# Patient Record
Sex: FEMALE | Race: BLACK OR AFRICAN AMERICAN | NOT HISPANIC OR LATINO | Employment: FULL TIME | URBAN - METROPOLITAN AREA
[De-identification: names, ages, dates, MRNs, and addresses within clinical notes are randomized per-mention and may not be internally consistent; named-entity substitution may affect disease eponyms.]

---

## 2017-02-13 ENCOUNTER — OFFICE VISIT (OUTPATIENT)
Dept: OPHTHALMOLOGY | Facility: CLINIC | Age: 57
End: 2017-02-13
Payer: COMMERCIAL

## 2017-02-13 DIAGNOSIS — H10.9 BACTERIAL CONJUNCTIVITIS: Primary | ICD-10-CM

## 2017-02-13 PROCEDURE — 99999 PR PBB SHADOW E&M-EST. PATIENT-LVL I: CPT | Mod: PBBFAC,,, | Performed by: OPHTHALMOLOGY

## 2017-02-13 PROCEDURE — 92012 INTRM OPH EXAM EST PATIENT: CPT | Mod: S$GLB,,, | Performed by: OPHTHALMOLOGY

## 2017-02-13 RX ORDER — POLYMYXIN B SULFATE AND TRIMETHOPRIM 1; 10000 MG/ML; [USP'U]/ML
1 SOLUTION OPHTHALMIC 4 TIMES DAILY
Qty: 1 BOTTLE | Refills: 1 | Status: SHIPPED | OUTPATIENT
Start: 2017-02-13 | End: 2017-02-20

## 2017-02-13 NOTE — PROGRESS NOTES
HPI     Patient c/o sticky ou upon awakening in the am with discharge os< od for   3 weeks with itching and burning.      H/O RETINA DETACHMENT OS  LATTICE / COBBLESTONE OU  MAC HOLE OS  HIGH MYOPE -9  PERIPHERAL PIGMENTATION   H/O SARCOID UVEITIS  GUTTATA OU  PCIOL OS  CAT OD         Last edited by IAM Vargas on 2/13/2017 10:39 AM.         Assessment /Plan     For exam results, see Encounter Report.      ICD-10-CM ICD-9-CM    1. Bacterial conjunctivitis H10.9 372.39 OS >OD   polymyxin B sulf-trimethoprim (POLYTRIM) 10,000 unit- 1 mg/mL Drop     041.9        Start Polytrim QID OU, precautions discussed with pt      RETURN TO CLINIC 1 month for Full Exam

## 2017-02-20 ENCOUNTER — OFFICE VISIT (OUTPATIENT)
Dept: RHEUMATOLOGY | Facility: CLINIC | Age: 57
End: 2017-02-20
Payer: COMMERCIAL

## 2017-02-20 ENCOUNTER — LAB VISIT (OUTPATIENT)
Dept: LAB | Facility: HOSPITAL | Age: 57
End: 2017-02-20
Attending: INTERNAL MEDICINE
Payer: COMMERCIAL

## 2017-02-20 VITALS
HEART RATE: 84 BPM | HEIGHT: 65 IN | BODY MASS INDEX: 34.2 KG/M2 | SYSTOLIC BLOOD PRESSURE: 127 MMHG | WEIGHT: 205.25 LBS | DIASTOLIC BLOOD PRESSURE: 78 MMHG

## 2017-02-20 DIAGNOSIS — E66.09 NON MORBID OBESITY DUE TO EXCESS CALORIES: ICD-10-CM

## 2017-02-20 DIAGNOSIS — M54.2 NECK PAIN ON RIGHT SIDE: ICD-10-CM

## 2017-02-20 DIAGNOSIS — E87.6 HYPOKALEMIA: ICD-10-CM

## 2017-02-20 DIAGNOSIS — D86.9 SARCOIDOSIS: Primary | ICD-10-CM

## 2017-02-20 DIAGNOSIS — M17.0 PRIMARY OSTEOARTHRITIS OF BOTH KNEES: Chronic | ICD-10-CM

## 2017-02-20 DIAGNOSIS — Z51.81 MEDICATION MONITORING ENCOUNTER: Chronic | ICD-10-CM

## 2017-02-20 DIAGNOSIS — D86.9 SARCOIDOSIS: ICD-10-CM

## 2017-02-20 LAB
ALBUMIN SERPL BCP-MCNC: 3.7 G/DL
ALP SERPL-CCNC: 85 U/L
ALT SERPL W/O P-5'-P-CCNC: 13 U/L
ANION GAP SERPL CALC-SCNC: 12 MMOL/L
AST SERPL-CCNC: 16 U/L
BASOPHILS # BLD AUTO: 0.02 K/UL
BASOPHILS NFR BLD: 0.3 %
BILIRUB SERPL-MCNC: 0.6 MG/DL
BILIRUB UR QL STRIP: NEGATIVE
BUN SERPL-MCNC: 12 MG/DL
CALCIUM SERPL-MCNC: 9.4 MG/DL
CHLORIDE SERPL-SCNC: 105 MMOL/L
CLARITY UR: CLEAR
CO2 SERPL-SCNC: 26 MMOL/L
COLOR UR: YELLOW
CREAT SERPL-MCNC: 0.9 MG/DL
CRP SERPL-MCNC: 2.6 MG/L
DIFFERENTIAL METHOD: ABNORMAL
EOSINOPHIL # BLD AUTO: 0.1 K/UL
EOSINOPHIL NFR BLD: 1.4 %
ERYTHROCYTE [DISTWIDTH] IN BLOOD BY AUTOMATED COUNT: 12.9 %
ERYTHROCYTE [SEDIMENTATION RATE] IN BLOOD BY WESTERGREN METHOD: 30 MM/HR
EST. GFR  (AFRICAN AMERICAN): >60 ML/MIN/1.73 M^2
EST. GFR  (NON AFRICAN AMERICAN): >60 ML/MIN/1.73 M^2
GLUCOSE SERPL-MCNC: 131 MG/DL
GLUCOSE UR QL STRIP: NEGATIVE
HCT VFR BLD AUTO: 38.3 %
HGB BLD-MCNC: 12.5 G/DL
HGB UR QL STRIP: NEGATIVE
KETONES UR QL STRIP: NEGATIVE
LEUKOCYTE ESTERASE UR QL STRIP: NEGATIVE
LYMPHOCYTES # BLD AUTO: 2.7 K/UL
LYMPHOCYTES NFR BLD: 46.3 %
MCH RBC QN AUTO: 31.8 PG
MCHC RBC AUTO-ENTMCNC: 32.6 %
MCV RBC AUTO: 98 FL
MONOCYTES # BLD AUTO: 0.3 K/UL
MONOCYTES NFR BLD: 4.7 %
NEUTROPHILS # BLD AUTO: 2.7 K/UL
NEUTROPHILS NFR BLD: 47.3 %
NITRITE UR QL STRIP: NEGATIVE
PH UR STRIP: 6 [PH] (ref 5–8)
PLATELET # BLD AUTO: 228 K/UL
PMV BLD AUTO: 10.4 FL
POTASSIUM SERPL-SCNC: 3.2 MMOL/L
PROT SERPL-MCNC: 7.2 G/DL
PROT UR QL STRIP: ABNORMAL
RBC # BLD AUTO: 3.93 M/UL
SODIUM SERPL-SCNC: 143 MMOL/L
SP GR UR STRIP: 1.02 (ref 1–1.03)
URN SPEC COLLECT METH UR: ABNORMAL
WBC # BLD AUTO: 5.79 K/UL

## 2017-02-20 PROCEDURE — 85651 RBC SED RATE NONAUTOMATED: CPT | Mod: PO

## 2017-02-20 PROCEDURE — 85025 COMPLETE CBC W/AUTO DIFF WBC: CPT | Mod: PO

## 2017-02-20 PROCEDURE — 99999 PR PBB SHADOW E&M-EST. PATIENT-LVL III: CPT | Mod: PBBFAC,,, | Performed by: INTERNAL MEDICINE

## 2017-02-20 PROCEDURE — 36415 COLL VENOUS BLD VENIPUNCTURE: CPT | Mod: PO

## 2017-02-20 PROCEDURE — 3078F DIAST BP <80 MM HG: CPT | Mod: S$GLB,,, | Performed by: INTERNAL MEDICINE

## 2017-02-20 PROCEDURE — 86140 C-REACTIVE PROTEIN: CPT

## 2017-02-20 PROCEDURE — 3074F SYST BP LT 130 MM HG: CPT | Mod: S$GLB,,, | Performed by: INTERNAL MEDICINE

## 2017-02-20 PROCEDURE — 99214 OFFICE O/P EST MOD 30 MIN: CPT | Mod: S$GLB,,, | Performed by: INTERNAL MEDICINE

## 2017-02-20 PROCEDURE — 81003 URINALYSIS AUTO W/O SCOPE: CPT | Mod: PO

## 2017-02-20 PROCEDURE — 80053 COMPREHEN METABOLIC PANEL: CPT | Mod: PO

## 2017-02-20 PROCEDURE — 82164 ANGIOTENSIN I ENZYME TEST: CPT

## 2017-02-20 RX ORDER — AZATHIOPRINE 50 MG/1
TABLET ORAL
Qty: 90 TABLET | Refills: 3 | Status: SHIPPED | OUTPATIENT
Start: 2017-02-20 | End: 2017-06-28 | Stop reason: SDUPTHER

## 2017-02-20 NOTE — PATIENT INSTRUCTIONS
Use moist heat and muscle rub to neck with advil for neck pain    Ok to resume gabapentin in future if back pain worsens    Lowered Imuran to 1 tablet per day     Encourage weight loss, dieting, and exercise

## 2017-02-20 NOTE — PROGRESS NOTES
CC: pain in hands, L knee, bottom of feet     Mrs. Montalvo is a 55 y/o AAF with history of osteoarthritis and sarcoidosis. Sarcoidosis was diagnosed in 1985 through a biopsy of a spot on her liver. She has also has vision lost that she has regained since that time. Today, she is on Imuran 75mg daily. She is only endoses SOB when she cleans and when she moves around her house a lot. But states she can walk at least 1/2 mile without SOB. She also has epigastric abdominal pain from time to time. She has R shoulder pain that starts in her neck and radiates down her right arm. She states it's more of a muscle pain. It has been present for months. She takes advil which does help with the pain. She also complaints of soreness/tingling in her hands and the arches of her feet bilaterally. Also endorses L  Knee pain (which has had a replacement in the past). Does not take aleve for pain, just advil about every other week as needed for pain.     Denies chest pain, nausea, vomiting, diarrhea, constipation, fevers, chills, rash.     Vitals:    02/20/17 1439   BP: 127/78   Pulse: 84     PE:   General: alert and orientated  Cardio: rrr, no m, r, g  Resp: CTA bilaterally, no w, r, r   MSK: normal ROM. No swelling noted in any joints, no erythema. Pain on palpation of the MCP and PIP joints bilaterally in hands. No crepitus noted in R knee. Tender to palpation of the R trapezius muscle. No cervical spinal tenderness.    Neuro: CN II-XII intact bilaterally     Results for ASHLYN MONTALVO (MRN [id number]) as of 2/20/2017 15:36   Ref. Range 2/20/2017 14:08   WBC Latest Ref Range: 3.90 - 12.70 K/uL 5.79   RBC Latest Ref Range: 4.00 - 5.40 M/uL 3.93 (L)   Hemoglobin Latest Ref Range: 12.0 - 16.0 g/dL 12.5   Hematocrit Latest Ref Range: 37.0 - 48.5 % 38.3   MCV Latest Ref Range: 82 - 98 fL 98   MCH Latest Ref Range: 27.0 - 31.0 pg 31.8 (H)   MCHC Latest Ref Range: 32.0 - 36.0 % 32.6   RDW Latest Ref Range: 11.5 - 14.5 % 12.9    Platelets Latest Ref Range: 150 - 350 K/uL 228   MPV Latest Ref Range: 9.2 - 12.9 fL 10.4   Gran% Latest Ref Range: 38.0 - 73.0 % 47.3   Gran # Latest Ref Range: 1.8 - 7.7 K/uL 2.7   Lymph% Latest Ref Range: 18.0 - 48.0 % 46.3   Lymph # Latest Ref Range: 1.0 - 4.8 K/uL 2.7   Mono% Latest Ref Range: 4.0 - 15.0 % 4.7   Mono # Latest Ref Range: 0.3 - 1.0 K/uL 0.3   Eosinophil% Latest Ref Range: 0.0 - 8.0 % 1.4   Eos # Latest Ref Range: 0.0 - 0.5 K/uL 0.1   Basophil% Latest Ref Range: 0.0 - 1.9 % 0.3   Baso # Latest Ref Range: 0.00 - 0.20 K/uL 0.02   Sodium Latest Ref Range: 136 - 145 mmol/L 143   Potassium Latest Ref Range: 3.5 - 5.1 mmol/L 3.2 (L)   Chloride Latest Ref Range: 95 - 110 mmol/L 105   CO2 Latest Ref Range: 23 - 29 mmol/L 26   Anion Gap Latest Ref Range: 8 - 16 mmol/L 12   BUN, Bld Latest Ref Range: 6 - 20 mg/dL 12   Creatinine Latest Ref Range: 0.5 - 1.4 mg/dL 0.9   eGFR if non African American Latest Ref Range: >60 mL/min/1.73 m^2 >60   eGFR if  Latest Ref Range: >60 mL/min/1.73 m^2 >60   Glucose Latest Ref Range: 70 - 110 mg/dL 131 (H)   Calcium Latest Ref Range: 8.7 - 10.5 mg/dL 9.4   Alkaline Phosphatase Latest Ref Range: 55 - 135 U/L 85   Total Protein Latest Ref Range: 6.0 - 8.4 g/dL 7.2   Albumin Latest Ref Range: 3.5 - 5.2 g/dL 3.7   Total Bilirubin Latest Ref Range: 0.1 - 1.0 mg/dL 0.6   AST Latest Ref Range: 10 - 40 U/L 16   ALT Latest Ref Range: 10 - 44 U/L 13   Results for ASHLYN MONTALVO (MRN [id number]) as of 2/20/2017 15:36   Ref. Range 2/20/2017 14:05   Specimen UA Unknown Urine, Clean Catch   Color, UA Latest Ref Range: Yellow, Straw, Maru  Yellow   pH, UA Latest Ref Range: 5.0 - 8.0  6.0   Specific Gravity, UA Latest Ref Range: 1.005 - 1.030  1.025   Appearance, UA Latest Ref Range: Clear  Clear   Protein, UA Latest Ref Range: Negative  Trace (A)   Glucose, UA Latest Ref Range: Negative  Negative   Ketones, UA Latest Ref Range: Negative  Negative   Occult Blood UA  Latest Ref Range: Negative  Negative   Nitrite, UA Latest Ref Range: Negative  Negative   Bilirubin (UA) Latest Ref Range: Negative  Negative   Leukocytes, UA Latest Ref Range: Negative  Negative         Impression and Plan:  Mrs. Sheffield is a 55 y/o AAF with history of osteoarthritis and sarcoidosis. Currently well controlled on Imuran 75mg daily. Currently having neck pain with bilateral hand and arch pains.     Sarcoidosis:  - well controlled  - will decrease Imuran from 75mg daily to 50mg daily    Osteoarthritis:  - possible R neck pain is from degenerative changes in neck  - continuing conservative therapy with moist warmth to neck for muscle pain and advil as needed for pain  - feel that her hand pain is likely referred pain from her neck at this time- no evidence of synovitis or loss of motion in any hand joint  - can restart gabapentin in future if needed for back and R hip pain  - Encourage weight loss, dieting, and exercise     Proteinuria  - trace  - will continue to monitor with urinalysis    Hypokalemia  - likely from diuretic   - advised patient to increase K through food choices     Plan:  Use moist heat and muscle rub to neck with advil for neck pain  Ok to resume gabapentin in future if back pain worsens  Lowered Imuran to 50mg daily from 75mg daily - follow lab  Encourage weight loss, dieting, and exercise     Return to clinic in 4 months. Pre-visit labs placed today.

## 2017-02-20 NOTE — MR AVS SNAPSHOT
[facility]  [address] [place] [address]  Phone: [phone]  Fax: [phone]                  Janna Sheffield   2017 2:30 PM   Office Visit    Description:  Female : 1960   Provider:  Odin Barraza MD   Department:  [facility] - Rheumatology           Reason for Visit     Sarcoidosis     Osteoarthritis     Pain           Diagnoses this Visit        Comments    Sarcoidosis    -  Primary     Medication monitoring encounter                To Do List           Future Appointments        Provider Department Dept Phone    3/20/2017 9:15 AM MD Russ Finch - Ophthalmology [phone]    5/15/2017 8:10 AM LABORATORY, [facility] [phone]    2017 1:00 PM RUDY Botello Jr., MD [facility] Internal Medicine [phone]    2017 10:30 AM LABORATORY, [facility] [phone]    2017 10:40 AM SPECIMEN, [facility] [phone]      Goals (5 Years of Data)     None      Follow-Up and Disposition     Return in about 4 months (around 2017) for jasmina, regular for lab and urinalysis .       These Medications        Disp Refills Start End    azathioprine (IMURAN) 50 mg Tab 90 tablet 3 2017     TAKE ONE TABLET BY MOUTH ONCE DAILY WITH  ORANGE  JUICE  AS  DIRECTED    Pharmacy: [facility] [address] [place] [address] R[place]  Ph #: [phone]         [facility] On Call     [facility] Nurse Care Line -  Assistance  Registered nurses in the [facility] Center provide clinical advisement, health education, appointment booking, and other advisory services.  Call for this free service at [phone].             Medications           Message regarding Medications     Verify the changes and/or additions to your medication regime listed below are the same as discussed with your clinician today.  If any of these changes or additions are incorrect,  "please notify your healthcare provider.        CHANGE how you are taking these medications     Start Taking Instead of    azathioprine (IMURAN) 50 mg Tab azathioprine (IMURAN) 50 mg Tab    Dosage:  TAKE ONE TABLET BY MOUTH ONCE DAILY WITH  ORANGE  JUICE  AS  DIRECTED Dosage:  TAKE ONE & ONE-HALF TABLETS BY MOUTH ONCE DAILY WITH  ORANGE  JUICE  AS  DIRECTED    Reason for Change:  Reorder       STOP taking these medications     polymyxin B sulf-trimethoprim (POLYTRIM) 10,000 unit- 1 mg/mL Drop Place 1 drop into both eyes 4 (four) times daily.           Verify that the below list of medications is an accurate representation of the medications you are currently taking.  If none reported, the list may be blank. If incorrect, please contact your healthcare provider. Carry this list with you in case of emergency.           Current Medications     azathioprine (IMURAN) 50 mg Tab TAKE ONE TABLET BY MOUTH ONCE DAILY WITH  ORANGE  JUICE  AS  DIRECTED    hydrochlorothiazide (HYDRODIURIL) 25 MG tablet Take 1 tablet (25 mg total) by mouth once daily.    multivitamin (THERAGRAN) per tablet Take 1 tablet by mouth Daily.    rosuvastatin (CRESTOR) 40 MG Tab TAKE ONE TABLET BY MOUTH ONCE DAILY (  REPLACES  LIPITOR)           Clinical Reference Information           Your Vitals Were     BP Pulse Height Weight BMI    127/78 84 5' 5" (1.651 m) 93.1 kg (205 lb 4 oz) 34.16 kg/m2      Blood Pressure          Most Recent Value    BP  127/78      Allergies as of 2/20/2017     No Known Allergies      Immunizations Administered on Date of Encounter - 2/20/2017     None      Orders Placed During Today's Visit     Future Labs/Procedures Expected by Expires    C-reactive protein  5/2/2017 4/21/2018    CBC auto differential  5/2/2017 4/21/2018    Comprehensive metabolic panel  5/2/2017 4/21/2018    Sedimentation rate, manual  5/2/2017 2/20/2018    Urinalysis  5/2/2017 4/21/2018      Instructions    Use moist heat and muscle rub to neck with advil " for neck pain    Ok to resume gabapentin in future if back pain worsens    Lowered Imuran to 1 tablet per day     Encourage weight loss, dieting, and exercise        Language Assistance Services     ATTENTION: Language assistance services are available, free of charge. Please call [phone].      ATENCIÓN: Si ruy paniagua, tiene a raines disposición servicios gratuitos de asistencia lingüística. Llame al [phone].     CHÚ Ý: N?u b?n nói Ti?ng Vi?t, có các d?ch v? h? tr? ngôn ng? mi?n phí dành cho b?n. G?i s? [phone].         [facility] complies with applicable Federal civil rights laws and does not discriminate on the basis of race, color, national origin, age, disability, or sex.

## 2017-02-22 LAB — ACE SERPL-CCNC: 15 U/L

## 2017-05-01 ENCOUNTER — OFFICE VISIT (OUTPATIENT)
Dept: OPHTHALMOLOGY | Facility: CLINIC | Age: 57
End: 2017-05-01
Payer: COMMERCIAL

## 2017-05-01 DIAGNOSIS — H35.3190 MACULAR DEGENERATION, AGE RELATED, NONEXUDATIVE: Primary | ICD-10-CM

## 2017-05-01 DIAGNOSIS — H35.349 MACULAR CYST, HOLE, OR PSEUDOHOLE OF RETINA: ICD-10-CM

## 2017-05-01 DIAGNOSIS — H52.7 REFRACTIVE ERROR: ICD-10-CM

## 2017-05-01 DIAGNOSIS — H35.342 MACULAR HOLE, LEFT: ICD-10-CM

## 2017-05-01 DIAGNOSIS — Z96.1 PSEUDOPHAKIA OF BOTH EYES: ICD-10-CM

## 2017-05-01 PROCEDURE — 99999 PR PBB SHADOW E&M-EST. PATIENT-LVL I: CPT | Mod: PBBFAC,,, | Performed by: OPHTHALMOLOGY

## 2017-05-01 PROCEDURE — 92014 COMPRE OPH EXAM EST PT 1/>: CPT | Mod: S$GLB,,, | Performed by: OPHTHALMOLOGY

## 2017-05-01 NOTE — PROGRESS NOTES
HPI     Here for full exam.  She wants bifocals so she doesn't have to remove   glasses to read.  Her eyes tear frequently.    H/O RETINA DETACHMENT OS  LATTICE / COBBLESTONE OU  MAC HOLE OS  HIGH MYOPE -9  PERIPHERAL PIGMENTATION   H/O SARCOID UVEITIS  GUTTATA OU  PCIOL OS  CAT OD       Last edited by Shilpa Barraza on 5/1/2017  8:51 AM.         Assessment /Plan     For exam results, see Encounter Report.      ICD-10-CM ICD-9-CM    1. Macular degeneration, age related, nonexudative - Left Eye H35.3190 362.51 Appears stable- Follow   2. Pseudophakia of both eyes Z96.1 V43.1 Well   3. Macular hole, left H35.342 362.54 Left eye, appears stable    4. Macular cyst, hole, or pseudohole of retina H35.349 362.54 Left eye, appears table    5. Refractive error - Both Eyes H52.7 367.9 Disp MR        RETURN TO CLINIC 1 year

## 2017-05-17 ENCOUNTER — LAB VISIT (OUTPATIENT)
Dept: LAB | Facility: HOSPITAL | Age: 57
End: 2017-05-17
Attending: PEDIATRICS
Payer: COMMERCIAL

## 2017-05-17 DIAGNOSIS — E78.00 PURE HYPERCHOLESTEROLEMIA: ICD-10-CM

## 2017-05-17 DIAGNOSIS — I10 ESSENTIAL HYPERTENSION: ICD-10-CM

## 2017-05-17 LAB
ALT SERPL W/O P-5'-P-CCNC: 10 U/L
ANION GAP SERPL CALC-SCNC: 8 MMOL/L
AST SERPL-CCNC: 15 U/L
BUN SERPL-MCNC: 10 MG/DL
CALCIUM SERPL-MCNC: 9.5 MG/DL
CHLORIDE SERPL-SCNC: 104 MMOL/L
CHOLEST/HDLC SERPL: 3.8 {RATIO}
CO2 SERPL-SCNC: 29 MMOL/L
CREAT SERPL-MCNC: 0.8 MG/DL
EST. GFR  (AFRICAN AMERICAN): >60 ML/MIN/1.73 M^2
EST. GFR  (NON AFRICAN AMERICAN): >60 ML/MIN/1.73 M^2
GLUCOSE SERPL-MCNC: 80 MG/DL
HDL/CHOLESTEROL RATIO: 26.5 %
HDLC SERPL-MCNC: 166 MG/DL
HDLC SERPL-MCNC: 44 MG/DL
LDLC SERPL CALC-MCNC: 106.4 MG/DL
NONHDLC SERPL-MCNC: 122 MG/DL
POTASSIUM SERPL-SCNC: 3.7 MMOL/L
SODIUM SERPL-SCNC: 141 MMOL/L
TRIGL SERPL-MCNC: 78 MG/DL

## 2017-05-17 PROCEDURE — 80048 BASIC METABOLIC PNL TOTAL CA: CPT

## 2017-05-17 PROCEDURE — 36415 COLL VENOUS BLD VENIPUNCTURE: CPT | Mod: PO

## 2017-05-17 PROCEDURE — 80061 LIPID PANEL: CPT

## 2017-05-17 PROCEDURE — 84460 ALANINE AMINO (ALT) (SGPT): CPT

## 2017-05-17 PROCEDURE — 84450 TRANSFERASE (AST) (SGOT): CPT

## 2017-05-22 ENCOUNTER — OFFICE VISIT (OUTPATIENT)
Dept: INTERNAL MEDICINE | Facility: CLINIC | Age: 57
End: 2017-05-22
Payer: COMMERCIAL

## 2017-05-22 VITALS
BODY MASS INDEX: 34.25 KG/M2 | OXYGEN SATURATION: 98 % | TEMPERATURE: 96 F | HEART RATE: 81 BPM | HEIGHT: 65 IN | SYSTOLIC BLOOD PRESSURE: 136 MMHG | DIASTOLIC BLOOD PRESSURE: 72 MMHG | WEIGHT: 205.56 LBS

## 2017-05-22 DIAGNOSIS — I10 ESSENTIAL HYPERTENSION: Primary | ICD-10-CM

## 2017-05-22 DIAGNOSIS — E78.00 PURE HYPERCHOLESTEROLEMIA: ICD-10-CM

## 2017-05-22 PROCEDURE — 1160F RVW MEDS BY RX/DR IN RCRD: CPT | Mod: S$GLB,,, | Performed by: PEDIATRICS

## 2017-05-22 PROCEDURE — 99999 PR PBB SHADOW E&M-EST. PATIENT-LVL III: CPT | Mod: PBBFAC,,, | Performed by: PEDIATRICS

## 2017-05-22 PROCEDURE — 99214 OFFICE O/P EST MOD 30 MIN: CPT | Mod: S$GLB,,, | Performed by: PEDIATRICS

## 2017-05-22 PROCEDURE — 3078F DIAST BP <80 MM HG: CPT | Mod: S$GLB,,, | Performed by: PEDIATRICS

## 2017-05-22 PROCEDURE — 3075F SYST BP GE 130 - 139MM HG: CPT | Mod: S$GLB,,, | Performed by: PEDIATRICS

## 2017-05-22 NOTE — PROGRESS NOTES
Subjective:       Patient ID: Janna Sheffield is a 56 y.o. female.    Chief Complaint: Annual Exam (6mo/lab)    HTN: B/P good, no HTNive symptoms.  LIPIDS:not following D&E, weight is down slightly. Tolerating, and compliant with crestor .     SUBSPECIALTY NOTES AND LABS REVIEWED AND DISCUSSED WITH PATIENT      Review of Systems   Constitutional: Negative for fever and unexpected weight change.   HENT: Negative for congestion and rhinorrhea.    Eyes: Negative for discharge and redness.   Respiratory: Negative for cough and wheezing.    Cardiovascular: Negative for chest pain, palpitations and leg swelling.   Gastrointestinal: Negative for constipation, diarrhea and vomiting.   Endocrine: Negative for cold intolerance, heat intolerance, polydipsia, polyphagia and polyuria.   Genitourinary: Positive for pelvic pain. Negative for decreased urine volume, difficulty urinating and menstrual problem.   Musculoskeletal: Positive for arthralgias and myalgias. Negative for joint swelling.   Skin: Negative for rash and wound.   Neurological: Negative for syncope and headaches.   Psychiatric/Behavioral: Negative for behavioral problems and sleep disturbance.       Objective:      Physical Exam   Constitutional: She is oriented to person, place, and time. She appears well-developed and well-nourished. She is cooperative.   HENT:   Head: Normocephalic and atraumatic.   Eyes: Conjunctivae, EOM and lids are normal. Pupils are equal, round, and reactive to light.   Neck: Trachea normal and normal range of motion. Neck supple. No JVD present. Carotid bruit is not present. No Brudzinski's sign and no Kernig's sign noted. No thyroid mass and no thyromegaly present.   Cardiovascular: Normal rate, regular rhythm, normal heart sounds and normal pulses.    No murmur heard.  Pulmonary/Chest: Effort normal and breath sounds normal. No respiratory distress. She has no wheezes. She has no rhonchi. She has no rales.   Abdominal: Soft. Normal  appearance. She exhibits no mass. There is no hepatosplenomegaly. There is no tenderness. There is no rebound and no CVA tenderness.   Musculoskeletal: She exhibits no edema.   Lymphadenopathy:     She has no cervical adenopathy.   Neurological: She is alert and oriented to person, place, and time. She has normal strength and normal reflexes. No cranial nerve deficit or sensory deficit. Coordination and gait normal.   Skin: Skin is warm. No abrasion and no rash noted.   Psychiatric: She has a normal mood and affect. Her speech is normal and behavior is normal. Judgment and thought content normal. Her mood appears not anxious. Cognition and memory are normal. She does not exhibit a depressed mood.       Assessment:       1. Essential hypertension    2. Pure hypercholesterolemia        Plan:       Essential hypertension    Pure hypercholesterolemia  -     ALT (SGPT); Future; Expected date: 05/22/2017  -     AST (SGOT); Future; Expected date: 05/22/2017  -     Lipid panel; Future; Expected date: 05/22/2017    D&E, weight loss. Continue meds. See GYN for her gyn care and pelvic pain. F/U 6 months with labs.

## 2017-06-28 ENCOUNTER — LAB VISIT (OUTPATIENT)
Dept: LAB | Facility: HOSPITAL | Age: 57
End: 2017-06-28
Attending: INTERNAL MEDICINE
Payer: COMMERCIAL

## 2017-06-28 ENCOUNTER — OFFICE VISIT (OUTPATIENT)
Dept: RHEUMATOLOGY | Facility: CLINIC | Age: 57
End: 2017-06-28
Payer: COMMERCIAL

## 2017-06-28 VITALS
HEART RATE: 84 BPM | BODY MASS INDEX: 34.41 KG/M2 | DIASTOLIC BLOOD PRESSURE: 82 MMHG | WEIGHT: 206.81 LBS | SYSTOLIC BLOOD PRESSURE: 134 MMHG

## 2017-06-28 DIAGNOSIS — D86.9 SARCOIDOSIS: Primary | ICD-10-CM

## 2017-06-28 DIAGNOSIS — Z51.81 MEDICATION MONITORING ENCOUNTER: Chronic | ICD-10-CM

## 2017-06-28 DIAGNOSIS — D84.9 IMMUNOCOMPROMISED: ICD-10-CM

## 2017-06-28 DIAGNOSIS — D86.9 SARCOIDOSIS: ICD-10-CM

## 2017-06-28 DIAGNOSIS — M15.9 GENERALIZED OSTEOARTHRITIS OF MULTIPLE SITES: Chronic | ICD-10-CM

## 2017-06-28 DIAGNOSIS — J98.4 CHRONIC RESTRICTIVE LUNG DISEASE: ICD-10-CM

## 2017-06-28 LAB
ALBUMIN SERPL BCP-MCNC: 3.6 G/DL
ALP SERPL-CCNC: 86 U/L
ALT SERPL W/O P-5'-P-CCNC: 12 U/L
ANION GAP SERPL CALC-SCNC: 9 MMOL/L
AST SERPL-CCNC: 15 U/L
BASOPHILS # BLD AUTO: 0.04 K/UL
BASOPHILS NFR BLD: 0.8 %
BILIRUB SERPL-MCNC: 0.6 MG/DL
BUN SERPL-MCNC: 9 MG/DL
CALCIUM SERPL-MCNC: 9 MG/DL
CHLORIDE SERPL-SCNC: 108 MMOL/L
CO2 SERPL-SCNC: 27 MMOL/L
CREAT SERPL-MCNC: 0.8 MG/DL
CRP SERPL-MCNC: 7.1 MG/L
DIFFERENTIAL METHOD: ABNORMAL
EOSINOPHIL # BLD AUTO: 0.1 K/UL
EOSINOPHIL NFR BLD: 1.4 %
ERYTHROCYTE [DISTWIDTH] IN BLOOD BY AUTOMATED COUNT: 13.3 %
ERYTHROCYTE [SEDIMENTATION RATE] IN BLOOD BY WESTERGREN METHOD: 35 MM/HR
EST. GFR  (AFRICAN AMERICAN): >60 ML/MIN/1.73 M^2
EST. GFR  (NON AFRICAN AMERICAN): >60 ML/MIN/1.73 M^2
GLUCOSE SERPL-MCNC: 96 MG/DL
HCT VFR BLD AUTO: 38.2 %
HGB BLD-MCNC: 12.4 G/DL
LYMPHOCYTES # BLD AUTO: 2.4 K/UL
LYMPHOCYTES NFR BLD: 49.4 %
MCH RBC QN AUTO: 31.2 PG
MCHC RBC AUTO-ENTMCNC: 32.5 %
MCV RBC AUTO: 96 FL
MONOCYTES # BLD AUTO: 0.3 K/UL
MONOCYTES NFR BLD: 6.3 %
NEUTROPHILS # BLD AUTO: 2.1 K/UL
NEUTROPHILS NFR BLD: 42.1 %
PLATELET # BLD AUTO: 220 K/UL
PMV BLD AUTO: 10.3 FL
POTASSIUM SERPL-SCNC: 3.6 MMOL/L
PROT SERPL-MCNC: 7.1 G/DL
RBC # BLD AUTO: 3.97 M/UL
SODIUM SERPL-SCNC: 144 MMOL/L
WBC # BLD AUTO: 4.92 K/UL

## 2017-06-28 PROCEDURE — 80053 COMPREHEN METABOLIC PANEL: CPT | Mod: PO

## 2017-06-28 PROCEDURE — 85025 COMPLETE CBC W/AUTO DIFF WBC: CPT | Mod: PO

## 2017-06-28 PROCEDURE — 86140 C-REACTIVE PROTEIN: CPT

## 2017-06-28 PROCEDURE — 99999 PR PBB SHADOW E&M-EST. PATIENT-LVL III: CPT | Mod: PBBFAC,,, | Performed by: PHYSICIAN ASSISTANT

## 2017-06-28 PROCEDURE — 99214 OFFICE O/P EST MOD 30 MIN: CPT | Mod: S$GLB,,, | Performed by: PHYSICIAN ASSISTANT

## 2017-06-28 PROCEDURE — 85651 RBC SED RATE NONAUTOMATED: CPT | Mod: PO

## 2017-06-28 PROCEDURE — 36415 COLL VENOUS BLD VENIPUNCTURE: CPT | Mod: PO

## 2017-06-28 RX ORDER — AZATHIOPRINE 50 MG/1
TABLET ORAL
Qty: 90 TABLET | Refills: 3 | Status: SHIPPED | OUTPATIENT
Start: 2017-06-28 | End: 2018-03-01 | Stop reason: SDUPTHER

## 2017-06-28 RX ORDER — GABAPENTIN 100 MG/1
100 CAPSULE ORAL 3 TIMES DAILY
Qty: 90 CAPSULE | Refills: 11 | Status: SHIPPED | OUTPATIENT
Start: 2017-06-28 | End: 2017-10-11 | Stop reason: SDUPTHER

## 2017-06-28 NOTE — PATIENT INSTRUCTIONS
otc topicals for feet arches, arch supports     Imuran 50mg/day --refilled    Gabapentin 100mg start at night then can increase up to 300mg three times daily if you want     Keep f/u with Dr. Casper

## 2017-06-28 NOTE — PROGRESS NOTES
Subjective:       Patient ID: Janna Sheffield is a 56 y.o. female.    Chief Complaint: Sarcoidosis, osteoarthritis    Mrs. Sheffield is a 57 y/o AAF with history of osteoarthritis and sarcoidosis. Sarcoidosis was diagnosed in 1985 through a biopsy of a spot on her liver. 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     Currently she is on Imuran 50mg daily (lowered last visit from 75mg/d). She is doing well in regards to her Sarcoidosis.  She does have restrictive lung disease and SOB when she is more active. She sees Dr. Casper for this.  Her last CT scan done a year ago was stable.     She has generalized osteoarthritis with pain in her neck that radiates into her right shoulder and arm.  She complains of sharp shooting pains in her hands.  She also c/o mao foot pain at both arches.  She takes aleve prn which helps.     Denies chest pain, nausea, vomiting, diarrhea, constipation, fevers, chills, rash.  No vision issues. No weakness.       Review of Systems   Constitutional: Negative for chills, fatigue and fever.   HENT: Negative for mouth sores, rhinorrhea and sore throat.    Eyes: Negative for pain and redness.   Respiratory: Positive for shortness of breath. Negative for cough.    Cardiovascular: Negative for chest pain.   Gastrointestinal: Negative for abdominal pain, constipation, diarrhea, nausea and vomiting.   Genitourinary: Negative for dysuria and hematuria.   Musculoskeletal: Positive for arthralgias and neck pain. Negative for joint swelling and myalgias.        Left knee replacement   Skin: Negative for rash.   Neurological: Negative for weakness, numbness and headaches.   Psychiatric/Behavioral: The patient is not nervous/anxious.            Objective:   /82   Pulse 84   Wt 93.8 kg (206 lb 12.7 oz)   BMI 34.41 kg/m²      Physical Exam   Constitutional: She is oriented to person, place, and time and well-developed, well-nourished,  and in no distress.   HENT:   Head: Normocephalic and atraumatic.   Eyes: Pupils are equal, round, and reactive to light. Right eye exhibits no discharge.   Neck: Normal range of motion.   Cardiovascular: Normal rate, regular rhythm and normal heart sounds.  Exam reveals no friction rub.    Pulmonary/Chest: Effort normal and breath sounds normal. No respiratory distress.   Abdominal: Soft. She exhibits no distension. There is no tenderness.   Lymphadenopathy:     She has no cervical adenopathy.   Neurological: She is alert and oriented to person, place, and time.   Skin: No rash noted. No erythema.     Psychiatric: Mood normal.   Musculoskeletal: Normal range of motion. She exhibits no edema or deformity.   mao wrists, mcps, pips no synovitis, no tenderness, full rom    Left knee s/p replacement, no effusion, no warmth  Right knee no effusion, no warmth, full rom         Recent Results (from the past 168 hour(s))   CBC auto differential    Collection Time: 06/28/17 10:54 AM   Result Value Ref Range    WBC 4.92 3.90 - 12.70 K/uL    RBC 3.97 (L) 4.00 - 5.40 M/uL    Hemoglobin 12.4 12.0 - 16.0 g/dL    Hematocrit 38.2 37.0 - 48.5 %    MCV 96 82 - 98 fL    MCH 31.2 (H) 27.0 - 31.0 pg    MCHC 32.5 32.0 - 36.0 %    RDW 13.3 11.5 - 14.5 %    Platelets 220 150 - 350 K/uL    MPV 10.3 9.2 - 12.9 fL    Gran # 2.1 1.8 - 7.7 K/uL    Lymph # 2.4 1.0 - 4.8 K/uL    Mono # 0.3 0.3 - 1.0 K/uL    Eos # 0.1 0.0 - 0.5 K/uL    Baso # 0.04 0.00 - 0.20 K/uL    Gran% 42.1 38.0 - 73.0 %    Lymph% 49.4 (H) 18.0 - 48.0 %    Mono% 6.3 4.0 - 15.0 %    Eosinophil% 1.4 0.0 - 8.0 %    Basophil% 0.8 0.0 - 1.9 %    Differential Method Automated    Comprehensive metabolic panel    Collection Time: 06/28/17 10:54 AM   Result Value Ref Range    Sodium 144 136 - 145 mmol/L    Potassium 3.6 3.5 - 5.1 mmol/L    Chloride 108 95 - 110 mmol/L    CO2 27 23 - 29 mmol/L    Glucose 96 70 - 110 mg/dL    BUN, Bld 9 6 - 20 mg/dL    Creatinine 0.8 0.5 - 1.4 mg/dL     Calcium 9.0 8.7 - 10.5 mg/dL    Total Protein 7.1 6.0 - 8.4 g/dL    Albumin 3.6 3.5 - 5.2 g/dL    Total Bilirubin 0.6 0.1 - 1.0 mg/dL    Alkaline Phosphatase 86 55 - 135 U/L    AST 15 10 - 40 U/L    ALT 12 10 - 44 U/L    Anion Gap 9 8 - 16 mmol/L    eGFR if African American >60 >60 mL/min/1.73 m^2    eGFR if non African American >60 >60 mL/min/1.73 m^2   Urinalysis    Collection Time: 06/28/17 11:02 AM   Result Value Ref Range    Specimen UA Urine, Clean Catch     Color, UA Maru Yellow, Straw, Maru    Appearance, UA Clear Clear    pH, UA 6.0 5.0 - 8.0    Specific Gravity, UA 1.020 1.005 - 1.030    Protein, UA Negative Negative    Glucose, UA Negative Negative    Ketones, UA Negative Negative    Bilirubin (UA) Negative Negative    Occult Blood UA Negative Negative    Nitrite, UA Negative Negative    Leukocytes, UA Negative Negative          Assessment:       1. Sarcoidosis    2. Medication monitoring encounter    3. Chronic restrictive lung disease    4. Generalized osteoarthritis of multiple sites    5. Immunocompromised        Impression:    Sarcoidosis: controlled on Imuran 50mg/day  Chronic restrictive lung disease: stable, occasional SOB;  sees dr. Casper, last CT of chest 7/2016 stable, PFTs with moderately reduced diffusion   Medication Monitoring: no issues with toxicity  OA generalized with neck pain likely due to arthritis with radiation into her hands, h/o gabapentin for radicular symptoms. Off now  Immunocompromised: needs zoster, needs revaccination w pneumovax, needs to hold imuran for these    Plan:       Cont imuran 50mg/day   Resume gabapentin 100mg q hs then increase gradually to tid, can go up to 300mg tid  Aleve prn  Topicals pain relievers for foot pain; otc, can try voltaren gel in future if need  rec Arch supports   Discussed holding imuran for zoster vaccine, can get this at any pharmacy; hold imuran week of next visit and we can give pneumovax  Keep f/u appts with pulm    rtc in 4 mos  with reg 4 labs and Urine

## 2017-08-23 ENCOUNTER — TELEPHONE (OUTPATIENT)
Dept: OBSTETRICS AND GYNECOLOGY | Facility: CLINIC | Age: 57
End: 2017-08-23

## 2017-08-23 DIAGNOSIS — Z12.31 VISIT FOR SCREENING MAMMOGRAM: Primary | ICD-10-CM

## 2017-08-23 NOTE — TELEPHONE ENCOUNTER
----- Message from Ernesto Colbert sent at 8/23/2017 10:58 AM CDT -----  Contact: [phone]   Pt would like an order for a mammogram and an appt scheduled on the day of doctor visit.  Please call back at [phone].   Thx-[facility]

## 2017-08-24 ENCOUNTER — HOSPITAL ENCOUNTER (EMERGENCY)
Facility: HOSPITAL | Age: 57
Discharge: HOME | End: 2017-08-24
Payer: COMMERCIAL

## 2017-08-24 ENCOUNTER — TELEPHONE (OUTPATIENT)
Dept: INTERNAL MEDICINE | Facility: CLINIC | Age: 57
End: 2017-08-24

## 2017-08-24 VITALS
RESPIRATION RATE: 18 BRPM | HEART RATE: 78 BPM | BODY MASS INDEX: 33.66 KG/M2 | HEIGHT: 65 IN | DIASTOLIC BLOOD PRESSURE: 79 MMHG | TEMPERATURE: 98 F | WEIGHT: 202 LBS | OXYGEN SATURATION: 100 % | SYSTOLIC BLOOD PRESSURE: 145 MMHG

## 2017-08-24 DIAGNOSIS — M79.18 MYOFACIAL MUSCLE PAIN: Primary | ICD-10-CM

## 2017-08-24 DIAGNOSIS — R07.9 CHEST PAIN: ICD-10-CM

## 2017-08-24 LAB
ALBUMIN SERPL BCP-MCNC: 3.9 G/DL
ALP SERPL-CCNC: 98 U/L
ALT SERPL W/O P-5'-P-CCNC: 13 U/L
ANION GAP SERPL CALC-SCNC: 12 MMOL/L
AST SERPL-CCNC: 18 U/L
BASOPHILS # BLD AUTO: 0.03 K/UL
BASOPHILS NFR BLD: 0.4 %
BILIRUB SERPL-MCNC: 0.3 MG/DL
BUN SERPL-MCNC: 10 MG/DL
CALCIUM SERPL-MCNC: 9.9 MG/DL
CHLORIDE SERPL-SCNC: 106 MMOL/L
CO2 SERPL-SCNC: 25 MMOL/L
CREAT SERPL-MCNC: 1 MG/DL
DIFFERENTIAL METHOD: ABNORMAL
EOSINOPHIL # BLD AUTO: 0.1 K/UL
EOSINOPHIL NFR BLD: 1.4 %
ERYTHROCYTE [DISTWIDTH] IN BLOOD BY AUTOMATED COUNT: 13.8 %
EST. GFR  (AFRICAN AMERICAN): >60 ML/MIN/1.73 M^2
EST. GFR  (NON AFRICAN AMERICAN): >60 ML/MIN/1.73 M^2
GLUCOSE SERPL-MCNC: 96 MG/DL
HCT VFR BLD AUTO: 40.3 %
HGB BLD-MCNC: 13.2 G/DL
LYMPHOCYTES # BLD AUTO: 3.4 K/UL
LYMPHOCYTES NFR BLD: 47.2 %
MCH RBC QN AUTO: 31.5 PG
MCHC RBC AUTO-ENTMCNC: 32.8 G/DL
MCV RBC AUTO: 96 FL
MONOCYTES # BLD AUTO: 0.5 K/UL
MONOCYTES NFR BLD: 7 %
NEUTROPHILS # BLD AUTO: 3.1 K/UL
NEUTROPHILS NFR BLD: 44 %
PLATELET # BLD AUTO: 269 K/UL
PMV BLD AUTO: 10.5 FL
POTASSIUM SERPL-SCNC: 3.8 MMOL/L
PROT SERPL-MCNC: 8.2 G/DL
RBC # BLD AUTO: 4.19 M/UL
SODIUM SERPL-SCNC: 143 MMOL/L
TROPONIN I SERPL DL<=0.01 NG/ML-MCNC: 0.01 NG/ML
WBC # BLD AUTO: 7.1 K/UL

## 2017-08-24 PROCEDURE — 80053 COMPREHEN METABOLIC PANEL: CPT

## 2017-08-24 PROCEDURE — 96374 THER/PROPH/DIAG INJ IV PUSH: CPT

## 2017-08-24 PROCEDURE — 85025 COMPLETE CBC W/AUTO DIFF WBC: CPT

## 2017-08-24 PROCEDURE — 84484 ASSAY OF TROPONIN QUANT: CPT

## 2017-08-24 PROCEDURE — 93005 ELECTROCARDIOGRAM TRACING: CPT

## 2017-08-24 PROCEDURE — 99284 EMERGENCY DEPT VISIT MOD MDM: CPT | Mod: 25

## 2017-08-24 PROCEDURE — 63600175 PHARM REV CODE 636 W HCPCS: Performed by: PHYSICIAN ASSISTANT

## 2017-08-24 PROCEDURE — 25500020 PHARM REV CODE 255: Performed by: PHYSICIAN ASSISTANT

## 2017-08-24 PROCEDURE — 93010 ELECTROCARDIOGRAM REPORT: CPT | Mod: ,,, | Performed by: INTERNAL MEDICINE

## 2017-08-24 PROCEDURE — 25000003 PHARM REV CODE 250: Performed by: PHYSICIAN ASSISTANT

## 2017-08-24 RX ORDER — METHOCARBAMOL 500 MG/1
1000 TABLET, FILM COATED ORAL 3 TIMES DAILY
Qty: 30 TABLET | Refills: 0 | Status: SHIPPED | OUTPATIENT
Start: 2017-08-24 | End: 2017-08-29

## 2017-08-24 RX ORDER — KETOROLAC TROMETHAMINE 30 MG/ML
15 INJECTION, SOLUTION INTRAMUSCULAR; INTRAVENOUS
Status: COMPLETED | OUTPATIENT
Start: 2017-08-24 | End: 2017-08-24

## 2017-08-24 RX ORDER — CYCLOBENZAPRINE HCL 10 MG
10 TABLET ORAL
Status: COMPLETED | OUTPATIENT
Start: 2017-08-24 | End: 2017-08-24

## 2017-08-24 RX ADMIN — KETOROLAC TROMETHAMINE 15 MG: 30 INJECTION, SOLUTION INTRAMUSCULAR at 10:08

## 2017-08-24 RX ADMIN — CYCLOBENZAPRINE HYDROCHLORIDE 10 MG: 10 TABLET, FILM COATED ORAL at 10:08

## 2017-08-24 RX ADMIN — IOHEXOL 100 ML: 350 INJECTION, SOLUTION INTRAVENOUS at 10:08

## 2017-08-24 NOTE — TELEPHONE ENCOUNTER
Returned call to pt regarding 9/10 pain that starts across her shoulders and down her back. She states that it radiates through her neck and down her left arm. Advised that d/t the severity of the pain and and locations, she should go to ER. She states that she will be on her way, and will keep Dr. Botello posted on the happenings.//rlt

## 2017-08-24 NOTE — TELEPHONE ENCOUNTER
----- Message from Alicia Glasgow sent at 8/24/2017  9:33 AM CDT -----  Contact: Patient  Patient called to speak with the nurse; she keeps having pain that is running from her back to her neck, head, left arm, and to the side of her face. This started last week but has gotten worse last night. She can be contacted at [phone].    Thanks,  Alicia

## 2017-08-25 NOTE — ED PROVIDER NOTES
"SCRIBE #1 NOTE: I, Michelle Whiteside, am scribing for, and in the presence of, Kelechi Castillo PA-C. I have scribed the entire note.     SCRIBE #2 NOTE: I, Maame Lemon, am scribing for, and in the presence of,  Kelechi Castillo PA-C. I have scribed the remaining portions of the note not scribed by Scribe #1.     History      Chief Complaint   Patient presents with    Shoulder Pain     radiating down both arms, up neck, and down back       Review of patient's allergies indicates:  No Known Allergies     HPI   HPI    8/24/2017, 9:33 PM   History obtained from the patient      History of Present Illness: Janna Sheffield is a 56 y.o. female patient who presents to the Emergency Department for pain to upper back, bilateral arms, and left chest. Sxs onset gradually 1 week ago and worsened last night. Pt experiencing worst pain in upper back.  Describes chest and upper back pain as "throbbing" and arm pain as "shooting." States arm pain is worse on left side. Symptoms have been constant since onset and moderate in severity.  No exacerbating factors reported. Massaging the area mitigates pain. Pt has not experienced similar pain before. No associated sxs reported. Patient denies any recent injury, n/v, fever, chills, and all other sxs at this time. No prior tx reported. No further complaints or concerns at this time.         Arrival mode: Personal vehicle    PCP: HEIDE Botello Jr, MD       Past Medical History:  Past Medical History:   Diagnosis Date    Arthritis     Chest pain syndrome 10/23/2014    Hyperlipidemia     Hypertension     Macular degeneration, age related, nonexudative - Left Eye 3/24/2014    Retinal detachment, old, partial 4/14/2014    Sarcoidosis        Past Surgical History:  Past Surgical History:   Procedure Laterality Date    CATARACT EXTRACTION      DILATION AND CURETTAGE OF UTERUS      missed ab x 3    HYSTERECTOMY      MACARIO    knee surgery      TUBAL LIGATION           Family " History:  Family History   Problem Relation Age of Onset    Cervical cancer Mother     Heart disease Mother     Heart attack Mother     Hypertension Mother     Hyperlipidemia Mother     Hypertension Father     Hyperlipidemia Father     Stroke Father     Stroke Brother     Breast cancer Maternal Aunt     Colon cancer Neg Hx     Ovarian cancer Neg Hx     Thrombophilia Neg Hx     Deep vein thrombosis Neg Hx     Pulmonary embolism Neg Hx        Social History:  Social History     Social History Main Topics    Smoking status: Never Smoker    Smokeless tobacco: Never Used    Alcohol use No    Drug use: No    Sexual activity: Yes     Partners: Male      Comment:        ROS   Review of Systems   Constitutional: Negative for chills and fever.   HENT: Negative for sore throat.    Respiratory: Negative for shortness of breath.    Cardiovascular: Positive for chest pain.   Gastrointestinal: Negative for nausea and vomiting.   Genitourinary: Negative for dysuria.   Musculoskeletal: Positive for back pain.        (-) injury  (+) bilateral arm pain       Skin: Negative for rash.   Neurological: Negative for weakness.   Hematological: Does not bruise/bleed easily.   All other systems reviewed and are negative.    Physical Exam      Initial Vitals [08/24/17 2051]   BP Pulse Resp Temp SpO2   -- 74 20 98.1 °F (36.7 °C) 100 %      MAP       --          Physical Exam  Nursing Notes and Vital Signs Reviewed.  Constitutional: Patient is in no apparent distress. Well-developed and well-nourished.  Head: Atraumatic. Normocephalic.  Eyes: PERRL. EOM intact. Conjunctivae are not pale. No scleral icterus.  ENT: Mucous membranes are moist. Oropharynx is clear and symmetric.  Neck: Supple. No cervical midline bony tenderness, deformities, or step-offs.    Cardiovascular: Regular rate. Regular rhythm. No murmurs, rubs, or gallops. Good capillary refill. Radial and posterior tibialis pulses are 1+  "bilaterally.  Pulmonary/Chest: No respiratory distress. Clear to auscultation bilaterally. No wheezing, rales, or rhonchi.  Abdominal: Soft and non-distended.  There is no tenderness.  No rebound, guarding, or rigidity.   Musculoskeletal: Moves all extremities. No obvious deformities. No lower extremity swelling. No calf tenderness. Neurovascularly intact to medial, radial, and ulnar motor and sensory. Diffuse tenderness to bilateral arms, upper back, and L chest. FROM upper extremities. Pain when lifting shoulders above 90 degrees.  Back: Upper back trapezius area is TTP. No midline bony tenderness, deformities, or step-offs of the T-spine or L-spine. Skin appears normal without abrasions or bruising. No erythema, induration, or fluctuance.   Skin: Warm and dry.  Neurological:  Alert, awake, and appropriate.  Normal speech.  No acute focal neurological deficits are appreciated.  Psychiatric: Normal affect. Good eye contact. Appropriate in content.    ED Course    Procedures  ED Vital Signs:  Vitals:    08/24/17 2051 08/24/17 2210 08/24/17 2212   BP:  (!) 165/98 133/80   Pulse: 74 80 75   Resp: 20     Temp: 98.1 °F (36.7 °C)     TempSrc: Oral     SpO2: 100%     Weight: 91.6 kg (202 lb)     Height: 5' 5" (1.651 m)         Abnormal Lab Results:  Labs Reviewed   CBC W/ AUTO DIFFERENTIAL - Abnormal; Notable for the following:        Result Value    MCH 31.5 (*)     All other components within normal limits   COMPREHENSIVE METABOLIC PANEL   TROPONIN I        All Lab Results:  None ordered.    Imaging Results:  None ordered.    The EKG was ordered, reviewed, and independently interpreted by the ED provider.  Interpretation time: 2205  Rate: 69 BPM  Rhythm: normal sinus rhythm  Interpretation: No STEMI.           Results for orders placed or performed during the hospital encounter of 08/24/17   CBC auto differential   Result Value Ref Range    WBC 7.10 3.90 - 12.70 K/uL    RBC 4.19 4.00 - 5.40 M/uL    Hemoglobin 13.2 12.0 " - 16.0 g/dL    Hematocrit 40.3 37.0 - 48.5 %    MCV 96 82 - 98 fL    MCH 31.5 (H) 27.0 - 31.0 pg    MCHC 32.8 32.0 - 36.0 g/dL    RDW 13.8 11.5 - 14.5 %    Platelets 269 150 - 350 K/uL    MPV 10.5 9.2 - 12.9 fL    Gran # 3.1 1.8 - 7.7 K/uL    Lymph # 3.4 1.0 - 4.8 K/uL    Mono # 0.5 0.3 - 1.0 K/uL    Eos # 0.1 0.0 - 0.5 K/uL    Baso # 0.03 0.00 - 0.20 K/uL    Gran% 44.0 38.0 - 73.0 %    Lymph% 47.2 18.0 - 48.0 %    Mono% 7.0 4.0 - 15.0 %    Eosinophil% 1.4 0.0 - 8.0 %    Basophil% 0.4 0.0 - 1.9 %    Differential Method Automated    Comprehensive metabolic panel   Result Value Ref Range    Sodium 143 136 - 145 mmol/L    Potassium 3.8 3.5 - 5.1 mmol/L    Chloride 106 95 - 110 mmol/L    CO2 25 23 - 29 mmol/L    Glucose 96 70 - 110 mg/dL    BUN, Bld 10 6 - 20 mg/dL    Creatinine 1.0 0.5 - 1.4 mg/dL    Calcium 9.9 8.7 - 10.5 mg/dL    Total Protein 8.2 6.0 - 8.4 g/dL    Albumin 3.9 3.5 - 5.2 g/dL    Total Bilirubin 0.3 0.1 - 1.0 mg/dL    Alkaline Phosphatase 98 55 - 135 U/L    AST 18 10 - 40 U/L    ALT 13 10 - 44 U/L    Anion Gap 12 8 - 16 mmol/L    eGFR if African American >60 >60 mL/min/1.73 m^2    eGFR if non African American >60 >60 mL/min/1.73 m^2   Troponin I   Result Value Ref Range    Troponin I 0.013 0.000 - 0.026 ng/mL     Imaging Results          CTA Chest Abdomen Pelvis (Final result)  Result time 08/24/17 23:18:55   Procedure changed from CT Chest With Contrast     Final result by Viet Barnett MD (Timothy) (08/24/17 23:18:55)                 Impression:           Negative for pulmonary embolus, aneurysm or dissection.  Lungs are clear.    CT angiography abdomen and pelvis    Clinical Indication: Chest pain, back pain    Technique:  Axial images through the abdomen and pelvis were obtained during the dynamic bolus injection of IV contrast.  MIP oblique coronal reconstructions were obtained and permanently archived.    Findings: The abdominal aorta appears normal.  No evidence of aneurysm or dissection.  No  retroperitoneal hematoma.  Mesenteric vessels are patent.    The renal arteries are patent.  There is a small accessory right renal artery.    The iliac vessels are patent bilaterally.  No hematoma or dissection.      The liver and spleen and pancreas appear normal.  Prior cholecystectomy. Kidneys appear normal.  The bladder is unremarkable no pelvic masses.  No acute bowel abnormalities.  There is a small umbilical hernia containing fat measuring about 2-3 cm.  There is a hernia of the left anterolateral abdominal wall containing fat.    Impression:       No evidence of aortic dissection.  No evidence of aneurysm or retroperitoneal hematoma.    Small umbilical hernia containing fat.  Hernia involving the right anterolateral abdominal wall containing fat.      [facility] exams at [facility] use dose modulation, iterative reconstruction, and/or weight based dosing to reduce radiation dose to as low as reasonably achievable.      Electronically signed by: HIREN BARBOSA MD  Date:     08/24/17  Time:    23:18              Narrative:    CT angiography chest, pulmonary embolus protocol    Clinical Indication: Chest pain    Technique:  Axial images through the chest were obtained during the dynamic bolus injection of IV contrast.  MIP oblique coronal reconstructions were obtained and permanently archived.    Findings: There are no infiltrates, effusions, or parenchymal lung nodules. There are no hilar or mediastinal masses or lymphadenopathy.  Adequate opacification of the primary, secondary , and tertiary divisions of the pulmonary arteries without intraluminal thrombus. The aorta is intact without aneurysm or dissection.    The airways are patent.                             X-Ray Chest PA And Lateral (Final result)  Result time 08/24/17 21:53:27    Final result by Hiren Barbosa MD (Timothy) (08/24/17 21:53:27)                 Impression:         Normal sized heart. Clear lungs.Comparison  07/20/2016.      Electronically signed by: HIREN BARBOSA MD  Date:     08/24/17  Time:    21:53              Narrative:    CXR, 2 views    Clinical History:    Chest pain                                The Emergency Provider reviewed the vital signs and test results, which are outlined above.    ED Discussion     Pt understands normal findings.  Most likely MSK pain due to over work.  Daughter in the room at discharge states her mother works too much and does not get enough rest.      Pt to follow up with PCP tomorrow.    Normal labs, EKG, and CT scan to rule out dissection.    Pt has no discomfort currently after Toradol and muscle relaxer    ED Medication(s):  Medications   ketorolac injection 15 mg (15 mg Intravenous Given 8/24/17 2208)   cyclobenzaprine tablet 10 mg (10 mg Oral Given 8/24/17 2208)   omnipaque 350 iohexol 100 mL (100 mLs Intravenous Given 8/24/17 2251)       New Prescriptions    METHOCARBAMOL (ROBAXIN) 500 MG TAB    Take 2 tablets (1,000 mg total) by mouth 3 (three) times daily.       Follow-up Information     E Alexis Botello Jr, MD In 1 day.    Specialties:  Internal Medicine, Pediatrics  Contact information:  [address]  [place] [address] [phone]                     Medical Decision Making    Medical Decision Making:   Differential Diagnosis:   Diffuse bilateral arm, chest and back pain raise concerns for dissection and her left arm BP higher than right arm also raise concerns for dissection. CP could be also be realted to sarcoidosis. Will do CT scan of chest, abd, and pelvis.  Clinical Tests:   Lab Tests: Ordered and Reviewed  Radiological Study: Ordered and Reviewed  Medical Tests: Ordered and Reviewed           Scribe Attestation:   Scribe #1: I performed the above scribed service and the documentation accurately describes the services I performed. I attest to the accuracy of the note.    Attending:   Physician Attestation Statement for Scribe #1: I, Kelechi Castillo,  VEL, personally performed the services described in this documentation, as scribed by Michelle Whiteside, in my presence, and it is both accurate and complete.       Scribe Attestation:   Scribe #2: I performed the above scribed service and the documentation accurately describes the services I performed. I attest to the accuracy of the note.    Attending Attestation:           Physician Attestation for Scribe:    Physician Attestation Statement for Scribe #2: I, Kelcehi Castillo PA-C, reviewed documentation, as scribed by Maame Lemon in my presence, and it is both accurate and complete. I also acknowledge and confirm the content of the note done by Shenibe #1.          Clinical Impression       ICD-10-CM ICD-9-CM   1. Myofacial muscle pain M79.1 729.1   2. Chest pain R07.9 786.50       Disposition:   Disposition: Discharged  Condition: Stable         Kelechi Castillo PA-C  08/24/17 3763

## 2017-08-25 NOTE — DISCHARGE INSTRUCTIONS
Naproxen 400-500 mg every 12 hours and/or Tylenol 650-1000 mg every 4-6 hours for fever and pain relief.  No more than 4000mg of Tylenol every 24 hours.  Do not take Ibuprofen and Naproxen at the same time

## 2017-08-28 ENCOUNTER — TELEPHONE (OUTPATIENT)
Dept: PULMONOLOGY | Facility: CLINIC | Age: 57
End: 2017-08-28

## 2017-08-28 ENCOUNTER — TELEPHONE (OUTPATIENT)
Dept: RHEUMATOLOGY | Facility: CLINIC | Age: 57
End: 2017-08-28

## 2017-08-28 NOTE — TELEPHONE ENCOUNTER
----- Message from Hina Sanz sent at 8/28/2017  8:38 AM CDT -----  Contact: Pt  Pt went to the ER last Thursday for sarcoidosis. Pt wants to know if she needs to come in for an appt or could she have her meds adjusted. Pls call pt back at [phone].

## 2017-08-28 NOTE — TELEPHONE ENCOUNTER
----- Message from Hina Sanz sent at 2017  8:42 AM CDT -----  Contact: Pt  Pt needs to reschedule her appts on . Pt's orders will be  on . Pls call pt back to reschedule at [phone].

## 2017-08-28 NOTE — TELEPHONE ENCOUNTER
Spoke with Kenrick who states that she went to the ER due to pains Bilat UE, Chest pain and back pains. She is still having the pains but not as severe. Pain rate 7/10. Please refer to ER notes from 08/24/2017. She would like to know if she needs to come in for medication adjustment. Appointment scheduled for 08/31/2017 at 8:00am with Christi Fish PA-C.

## 2017-08-31 ENCOUNTER — OFFICE VISIT (OUTPATIENT)
Dept: RHEUMATOLOGY | Facility: CLINIC | Age: 57
End: 2017-08-31
Payer: COMMERCIAL

## 2017-08-31 ENCOUNTER — HOSPITAL ENCOUNTER (OUTPATIENT)
Dept: RADIOLOGY | Facility: HOSPITAL | Age: 57
Discharge: HOME | End: 2017-08-31
Attending: PHYSICIAN ASSISTANT
Payer: COMMERCIAL

## 2017-08-31 VITALS
BODY MASS INDEX: 34.63 KG/M2 | HEART RATE: 80 BPM | SYSTOLIC BLOOD PRESSURE: 139 MMHG | WEIGHT: 207.88 LBS | HEIGHT: 65 IN | DIASTOLIC BLOOD PRESSURE: 92 MMHG

## 2017-08-31 DIAGNOSIS — E55.9 VITAMIN D DEFICIENCY: ICD-10-CM

## 2017-08-31 DIAGNOSIS — M54.10 RADICULOPATHY AFFECTING UPPER EXTREMITY: ICD-10-CM

## 2017-08-31 DIAGNOSIS — D86.9 SARCOIDOSIS: Primary | ICD-10-CM

## 2017-08-31 DIAGNOSIS — M54.2 NECK PAIN: ICD-10-CM

## 2017-08-31 DIAGNOSIS — Z51.81 MEDICATION MONITORING ENCOUNTER: ICD-10-CM

## 2017-08-31 DIAGNOSIS — J98.4 CHRONIC RESTRICTIVE LUNG DISEASE: ICD-10-CM

## 2017-08-31 PROCEDURE — 99999 PR PBB SHADOW E&M-EST. PATIENT-LVL III: CPT | Mod: PBBFAC,,, | Performed by: PHYSICIAN ASSISTANT

## 2017-08-31 PROCEDURE — 99214 OFFICE O/P EST MOD 30 MIN: CPT | Mod: 25,S$GLB,, | Performed by: PHYSICIAN ASSISTANT

## 2017-08-31 PROCEDURE — 72040 X-RAY EXAM NECK SPINE 2-3 VW: CPT | Mod: TC,PO

## 2017-08-31 PROCEDURE — 3080F DIAST BP >= 90 MM HG: CPT | Mod: S$GLB,,, | Performed by: PHYSICIAN ASSISTANT

## 2017-08-31 PROCEDURE — 3008F BODY MASS INDEX DOCD: CPT | Mod: S$GLB,,, | Performed by: PHYSICIAN ASSISTANT

## 2017-08-31 PROCEDURE — 3075F SYST BP GE 130 - 139MM HG: CPT | Mod: S$GLB,,, | Performed by: PHYSICIAN ASSISTANT

## 2017-08-31 PROCEDURE — 96372 THER/PROPH/DIAG INJ SC/IM: CPT | Mod: S$GLB,,, | Performed by: PHYSICIAN ASSISTANT

## 2017-08-31 PROCEDURE — 72040 X-RAY EXAM NECK SPINE 2-3 VW: CPT | Mod: 26,,, | Performed by: RADIOLOGY

## 2017-08-31 RX ORDER — KETOROLAC TROMETHAMINE 30 MG/ML
30 INJECTION, SOLUTION INTRAMUSCULAR; INTRAVENOUS
Status: COMPLETED | OUTPATIENT
Start: 2017-08-31 | End: 2017-08-31

## 2017-08-31 RX ORDER — ERGOCALCIFEROL 1.25 MG/1
50000 CAPSULE ORAL WEEKLY
Qty: 12 CAPSULE | Refills: 4 | Status: SHIPPED | OUTPATIENT
Start: 2017-08-31 | End: 2019-01-23 | Stop reason: SDUPTHER

## 2017-08-31 RX ORDER — METHYLPREDNISOLONE 4 MG/1
TABLET ORAL
Qty: 1 PACKAGE | Refills: 2 | Status: SHIPPED | OUTPATIENT
Start: 2017-08-31 | End: 2017-09-20

## 2017-08-31 RX ADMIN — KETOROLAC TROMETHAMINE 30 MG: 30 INJECTION, SOLUTION INTRAMUSCULAR; INTRAVENOUS at 08:08

## 2017-08-31 NOTE — PROGRESS NOTES
Administered 1cc Toradol 30mg/cc to Right Ventrogluteal. Pt tolerated well. No acute reaction noted to site. Pt instructed on S/S to report. Pt verbalized understanding.     Lot:-[id number]  Exp:09/01/2017  Manu:Hospira

## 2017-08-31 NOTE — PROGRESS NOTES
Subjective:       Patient ID: Janna Sheffield is a 56 y.o. female.    Chief Complaint: Sarcoidosis, osteoarthritis    Mrs. Sheffield is a 55 y/o AAF with history of osteoarthritis and sarcoidosis. Sarcoidosis was diagnosed in 1985 through a biopsy of a spot on her liver. 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   Currently she is on Imuran 50mg daily (lowered previously from 75mg/d). She has been doing well in regards to her Sarcoidosis.  She does have restrictive lung disease and SOB when she is more active. She sees Dr. Casper for this.  Her last CT scan done a year ago was stable. She has generalized osteoarthritis with pain in her neck that radiates into her bilateral arms     Today she is here c/o 8/10 pain in her neck,left shoulder and arm.  She was recently seen in the hospital for this acute pain, was given robaxin muscle relaxer.  C/o shooting and throbbing pain in her upper back, neck, left arm, and left chest.  CXR was unremarkable. CTA was done, neg for PE, noted a small umbilical hernia but otherwise no acute findings. Cardiac work up normal. Pain has been present for about 1.5 weeks, pain is improved with massaging the neck and shoulder, muscle relaxer helps somewhat.  Today she is obviously uncomfortable.                  Review of Systems   Constitutional: Negative for chills, fatigue and fever.   HENT: Negative for mouth sores, rhinorrhea and sore throat.    Eyes: Negative for pain and redness.   Respiratory: Positive for shortness of breath. Negative for cough.    Cardiovascular: Negative for chest pain.   Gastrointestinal: Negative for abdominal pain, constipation, diarrhea, nausea and vomiting.   Genitourinary: Negative for dysuria and hematuria.   Musculoskeletal: Positive for arthralgias and neck pain. Negative for joint swelling and myalgias.        Left knee replacement   Skin: Negative for rash.   Neurological: Negative for  "weakness, numbness and headaches.   Psychiatric/Behavioral: The patient is not nervous/anxious.            Objective:   BP (!) 139/92   Pulse 80   Ht 5' 5" (1.651 m)   Wt 94.3 kg (207 lb 14.3 oz)   BMI 34.60 kg/m²      Physical Exam   Constitutional: She is oriented to person, place, and time and well-developed, well-nourished, and in no distress.   HENT:   Head: Normocephalic and atraumatic.   Eyes: Pupils are equal, round, and reactive to light. Right eye exhibits no discharge.   Neck: Normal range of motion.   Cardiovascular: Normal rate, regular rhythm and normal heart sounds.  Exam reveals no friction rub.    Pulmonary/Chest: Effort normal and breath sounds normal. No respiratory distress.   Abdominal: Soft. She exhibits no distension. There is no tenderness.   Lymphadenopathy:     She has no cervical adenopathy.   Neurological: She is alert and oriented to person, place, and time.   Skin: No rash noted. No erythema.     Psychiatric: Mood normal.   Musculoskeletal: Normal range of motion. She exhibits no edema or deformity.   Full cervical rom but painful rom with flex/ext and lateral rotation; tenderness to palpation left neck and shoulder area    mao wrists, mcps, pips no synovitis, no tenderness, full rom, grib strength equal bilaterally  mao shoulder strength 5/5    Left knee s/p replacement, no effusion, no warmth  Right knee no effusion, no warmth, full rom         Recent Results (from the past 168 hour(s))   CBC auto differential    Collection Time: 08/24/17 10:10 PM   Result Value Ref Range    WBC 7.10 3.90 - 12.70 K/uL    RBC 4.19 4.00 - 5.40 M/uL    Hemoglobin 13.2 12.0 - 16.0 g/dL    Hematocrit 40.3 37.0 - 48.5 %    MCV 96 82 - 98 fL    MCH 31.5 (H) 27.0 - 31.0 pg    MCHC 32.8 32.0 - 36.0 g/dL    RDW 13.8 11.5 - 14.5 %    Platelets 269 150 - 350 K/uL    MPV 10.5 9.2 - 12.9 fL    Gran # 3.1 1.8 - 7.7 K/uL    Lymph # 3.4 1.0 - 4.8 K/uL    Mono # 0.5 0.3 - 1.0 K/uL    Eos # 0.1 0.0 - 0.5 K/uL    " Baso # 0.03 0.00 - 0.20 K/uL    Gran% 44.0 38.0 - 73.0 %    Lymph% 47.2 18.0 - 48.0 %    Mono% 7.0 4.0 - 15.0 %    Eosinophil% 1.4 0.0 - 8.0 %    Basophil% 0.4 0.0 - 1.9 %    Differential Method Automated    Comprehensive metabolic panel    Collection Time: 08/24/17 10:10 PM   Result Value Ref Range    Sodium 143 136 - 145 mmol/L    Potassium 3.8 3.5 - 5.1 mmol/L    Chloride 106 95 - 110 mmol/L    CO2 25 23 - 29 mmol/L    Glucose 96 70 - 110 mg/dL    BUN, Bld 10 6 - 20 mg/dL    Creatinine 1.0 0.5 - 1.4 mg/dL    Calcium 9.9 8.7 - 10.5 mg/dL    Total Protein 8.2 6.0 - 8.4 g/dL    Albumin 3.9 3.5 - 5.2 g/dL    Total Bilirubin 0.3 0.1 - 1.0 mg/dL    Alkaline Phosphatase 98 55 - 135 U/L    AST 18 10 - 40 U/L    ALT 13 10 - 44 U/L    Anion Gap 12 8 - 16 mmol/L    eGFR if African American >60 >60 mL/min/1.73 m^2    eGFR if non African American >60 >60 mL/min/1.73 m^2   Troponin I    Collection Time: 08/24/17 10:10 PM   Result Value Ref Range    Troponin I 0.013 0.000 - 0.026 ng/mL   CTA chest 8/24/17  Impression:       Negative for pulmonary embolus, aneurysm or dissection.  Lungs are clear.  No evidence of aortic dissection.  No evidence of aneurysm or retroperitoneal hematoma.  Small umbilical hernia containing fat.  Hernia involving the right anterolateral abdominal wall containing fat.         Assessment:       1. Sarcoidosis    2. Medication monitoring encounter    3. Neck pain    4. Chronic restrictive lung disease    5. Radiculopathy affecting upper extremity        Impression:  Neck pain with radiation into the left arm: h/o neck OA w radiation; acutely exacerbated today with left arm radiculopathy, muscle spasm left neck trapezius; not taking gabapentin routinely  Sarcoidosis:  Imuran 50mg/day, do not suspect this pain today is due to sarcoid  Chronic restrictive lung disease: stable, occasional SOB;  sees dr. Casper, last CT of chest 7/2016 stable, PFTs with moderately reduced diffusion   Medication Monitoring:  no issues with toxicity      Plan:       Send for lab today, ace, calcitriol, vit d   C spine xray  Cont imuran 50mg/day   Resume gabapentin 1-200mg am, noon and 3-400mg at night, can increase to 600mg q hs  Toradol 30mg IM now  Medrol dose pack  May need MRI neck if symptoms don't resolve    rtc in 2 weeks for recheck

## 2017-08-31 NOTE — PATIENT INSTRUCTIONS
Labs today to check sarcoid activity   Xray neck today   May need mri  Medrol dose pack --steroid  Shot today for pain-toradol  Gabapentin: 1-200mg morning and afternoon 300-400mg at night --if makes you sleepy during the day take only at night can take up to 6 pills  Heat to neck, shoulder   Continue imuran 50mg/daily

## 2017-09-07 ENCOUNTER — TELEPHONE (OUTPATIENT)
Dept: RHEUMATOLOGY | Facility: CLINIC | Age: 57
End: 2017-09-07

## 2017-09-07 DIAGNOSIS — M54.10 RADICULOPATHY AFFECTING UPPER EXTREMITY: ICD-10-CM

## 2017-09-07 DIAGNOSIS — M54.2 NECK PAIN ON RIGHT SIDE: Primary | ICD-10-CM

## 2017-09-07 NOTE — TELEPHONE ENCOUNTER
----- Message from Tatiana Newell sent at 9/7/2017 12:04 PM CDT -----  She wants to know if you received her labs and test results back.  She is still having the pain.  Call her at [phone].                                           santos

## 2017-09-12 ENCOUNTER — TELEPHONE (OUTPATIENT)
Dept: RHEUMATOLOGY | Facility: CLINIC | Age: 57
End: 2017-09-12

## 2017-09-12 NOTE — TELEPHONE ENCOUNTER
----- Message from Miriam Gonzalez sent at 9/12/2017 12:17 PM CDT -----  Please call pt back at [phone] about  KAMAR GUAJARDO.

## 2017-09-13 ENCOUNTER — TELEPHONE (OUTPATIENT)
Dept: RADIOLOGY | Facility: HOSPITAL | Age: 57
End: 2017-09-13

## 2017-09-13 ENCOUNTER — TELEPHONE (OUTPATIENT)
Dept: INTERNAL MEDICINE | Facility: CLINIC | Age: 57
End: 2017-09-13

## 2017-09-13 NOTE — TELEPHONE ENCOUNTER
Pt informed that she will be called with results. JULIO Mcwilliams sent routing comment informing she can be called. Pt verbalized understanding.

## 2017-09-13 NOTE — TELEPHONE ENCOUNTER
Message sent to Dr. Christi Arevalo but was intended for Christi Fish in Rheumatology. Message forwarded to her staff pool.

## 2017-09-13 NOTE — TELEPHONE ENCOUNTER
----- Message from Naun Jay sent at 9/13/2017 10:45 AM CDT -----  Contact: Pt   Pt called and requested a callback in regards to her appointment pt need to know if she will need to come back to Dr after the MRI which scheduled after she see the Dr, pt wants to know if the dr can just call with results or will she need to come in for a follow up visit  [phone]

## 2017-09-14 ENCOUNTER — HOSPITAL ENCOUNTER (OUTPATIENT)
Dept: RADIOLOGY | Facility: HOSPITAL | Age: 57
Discharge: HOME | End: 2017-09-14
Attending: PHYSICIAN ASSISTANT
Payer: COMMERCIAL

## 2017-09-14 ENCOUNTER — OFFICE VISIT (OUTPATIENT)
Dept: RHEUMATOLOGY | Facility: CLINIC | Age: 57
End: 2017-09-14
Payer: COMMERCIAL

## 2017-09-14 VITALS
WEIGHT: 207.69 LBS | HEART RATE: 75 BPM | DIASTOLIC BLOOD PRESSURE: 83 MMHG | BODY MASS INDEX: 34.56 KG/M2 | SYSTOLIC BLOOD PRESSURE: 139 MMHG

## 2017-09-14 DIAGNOSIS — M54.2 NECK PAIN ON RIGHT SIDE: ICD-10-CM

## 2017-09-14 DIAGNOSIS — E55.9 VITAMIN D DEFICIENCY: ICD-10-CM

## 2017-09-14 DIAGNOSIS — M54.2 NECK PAIN ON RIGHT SIDE: Primary | ICD-10-CM

## 2017-09-14 DIAGNOSIS — M54.10 RADICULOPATHY AFFECTING UPPER EXTREMITY: ICD-10-CM

## 2017-09-14 DIAGNOSIS — D86.9 SARCOIDOSIS: ICD-10-CM

## 2017-09-14 PROCEDURE — 72141 MRI NECK SPINE W/O DYE: CPT | Mod: 26,,, | Performed by: RADIOLOGY

## 2017-09-14 PROCEDURE — 99214 OFFICE O/P EST MOD 30 MIN: CPT | Mod: S$GLB,,, | Performed by: PHYSICIAN ASSISTANT

## 2017-09-14 PROCEDURE — 99999 PR PBB SHADOW E&M-EST. PATIENT-LVL III: CPT | Mod: PBBFAC,,, | Performed by: PHYSICIAN ASSISTANT

## 2017-09-14 PROCEDURE — 3008F BODY MASS INDEX DOCD: CPT | Mod: S$GLB,,, | Performed by: PHYSICIAN ASSISTANT

## 2017-09-14 PROCEDURE — 3079F DIAST BP 80-89 MM HG: CPT | Mod: S$GLB,,, | Performed by: PHYSICIAN ASSISTANT

## 2017-09-14 PROCEDURE — 3075F SYST BP GE 130 - 139MM HG: CPT | Mod: S$GLB,,, | Performed by: PHYSICIAN ASSISTANT

## 2017-09-14 PROCEDURE — 72141 MRI NECK SPINE W/O DYE: CPT | Mod: TC,PO

## 2017-09-14 NOTE — PROGRESS NOTES
Subjective:       Patient ID: Janna Sheffield is a 56 y.o. female.    Chief Complaint: Sarcoidosis, osteoarthritis    Mrs. Sheffield is a 55 y/o AAF with history of osteoarthritis and sarcoidosis. Sarcoidosis was diagnosed in 1985 through a biopsy of a spot on her liver. 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   Currently she is on Imuran 50mg daily (lowered previously from 75mg/d). She has been doing well in regards to her Sarcoidosis.  She does have restrictive lung disease and SOB when she is more active. She sees Dr. Casper for this.  Her last CT scan done a year ago was stable. She has generalized osteoarthritis with pain in her neck that radiates into her bilateral arms     Last seen 2 wks ago for severe neck pain with radiation into her left shoulder and arm.  We gave her a toradol shot as well as a steroid dose pack.  C spine xray showed significant spondylosis.  We also increased her gabapentin to 200mg in the am and 400mg at night.  This has helped somewhat but she still c/o 8/10 pain with left arm pain.  No numbness/tingling.  Of note, She has done PT in the past that helped her neck pain. She is scheduled for MRI today.  Checked ace level, cbc, cmp, esr, crp and calcitriol and vit d level last visit. No evidence on increased sarcoidosis activity. She did have low vit D so started 50K units weekly.                 Review of Systems   Constitutional: Negative for chills, fatigue and fever.   HENT: Negative for mouth sores, rhinorrhea and sore throat.    Eyes: Negative for pain and redness.   Respiratory: Positive for shortness of breath. Negative for cough.    Cardiovascular: Negative for chest pain.   Gastrointestinal: Negative for abdominal pain, constipation, diarrhea, nausea and vomiting.   Genitourinary: Negative for dysuria and hematuria.   Musculoskeletal: Positive for arthralgias and neck pain. Negative for joint swelling and  myalgias.        Left knee replacement   Skin: Negative for rash.   Neurological: Negative for weakness, numbness and headaches.   Psychiatric/Behavioral: The patient is not nervous/anxious.            Objective:   /83   Pulse 75   Wt 94.2 kg (207 lb 10.8 oz)   BMI 34.56 kg/m²      Physical Exam   Constitutional: She is oriented to person, place, and time and well-developed, well-nourished, and in no distress.   HENT:   Head: Normocephalic and atraumatic.   Eyes: Pupils are equal, round, and reactive to light. Right eye exhibits no discharge.   Neck: Normal range of motion.   Cardiovascular: Normal rate, regular rhythm and normal heart sounds.  Exam reveals no friction rub.    Pulmonary/Chest: Effort normal and breath sounds normal. No respiratory distress.   Abdominal: Soft. She exhibits no distension. There is no tenderness.   Lymphadenopathy:     She has no cervical adenopathy.   Neurological: She is alert and oriented to person, place, and time.   Skin: No rash noted. No erythema.     Psychiatric: Mood normal.   Musculoskeletal: Normal range of motion. She exhibits no edema or deformity.   Full cervical rom but painful rom with flex/ext and lateral rotation; tenderness to palpation left neck and shoulder area    mao wrists, mcps, pips no synovitis, no tenderness, full rom, grib strength equal bilaterally  mao shoulder strength 5/5    Left knee s/p replacement, no effusion, no warmth  Right knee no effusion, no warmth, full rom         Recent Results (from the past 672 hour(s))   CBC auto differential    Collection Time: 08/24/17 10:10 PM   Result Value Ref Range    WBC 7.10 3.90 - 12.70 K/uL    RBC 4.19 4.00 - 5.40 M/uL    Hemoglobin 13.2 12.0 - 16.0 g/dL    Hematocrit 40.3 37.0 - 48.5 %    MCV 96 82 - 98 fL    MCH 31.5 (H) 27.0 - 31.0 pg    MCHC 32.8 32.0 - 36.0 g/dL    RDW 13.8 11.5 - 14.5 %    Platelets 269 150 - 350 K/uL    MPV 10.5 9.2 - 12.9 fL    Gran # 3.1 1.8 - 7.7 K/uL    Lymph # 3.4 1.0 - 4.8  K/uL    Mono # 0.5 0.3 - 1.0 K/uL    Eos # 0.1 0.0 - 0.5 K/uL    Baso # 0.03 0.00 - 0.20 K/uL    Gran% 44.0 38.0 - 73.0 %    Lymph% 47.2 18.0 - 48.0 %    Mono% 7.0 4.0 - 15.0 %    Eosinophil% 1.4 0.0 - 8.0 %    Basophil% 0.4 0.0 - 1.9 %    Differential Method Automated    Comprehensive metabolic panel    Collection Time: 08/24/17 10:10 PM   Result Value Ref Range    Sodium 143 136 - 145 mmol/L    Potassium 3.8 3.5 - 5.1 mmol/L    Chloride 106 95 - 110 mmol/L    CO2 25 23 - 29 mmol/L    Glucose 96 70 - 110 mg/dL    BUN, Bld 10 6 - 20 mg/dL    Creatinine 1.0 0.5 - 1.4 mg/dL    Calcium 9.9 8.7 - 10.5 mg/dL    Total Protein 8.2 6.0 - 8.4 g/dL    Albumin 3.9 3.5 - 5.2 g/dL    Total Bilirubin 0.3 0.1 - 1.0 mg/dL    Alkaline Phosphatase 98 55 - 135 U/L    AST 18 10 - 40 U/L    ALT 13 10 - 44 U/L    Anion Gap 12 8 - 16 mmol/L    eGFR if African American >60 >60 mL/min/1.73 m^2    eGFR if non African American >60 >60 mL/min/1.73 m^2   Troponin I    Collection Time: 08/24/17 10:10 PM   Result Value Ref Range    Troponin I 0.013 0.000 - 0.026 ng/mL   Angiotensin converting enzyme    Collection Time: 08/31/17  9:19 AM   Result Value Ref Range    Angio Convert Enzyme 16 8 - 53 U/L   Calcitriol    Collection Time: 08/31/17  9:19 AM   Result Value Ref Range    Vit D, 1,25-Dihydroxy 37 20 - 79 pg/mL   Vitamin D    Collection Time: 08/31/17  9:19 AM   Result Value Ref Range    Vit D, 25-Hydroxy 22 (L) 30 - 96 ng/mL       CTA chest 8/24/17  Impression:       Negative for pulmonary embolus, aneurysm or dissection.  Lungs are clear.  No evidence of aortic dissection.  No evidence of aneurysm or retroperitoneal hematoma.  Small umbilical hernia containing fat.  Hernia involving the right anterolateral abdominal wall containing fat.    Cspine xr:   The vertebral bodies demonstrate normal height.  The alignment is within normal limits. There is moderate displaced narrowing and spondylosis present at the C4-C5 through the C6-C7 levels. The  C1-C2 articulation is within normal limits. No prevertebral soft tissue swelling.       Assessment:       1. Neck pain on right side    2. Radiculopathy affecting upper extremity    3. Sarcoidosis    4. Vitamin D deficiency        Impression:  Neck pain with radiation into the left arm: h/o neck OA w radiation; left arm radiculopathy, muscle spasm left neck trapezius; minimal improvement with toradol, steroid, gabapentin   Sarcoidosis:  Imuran 50mg/day, do not suspect this is sarcoid, ace, calcitriol/vit d normal  Chronic restrictive lung disease: stable, occasional SOB;  sees dr. Casper, last CT of chest 7/2016 stable, PFTs with moderately reduced diffusion   Medication Monitoring: no issues with toxicity  Vit d def: started 50K units weekly      Plan:       Cont imuran 50mg/day   continue gabapentin 1-200mg am, noon and 3-400mg at night, can increase to 600mg q hs  MRI neck scheduled for today,   Likely will need to see Dr. Rankin for HIMANSHU vs neurosurg  Consider sending back to PT as well.     rtc in oct for routine f/u

## 2017-09-15 ENCOUNTER — TELEPHONE (OUTPATIENT)
Dept: RHEUMATOLOGY | Facility: CLINIC | Age: 57
End: 2017-09-15

## 2017-09-15 DIAGNOSIS — M47.22 CERVICAL SPONDYLOSIS WITH RADICULOPATHY: Primary | ICD-10-CM

## 2017-09-15 NOTE — TELEPHONE ENCOUNTER
----- Message from Jaclyn Delaney sent at 9/15/2017  8:43 AM CDT -----  Contact: pt q  Pt states that the block on her account is lifted and need to discuss getting a appt with Dr. Rankin.     [phone]

## 2017-09-15 NOTE — TELEPHONE ENCOUNTER
Called pt and gave order per Christi Pt has financial hold on account when she gets it straight she will call back to schedule appt with Dr. Rankin. Pt verbalized understanding.

## 2017-09-18 ENCOUNTER — OFFICE VISIT (OUTPATIENT)
Dept: PAIN MEDICINE | Facility: CLINIC | Age: 57
End: 2017-09-18
Payer: COMMERCIAL

## 2017-09-18 VITALS
HEART RATE: 76 BPM | BODY MASS INDEX: 34.49 KG/M2 | HEIGHT: 65 IN | WEIGHT: 207 LBS | SYSTOLIC BLOOD PRESSURE: 131 MMHG | DIASTOLIC BLOOD PRESSURE: 86 MMHG

## 2017-09-18 DIAGNOSIS — M47.22 OSTEOARTHRITIS OF SPINE WITH RADICULOPATHY, CERVICAL REGION: ICD-10-CM

## 2017-09-18 PROCEDURE — 3008F BODY MASS INDEX DOCD: CPT | Mod: S$GLB,,, | Performed by: ANESTHESIOLOGY

## 2017-09-18 PROCEDURE — 3079F DIAST BP 80-89 MM HG: CPT | Mod: S$GLB,,, | Performed by: ANESTHESIOLOGY

## 2017-09-18 PROCEDURE — 99204 OFFICE O/P NEW MOD 45 MIN: CPT | Mod: S$GLB,,, | Performed by: ANESTHESIOLOGY

## 2017-09-18 PROCEDURE — 99999 PR PBB SHADOW E&M-EST. PATIENT-LVL III: CPT | Mod: PBBFAC,,, | Performed by: ANESTHESIOLOGY

## 2017-09-18 PROCEDURE — 3075F SYST BP GE 130 - 139MM HG: CPT | Mod: S$GLB,,, | Performed by: ANESTHESIOLOGY

## 2017-09-18 NOTE — PROGRESS NOTES
Chief Pain Complaint:  Shoulder Pain (radiates to arm and chest) and Neck Pain (left)        History of Present Illness:   Janna Sheffield is a 56 y.o. female  who is presenting with a chief complaint of Shoulder Pain (radiates to arm and chest) and Neck Pain (left)  . The patient began experiencing this problem abruptly, and the pain has been gradually worsening over the past 2 week(s). The pain is described as throbbing, shooting, burning and electrical and is located in the left lower cervical spine and radiates into left shouder, triceps and forearm. Pain is intermittent and lasts hours. The pain radiates to left arm. The patient rates her pain a 8 out of ten and interferes with activities of daily living a 6 out of ten. Pain is exacerbated by flexion of the spine, and is improved by rest. Patient reports no prior trauma, no prior spinal surgery     - pertinent negatives: No fever, No chills, No weight loss, No bladder dysfunction, No bowel dysfunction, No extremity weakness, No saddle anesthesia  - pertinent positives: none    - medications, other therapies tried (physical therapy, injections):     >> Tylenol, gabapentin and medrol dose pack    >> Has NOT previously undergone Physical Therapy    >> Has NOT previously undergone spinal injection/s      Imaging / Labs / Studies (reviewed on 9/18/2017):    Results for orders placed during the hospital encounter of 09/14/17   MRI Cervical Spine Without Contrast    Narrative Technique: Standard noncontrast multiplanar multisequence imaging of the cervical spine was performed.    Findings: There is straightening of the cervical lordosis.  Degenerative vertebral endplate spurring and disc desiccation present at multiple levels with moderate disc space narrowing from C4-C5 through C6-C7.  Vertebral marrow signal pattern and architecture are normal.  Cervical cord and craniocervical junction are intact.  Paravertebral soft tissues are symmetric and normal in  appearance.    C2-C3: Unremarkable.    C3-C4: Unremarkable.    C4-C5: Posterior disc osteophyte complex and bilateral uncovertebral hypertrophy resulting in moderate left greater than right foraminal narrowing.  Indentation of ventral thecal sac with associated anterior cord contact and mild central canal stenosis.  Minimum AP canal diameter is 9.0 mm.    C5-C6: Posterior disc osteophyte complex and asymmetric left greater than right uncovertebral hypertrophy producing marked left and moderate right foraminal narrowing.  Mild indentation of the ventral thecal sac without significant canal stenosis.    C6-C7: Posterior disc osteophyte complex and uncovertebral hypertrophy producing moderate to marked left greater than right foraminal narrowing.  Mild indentation of the ventral thecal sac without significant canal stenosis.    C7-T1: Unremarkable.    Impression  Multilevel cervical spondylosis and degenerative disc disease as detailed.      Electronically signed by: KIKE ALCALA MD  Date:     09/14/17  Time:    18:05      Results for orders placed during the hospital encounter of 10/28/15   X-Ray Lumbar Spine AP And Lateral    Narrative Findings: Three views.  Spinal alignment is anatomic.  Vertebral body heights and disk interspaces are maintained.  Hypertrophic facet arthropathy noted bilaterally at L4-5 and L5-S1.  Anterior vertebral endplate spurring in the lower thoracic spine.  Pedicles are intact.  No compression fracture or subluxation.    Impression  As above.      Electronically signed by: KIKE ALCALA MD  Date:     10/28/15  Time:    10:03        Results for orders placed during the hospital encounter of 08/31/17   X-Ray Cervical Spine AP And Lateral    Narrative AP and lateral views of the cervical spine    Findings: The vertebral bodies demonstrate normal height.  The alignment is within normal limits. There is moderate displaced narrowing and spondylosis present at the C4-C5 through the C6-C7  "levels. The C1-C2 articulation is within normal limits. No prevertebral soft tissue swelling.    Impression  As above      Electronically signed by: DONTRELL FERNANDEZ D.O.  Date:     08/31/17  Time:    09:30      t.      Review of Systems:  CONSTITUTIONAL: patient denies any fever, chills, or weight loss  SKIN: patient denies any rash or itching  RESPIRATORY: patient denies having any shortness of breath  GASTROINTESTINAL: patient denies having any diarrhea, constipation, or bowel incontinence  GENITOURINARY: patient denies having any abnormal bladder function    MUSCULOSKELETAL:  - patient complains of the above noted pain/s (see chief pain complaint)    NEUROLOGICAL:   - pain as above  - strength in Upper extremities is intact, BILATERALLY  - sensation in Upper extremities is intact, BILATERALLY  - patient denies any loss of bowel or bladder control      PSYCHIATRIC: patient denies any change in mood    Other:  All other systems reviewed and are negative      Physical Exam:  /86 (BP Location: Right arm, Patient Position: Sitting)   Pulse 76   Ht 5' 5" (1.651 m)   Wt 93.9 kg (207 lb)   BMI 34.45 kg/m²  (reviewed on 9/18/2017)  General: Alert and oriented, in no apparent distress.  Gait: normal gait.  Skin: No rashes, No discoloration, No obvious lesions  HEENT: Normocephalic, atraumatic. Pupils equal and round.  Cardiovascular: Regular rate and rhythm , no significant peripheral edema present  Respiratory: Without audible wheezing, without use of accessory muscles of respiration.    Musculoskeletal:    Cervical Spine    - Pain on flexion of cervical spine Present  - Spurling's Test:  Present    - Pain on extension of cervical spine Absent  - TTP over the cervical facet joints Absent  - Cervical facet loading Absent      Neuro:    Strength:  UE R/L: D: 5/5; B: 5/5; T: 5/5; WF: 5/5; WE: 5/5; IO: 5/5;      Extremity Reflexes: Brisk and symmetric throughout.      Extremity Sensory: Sensation to pinprick and " temperature symmetric. Proprioception intact.      Psych:  Mood and affect is appropriate      Assessment:    Janna Sheffield is a 56 y.o. year old female who is presenting with cervical radiculopathy .    Encounter Diagnosis   Name Primary?    Osteoarthritis of spine with radiculopathy, cervical region        Plan:    1. Interventional: Schedule patient for Left C6-7 TFESI with sedation.     2. Pharmacologic: Continue Gabapentin.     3. Rehabilitative: PT after injection     4. Diagnostic: None at this time    5. Follow up: Return in about 2 weeks (around 10/2/2017) for After Injection.      45 minutes were spent in this encounter with more than 50% of the time used for counseling and review of the plan.  Imaging / studies reviewed, detailed above.  I discussed in detail the risks, benefits, and alternatives to any and all potential treatment options.  All questions and concerns were fully addressed today in clinic. Medical decision making moderate.    Thank you for the opportunity to assist in the care of this patient.    Best wishes,    Signed:    Cali Isabel MD          Disclaimer:  This note may have been prepared using voice recognition software, it may have not been extensively proofed, as such there could be errors within the text such as sound alike errors.

## 2017-09-18 NOTE — LETTER
September 18, 2017      Christi Fish PA-C  [address]  [facility] [address]           [facility]  [address]  [place] [address]  Phone: [phone]  Fax: [phone]          Patient: Janna Sheffield   MR Number: [id number]   YOB: 1960   Date of Visit: 9/18/2017       Dear Christi Fish:    Thank you for referring Janna Sheffield to me for evaluation. Attached you will find relevant portions of my assessment and plan of care.    If you have questions, please do not hesitate to call me. I look forward to following Janna Sheffield along with you.    Sincerely,    Cali Isabel MD    Enclosure  CC:  No Recipients    If you would like to receive this communication electronically, please contact [email or web address] or [phone] to request more information on [employer] Link access.    For providers and/or their staff who would like to refer a patient to [facility], please contact us through our one-stop-shop provider referral line, [facility] , at [phone].    If you feel you have received this communication in error or would no longer like to receive these types of communications, please e-mail [email or web address]

## 2017-09-20 ENCOUNTER — OFFICE VISIT (OUTPATIENT)
Dept: OBSTETRICS AND GYNECOLOGY | Facility: CLINIC | Age: 57
End: 2017-09-20
Payer: COMMERCIAL

## 2017-09-20 ENCOUNTER — HOSPITAL ENCOUNTER (OUTPATIENT)
Dept: RADIOLOGY | Facility: HOSPITAL | Age: 57
Discharge: HOME | End: 2017-09-20
Attending: OBSTETRICS & GYNECOLOGY
Payer: COMMERCIAL

## 2017-09-20 VITALS
DIASTOLIC BLOOD PRESSURE: 84 MMHG | BODY MASS INDEX: 34.68 KG/M2 | WEIGHT: 208.13 LBS | HEIGHT: 65 IN | SYSTOLIC BLOOD PRESSURE: 138 MMHG

## 2017-09-20 VITALS — WEIGHT: 207 LBS | BODY MASS INDEX: 34.49 KG/M2 | HEIGHT: 65 IN

## 2017-09-20 DIAGNOSIS — Z12.31 VISIT FOR SCREENING MAMMOGRAM: ICD-10-CM

## 2017-09-20 DIAGNOSIS — Z01.419 ENCOUNTER FOR GYNECOLOGICAL EXAMINATION: Primary | ICD-10-CM

## 2017-09-20 PROCEDURE — 99999 PR PBB SHADOW E&M-EST. PATIENT-LVL II: CPT | Mod: PBBFAC,,, | Performed by: OBSTETRICS & GYNECOLOGY

## 2017-09-20 PROCEDURE — 99396 PREV VISIT EST AGE 40-64: CPT | Mod: S$GLB,,, | Performed by: OBSTETRICS & GYNECOLOGY

## 2017-09-20 PROCEDURE — 77067 SCR MAMMO BI INCL CAD: CPT | Mod: TC

## 2017-09-20 PROCEDURE — 77067 SCR MAMMO BI INCL CAD: CPT | Mod: 26,,, | Performed by: RADIOLOGY

## 2017-09-20 NOTE — PROGRESS NOTES
"Janna Sheffield is a 56 y.o.  who presents for annual exam and has c/o lower back and abdominal pain "when I first wake up in the morning" x 8 months. Improves as day progresses.   - s/p MACARIO (retains ovaries)   - no GI issues   - no other ppt causes    Pt is sexually active - mut monog.    Very happy helping raise her 4 grandsons       Last mammogram was normal on 6/10/16. Had one today   Last colonoscopy was 11, rpt in 10 yrs was rec.        Past Medical History:   Diagnosis Date    Arthritis     Chest pain syndrome 10/23/2014    Hyperlipidemia     Hypertension     Macular degeneration, age related, nonexudative - Left Eye 3/24/2014    Retinal detachment, old, partial 2014    Sarcoidosis        Past Surgical History:   Procedure Laterality Date    CATARACT EXTRACTION      DILATION AND CURETTAGE OF UTERUS      missed ab x 3    HYSTERECTOMY      MACARIO    knee surgery      TUBAL LIGATION         Current Outpatient Prescriptions   Medication Sig Dispense Refill    azathioprine (IMURAN) 50 mg Tab TAKE ONE TABLET BY MOUTH ONCE DAILY 90 tablet 3    ergocalciferol (ERGOCALCIFEROL) 50,000 unit Cap Take 1 capsule (50,000 Units total) by mouth once a week. 12 capsule 4    gabapentin (NEURONTIN) 100 MG capsule Take 1 capsule (100 mg total) by mouth 3 (three) times daily. 90 capsule 11    hydrochlorothiazide (HYDRODIURIL) 25 MG tablet Take 1 tablet (25 mg total) by mouth once daily. 30 tablet 11    multivitamin (THERAGRAN) per tablet Take 1 tablet by mouth Daily.      rosuvastatin (CRESTOR) 40 MG Tab TAKE ONE TABLET BY MOUTH ONCE DAILY (  REPLACES  LIPITOR) 30 tablet 11     No current facility-administered medications for this visit.        Family History   Problem Relation Age of Onset    Cervical cancer Mother     Heart disease Mother     Heart attack Mother     Hypertension Mother     Hyperlipidemia Mother     Hypertension Father     Hyperlipidemia Father     Stroke Father     " "Stroke Brother     Breast cancer Maternal Aunt     Breast cancer Cousin      mat and pat cousin w/ br ca    Colon cancer Neg Hx     Ovarian cancer Neg Hx     Thrombophilia Neg Hx     Deep vein thrombosis Neg Hx     Pulmonary embolism Neg Hx        Review of patient's allergies indicates:  No Known Allergies    Social History   Substance Use Topics    Smoking status: Never Smoker    Smokeless tobacco: Never Used    Alcohol use No       /84   Ht 5' 5" (1.651 m)   Wt 94.4 kg (208 lb 1.8 oz)   BMI 34.63 kg/m²     ROS:  GENERAL: Doing well and no acute complaints.   BREASTS: No lumps, tenderness, discharge.  GI: No nausea, vomiting, melena, hematochezia.  : No significant incontinence, dysuria, hematuria.  GYN: No unusual discharge, pain.    GEN: Very nice lady in no distress. Alert and oriented.  HEAD and NECK: Normocephalic. No adenopathy. Thyroid normal to inspection and palpation.  SKIN: No hirsutism.  BREASTS: Normal to inspection and exam ( no suspicious masses, tenderness, or axillary adenopathy). No discharge.   ABDOMEN:  Overweight, soft and non tender. No hernia, mass, hepatomegaly, or RUQT/CVAT.   PELVIC:      Vulva: normal female genitalia. No lesions.       Urethra: normal      Vagina:  Moist, no discharge, no significant cystocele or rectocele      Cuff: Intact, well supported and free of lesion. No bladder base tenderness.      Adnexa: No masses, tenderness, or CDS nodularity.      Anus: normal appearing.    IMPRESSION: nl exam, obesity, suspect low back/abd discomfort 2ary to wt     COUNSELING:  -Discussed pt's weight and the health implications (increased risks of thrombosis, DM, HTN, renal disease, lipid issues) -  recommend Weight Watchers, [employer] program or any other organized program along with increased activity/exercise (30-60 min 5 times/wk). Suggest a target of 5-10 % wt reduction over 6-12 months as a goal.      PLAN: annual f/u         "

## 2017-09-28 ENCOUNTER — HOSPITAL ENCOUNTER (OUTPATIENT)
Facility: HOSPITAL | Age: 57
Discharge: HOME | End: 2017-09-28
Attending: ANESTHESIOLOGY | Admitting: ANESTHESIOLOGY
Payer: COMMERCIAL

## 2017-09-28 ENCOUNTER — SURGERY (OUTPATIENT)
Age: 57
End: 2017-09-28

## 2017-09-28 ENCOUNTER — HOSPITAL ENCOUNTER (OUTPATIENT)
Dept: RADIOLOGY | Facility: HOSPITAL | Age: 57
Discharge: HOME | End: 2017-09-28
Attending: ANESTHESIOLOGY
Payer: COMMERCIAL

## 2017-09-28 VITALS
BODY MASS INDEX: 34.49 KG/M2 | SYSTOLIC BLOOD PRESSURE: 149 MMHG | WEIGHT: 207 LBS | DIASTOLIC BLOOD PRESSURE: 84 MMHG | HEIGHT: 65 IN | HEART RATE: 87 BPM | OXYGEN SATURATION: 100 % | RESPIRATION RATE: 16 BRPM | TEMPERATURE: 99 F

## 2017-09-28 DIAGNOSIS — M54.12 CERVICAL RADICULOPATHY: Primary | ICD-10-CM

## 2017-09-28 DIAGNOSIS — M47.22 OSTEOARTHRITIS OF SPINE WITH RADICULOPATHY, CERVICAL REGION: ICD-10-CM

## 2017-09-28 DIAGNOSIS — M54.12 CERVICAL RADICULOPATHY: ICD-10-CM

## 2017-09-28 PROCEDURE — 64479 NJX AA&/STRD TFRM EPI C/T 1: CPT

## 2017-09-28 PROCEDURE — 25000003 PHARM REV CODE 250

## 2017-09-28 PROCEDURE — 63600175 PHARM REV CODE 636 W HCPCS: Performed by: ANESTHESIOLOGY

## 2017-09-28 PROCEDURE — 63600175 PHARM REV CODE 636 W HCPCS

## 2017-09-28 PROCEDURE — 25000003 PHARM REV CODE 250: Performed by: ANESTHESIOLOGY

## 2017-09-28 RX ORDER — SODIUM CHLORIDE, SODIUM LACTATE, POTASSIUM CHLORIDE, CALCIUM CHLORIDE 600; 310; 30; 20 MG/100ML; MG/100ML; MG/100ML; MG/100ML
INJECTION, SOLUTION INTRAVENOUS CONTINUOUS
Status: DISCONTINUED | OUTPATIENT
Start: 2017-09-28 | End: 2017-09-28 | Stop reason: HOSPADM

## 2017-09-28 RX ORDER — DEXAMETHASONE SODIUM PHOSPHATE 10 MG/ML
INJECTION INTRAMUSCULAR; INTRAVENOUS
Status: DISCONTINUED | OUTPATIENT
Start: 2017-09-28 | End: 2017-09-28 | Stop reason: HOSPADM

## 2017-09-28 RX ORDER — SODIUM CHLORIDE, SODIUM LACTATE, POTASSIUM CHLORIDE, CALCIUM CHLORIDE 600; 310; 30; 20 MG/100ML; MG/100ML; MG/100ML; MG/100ML
INJECTION, SOLUTION INTRAVENOUS CONTINUOUS
Status: CANCELLED | OUTPATIENT
Start: 2017-09-28

## 2017-09-28 RX ORDER — LIDOCAINE HYDROCHLORIDE 20 MG/ML
INJECTION, SOLUTION INFILTRATION; PERINEURAL
Status: DISCONTINUED | OUTPATIENT
Start: 2017-09-28 | End: 2017-09-28 | Stop reason: HOSPADM

## 2017-09-28 RX ADMIN — LIDOCAINE HYDROCHLORIDE 10 ML: 20 INJECTION, SOLUTION INFILTRATION; PERINEURAL at 01:09

## 2017-09-28 RX ADMIN — DEXAMETHASONE SODIUM PHOSPHATE 10 MG: 10 INJECTION, SOLUTION INTRAMUSCULAR; INTRAVENOUS at 01:09

## 2017-09-28 NOTE — DISCHARGE SUMMARY
[facility]  Discharge Note       Description of Procedure: Left Cervical C6-7 Transforiminal Injection  under Fluoroscopic Guidance    Procedure Date: 9/28/2017    Admit Date: 9/28/2017  Discharge Date: 9/28/2017     Attending Physician: Maame Leiva   Discharge Provider: Maame Leiva    Preoperative Diagnosis: Right Cervical Radiculopathy   Postoperative Diagnosis: as above, same as preoperative diagnosis    Discharged Condition: Stable    Hospital Course: Patient was admitted for an outpatient procedure. The procedure was tolerated well with no complications.    Final Diagnoses: Same as principal problem.    Disposition: Home, self-care.    Follow up/Patient Instructions:  Follow-up in clinic in 2-3 weeks.    Medications: No medications were prescribed today. The patient was advised to resume normal medication regimen without change.  Specific information was provided regarding restarting any anticoagulant/s.    Discharge Procedure Orders (must include Diet, Follow-up, Activity):  Light activity for the remainder of the day, resume normal activity tomorrow. Resume normal diet. Follow-up in clinic in 2-3 weeks.

## 2017-09-28 NOTE — PROCEDURES
Procedure: Cervical Transforaminal Epidural Steroid Injection under Fluoroscopic Guidance    Level: C6/7     Side: Left    PROCEDURE DATE: 9/28/2017    Pre-operative Diagnosis: Cervical Radiculopathy  Post-operative Diagnosis: Cervical Radiculopathy    Provider: Cali Isabel MD  Assistant(s): None    Anesthesia: Local, No Sedation    >> 0 mg of VERSED    >> 0 mcg of FENTANYL     Indication: Low back pain with radiculopathy consistent with distribution of targeted nerve. Symptoms unresponsive to conservative treatments. Fluoroscopy was used to optimize visualization of needle placement and to maximize safety.     Procedure Description / Technique:  The patient was seen and identified in the preoperative area. Risks, benefits, complications, and alternatives were discussed with the patient. The patient agreed to proceed with the procedure and signed the consent. The site and side of the procedure was identified and marked. An IV was started. The patient was taken to the procedural suite.    The patient was positioned in supine orientation on procedure table. A time out was performed prior to any intervention. The procedure, site, side, and allergies were stated and agreed to by all present. The left neck area was widely prepped with ChloraPrep. The procedural site was draped in usual sterile fashion. Vital signs were closely monitored throughout this procedure. Conscious sedation was not used for this procedure.    The left C6-C7 foramen was identified. The midpoint of the posterior aspect of the facets were approached using 25-gauge, 2.5-inch Quincke point needles. On AP view the needle tips were in the lateral third of the articular pillars. After negative aspiration, 0.25mL of Onmipaque® (lohexol) 240mg/ml was injected at each level. Good spread was identified. Then, 10 mg of Decadron (dexamethasone) and 0.5mL of 1% Lidocaine was injected. The needle was removed intact.    Description of Findings: Not  applicable    Prosthetic devices, grafts, tissues, or devices implanted: None    Specimen Removed: No    Estimated Blood Loss: minimal    COMPLICATIONS: None    DISPOSITION / PLANS: The patient was transferred to the recovery area in a stable condition for observation. The patient was reexamined prior to discharge. There was no evidence of acute neurologic injury following the procedure.  Patient was discharged from the recovery room after meeting discharge criteria. Home discharge instructions were given to the patient by the staff.

## 2017-09-28 NOTE — PLAN OF CARE
Problem: Patient Care Overview  Goal: Plan of Care Review  Outcome: Outcome(s) achieved Date Met: 09/28/17  Patient d/c home in stable condition via wheelchair with ride. Verbalized understanding of d/c instructions. Patient voiced no complaints at this time. Patient stood at side of bed, walked steps with no new motor deficits. Neurologically intact.

## 2017-10-04 ENCOUNTER — TELEPHONE (OUTPATIENT)
Dept: RHEUMATOLOGY | Facility: CLINIC | Age: 57
End: 2017-10-04

## 2017-10-04 NOTE — TELEPHONE ENCOUNTER
----- Message from Sigrid Banksnickolas sent at 10/4/2017  3:34 PM CDT -----  Contact: self [phone]  1. What is the name of the medication you are requesting? Vitamin D  2. What is the dose? unk  3. How do you take the medication? Orally, topically, etc? orally  4. How often do you take this medication? weekly  5. Do you need a 30 day or 90 day supply? 30 day  6. How many refills are you requesting? 1  7. What is your preferred pharmacy and location of the pharmacy?     [id number] - [place] - [address]  [address] [address]  Phone: [phone] Fax: [phone]    8. Who can we contact with further questions? Pt [phone]

## 2017-10-06 DIAGNOSIS — E55.9 VITAMIN D DEFICIENCY: Primary | ICD-10-CM

## 2017-10-06 RX ORDER — ASPIRIN 325 MG
50000 TABLET, DELAYED RELEASE (ENTERIC COATED) ORAL WEEKLY
Qty: 12 CAPSULE | Refills: 4 | Status: SHIPPED | OUTPATIENT
Start: 2017-10-06 | End: 2017-10-31 | Stop reason: SDUPTHER

## 2017-10-11 ENCOUNTER — OFFICE VISIT (OUTPATIENT)
Dept: PAIN MEDICINE | Facility: CLINIC | Age: 57
End: 2017-10-11
Payer: COMMERCIAL

## 2017-10-11 VITALS
BODY MASS INDEX: 34.49 KG/M2 | HEART RATE: 95 BPM | WEIGHT: 207 LBS | RESPIRATION RATE: 16 BRPM | DIASTOLIC BLOOD PRESSURE: 74 MMHG | SYSTOLIC BLOOD PRESSURE: 111 MMHG | HEIGHT: 65 IN

## 2017-10-11 DIAGNOSIS — M54.12 CERVICAL RADICULOPATHY: Primary | ICD-10-CM

## 2017-10-11 DIAGNOSIS — M47.22 OSTEOARTHRITIS OF SPINE WITH RADICULOPATHY, CERVICAL REGION: ICD-10-CM

## 2017-10-11 PROCEDURE — 99999 PR PBB SHADOW E&M-EST. PATIENT-LVL III: CPT | Mod: PBBFAC,,, | Performed by: ANESTHESIOLOGY

## 2017-10-11 PROCEDURE — 99214 OFFICE O/P EST MOD 30 MIN: CPT | Mod: S$GLB,,, | Performed by: ANESTHESIOLOGY

## 2017-10-11 RX ORDER — GABAPENTIN 300 MG/1
CAPSULE ORAL
Qty: 90 CAPSULE | Refills: 0 | Status: SHIPPED | OUTPATIENT
Start: 2017-10-11 | End: 2018-05-01 | Stop reason: SDUPTHER

## 2017-10-11 NOTE — PROGRESS NOTES
Chief Pain Complaint:  Arm Pain (left)    Interval History: The patient was last seen 9/28/2017. At that time she underwent C6-7 TFESI. The patient reports that  she is/was better following the procedure. She had 60 % relief of hr pain. The changes lasted 2 weeks. The changes have continued through this visit.       History of Present Illness:   Janna Sheffield is a 56 y.o. female  who is presenting with a chief complaint of Arm Pain (left)  . The patient began experiencing this problem abruptly, and the pain has been gradually worsening over the past 2 week(s). The pain is described as throbbing, shooting, burning and electrical and is located in the left lower cervical spine and radiates into left shouder, triceps and forearm. Pain is intermittent and lasts hours. The pain radiates to left arm. The patient rates her pain a 8 out of ten and interferes with activities of daily living a 6 out of ten. Pain is exacerbated by flexion of the spine, and is improved by rest. Patient reports no prior trauma, no prior spinal surgery     Imaging / Labs / Studies (reviewed on 10/11/2017):    Results for orders placed during the hospital encounter of 09/14/17   MRI Cervical Spine Without Contrast    Narrative Technique: Standard noncontrast multiplanar multisequence imaging of the cervical spine was performed.    Findings: There is straightening of the cervical lordosis.  Degenerative vertebral endplate spurring and disc desiccation present at multiple levels with moderate disc space narrowing from C4-C5 through C6-C7.  Vertebral marrow signal pattern and architecture are normal.  Cervical cord and craniocervical junction are intact.  Paravertebral soft tissues are symmetric and normal in appearance.    C2-C3: Unremarkable.    C3-C4: Unremarkable.    C4-C5: Posterior disc osteophyte complex and bilateral uncovertebral hypertrophy resulting in moderate left greater than right foraminal narrowing.  Indentation of ventral  thecal sac with associated anterior cord contact and mild central canal stenosis.  Minimum AP canal diameter is 9.0 mm.    C5-C6: Posterior disc osteophyte complex and asymmetric left greater than right uncovertebral hypertrophy producing marked left and moderate right foraminal narrowing.  Mild indentation of the ventral thecal sac without significant canal stenosis.    C6-C7: Posterior disc osteophyte complex and uncovertebral hypertrophy producing moderate to marked left greater than right foraminal narrowing.  Mild indentation of the ventral thecal sac without significant canal stenosis.    C7-T1: Unremarkable.    Impression  Multilevel cervical spondylosis and degenerative disc disease as detailed.      Electronically signed by: KIKE ALCALA MD  Date:     09/14/17  Time:    18:05        Results for orders placed during the hospital encounter of 10/28/15   X-Ray Lumbar Spine AP And Lateral    Narrative Findings: Three views.  Spinal alignment is anatomic.  Vertebral body heights and disk interspaces are maintained.  Hypertrophic facet arthropathy noted bilaterally at L4-5 and L5-S1.  Anterior vertebral endplate spurring in the lower thoracic spine.  Pedicles are intact.  No compression fracture or subluxation.    Impression  As above.      Electronically signed by: KIKE ALCALA MD  Date:     10/28/15  Time:    10:03        Results for orders placed during the hospital encounter of 08/31/17   X-Ray Cervical Spine AP And Lateral    Narrative AP and lateral views of the cervical spine    Findings: The vertebral bodies demonstrate normal height.  The alignment is within normal limits. There is moderate displaced narrowing and spondylosis present at the C4-C5 through the C6-C7 levels. The C1-C2 articulation is within normal limits. No prevertebral soft tissue swelling.    Impression  As above      Electronically signed by: DONTRELL FERNANDEZ D.O.  Date:     08/31/17  Time:    09:30        Review of  "Systems:  CONSTITUTIONAL: patient denies any fever, chills, or weight loss  SKIN: patient denies any rash or itching  RESPIRATORY: patient denies having any shortness of breath  GASTROINTESTINAL: patient denies having any diarrhea, constipation, or bowel incontinence  GENITOURINARY: patient denies having any abnormal bladder function    MUSCULOSKELETAL:  - patient complains of the above noted pain/s (see chief pain complaint)    NEUROLOGICAL:   - pain as above  - strength in Upper extremities is intact, BILATERALLY  - sensation in Upper extremities is intact, BILATERALLY  - patient denies any loss of bowel or bladder control      PSYCHIATRIC: patient denies any change in mood    Other:  All other systems reviewed and are negative      Physical Exam:  /74 (BP Location: Right arm, Patient Position: Sitting)   Pulse 95   Resp 16   Ht 5' 5" (1.651 m)   Wt 93.9 kg (207 lb)   BMI 34.45 kg/m²  (reviewed on 10/11/2017)  General: Alert and oriented, in no apparent distress.  Gait: normal gait.  Skin: No rashes, No discoloration, No obvious lesions  HEENT: Normocephalic, atraumatic. Pupils equal and round.  Cardiovascular: Regular rate and rhythm , no significant peripheral edema present  Respiratory: Without audible wheezing, without use of accessory muscles of respiration.    Musculoskeletal:    Cervical Spine    - Pain on flexion of cervical spine Present  - Spurling's Test:  Present    - Pain on extension of cervical spine Absent  - TTP over the cervical facet joints Absent  - Cervical facet loading Absent    Neuro:    Strength:  UE R/L: D: 5/5; B: 5/5; T: 5/5; WF: 5/5; WE: 5/5; IO: 5/5;    Extremity Reflexes: Brisk and symmetric throughout.      Extremity Sensory: Sensation to pinprick and temperature symmetric. Proprioception intact.      Psych:  Mood and affect is appropriate      Assessment:    Janna Sheffield is a 56 y.o. year old female who is presenting with     Encounter Diagnoses   Name Primary?    " Cervical radiculopathy Yes    Osteoarthritis of spine with radiculopathy, cervical region        Plan:    1. Interventional: None at this time.     2. Pharmacologic: Gabapentin 300 mg PO Qhs with up titration.    3. Rehabilitative: Referral to PT given.     4. Diagnostic: None at this time.     5. Follow up: 4 weeks    20 minutes were spent in this encounter with more than 50% of the time used for counseling and review of the plan.  Imaging / studies reviewed, detailed above.  I discussed in detail the risks, benefits, and alternatives to any and all potential treatment options.  All questions and concerns were fully addressed today in clinic. Medical decision making moderate.    Thank you for the opportunity to assist in the care of this patient.    Best wishes,    Signed:    Cali Isabel MD

## 2017-10-31 ENCOUNTER — LAB VISIT (OUTPATIENT)
Dept: LAB | Facility: HOSPITAL | Age: 57
End: 2017-10-31
Attending: INTERNAL MEDICINE
Payer: COMMERCIAL

## 2017-10-31 ENCOUNTER — OFFICE VISIT (OUTPATIENT)
Dept: RHEUMATOLOGY | Facility: CLINIC | Age: 57
End: 2017-10-31
Payer: COMMERCIAL

## 2017-10-31 VITALS
BODY MASS INDEX: 34.45 KG/M2 | DIASTOLIC BLOOD PRESSURE: 95 MMHG | WEIGHT: 207 LBS | SYSTOLIC BLOOD PRESSURE: 144 MMHG | HEART RATE: 69 BPM

## 2017-10-31 DIAGNOSIS — E55.9 VITAMIN D DEFICIENCY: ICD-10-CM

## 2017-10-31 DIAGNOSIS — M54.12 CERVICAL RADICULOPATHY: ICD-10-CM

## 2017-10-31 DIAGNOSIS — D86.9 SARCOIDOSIS: ICD-10-CM

## 2017-10-31 DIAGNOSIS — M47.22 OSTEOARTHRITIS OF SPINE WITH RADICULOPATHY, CERVICAL REGION: Primary | ICD-10-CM

## 2017-10-31 DIAGNOSIS — Z51.81 MEDICATION MONITORING ENCOUNTER: Chronic | ICD-10-CM

## 2017-10-31 DIAGNOSIS — Z23 NEED FOR INFLUENZA VACCINATION: ICD-10-CM

## 2017-10-31 DIAGNOSIS — Z51.81 MEDICATION MONITORING ENCOUNTER: ICD-10-CM

## 2017-10-31 DIAGNOSIS — D84.9 IMMUNOCOMPROMISED: ICD-10-CM

## 2017-10-31 DIAGNOSIS — J98.4 CHRONIC RESTRICTIVE LUNG DISEASE: ICD-10-CM

## 2017-10-31 LAB
ALBUMIN SERPL BCP-MCNC: 3.5 G/DL
ALP SERPL-CCNC: 74 U/L
ALT SERPL W/O P-5'-P-CCNC: 11 U/L
ANION GAP SERPL CALC-SCNC: 7 MMOL/L
AST SERPL-CCNC: 14 U/L
BASOPHILS # BLD AUTO: 0.02 K/UL
BASOPHILS NFR BLD: 0.5 %
BILIRUB SERPL-MCNC: 0.4 MG/DL
BUN SERPL-MCNC: 9 MG/DL
CALCIUM SERPL-MCNC: 9.6 MG/DL
CHLORIDE SERPL-SCNC: 109 MMOL/L
CO2 SERPL-SCNC: 28 MMOL/L
CREAT SERPL-MCNC: 0.8 MG/DL
CRP SERPL-MCNC: 5 MG/L
DIFFERENTIAL METHOD: ABNORMAL
EOSINOPHIL # BLD AUTO: 0.1 K/UL
EOSINOPHIL NFR BLD: 1.2 %
ERYTHROCYTE [DISTWIDTH] IN BLOOD BY AUTOMATED COUNT: 13.7 %
ERYTHROCYTE [SEDIMENTATION RATE] IN BLOOD BY WESTERGREN METHOD: 42 MM/HR
EST. GFR  (AFRICAN AMERICAN): >60 ML/MIN/1.73 M^2
EST. GFR  (NON AFRICAN AMERICAN): >60 ML/MIN/1.73 M^2
GLUCOSE SERPL-MCNC: 92 MG/DL
HCT VFR BLD AUTO: 37 %
HGB BLD-MCNC: 12 G/DL
LYMPHOCYTES # BLD AUTO: 2.1 K/UL
LYMPHOCYTES NFR BLD: 52.1 %
MCH RBC QN AUTO: 31.5 PG
MCHC RBC AUTO-ENTMCNC: 32.4 G/DL
MCV RBC AUTO: 97 FL
MONOCYTES # BLD AUTO: 0.2 K/UL
MONOCYTES NFR BLD: 4.6 %
NEUTROPHILS # BLD AUTO: 1.7 K/UL
NEUTROPHILS NFR BLD: 41.6 %
PLATELET # BLD AUTO: 287 K/UL
PMV BLD AUTO: 10.4 FL
POTASSIUM SERPL-SCNC: 4 MMOL/L
PROT SERPL-MCNC: 7.1 G/DL
RBC # BLD AUTO: 3.81 M/UL
SODIUM SERPL-SCNC: 144 MMOL/L
WBC # BLD AUTO: 4.11 K/UL

## 2017-10-31 PROCEDURE — 99999 PR PBB SHADOW E&M-EST. PATIENT-LVL III: CPT | Mod: PBBFAC,,, | Performed by: PHYSICIAN ASSISTANT

## 2017-10-31 PROCEDURE — 85025 COMPLETE CBC W/AUTO DIFF WBC: CPT | Mod: PO

## 2017-10-31 PROCEDURE — 36415 COLL VENOUS BLD VENIPUNCTURE: CPT | Mod: PO

## 2017-10-31 PROCEDURE — 85651 RBC SED RATE NONAUTOMATED: CPT | Mod: PO

## 2017-10-31 PROCEDURE — 86140 C-REACTIVE PROTEIN: CPT

## 2017-10-31 PROCEDURE — 99214 OFFICE O/P EST MOD 30 MIN: CPT | Mod: S$GLB,,, | Performed by: PHYSICIAN ASSISTANT

## 2017-10-31 PROCEDURE — 80053 COMPREHEN METABOLIC PANEL: CPT | Mod: PO

## 2017-10-31 RX ORDER — ASPIRIN 325 MG
50000 TABLET, DELAYED RELEASE (ENTERIC COATED) ORAL WEEKLY
Qty: 12 CAPSULE | Refills: 4 | Status: SHIPPED | OUTPATIENT
Start: 2017-10-31 | End: 2019-05-07 | Stop reason: SDUPTHER

## 2017-10-31 NOTE — PATIENT INSTRUCTIONS
High dose flu today downstairs    Start PT    Vitamin D weekly 50,000units     Continue Imuran 50mg/day (can start this weekend again)

## 2017-10-31 NOTE — PROGRESS NOTES
Subjective:       Patient ID: Janna Sheffield is a 56 y.o. female.    Chief Complaint: Sarcoidosis, osteoarthritis    Mrs. Sheffield is a 55 y/o AAF with history of osteoarthritis and sarcoidosis. Sarcoidosis was diagnosed in 1985 through a biopsy of a spot on her liver. 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   Currently she is on Imuran 50mg daily (lowered previously from 75mg/d). She has been doing well in regards to her Sarcoidosis.  She does have restrictive lung disease and SOB when she is more active. She sees Dr. Casper for this.  Her last CT scan done a year ago was stable. She has low vitamin D and started on 50Kunits weekly.     She has generalized osteoarthritis with pain in her neck that radiates into her bilateral arms. She has recently been seen worsening neck pain radiating into her left arm. MRI showed disc disease and degenerative arthritis. She was sent to pain [facility] for ESIs which have helped tremendously. She also is taking gabapentin. Is planning to start PT at the [facility] soon.  She is doing well and has no major complaints today.                 Review of Systems   Constitutional: Negative for chills, fatigue and fever.   HENT: Negative for mouth sores, rhinorrhea and sore throat.    Eyes: Negative for pain and redness.   Respiratory: Positive for shortness of breath. Negative for cough.    Cardiovascular: Negative for chest pain.   Gastrointestinal: Negative for abdominal pain, constipation, diarrhea, nausea and vomiting.   Genitourinary: Negative for dysuria and hematuria.   Musculoskeletal: Positive for arthralgias and neck pain. Negative for joint swelling and myalgias.        Left knee replacement   Skin: Negative for rash.   Neurological: Negative for weakness, numbness and headaches.   Psychiatric/Behavioral: The patient is not nervous/anxious.            Objective:   BP (!) 144/95   Pulse 69   Wt 93.9 kg (207 lb  0.2 oz)   BMI 34.45 kg/m²      Physical Exam   Constitutional: She is oriented to person, place, and time and well-developed, well-nourished, and in no distress.   HENT:   Head: Normocephalic and atraumatic.   Eyes: Pupils are equal, round, and reactive to light. Right eye exhibits no discharge.   Neck: Normal range of motion.   Cardiovascular: Normal rate, regular rhythm and normal heart sounds.  Exam reveals no friction rub.    Pulmonary/Chest: Effort normal and breath sounds normal. No respiratory distress.   Abdominal: Soft. She exhibits no distension. There is no tenderness.   Lymphadenopathy:     She has no cervical adenopathy.   Neurological: She is alert and oriented to person, place, and time.   Skin: No rash noted. No erythema.     Psychiatric: Mood normal.   Musculoskeletal: Normal range of motion. She exhibits no edema or deformity.   mao wrists, mcps, pips no synovitis, no tenderness, full rom, grib strength equal bilaterally  mao shoulder strength 5/5    Left knee s/p replacement, no effusion, no warmth  Right knee no effusion, no warmth, full rom         No results found for this or any previous visit (from the past 672 hour(s)).    CTA chest 8/24/17  Impression:       Negative for pulmonary embolus, aneurysm or dissection.  Lungs are clear.  No evidence of aortic dissection.  No evidence of aneurysm or retroperitoneal hematoma.  Small umbilical hernia containing fat.  Hernia involving the right anterolateral abdominal wall containing fat.    Cspine xr:   The vertebral bodies demonstrate normal height.  The alignment is within normal limits. There is moderate displaced narrowing and spondylosis present at the C4-C5 through the C6-C7 levels. The C1-C2 articulation is within normal limits. No prevertebral soft tissue swelling.    MRI C spine:   Findings: There is straightening of the cervical lordosis.  Degenerative vertebral endplate spurring and disc desiccation present at multiple levels with  moderate disc space narrowing from C4-C5 through C6-C7.  Vertebral marrow signal pattern and architecture are normal.  Cervical cord and craniocervical junction are intact.  Paravertebral soft tissues are symmetric and normal in appearance.  C2-C3: Unremarkable.  C3-C4: Unremarkable.  C4-C5: Posterior disc osteophyte complex and bilateral uncovertebral hypertrophy resulting in moderate left greater than right foraminal narrowing.  Indentation of ventral thecal sac with associated anterior cord contact and mild central canal stenosis.  Minimum AP canal diameter is 9.0 mm.  C5-C6: Posterior disc osteophyte complex and asymmetric left greater than right uncovertebral hypertrophy producing marked left and moderate right foraminal narrowing.  Mild indentation of the ventral thecal sac without significant canal stenosis.  C6-C7: Posterior disc osteophyte complex and uncovertebral hypertrophy producing moderate to marked left greater than right foraminal narrowing.  Mild indentation of the ventral thecal sac without significant canal stenosis.  C7-T1: Unremarkable.     Assessment:       1. Osteoarthritis of spine with radiculopathy, cervical region    2. Cervical radiculopathy    3. Sarcoidosis    4. Chronic restrictive lung disease    5. Medication monitoring encounter    6. Immunocompromised    7. Vitamin D deficiency        Impression:  Cervical spondylosis with radiation into the left arm: seeing Dr. Isabel now, s/p HIMANSHU, much improved, PT planned    Sarcoidosis:  Imuran 50mg/day, no evidence of increased activity, ace, calcitriol/vit d normal    Chronic restrictive lung disease: stable, occasional SOB;  sees dr. Casper, last CT of chest 7/2016 stable, PFTs with moderately reduced diffusion     Medication Monitoring: no issues with toxicity    Immunocompromised: utd except this year flu and zoster    Vit d def: started 50K units weekly      Plan:       High dose flu vaccine today   Cont imuran 50mg/day   continue  gabapentin per Dr. Isabel  Start PT  Cont weekly vitamin D  Cont f/u with Dr. Isabel for cervical spondylosis    rtc in 4 mos with early reg labs, ace

## 2017-11-15 ENCOUNTER — LAB VISIT (OUTPATIENT)
Dept: LAB | Facility: HOSPITAL | Age: 57
End: 2017-11-15
Attending: PEDIATRICS
Payer: COMMERCIAL

## 2017-11-15 DIAGNOSIS — E78.00 PURE HYPERCHOLESTEROLEMIA: ICD-10-CM

## 2017-11-15 LAB
ALT SERPL W/O P-5'-P-CCNC: 12 U/L
AST SERPL-CCNC: 15 U/L
CHOLEST SERPL-MCNC: 184 MG/DL
CHOLEST/HDLC SERPL: 4.1 {RATIO}
HDLC SERPL-MCNC: 45 MG/DL
HDLC SERPL: 24.5 %
LDLC SERPL CALC-MCNC: 121.4 MG/DL
NONHDLC SERPL-MCNC: 139 MG/DL
TRIGL SERPL-MCNC: 88 MG/DL

## 2017-11-15 PROCEDURE — 84460 ALANINE AMINO (ALT) (SGPT): CPT

## 2017-11-15 PROCEDURE — 80061 LIPID PANEL: CPT

## 2017-11-15 PROCEDURE — 84450 TRANSFERASE (AST) (SGOT): CPT

## 2017-11-15 PROCEDURE — 36415 COLL VENOUS BLD VENIPUNCTURE: CPT

## 2017-12-04 NOTE — TELEPHONE ENCOUNTER
EKG/BMP/CBC Patient requesting a refill on her Vitamin D 50K that she was instructed to take due to her low Vitamin D level but the script is not listed in her Med list. Christi Fish PA-C notified.

## 2017-12-05 ENCOUNTER — OFFICE VISIT (OUTPATIENT)
Dept: INTERNAL MEDICINE | Facility: CLINIC | Age: 57
End: 2017-12-05
Payer: COMMERCIAL

## 2017-12-05 VITALS
SYSTOLIC BLOOD PRESSURE: 130 MMHG | WEIGHT: 205.69 LBS | HEART RATE: 85 BPM | OXYGEN SATURATION: 98 % | HEIGHT: 65 IN | TEMPERATURE: 98 F | BODY MASS INDEX: 34.27 KG/M2 | DIASTOLIC BLOOD PRESSURE: 70 MMHG

## 2017-12-05 DIAGNOSIS — E78.00 PURE HYPERCHOLESTEROLEMIA: ICD-10-CM

## 2017-12-05 DIAGNOSIS — E66.09 CLASS 1 OBESITY DUE TO EXCESS CALORIES WITH SERIOUS COMORBIDITY AND BODY MASS INDEX (BMI) OF 33.0 TO 33.9 IN ADULT: ICD-10-CM

## 2017-12-05 DIAGNOSIS — I10 ESSENTIAL HYPERTENSION: Primary | ICD-10-CM

## 2017-12-05 PROCEDURE — 99214 OFFICE O/P EST MOD 30 MIN: CPT | Mod: S$GLB,,, | Performed by: PEDIATRICS

## 2017-12-05 PROCEDURE — 99999 PR PBB SHADOW E&M-EST. PATIENT-LVL III: CPT | Mod: PBBFAC,,, | Performed by: PEDIATRICS

## 2017-12-05 NOTE — PROGRESS NOTES
Subjective:       Patient ID: Janna Sheffield is a 56 y.o. female.     Chief Complaint:6mo/lab     HTN: B/P good, no HTNive symptoms.  LIPIDS:not following D&E, weight is down slightly. Tolerating, and compliant with crestor .     SUBSPECIALTY NOTES AND LABS REVIEWED AND DISCUSSED WITH PATIENT     Review of Systems   Constitutional: Negative for fever and unexpected weight change.   HENT: Negative for congestion and rhinorrhea.    Eyes: Negative for discharge and redness.   Respiratory: Negative for cough and wheezing.    Cardiovascular: Negative for chest pain, palpitations and leg swelling.   Gastrointestinal: Negative for constipation, diarrhea and vomiting.   Endocrine: Negative for cold intolerance, heat intolerance, polydipsia, polyphagia and polyuria.   Genitourinary: Positive for pelvic pain. Negative for decreased urine volume, difficulty urinating and menstrual problem.   Musculoskeletal: Positive for arthralgias and myalgias. Negative for joint swelling.   Skin: Negative for rash and wound.   Neurological: Negative for syncope and headaches.   Psychiatric/Behavioral: Negative for behavioral problems and sleep disturbance.       Objective:      Physical Exam   Constitutional: She is oriented to person, place, and time. She appears well-developed and well-nourished. She is cooperative.   Neck: Trachea normal and normal range of motion. Neck supple. No JVD present. Carotid bruit is not present. No Brudzinski's sign and no Kernig's sign noted. No thyroid mass and no thyromegaly present.   Cardiovascular: Normal rate, regular rhythm, normal heart sounds and normal pulses.    No murmur heard.  Pulmonary/Chest: Effort normal and breath sounds normal. No respiratory distress. She has no wheezes. She has no rhonchi. She has no rales.   Abdominal: Soft. Normal appearance. She exhibits no mass. There is no hepatosplenomegaly. There is no tenderness. There is no rebound and no CVA tenderness.   Musculoskeletal:  She exhibits no edema.   Lymphadenopathy:     She has no cervical adenopathy.   Neurological: She is alert and oriented to person, place, and time. She has normal strength and normal reflexes. No cranial nerve deficit or sensory deficit. Coordination and gait normal.   Skin: Skin is warm. No abrasion and no rash noted.   Psychiatric: She has a normal mood and affect. Her speech is normal and behavior is normal. Judgment and thought content normal. Her mood appears not anxious. Cognition and memory are normal. She does not exhibit a depressed mood.       Assessment:       1. Essential hypertension    2. Pure hypercholesterolemia        Plan:    D&E, weight loss. Continue meds. F/U 6 months with labs.

## 2017-12-19 RX ORDER — HYDROCHLOROTHIAZIDE 25 MG/1
TABLET ORAL
Qty: 30 TABLET | Refills: 11 | Status: SHIPPED | OUTPATIENT
Start: 2017-12-19 | End: 2019-03-14 | Stop reason: SDUPTHER

## 2017-12-19 RX ORDER — ROSUVASTATIN CALCIUM 40 MG/1
TABLET, COATED ORAL
Qty: 30 TABLET | Refills: 11 | Status: SHIPPED | OUTPATIENT
Start: 2017-12-19 | End: 2019-03-14 | Stop reason: SDUPTHER

## 2017-12-21 ENCOUNTER — TELEPHONE (OUTPATIENT)
Dept: RHEUMATOLOGY | Facility: CLINIC | Age: 57
End: 2017-12-21

## 2017-12-21 NOTE — TELEPHONE ENCOUNTER
Spoke with patient and cancelled her 02/26/2018 appointment with Dr. Barraza due to his custodial. Patient will be contacted once his schedule is opened to reschedule appointment with Dr. Barraza. Patient has an appointment with Christi Fish PA-C 03/01/2018.

## 2018-02-27 NOTE — PROGRESS NOTES
Subjective:       Patient ID: Janna Sheffield is a 57 y.o. female.    Chief Complaint: Sarcoidosis, osteoarthritis    Mrs. Sheffield is a 58y/o AAF with history of osteoarthritis and sarcoidosis. Sarcoidosis was diagnosed in 1985 through a biopsy of a spot on her liver. 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   Currently she is on Imuran 50mg daily (lowered previously from 75mg/d). She has been doing well in regards to her Sarcoidosis.  She does have restrictive lung disease and SOB when she is more active. She sees Dr. Casper for this.  Her last CT scan done a year ago was stable. She has low vitamin D and started on 50Kunits weekly. No issues with joint swelling, rash, worsening sob, cp, eye issues. She does c/o dry eyes.     She has generalized osteoarthritis with pain in her neck that radiates into her bilateral arms. She had issues with worsening neck pain radiating into her left arm. MRI showed disc disease and degenerative arthritis. She was sent to pain [facility] for ESIs which have helped tremendously. She also is taking gabapentin but when she takes it early evening she gets very drowsy. She has pain in her right shoulder today with lifting her arm and other specific movements, pain down to her elbow but not past that.  No numbness/tingling. Her pain is 7/10.                Review of Systems   Constitutional: Negative for chills, fatigue and fever.   HENT: Negative for mouth sores, rhinorrhea and sore throat.    Eyes: Negative for pain and redness.        Dry eyes   Respiratory: Positive for shortness of breath. Negative for cough.    Cardiovascular: Negative for chest pain.   Gastrointestinal: Negative for abdominal pain, constipation, diarrhea, nausea and vomiting.   Genitourinary: Negative for dysuria and hematuria.   Musculoskeletal: Positive for arthralgias and neck pain. Negative for joint swelling and myalgias.        Left knee  "replacement   Skin: Negative for rash.   Neurological: Negative for weakness, numbness and headaches.   Psychiatric/Behavioral: The patient is not nervous/anxious.            Objective:   BP (!) 143/88   Pulse 72   Ht 5' 7" (1.702 m)   Wt 92.6 kg (204 lb 2.3 oz)   BMI 31.97 kg/m²      Physical Exam   Constitutional: She is oriented to person, place, and time and well-developed, well-nourished, and in no distress.   HENT:   Head: Normocephalic and atraumatic.   Eyes: Pupils are equal, round, and reactive to light. Right eye exhibits no discharge.   Neck: Normal range of motion.   Cardiovascular: Normal rate, regular rhythm and normal heart sounds.  Exam reveals no friction rub.    Pulmonary/Chest: Effort normal and breath sounds normal. No respiratory distress.   Abdominal: Soft. She exhibits no distension. There is no tenderness.   Lymphadenopathy:     She has no cervical adenopathy.   Neurological: She is alert and oriented to person, place, and time.   Skin: No rash noted. No erythema.     Psychiatric: Mood normal.   Musculoskeletal: Normal range of motion. She exhibits no edema or deformity.   mao wrists, mcps, pips no synovitis, no tenderness, full rom, grib strength equal bilaterally  Right shoulder pain with full abduction, +impingment sign, mild tenderness laterally    Left knee s/p replacement, no effusion, no warmth  Right knee no effusion, no warmth, full rom         No results found for this or any previous visit (from the past 672 hour(s)).    CTA chest 8/24/17  Impression:       Negative for pulmonary embolus, aneurysm or dissection.  Lungs are clear.  No evidence of aortic dissection.  No evidence of aneurysm or retroperitoneal hematoma.  Small umbilical hernia containing fat.  Hernia involving the right anterolateral abdominal wall containing fat.    Cspine xr:   The vertebral bodies demonstrate normal height.  The alignment is within normal limits. There is moderate displaced narrowing and " spondylosis present at the C4-C5 through the C6-C7 levels. The C1-C2 articulation is within normal limits. No prevertebral soft tissue swelling.    MRI C spine:   Findings: There is straightening of the cervical lordosis.  Degenerative vertebral endplate spurring and disc desiccation present at multiple levels with moderate disc space narrowing from C4-C5 through C6-C7.  Vertebral marrow signal pattern and architecture are normal.  Cervical cord and craniocervical junction are intact.  Paravertebral soft tissues are symmetric and normal in appearance.  C2-C3: Unremarkable.  C3-C4: Unremarkable.  C4-C5: Posterior disc osteophyte complex and bilateral uncovertebral hypertrophy resulting in moderate left greater than right foraminal narrowing.  Indentation of ventral thecal sac with associated anterior cord contact and mild central canal stenosis.  Minimum AP canal diameter is 9.0 mm.  C5-C6: Posterior disc osteophyte complex and asymmetric left greater than right uncovertebral hypertrophy producing marked left and moderate right foraminal narrowing.  Mild indentation of the ventral thecal sac without significant canal stenosis.  C6-C7: Posterior disc osteophyte complex and uncovertebral hypertrophy producing moderate to marked left greater than right foraminal narrowing.  Mild indentation of the ventral thecal sac without significant canal stenosis.  C7-T1: Unremarkable.     Assessment:       1. Sarcoidosis    2. Chronic restrictive lung disease    3. Medication monitoring encounter    4. Immunocompromised    5. Cervical radiculopathy    6. Vitamin D deficiency        Impression:  Sarcoidosis:  Imuran 50mg/day, no evidence of increased activity, labs pending today but have been normal    Chronic restrictive lung disease: stable, occasional SOB;  sees dr. Casper, last CT of chest 7/2016 stable, PFTs with moderately reduced diffusion     Medication Monitoring: no issues with toxicity    Immunocompromised: utd except  zoster    Cervical spondylosis with radiation into the left arm: seeing Dr. Isabel now, s/p HIMANSHU, gabapentin makes her drowsy    Right shoulder pain: likely subacromial bursitis with impingement    Vit d def: started 50K units weekly      Plan:       Cont imuran 50mg/day   Cont weekly vitamin D  Take gabapentin later in evening, can decrease dose to 100mg if needed  Inject right shoulder today as below   Cont f/u with Dr. Isabel for cervical spondylosis and HIMANSHU's as needed  F/u with dr. silva    rtc in 4 mos with reg labs, ace    Procedure note: After verbal consent was obtained.  The right shoulder was prepared with sterile prep.  The skin was anesthetized with 1% ethyl chloride.  The shoulder joint was injected with 2.5 cc of 1% lidocaine to anesthetize the joint.  The shoulder joint was then injected with 3mg celestone/soluspan, 80mg  Depo-Medrol and 1.0 mL of 1% lidocaine.  Hemostasis was obtained.  The patient tolerated procedure well with no complications.  Patient discharged with icing instructions

## 2018-03-01 ENCOUNTER — TELEPHONE (OUTPATIENT)
Dept: RHEUMATOLOGY | Facility: CLINIC | Age: 58
End: 2018-03-01

## 2018-03-01 ENCOUNTER — OFFICE VISIT (OUTPATIENT)
Dept: RHEUMATOLOGY | Facility: CLINIC | Age: 58
End: 2018-03-01
Payer: COMMERCIAL

## 2018-03-01 ENCOUNTER — LAB VISIT (OUTPATIENT)
Dept: LAB | Facility: HOSPITAL | Age: 58
End: 2018-03-01
Attending: PHYSICIAN ASSISTANT
Payer: COMMERCIAL

## 2018-03-01 VITALS
BODY MASS INDEX: 32.04 KG/M2 | SYSTOLIC BLOOD PRESSURE: 143 MMHG | HEART RATE: 72 BPM | HEIGHT: 67 IN | WEIGHT: 204.13 LBS | DIASTOLIC BLOOD PRESSURE: 88 MMHG

## 2018-03-01 DIAGNOSIS — E55.9 VITAMIN D DEFICIENCY: ICD-10-CM

## 2018-03-01 DIAGNOSIS — Z51.81 MEDICATION MONITORING ENCOUNTER: Chronic | ICD-10-CM

## 2018-03-01 DIAGNOSIS — D84.9 IMMUNOCOMPROMISED: ICD-10-CM

## 2018-03-01 DIAGNOSIS — M54.12 CERVICAL RADICULOPATHY: ICD-10-CM

## 2018-03-01 DIAGNOSIS — M25.511 ACUTE PAIN OF RIGHT SHOULDER: ICD-10-CM

## 2018-03-01 DIAGNOSIS — J98.4 CHRONIC RESTRICTIVE LUNG DISEASE: ICD-10-CM

## 2018-03-01 DIAGNOSIS — Z51.81 MEDICATION MONITORING ENCOUNTER: ICD-10-CM

## 2018-03-01 DIAGNOSIS — D86.9 SARCOIDOSIS: Primary | ICD-10-CM

## 2018-03-01 DIAGNOSIS — D86.9 SARCOIDOSIS: ICD-10-CM

## 2018-03-01 LAB
ALBUMIN SERPL BCP-MCNC: 3.9 G/DL
ALP SERPL-CCNC: 93 U/L
ALT SERPL W/O P-5'-P-CCNC: 13 U/L
ANION GAP SERPL CALC-SCNC: 10 MMOL/L
AST SERPL-CCNC: 15 U/L
BASOPHILS # BLD AUTO: 0.02 K/UL
BASOPHILS NFR BLD: 0.4 %
BILIRUB SERPL-MCNC: 0.4 MG/DL
BUN SERPL-MCNC: 15 MG/DL
CALCIUM SERPL-MCNC: 10.1 MG/DL
CHLORIDE SERPL-SCNC: 103 MMOL/L
CO2 SERPL-SCNC: 28 MMOL/L
CREAT SERPL-MCNC: 0.9 MG/DL
CRP SERPL-MCNC: 7.2 MG/L
DIFFERENTIAL METHOD: ABNORMAL
EOSINOPHIL # BLD AUTO: 0.1 K/UL
EOSINOPHIL NFR BLD: 1.3 %
ERYTHROCYTE [DISTWIDTH] IN BLOOD BY AUTOMATED COUNT: 13.2 %
ERYTHROCYTE [SEDIMENTATION RATE] IN BLOOD BY WESTERGREN METHOD: 25 MM/HR
EST. GFR  (AFRICAN AMERICAN): >60 ML/MIN/1.73 M^2
EST. GFR  (NON AFRICAN AMERICAN): >60 ML/MIN/1.73 M^2
GLUCOSE SERPL-MCNC: 98 MG/DL
HCT VFR BLD AUTO: 39.9 %
HGB BLD-MCNC: 13 G/DL
LYMPHOCYTES # BLD AUTO: 2.7 K/UL
LYMPHOCYTES NFR BLD: 51.6 %
MCH RBC QN AUTO: 31.3 PG
MCHC RBC AUTO-ENTMCNC: 32.6 G/DL
MCV RBC AUTO: 96 FL
MONOCYTES # BLD AUTO: 0.4 K/UL
MONOCYTES NFR BLD: 7.6 %
NEUTROPHILS # BLD AUTO: 2.1 K/UL
NEUTROPHILS NFR BLD: 39.1 %
PLATELET # BLD AUTO: 248 K/UL
PMV BLD AUTO: 10.9 FL
POTASSIUM SERPL-SCNC: 3.9 MMOL/L
PROT SERPL-MCNC: 7.5 G/DL
RBC # BLD AUTO: 4.16 M/UL
SODIUM SERPL-SCNC: 141 MMOL/L
WBC # BLD AUTO: 5.27 K/UL

## 2018-03-01 PROCEDURE — 3077F SYST BP >= 140 MM HG: CPT | Mod: S$GLB,,, | Performed by: PHYSICIAN ASSISTANT

## 2018-03-01 PROCEDURE — 20610 DRAIN/INJ JOINT/BURSA W/O US: CPT | Mod: RT,S$GLB,, | Performed by: PHYSICIAN ASSISTANT

## 2018-03-01 PROCEDURE — 36415 COLL VENOUS BLD VENIPUNCTURE: CPT

## 2018-03-01 PROCEDURE — 86140 C-REACTIVE PROTEIN: CPT

## 2018-03-01 PROCEDURE — 80053 COMPREHEN METABOLIC PANEL: CPT | Mod: PO

## 2018-03-01 PROCEDURE — 82164 ANGIOTENSIN I ENZYME TEST: CPT

## 2018-03-01 PROCEDURE — 99214 OFFICE O/P EST MOD 30 MIN: CPT | Mod: 25,S$GLB,, | Performed by: PHYSICIAN ASSISTANT

## 2018-03-01 PROCEDURE — 3079F DIAST BP 80-89 MM HG: CPT | Mod: S$GLB,,, | Performed by: PHYSICIAN ASSISTANT

## 2018-03-01 PROCEDURE — 85651 RBC SED RATE NONAUTOMATED: CPT

## 2018-03-01 PROCEDURE — 99999 PR PBB SHADOW E&M-EST. PATIENT-LVL III: CPT | Mod: PBBFAC,,, | Performed by: PHYSICIAN ASSISTANT

## 2018-03-01 PROCEDURE — 85025 COMPLETE CBC W/AUTO DIFF WBC: CPT | Mod: PO

## 2018-03-01 RX ORDER — AZATHIOPRINE 50 MG/1
TABLET ORAL
Qty: 90 TABLET | Refills: 3 | Status: SHIPPED | OUTPATIENT
Start: 2018-03-01 | End: 2019-05-10 | Stop reason: SDUPTHER

## 2018-03-01 RX ORDER — BETAMETHASONE SODIUM PHOSPHATE AND BETAMETHASONE ACETATE 3; 3 MG/ML; MG/ML
3 INJECTION, SUSPENSION INTRA-ARTICULAR; INTRALESIONAL; INTRAMUSCULAR; SOFT TISSUE
Status: COMPLETED | OUTPATIENT
Start: 2018-03-01 | End: 2018-03-01

## 2018-03-01 RX ORDER — METHYLPREDNISOLONE ACETATE 80 MG/ML
80 INJECTION, SUSPENSION INTRA-ARTICULAR; INTRALESIONAL; INTRAMUSCULAR; SOFT TISSUE
Status: COMPLETED | OUTPATIENT
Start: 2018-03-01 | End: 2018-03-01

## 2018-03-01 RX ADMIN — METHYLPREDNISOLONE ACETATE 80 MG: 80 INJECTION, SUSPENSION INTRA-ARTICULAR; INTRALESIONAL; INTRAMUSCULAR; SOFT TISSUE at 11:03

## 2018-03-01 RX ADMIN — BETAMETHASONE SODIUM PHOSPHATE AND BETAMETHASONE ACETATE 3 MG: 3; 3 INJECTION, SUSPENSION INTRA-ARTICULAR; INTRALESIONAL; INTRAMUSCULAR; SOFT TISSUE at 11:03

## 2018-03-01 NOTE — PATIENT INSTRUCTIONS
systane eye drops     Ice shoulder tonight, 20mins on 20 mins off 2-3 times    Imuran 50mg/day     See pulmonary doctor     Continue weekly vit D    Ok to take gabapentin later in the evening, do this Friday to see how you feel     advil as directed through Saturday

## 2018-03-01 NOTE — LETTER
March 1, 2018    Janna Sheffield  [address] [address] [address]             [facility]  [address] [address]  Phone: [phone]  Fax: [phone] Dear  Janna Sheffield:    We are sorry that you missed your appointment with Christi Fish on 3/1/2018. Your health and follow-up medical care are important to us. Please call our office as soon as possible so that we may reschedule your appointment. If you have already rescheduled your appointment, please disregard this letter.    Sincerely,        Danielle Nowak LPN

## 2018-03-02 LAB — ACE SERPL-CCNC: 19 U/L

## 2018-04-09 ENCOUNTER — TELEPHONE (OUTPATIENT)
Dept: INTERNAL MEDICINE | Facility: CLINIC | Age: 58
End: 2018-04-09

## 2018-04-09 NOTE — TELEPHONE ENCOUNTER
----- Message from Chioma Delaney sent at 4/9/2018 12:18 PM CDT -----  Contact: Patient   Patient would like a call back at [phone], Regards to her not feeling well.    Thanks  td

## 2018-04-09 NOTE — TELEPHONE ENCOUNTER
Returned call to patient. Patient states she has Sarcoidosis of the liver. She has been doing a lot of work at her house and she's been feeling tired and drained. Wants to know can she be seen so that she can get an excuse for the rest of the week for work. Please advise.

## 2018-04-10 NOTE — TELEPHONE ENCOUNTER
Returned call to patient to advise excuse cannot be written for the week of 4/16-4/20. No answer. Voicemail full. Could not leave message.

## 2018-04-10 NOTE — TELEPHONE ENCOUNTER
Patient states she wasn't able to take off this week due to a program at her job. Wants to know can the excuse be written for next week. 4/16-4/20 Please advise.

## 2018-04-13 ENCOUNTER — OFFICE VISIT (OUTPATIENT)
Dept: INTERNAL MEDICINE | Facility: CLINIC | Age: 58
End: 2018-04-13
Payer: COMMERCIAL

## 2018-04-13 ENCOUNTER — LAB VISIT (OUTPATIENT)
Dept: LAB | Facility: HOSPITAL | Age: 58
End: 2018-04-13
Attending: PEDIATRICS
Payer: COMMERCIAL

## 2018-04-13 VITALS
HEART RATE: 84 BPM | BODY MASS INDEX: 31.7 KG/M2 | WEIGHT: 201.94 LBS | DIASTOLIC BLOOD PRESSURE: 78 MMHG | SYSTOLIC BLOOD PRESSURE: 122 MMHG | OXYGEN SATURATION: 98 % | HEIGHT: 67 IN | TEMPERATURE: 99 F

## 2018-04-13 DIAGNOSIS — R53.83 FATIGUE, UNSPECIFIED TYPE: ICD-10-CM

## 2018-04-13 DIAGNOSIS — D86.9 SARCOIDOSIS: ICD-10-CM

## 2018-04-13 DIAGNOSIS — M79.7 FIBROMYALGIA: Primary | ICD-10-CM

## 2018-04-13 LAB — TSH SERPL DL<=0.005 MIU/L-ACNC: 0.57 UIU/ML

## 2018-04-13 PROCEDURE — 99999 PR PBB SHADOW E&M-EST. PATIENT-LVL III: CPT | Mod: PBBFAC,,, | Performed by: PEDIATRICS

## 2018-04-13 PROCEDURE — 84443 ASSAY THYROID STIM HORMONE: CPT

## 2018-04-13 PROCEDURE — 99214 OFFICE O/P EST MOD 30 MIN: CPT | Mod: S$GLB,,, | Performed by: PEDIATRICS

## 2018-04-13 PROCEDURE — 3074F SYST BP LT 130 MM HG: CPT | Mod: CPTII,S$GLB,, | Performed by: PEDIATRICS

## 2018-04-13 PROCEDURE — 36415 COLL VENOUS BLD VENIPUNCTURE: CPT | Mod: PO

## 2018-04-13 PROCEDURE — 3078F DIAST BP <80 MM HG: CPT | Mod: CPTII,S$GLB,, | Performed by: PEDIATRICS

## 2018-04-13 NOTE — PROGRESS NOTES
Subjective:       Patient ID: Janna Sheffield is a 57 y.o. female.    Chief Complaint: No chief complaint on file.    She is here due to feeling poorly. Rheumatology note was reviewed. Symptoms today are basically a continuation of that note. Fatigue, body achey, sleeping poorly, does not feel well. Cannot work up to full daily gabapentin due to sedation during next morning. Only takes 200 mg neurontin on weekend which seems to help but makes her too sleepy. Has also stressors at home. Depression may be playing role. No HI/SI.      Review of Systems   Constitutional: Positive for activity change and fatigue. Negative for fever and unexpected weight change.   HENT: Negative for congestion and rhinorrhea.    Eyes: Negative for discharge and redness.   Respiratory: Positive for shortness of breath (only with activity higher than her normal.). Negative for cough and wheezing.    Cardiovascular: Negative for chest pain, palpitations and leg swelling.   Gastrointestinal: Negative for abdominal pain, constipation, diarrhea and vomiting.   Endocrine: Negative for cold intolerance, heat intolerance, polydipsia, polyphagia and polyuria.   Genitourinary: Negative for decreased urine volume, difficulty urinating and menstrual problem.   Musculoskeletal: Negative for arthralgias and joint swelling.   Skin: Negative for rash and wound.   Neurological: Positive for dizziness, weakness and headaches. Negative for syncope.   Psychiatric/Behavioral: Positive for decreased concentration, dysphoric mood and sleep disturbance. Negative for behavioral problems.       Objective:      Physical Exam   Constitutional: She is oriented to person, place, and time. She appears well-developed and well-nourished. She is cooperative.   HENT:   Head: Normocephalic and atraumatic.   Eyes: Conjunctivae, EOM and lids are normal. Pupils are equal, round, and reactive to light.   Neck: Trachea normal and normal range of motion. Neck supple. No JVD  present. Carotid bruit is not present. No Brudzinski's sign and no Kernig's sign noted. No thyroid mass and no thyromegaly present.   Cardiovascular: Normal rate, regular rhythm, normal heart sounds and normal pulses.    No murmur heard.  Pulmonary/Chest: Effort normal and breath sounds normal. She has no wheezes. She has no rhonchi. She has no rales.   Abdominal: Soft. Normal appearance. She exhibits no mass. There is no hepatosplenomegaly. There is no tenderness. There is no rebound, no guarding and no CVA tenderness.   Musculoskeletal: She exhibits no edema.   Lymphadenopathy:     She has no cervical adenopathy.   Neurological: She is alert and oriented to person, place, and time. She has normal strength and normal reflexes. No cranial nerve deficit or sensory deficit. Coordination and gait normal.   Skin: Skin is warm. No abrasion and no rash noted.   Psychiatric: Her speech is normal and behavior is normal. Judgment and thought content normal. Her mood appears not anxious. Cognition and memory are normal. She does not exhibit a depressed mood.   Slightly depressed affect.       Assessment:       1. Fibromyalgia    2. Sarcoidosis    3. Fatigue, unspecified type        Plan:       Fibromyalgia    Sarcoidosis    Fatigue, unspecified type  -     TSH; Future; Expected date: 04/13/2018    She will try to work up to gabapentin 300 mg at night. Work excuse to be off one week written to allow her to adjust to sedation. If she does not, change to cymbalta which may also help with any depression symptoms. It is hard to separate what may be true depression, or feeling poorly/poor sleep, vs comorbidity. F/U in 1-2 weeks.

## 2018-04-19 ENCOUNTER — PATIENT OUTREACH (OUTPATIENT)
Dept: ADMINISTRATIVE | Facility: HOSPITAL | Age: 58
End: 2018-04-19

## 2018-05-01 ENCOUNTER — OFFICE VISIT (OUTPATIENT)
Dept: INTERNAL MEDICINE | Facility: CLINIC | Age: 58
End: 2018-05-01
Payer: COMMERCIAL

## 2018-05-01 VITALS
DIASTOLIC BLOOD PRESSURE: 86 MMHG | HEIGHT: 67 IN | HEART RATE: 82 BPM | SYSTOLIC BLOOD PRESSURE: 122 MMHG | OXYGEN SATURATION: 99 % | TEMPERATURE: 97 F | WEIGHT: 204.81 LBS | BODY MASS INDEX: 32.15 KG/M2

## 2018-05-01 DIAGNOSIS — M79.7 FIBROMYALGIA: Primary | ICD-10-CM

## 2018-05-01 PROCEDURE — 99999 PR PBB SHADOW E&M-EST. PATIENT-LVL III: CPT | Mod: PBBFAC,,, | Performed by: PEDIATRICS

## 2018-05-01 PROCEDURE — 3074F SYST BP LT 130 MM HG: CPT | Mod: CPTII,S$GLB,, | Performed by: PEDIATRICS

## 2018-05-01 PROCEDURE — 3079F DIAST BP 80-89 MM HG: CPT | Mod: CPTII,S$GLB,, | Performed by: PEDIATRICS

## 2018-05-01 PROCEDURE — 99214 OFFICE O/P EST MOD 30 MIN: CPT | Mod: S$GLB,,, | Performed by: PEDIATRICS

## 2018-05-01 RX ORDER — GABAPENTIN 300 MG/1
600 CAPSULE ORAL NIGHTLY
Qty: 60 CAPSULE | Refills: 11 | Status: SHIPPED | OUTPATIENT
Start: 2018-05-01 | End: 2021-11-16

## 2018-05-01 NOTE — PROGRESS NOTES
Subjective:       Patient ID: Janna Sheffield is a 57 y.o. female.    Chief Complaint: Follow-up (2wk)    She has tolerated the increase to gabapentin to 600 mg at night. She has had wonderful response. Not hardly achey at all, sleeping well, fatigue resolved, mood improved.      Review of Systems   Constitutional: Negative for fever and unexpected weight change.   HENT: Negative for congestion and rhinorrhea.    Eyes: Negative for discharge and redness.   Respiratory: Negative for cough and wheezing.    Cardiovascular: Negative for chest pain, palpitations and leg swelling.   Gastrointestinal: Negative for constipation, diarrhea and vomiting.   Endocrine: Negative for cold intolerance, heat intolerance, polydipsia, polyphagia and polyuria.   Genitourinary: Negative for decreased urine volume, difficulty urinating and menstrual problem.   Musculoskeletal: Negative for arthralgias and joint swelling.   Skin: Negative for rash and wound.   Neurological: Negative for syncope and headaches.   Psychiatric/Behavioral: Negative for behavioral problems and sleep disturbance.       Objective:      Physical Exam   Constitutional: She is oriented to person, place, and time. She appears well-developed and well-nourished. She is cooperative.   HENT:   Head: Normocephalic and atraumatic.   Eyes: Conjunctivae, EOM and lids are normal. Pupils are equal, round, and reactive to light.   Neck: Trachea normal and normal range of motion. Neck supple. No JVD present. Carotid bruit is not present. No Brudzinski's sign and no Kernig's sign noted. No thyroid mass and no thyromegaly present.   Cardiovascular: Normal rate, regular rhythm, normal heart sounds and normal pulses.    No murmur heard.  Pulmonary/Chest: Effort normal and breath sounds normal. She has no wheezes. She has no rhonchi. She has no rales.   Abdominal: Soft. Normal appearance. There is no hepatosplenomegaly. There is no tenderness. There is no rebound and no CVA  tenderness.   Musculoskeletal: She exhibits no edema.   Lymphadenopathy:     She has no cervical adenopathy.   Neurological: She is alert and oriented to person, place, and time. She has normal strength and normal reflexes. No cranial nerve deficit or sensory deficit. Coordination and gait normal.   Skin: Skin is warm. No abrasion and no rash noted.   Psychiatric: She has a normal mood and affect. Her speech is normal and behavior is normal. Judgment and thought content normal. Her mood appears not anxious. Cognition and memory are normal. She does not exhibit a depressed mood.       Assessment:       1. Fibromyalgia        Plan:       Fibromyalgia  -     gabapentin (NEURONTIN) 300 MG capsule; Take 2 capsules (600 mg total) by mouth every evening.  Dispense: 60 capsule; Refill: 11    She is doing quite well. We can increase gabapentin to 3 a day and add cymbalta if needed.  F/u in 6 months.

## 2019-01-02 ENCOUNTER — TELEPHONE (OUTPATIENT)
Dept: INTERNAL MEDICINE | Facility: CLINIC | Age: 59
End: 2019-01-02

## 2019-01-02 ENCOUNTER — OFFICE VISIT (OUTPATIENT)
Dept: INTERNAL MEDICINE | Facility: CLINIC | Age: 59
End: 2019-01-02
Payer: COMMERCIAL

## 2019-01-02 ENCOUNTER — HOSPITAL ENCOUNTER (OUTPATIENT)
Dept: RADIOLOGY | Facility: HOSPITAL | Age: 59
Discharge: HOME | End: 2019-01-02
Attending: NURSE PRACTITIONER
Payer: COMMERCIAL

## 2019-01-02 VITALS
BODY MASS INDEX: 32.46 KG/M2 | SYSTOLIC BLOOD PRESSURE: 128 MMHG | HEIGHT: 67 IN | TEMPERATURE: 97 F | HEART RATE: 88 BPM | OXYGEN SATURATION: 97 % | DIASTOLIC BLOOD PRESSURE: 94 MMHG | WEIGHT: 206.81 LBS

## 2019-01-02 DIAGNOSIS — R10.13 EPIGASTRIC PAIN: ICD-10-CM

## 2019-01-02 DIAGNOSIS — Z00.00 WELL ADULT EXAM: Primary | ICD-10-CM

## 2019-01-02 DIAGNOSIS — M15.9 GENERALIZED OSTEOARTHRITIS OF MULTIPLE SITES: Chronic | ICD-10-CM

## 2019-01-02 DIAGNOSIS — D84.9 IMMUNOCOMPROMISED: ICD-10-CM

## 2019-01-02 DIAGNOSIS — M72.2 PLANTAR FASCIITIS: ICD-10-CM

## 2019-01-02 DIAGNOSIS — M79.7 FIBROMYALGIA: Primary | ICD-10-CM

## 2019-01-02 DIAGNOSIS — R39.9 UTI SYMPTOMS: ICD-10-CM

## 2019-01-02 DIAGNOSIS — Z00.00 PREVENTATIVE HEALTH CARE: ICD-10-CM

## 2019-01-02 PROCEDURE — 99999 PR PBB SHADOW E&M-EST. PATIENT-LVL IV: ICD-10-PCS | Mod: PBBFAC,,, | Performed by: NURSE PRACTITIONER

## 2019-01-02 PROCEDURE — 99999 PR PBB SHADOW E&M-EST. PATIENT-LVL IV: CPT | Mod: PBBFAC,,, | Performed by: NURSE PRACTITIONER

## 2019-01-02 PROCEDURE — 74019 RADEX ABDOMEN 2 VIEWS: CPT | Mod: 26,,, | Performed by: RADIOLOGY

## 2019-01-02 PROCEDURE — 3074F PR MOST RECENT SYSTOLIC BLOOD PRESSURE < 130 MM HG: ICD-10-PCS | Mod: CPTII,S$GLB,, | Performed by: NURSE PRACTITIONER

## 2019-01-02 PROCEDURE — 3074F SYST BP LT 130 MM HG: CPT | Mod: CPTII,S$GLB,, | Performed by: NURSE PRACTITIONER

## 2019-01-02 PROCEDURE — 3080F DIAST BP >= 90 MM HG: CPT | Mod: CPTII,S$GLB,, | Performed by: NURSE PRACTITIONER

## 2019-01-02 PROCEDURE — 99214 PR OFFICE/OUTPT VISIT, EST, LEVL IV, 30-39 MIN: ICD-10-PCS | Mod: S$GLB,,, | Performed by: NURSE PRACTITIONER

## 2019-01-02 PROCEDURE — 3008F BODY MASS INDEX DOCD: CPT | Mod: CPTII,S$GLB,, | Performed by: NURSE PRACTITIONER

## 2019-01-02 PROCEDURE — 3008F PR BODY MASS INDEX (BMI) DOCUMENTED: ICD-10-PCS | Mod: CPTII,S$GLB,, | Performed by: NURSE PRACTITIONER

## 2019-01-02 PROCEDURE — 99214 OFFICE O/P EST MOD 30 MIN: CPT | Mod: S$GLB,,, | Performed by: NURSE PRACTITIONER

## 2019-01-02 PROCEDURE — 74019 XR ABDOMEN FLAT AND ERECT: ICD-10-PCS | Mod: 26,,, | Performed by: RADIOLOGY

## 2019-01-02 PROCEDURE — 3080F PR MOST RECENT DIASTOLIC BLOOD PRESSURE >= 90 MM HG: ICD-10-PCS | Mod: CPTII,S$GLB,, | Performed by: NURSE PRACTITIONER

## 2019-01-02 PROCEDURE — 74019 RADEX ABDOMEN 2 VIEWS: CPT | Mod: TC,FY,PO

## 2019-01-02 RX ORDER — PANTOPRAZOLE SODIUM 40 MG/1
40 TABLET, DELAYED RELEASE ORAL DAILY
Qty: 14 TABLET | Refills: 0 | Status: SHIPPED | OUTPATIENT
Start: 2019-01-02 | End: 2019-01-28

## 2019-01-02 NOTE — PROGRESS NOTES
Subjective:       Patient ID: Janna Sheffield is a 58 y.o. female.    Chief Complaint: Abdominal Pain; Leg Pain (Patient complains of both leg pain. ); Hip Pain; and Knee Pain (Patient had left knee replacement. )    HPI    Fibromyalgia- Hips/bilateral leg pain- pt admits that she stopped taking gabapentin. States that it was helping, but she had fear of addiction so she stopped taking it.     R plantar foot pain x 1-2 weeks- pain when first getting out of bed and if walking/standing too long    L knee pain- previous Knee surgery (reports aching) no trauma    Took advil 400 mg last night and it did help for above 3 complaints.    Reports GI discomfort x 1 week. Had n/v x 1- 1 1/2 days. Now has remaining burning in throat epigastric region. Reports bloating and gas. Reports diminished appetite. Is tolerating fluids. Mild nausea remains    Dysuria x 3-4 days no flank pain, hematuria      Review of Systems   Constitutional: Negative for activity change, appetite change, chills, diaphoresis, fatigue, fever and unexpected weight change.   HENT: Negative for congestion, ear pain, postnasal drip, rhinorrhea, sinus pressure, sinus pain, sneezing, sore throat, tinnitus, trouble swallowing and voice change.    Eyes: Negative for photophobia, pain and visual disturbance.   Respiratory: Negative for cough, chest tightness, shortness of breath and wheezing.    Cardiovascular: Negative for chest pain, palpitations and leg swelling.   Gastrointestinal: Positive for nausea. Negative for abdominal distention, abdominal pain, constipation, diarrhea and vomiting.        See HPI    Genitourinary: Positive for dysuria. Negative for decreased urine volume, difficulty urinating, flank pain, frequency, hematuria and urgency.   Musculoskeletal: Positive for arthralgias and myalgias. Negative for back pain, joint swelling, neck pain and neck stiffness.        Bilateral hip pain, BLE pain, L knee pain   Allergic/Immunologic: Negative for  immunocompromised state.   Neurological: Negative for dizziness, tremors, seizures, syncope, facial asymmetry, speech difficulty, weakness, light-headedness, numbness and headaches.   Hematological: Negative for adenopathy. Does not bruise/bleed easily.   Psychiatric/Behavioral: Negative for confusion and sleep disturbance.       Objective:      Physical Exam   Constitutional: She is oriented to person, place, and time.   Cardiovascular: Normal rate, regular rhythm and normal heart sounds.   Pulmonary/Chest: Effort normal and breath sounds normal.   Abdominal: Bowel sounds are normal. She exhibits distension (mild). There is tenderness in the epigastric area.   Musculoskeletal:        Left knee: She exhibits decreased range of motion.   Neurological: She is alert and oriented to person, place, and time.   Skin: Skin is warm and dry. Capillary refill takes less than 2 seconds.   Psychiatric: She has a normal mood and affect.       Assessment:     Vitals:    01/02/19 1115   BP: (!) 128/94   Pulse: 88   Temp: 96.7 °F (35.9 °C)         1. Fibromyalgia    2. Plantar fasciitis    3. Generalized osteoarthritis of multiple sites    4. Epigastric pain    5. Immunocompromised    6. UTI symptoms    7. Preventative health care        Plan:   Fibromyalgia    Plantar fasciitis    Generalized osteoarthritis of multiple sites    Epigastric pain  -     X-Ray Abdomen Flat And Erect; Future; Expected date: 01/02/2019  -     pantoprazole (PROTONIX) 40 MG tablet; Take 1 tablet (40 mg total) by mouth once daily. for 14 days  Dispense: 14 tablet; Refill: 0    Immunocompromised    UTI symptoms  -     Urinalysis; Future; Expected date: 01/02/2019    Preventative health care  -     Ambulatory referral to Obstetrics / Gynecology        As above  Xray now- r/o obstruction  protonix BID x2 week  Avoid greasy, spicy foods  UA now  Shoe inserts, foot stretches discussed  advil 400 mg twice daily x 7-10 days  Restart gabapentin nightly as  previously ordered- increase to BID or TID if warranted (discussed with patient)  Reschedule missed rheum and PCP appt  Refer to GYN

## 2019-01-02 NOTE — PATIENT INSTRUCTIONS
advil 400 mg twice daily  Restart gabapentin nightly as previously ordered  Plantar Fasciitis  Plantar fasciitis is a painful swelling of the plantar fascia. The plantar fascia is a thick, fibrous layer of tissue. It covers the bones on the bottom of your foot. And it supports the foot bones in an arched position.  This can happen gradually or suddenly. It usually affects one foot at a time. Heel pain can be sharp, like a knife sticking into the bottom of your foot. You may feel pain after exercising, long-distance jogging, stair climbing, long periods of standing, or after standing up.  Risk factors include: non-active lifestyle, arthritis, diabetes, obesity or recent weight gain, flat foot, high arch. Wearing high heels, loose shoes, or shoes with poor arch support for long periods of time adds to the risk. This problem is commonly found in runners and dancers. It also found in people who stand on hard surfaces for long periods of time.  Foot pain from this condition is usually worse in the morning. But it improves with walking. By the end of the day there may be a dull aching. Treatment requires short-term rest and controlling swelling. It may take up to 9 months before all symptoms go away. Rarely, a steroid injection into the foot, or surgery, may be needed.  Home care  · If you are overweight, lose weight to help healing.  · Choose supportive shoes with good arch support and shock absorbency. Replace athletic shoes when they become worn out. Dont walk or run barefoot.  · Premade or custom-fitted shoe inserts may be helpful. Inserts made of silicone seem to be the most effective. Custom-made inserts can be provided by a podiatrist or foot specialist, physical therapist, or orthopedist.  · Premade or custom-made night splints keep the heel stretched out while you sleep. They may prevent morning pain.  · Avoid activities that stress the feet: jogging, prolonged standing or walking, contact sports, etc.  · First  thing in the morning and before sports, stretch the bottom of your feet. Gently flex your ankle so the toes move toward your knee.  · Icing may help control heel pain. Apply an ice pack to the heel for 10-20 minutes as a preventive. Or ice your heel after a severe flare-up of symptoms. You may repeat this every 1-2 hours as needed.  · You may use over-the-counter pain medicine to control pain, unless another medicine was prescribed. Anti-inflammatory pain medicines, such as ibuprofen or naproxen, may work better than acetaminophen. If you have chronic liver or kidney disease or ever had a stomach ulcer or GI bleeding, talk with your healthcare provider before using these medicines.  Follow-up care  Follow up with your healthcare provider, physical therapist, or podiatrist or foot specialist as advised.  Call for an appointment if pain worsens or there is no relief after a few weeks of home treatment. Shoe inserts, a night splint, or a special boot may be required.  If X-rays were taken, you will be told of any new findings that may affect your care.  When to seek medical advice  Call your healthcare provider right away if any of these occur:  · Foot swelling  · Redness with increasing pain  Date Last Reviewed: 11/21/2015  © 6732-3996 [employer]. [address] [place] [address], [place] [address]. All rights reserved. This information is not intended as a substitute for professional medical care. Always follow your healthcare professional's instructions.        Understanding Plantar Fasciitis    Plantar fasciitis is a condition that causes foot and heel pain. The plantar fascia is a tough band of tissue that runs across the bottom of the foot from the heel to the toes. This tissue pulls on the heel bone. It supports the arch of the foot as it pushes off the ground. If the tissue becomes irritated or red and swollen (inflamed), it is called plantar fasciitis.  How to say it  PLAN-tuhr [id number]   What causes  plantar fasciitis?  Plantar fasciitis most often occurs from overusing the plantar fascia. The tissue may become damaged from activities that put repeated stress on the heel and foot. Or it may wear down over time with age and ankle stiffness. You are more likely to have plantar fasciitis if you:  · Do activities that require a lot of running, jumping, or dancing  · Have a job that requires being on your feet for long periods  · Are overweight or obese  · Have certain foot problems, such as a tight Achilles tendon, flat feet, or high arches  · Often wear poorly fitting shoes  Symptoms of plantar fasciitis  The condition most often causes pain in the heel and the bottom of the foot. The pain may occur when you take your first steps in the morning. It may get better as you walk throughout the day. But as you continue to put weight on the foot, the pain often returns. Pain may also occur after standing or sitting for long periods.  Treating plantar fasciitis  Treatments for plantar fasciitis include:  · Resting the foot. This involves limiting movements that make your foot hurt. You may also need to avoid certain sports and types of work for a time.  · Using cold packs. Put an ice pack on the heel and foot to help reduce pain and swelling.  · Taking pain medicines. Prescription and over-the-counter pain medicines can help relieve pain and swelling.  · Using heel cups or foot inserts (orthotics). These are placed in the shoes to help support the heel or arch and cushion the heel. You may also be told to buy proper-fitting shoes with good arch support and cushioned soles.  · Taping the foot. This supports the arch and limits the movement of the plantar fascia to help relieve symptoms.  · Wearing a night splint. This stretches the plantar fascia and leg muscles while you sleep. This may help relieve pain.  · Doing exercises and physical therapy. These stretch and strengthen the plantar fascia and the muscles in the leg  that support the heel and foot.  · Getting shots of medicine into the foot. These may help relieve symptoms for a time.  · Having surgery. This may be needed if other treatments fail to relieve symptoms. During surgery, the surgeon may partially cut the plantar fascia to release tension.  Possible complications of plantar fasciitis  Without proper care and treatment, healing may take longer than normal. Also, symptoms may continue or get worse. Over time, the plantar fascia may be damaged. This can make it hard to walk or even stand without pain.  When to call your healthcare provider  Call your healthcare provider right away if you have any of these:  · Fever of 100.4°F (38°C) or higher, or as directed  · Symptoms that dont get better with treatment, or get worse  · New symptoms, such as numbness, tingling, or weakness in the foot   Date Last Reviewed: 3/10/2016  © 0562-9318 [employer]. [address] [place] [address]. All rights reserved. This information is not intended as a substitute for professional medical care. Always follow your healthcare professional's instructions.        Treating Plantar Fasciitis  First, your healthcare provider tries to determine the cause of your problem in order to suggest ways to relieve pain. If your pain is due to poor foot mechanics, custom-made shoe inserts (orthoses) may help.    Reduce symptoms  · To relieve mild symptoms, try aspirin, ibuprofen, or other medicines as directed. Rubbing ice on the affected area may also help.  · To reduce severe pain and swelling, your healthcare provider may prescribe pills or injections or a walking cast in some instances. Physical therapy, such as ultrasound or a daily stretching program, may also be recommended. Surgery is rarely required.  · To reduce symptoms caused by poor foot mechanics, your foot may be taped. This supports the arch and temporarily controls movement. Night splints may also help by stretching  the fascia.  Control movement  If taping helps, your healthcare provider may prescribe orthoses. Built from plaster casts of your feet, these inserts control the way your foot moves. As a result, your symptoms should go away.  Reduce overuse  Every time your foot strikes the ground, the plantar fascia is stretched. You can reduce the strain on the plantar fascia and the possibility of overuse by following these suggestions:  · Lose any excess weight.  · Avoid running on hard or uneven ground.  · Use orthoses at all times in your shoes and house slippers.  If surgery is needed  Your healthcare provider may consider surgery if other types of treatment don't control your pain. During surgery, the plantar fascia is partially cut to release tension. As you heal, fibrous tissue fills the space between the heel bone and the plantar fascia.   Date Last Reviewed: 10/14/2015  © 6544-8266 [employer]. [address] [place] [address], [place] [address]. All rights reserved. This information is not intended as a substitute for professional medical care. Always follow your healthcare professional's instructions.

## 2019-01-02 NOTE — TELEPHONE ENCOUNTER
----- Message from Magdalena Jolley MA sent at 1/2/2019 11:55 AM CST -----  The above patient would like you to put in labs and mammogram order before 6 month follow up appointment with you on Monday 1/7/18.    Thank you

## 2019-01-02 NOTE — TELEPHONE ENCOUNTER
----- Message from RUDY Botello Jr., MD sent at 1/2/2019 12:27 PM CST -----  Lab and mammogram placed.  ----- Message -----  From: Magdalena Jolley MA  Sent: 1/2/2019  11:55 AM  To: RUDY Botello Jr., MD    The above patient would like you to put in labs and mammogram order before 6 month follow up appointment with you on Monday 1/7/18.    Thank you

## 2019-01-03 ENCOUNTER — OFFICE VISIT (OUTPATIENT)
Dept: OBSTETRICS AND GYNECOLOGY | Facility: CLINIC | Age: 59
End: 2019-01-03
Payer: COMMERCIAL

## 2019-01-03 ENCOUNTER — HOSPITAL ENCOUNTER (OUTPATIENT)
Dept: RADIOLOGY | Facility: HOSPITAL | Age: 59
Discharge: HOME | End: 2019-01-03
Attending: PEDIATRICS
Payer: COMMERCIAL

## 2019-01-03 VITALS
HEIGHT: 67 IN | BODY MASS INDEX: 32.4 KG/M2 | SYSTOLIC BLOOD PRESSURE: 128 MMHG | DIASTOLIC BLOOD PRESSURE: 92 MMHG | WEIGHT: 206.44 LBS

## 2019-01-03 DIAGNOSIS — Z00.00 WELL ADULT EXAM: ICD-10-CM

## 2019-01-03 DIAGNOSIS — Z01.419 ENCOUNTER FOR GYNECOLOGICAL EXAMINATION WITHOUT ABNORMAL FINDING: Primary | ICD-10-CM

## 2019-01-03 PROCEDURE — 99396 PR PREVENTIVE VISIT,EST,40-64: ICD-10-PCS | Mod: S$GLB,,, | Performed by: OBSTETRICS & GYNECOLOGY

## 2019-01-03 PROCEDURE — 77063 MAMMO DIGITAL SCREENING BILAT WITH TOMOSYNTHESIS_CAD: ICD-10-PCS | Mod: 26,,, | Performed by: RADIOLOGY

## 2019-01-03 PROCEDURE — 77067 MAMMO DIGITAL SCREENING BILAT WITH TOMOSYNTHESIS_CAD: ICD-10-PCS | Mod: 26,,, | Performed by: RADIOLOGY

## 2019-01-03 PROCEDURE — 3074F PR MOST RECENT SYSTOLIC BLOOD PRESSURE < 130 MM HG: ICD-10-PCS | Mod: CPTII,S$GLB,, | Performed by: OBSTETRICS & GYNECOLOGY

## 2019-01-03 PROCEDURE — 3074F SYST BP LT 130 MM HG: CPT | Mod: CPTII,S$GLB,, | Performed by: OBSTETRICS & GYNECOLOGY

## 2019-01-03 PROCEDURE — 77063 BREAST TOMOSYNTHESIS BI: CPT | Mod: 26,,, | Performed by: RADIOLOGY

## 2019-01-03 PROCEDURE — 3080F DIAST BP >= 90 MM HG: CPT | Mod: CPTII,S$GLB,, | Performed by: OBSTETRICS & GYNECOLOGY

## 2019-01-03 PROCEDURE — 99999 PR PBB SHADOW E&M-EST. PATIENT-LVL III: ICD-10-PCS | Mod: PBBFAC,,, | Performed by: OBSTETRICS & GYNECOLOGY

## 2019-01-03 PROCEDURE — 77067 SCR MAMMO BI INCL CAD: CPT | Mod: 26,,, | Performed by: RADIOLOGY

## 2019-01-03 PROCEDURE — 3080F PR MOST RECENT DIASTOLIC BLOOD PRESSURE >= 90 MM HG: ICD-10-PCS | Mod: CPTII,S$GLB,, | Performed by: OBSTETRICS & GYNECOLOGY

## 2019-01-03 PROCEDURE — 99396 PREV VISIT EST AGE 40-64: CPT | Mod: S$GLB,,, | Performed by: OBSTETRICS & GYNECOLOGY

## 2019-01-03 PROCEDURE — 99999 PR PBB SHADOW E&M-EST. PATIENT-LVL III: CPT | Mod: PBBFAC,,, | Performed by: OBSTETRICS & GYNECOLOGY

## 2019-01-03 PROCEDURE — 77067 SCR MAMMO BI INCL CAD: CPT | Mod: TC,PO

## 2019-01-03 NOTE — LETTER
January 3, 2019      Tuyet Mead, LEIF  [address]  [place] [address]           [facility] - OB/ GYN  [address]  [place] [address]  Phone: [phone]  Fax: [phone]          Patient: Janna Sheffield   MR Number: [id number]   YOB: 1960   Date of Visit: 1/3/2019       Dear Tuyet Mead:    Thank you for referring Janna Sheffield to me for evaluation. Attached you will find relevant portions of my assessment and plan of care.    If you have questions, please do not hesitate to call me. I look forward to following Janna Sheffield along with you.    Sincerely,    Marlee Lopez MD    Enclosure  CC:  No Recipients    If you would like to receive this communication electronically, please contact [email or web address] or [phone] to request more information on [employer] access.    For providers and/or their staff who would like to refer a patient to [facility], please contact us through our one-stop-shop provider referral line, [facility], at [phone].    If you feel you have received this communication in error or would no longer like to receive these types of communications, please e-mail [email or web address]

## 2019-01-03 NOTE — PROGRESS NOTES
CC: Well woman exam    Janna Sheffield is a 58 y.o. female  presents for a well woman exam.  No issues, problems, or complaints.      Past Medical History:   Diagnosis Date    Arthritis     Chest pain syndrome 10/23/2014    Hyperlipidemia     Hypertension     Macular degeneration, age related, nonexudative - Left Eye 3/24/2014    Retinal detachment, old, partial 2014    Sarcoidosis      Past Surgical History:   Procedure Laterality Date    CATARACT EXTRACTION      DILATION AND CURETTAGE OF UTERUS      missed ab x 3    HYSTERECTOMY      MACARIO - complete    knee surgery      OOPHORECTOMY      TUBAL LIGATION       Family History   Problem Relation Age of Onset    Cervical cancer Mother     Heart disease Mother     Heart attack Mother     Hypertension Mother     Hyperlipidemia Mother     Hypertension Father     Hyperlipidemia Father     Stroke Father     Stroke Brother     Breast cancer Maternal Aunt     Breast cancer Cousin         mat and pat cousin w/ br ca    Colon cancer Neg Hx     Ovarian cancer Neg Hx     Thrombophilia Neg Hx     Deep vein thrombosis Neg Hx     Pulmonary embolism Neg Hx      Social History     Tobacco Use    Smoking status: Never Smoker    Smokeless tobacco: Never Used   Substance Use Topics    Alcohol use: No    Drug use: No     OB History      Para Term  AB Living    5 2 1 1 3 2    SAB TAB Ectopic Multiple Live Births    3       2          Current Outpatient Medications:     azaTHIOprine (IMURAN) 50 mg Tab, TAKE ONE TABLET BY MOUTH ONCE DAILY, Disp: 90 tablet, Rfl: 3    cholecalciferol, vitamin D3, 50,000 unit capsule, Take 1 capsule (50,000 Units total) by mouth once a week., Disp: 12 capsule, Rfl: 4    gabapentin (NEURONTIN) 300 MG capsule, Take 2 capsules (600 mg total) by mouth every evening., Disp: 60 capsule, Rfl: 11    hydroCHLOROthiazide (HYDRODIURIL) 25 MG tablet, TAKE ONE TABLET BY MOUTH ONCE DAILY, Disp: 30 tablet,  "Rfl: 11    multivitamin (THERAGRAN) per tablet, Take 1 tablet by mouth Daily., Disp: , Rfl:     pantoprazole (PROTONIX) 40 MG tablet, Take 1 tablet (40 mg total) by mouth once daily. for 14 days, Disp: 14 tablet, Rfl: 0    rosuvastatin (CRESTOR) 40 MG Tab, TAKE ONE TABLET BY MOUTH ONCE DAILY (  REPLACES  LIPITOR), Disp: 30 tablet, Rfl: 11    GYNECOLOGY HISTORY:  No abnormal pap/std    DATA REVIEWED:  Last pap: normal history  Last mmg: normal Date: 201  Colonoscopy: normal 2011, repeat 2021    BP (!) 128/92 (BP Location: Left arm)   Ht 5' 7" (1.702 m)   Wt 93.6 kg (206 lb 7.4 oz)   BMI 32.34 kg/m²     ROS:  GENERAL: Denies weight gain or weight loss. Feeling well overall.   SKIN: Denies rash or lesions.   HEAD: Denies head injury or headache.   NODES: Denies enlarged lymph nodes.   CHEST: Denies chest pain or shortness of breath.   CARDIOVASCULAR: Denies palpitations or left sided chest pain.   ABDOMEN: No abdominal pain, constipation, diarrhea, nausea, vomiting or rectal bleeding.   URINARY: No frequency, dysuria, hematuria, or burning on urination.  REPRODUCTIVE: See HPI.   BREASTS: The patient denies pain, lumps, or nipple discharge.   HEMATOLOGIC: No easy bruisability or excessive bleeding.   MUSCULOSKELETAL: Denies joint pain or swelling.   NEUROLOGIC: Denies syncope or weakness.   PSYCHIATRIC: Denies depression, anxiety or mood swings.    PE:   APPEARANCE: Well nourished, well developed, in no acute distress.  AFFECT: WNL, alert and oriented x 3.  SKIN: No acne or hirsutism.  NECK: Neck symmetric without masses or thyromegaly.  NODES: No inguinal, cervical, axillary or femoral lymph node enlargement.  CHEST: Good respiratory effort.   ABDOMEN: Soft. No tenderness or masses. No hepatosplenomegaly. No hernias.  BREASTS: Symmetrical, no skin changes or visible lesions. No palpable masses, nipple discharge bilaterally.  PELVIC: Normal external female genitalia without lesions. Normal hair distribution. " Adequate perineal body, normal urethral meatus. Vagina atrophic without lesions or discharge. No significant cystocele or rectocele. Bimanual exam shows uterus and cervix to be surgically absent. Adnexa without masses or tenderness.  EXTREMITIES: No edema.    Encounter for gynecological examination without abnormal finding    Patient was counseled today on A.C.S. Pap guidelines (none needed) and recommendations for yearly pelvic exams, yearly mammograms starting age 40, and clinical breast exams; to see her PCP for other health maintenance.

## 2019-01-04 NOTE — PROGRESS NOTES
"Subjective:       Patient ID: Janna Sheffield is a 58 y.o. female.    Chief Complaint: Sarcoidosis, osteoarthritis    Mrs. Sheffield is a 59y/o AAF with history of osteoarthritis and sarcoidosis. Sarcoidosis was diagnosed in 1985 through a biopsy of a spot on her liver. 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     Currently she is on Imuran 50mg daily (lowered previously from 75mg/d). She has been doing well in regards to her Sarcoidosis.  She does have restrictive lung disease and SOB when she is more active but no worsening. She sees Dr. Casper for this but needs to f/u.  Her last CT scan done a year ago was stable. She has low vitamin D and started on 50Kunits weekly. No issues with joint swelling, rash, worsening sob, cp, eye issues. She does c/o dry, watery, itchy eyes.     She has generalized osteoarthritis with radiating neck pain. MRI showed disc disease and degenerative arthritis. She was sent to pain mgt for ESIs which helped. She also is taking gabapentin at night 300mg, makes her drowsy.            Review of Systems   Constitutional: Negative for chills, fatigue and fever.   HENT: Negative for mouth sores, rhinorrhea and sore throat.    Eyes: Negative for pain and redness.        Dry eyes   Respiratory: Positive for shortness of breath. Negative for cough.    Cardiovascular: Negative for chest pain.   Gastrointestinal: Negative for abdominal pain, constipation, diarrhea, nausea and vomiting.   Genitourinary: Negative for dysuria and hematuria.   Musculoskeletal: Positive for arthralgias and neck pain. Negative for joint swelling and myalgias.        Left knee replacement   Skin: Negative for rash.   Neurological: Negative for weakness, numbness and headaches.   Psychiatric/Behavioral: The patient is not nervous/anxious.            Objective:   /88   Pulse 71   Ht 5' 7" (1.702 m)   Wt 93.2 kg (205 lb 7.5 oz)   BMI 32.18 kg/m²    "   Physical Exam   Constitutional: She is oriented to person, place, and time and well-developed, well-nourished, and in no distress.   HENT:   Head: Normocephalic and atraumatic.   Eyes: Pupils are equal, round, and reactive to light. Right eye exhibits no discharge.   Neck: Normal range of motion.   Cardiovascular: Normal rate, regular rhythm and normal heart sounds.  Exam reveals no friction rub.    Pulmonary/Chest: Effort normal and breath sounds normal. No respiratory distress.   Abdominal: Soft. She exhibits no distension. There is no tenderness.   Lymphadenopathy:     She has no cervical adenopathy.   Neurological: She is alert and oriented to person, place, and time.   Skin: No rash noted. No erythema.     Psychiatric: Mood normal.   Musculoskeletal: Normal range of motion. She exhibits no edema or deformity.   mao wrists, mcps, pips no synovitis, no tenderness, full rom, grib strength equal bilaterally  Left knee s/p replacement, no effusion, no warmth  Right knee no effusion, no warmth, full rom         Recent Results (from the past 672 hour(s))   Urinalysis    Collection Time: 01/02/19 12:00 PM   Result Value Ref Range    Specimen UA Urine, Clean Catch     Color, UA Yellow Yellow, Straw, Maru    Appearance, UA Clear Clear    pH, UA 6.0 5.0 - 8.0    Specific Gravity, UA 1.025 1.005 - 1.030    Protein, UA Negative Negative    Glucose, UA Negative Negative    Ketones, UA Negative Negative    Bilirubin (UA) Negative Negative    Occult Blood UA Negative Negative    Nitrite, UA Negative Negative    Leukocytes, UA Negative Negative   Lipid panel    Collection Time: 01/03/19 10:22 AM   Result Value Ref Range    Cholesterol 172 120 - 199 mg/dL    Triglycerides 74 30 - 150 mg/dL    HDL 42 40 - 75 mg/dL    LDL Cholesterol 115.2 63.0 - 159.0 mg/dL    HDL/Chol Ratio 24.4 20.0 - 50.0 %    Total Cholesterol/HDL Ratio 4.1 2.0 - 5.0    Non-HDL Cholesterol 130 mg/dL   Basic metabolic panel    Collection Time: 01/03/19  10:22 AM   Result Value Ref Range    Sodium 141 136 - 145 mmol/L    Potassium 3.7 3.5 - 5.1 mmol/L    Chloride 102 95 - 110 mmol/L    CO2 30 (H) 23 - 29 mmol/L    Glucose 104 70 - 110 mg/dL    BUN, Bld 15 6 - 20 mg/dL    Creatinine 0.8 0.5 - 1.4 mg/dL    Calcium 9.7 8.7 - 10.5 mg/dL    Anion Gap 9 8 - 16 mmol/L    eGFR if African American >60.0 >60 mL/min/1.73 m^2    eGFR if non African American >60.0 >60 mL/min/1.73 m^2       CTA chest 8/24/17  Impression:       Negative for pulmonary embolus, aneurysm or dissection.  Lungs are clear.  No evidence of aortic dissection.  No evidence of aneurysm or retroperitoneal hematoma.  Small umbilical hernia containing fat.  Hernia involving the right anterolateral abdominal wall containing fat.    Cspine xr:   The vertebral bodies demonstrate normal height.  The alignment is within normal limits. There is moderate displaced narrowing and spondylosis present at the C4-C5 through the C6-C7 levels. The C1-C2 articulation is within normal limits. No prevertebral soft tissue swelling.    MRI C spine:   Findings: There is straightening of the cervical lordosis.  Degenerative vertebral endplate spurring and disc desiccation present at multiple levels with moderate disc space narrowing from C4-C5 through C6-C7.  Vertebral marrow signal pattern and architecture are normal.  Cervical cord and craniocervical junction are intact.  Paravertebral soft tissues are symmetric and normal in appearance.  C2-C3: Unremarkable.  C3-C4: Unremarkable.  C4-C5: Posterior disc osteophyte complex and bilateral uncovertebral hypertrophy resulting in moderate left greater than right foraminal narrowing.  Indentation of ventral thecal sac with associated anterior cord contact and mild central canal stenosis.  Minimum AP canal diameter is 9.0 mm.  C5-C6: Posterior disc osteophyte complex and asymmetric left greater than right uncovertebral hypertrophy producing marked left and moderate right foraminal  narrowing.  Mild indentation of the ventral thecal sac without significant canal stenosis.  C6-C7: Posterior disc osteophyte complex and uncovertebral hypertrophy producing moderate to marked left greater than right foraminal narrowing.  Mild indentation of the ventral thecal sac without significant canal stenosis.  C7-T1: Unremarkable.     Assessment:       1. Sarcoidosis    2. Vitamin D deficiency    3. Chronic restrictive lung disease    4. Immunocompromised    5. Medication monitoring encounter    6. Cervical radiculopathy        Impression:  Sarcoidosis:  Imuran 50mg/day, no evidence of increased activity, stable    Chronic restrictive lung disease: stable, occasional SOB;  sees dr. Casper, last CT of chest 7/2016 stable, PFTs with moderately reduced diffusion--need to f/u with pulm     High risk Medication Monitoring: no issues with toxicity    Immunocompromised: utd except zoster and flu     Cervical spondylosis with radiation into the left arm: seeing Dr. Isabel now, s/p HIMANSHU, gabapentin makes her drowsy    Vit d def: started 50K units weekly      Plan:       Flu shot today, hold imuran next two weeks  Cont imuran 50mg/day, can we come off this in the future?  Cont weekly vitamin D, will check level  Take gabapentin at night 300-600  Cont f/u with Dr. Isabel for cervical spondylosis and HIMANSHU's as needed  F/u with dr. casper  Send to lab today reg 4, vit D    rtc in 4 mos with reg labs, ace

## 2019-01-07 ENCOUNTER — LAB VISIT (OUTPATIENT)
Dept: LAB | Facility: HOSPITAL | Age: 59
End: 2019-01-07
Attending: PHYSICIAN ASSISTANT
Payer: COMMERCIAL

## 2019-01-07 ENCOUNTER — OFFICE VISIT (OUTPATIENT)
Dept: RHEUMATOLOGY | Facility: CLINIC | Age: 59
End: 2019-01-07
Payer: COMMERCIAL

## 2019-01-07 ENCOUNTER — OFFICE VISIT (OUTPATIENT)
Dept: INTERNAL MEDICINE | Facility: CLINIC | Age: 59
End: 2019-01-07
Payer: COMMERCIAL

## 2019-01-07 VITALS
WEIGHT: 205.5 LBS | HEART RATE: 64 BPM | DIASTOLIC BLOOD PRESSURE: 88 MMHG | HEIGHT: 67 IN | WEIGHT: 205.25 LBS | OXYGEN SATURATION: 96 % | SYSTOLIC BLOOD PRESSURE: 134 MMHG | DIASTOLIC BLOOD PRESSURE: 82 MMHG | HEIGHT: 67 IN | SYSTOLIC BLOOD PRESSURE: 133 MMHG | BODY MASS INDEX: 32.21 KG/M2 | TEMPERATURE: 98 F | BODY MASS INDEX: 32.25 KG/M2 | HEART RATE: 71 BPM

## 2019-01-07 DIAGNOSIS — J98.4 CHRONIC RESTRICTIVE LUNG DISEASE: ICD-10-CM

## 2019-01-07 DIAGNOSIS — E66.09 CLASS 1 OBESITY DUE TO EXCESS CALORIES WITH SERIOUS COMORBIDITY AND BODY MASS INDEX (BMI) OF 33.0 TO 33.9 IN ADULT: ICD-10-CM

## 2019-01-07 DIAGNOSIS — D86.9 SARCOIDOSIS: ICD-10-CM

## 2019-01-07 DIAGNOSIS — M79.7 FIBROMYALGIA: ICD-10-CM

## 2019-01-07 DIAGNOSIS — D84.9 IMMUNOCOMPROMISED: ICD-10-CM

## 2019-01-07 DIAGNOSIS — I10 ESSENTIAL HYPERTENSION: Primary | ICD-10-CM

## 2019-01-07 DIAGNOSIS — Z51.81 MEDICATION MONITORING ENCOUNTER: ICD-10-CM

## 2019-01-07 DIAGNOSIS — E55.9 VITAMIN D DEFICIENCY: ICD-10-CM

## 2019-01-07 DIAGNOSIS — D86.9 SARCOIDOSIS: Primary | ICD-10-CM

## 2019-01-07 DIAGNOSIS — M54.12 CERVICAL RADICULOPATHY: ICD-10-CM

## 2019-01-07 DIAGNOSIS — R11.0 NAUSEA: ICD-10-CM

## 2019-01-07 DIAGNOSIS — E78.00 PURE HYPERCHOLESTEROLEMIA: ICD-10-CM

## 2019-01-07 LAB
25(OH)D3+25(OH)D2 SERPL-MCNC: 43 NG/ML
BASOPHILS # BLD AUTO: 0.02 K/UL
BASOPHILS NFR BLD: 0.4 %
CRP SERPL-MCNC: 3.4 MG/L
DIFFERENTIAL METHOD: ABNORMAL
EOSINOPHIL # BLD AUTO: 0.1 K/UL
EOSINOPHIL NFR BLD: 1.9 %
ERYTHROCYTE [DISTWIDTH] IN BLOOD BY AUTOMATED COUNT: 12.9 %
ERYTHROCYTE [SEDIMENTATION RATE] IN BLOOD BY WESTERGREN METHOD: 30 MM/HR
HCT VFR BLD AUTO: 41.7 %
HGB BLD-MCNC: 13.5 G/DL
LYMPHOCYTES # BLD AUTO: 1.9 K/UL
LYMPHOCYTES NFR BLD: 40 %
MCH RBC QN AUTO: 31.5 PG
MCHC RBC AUTO-ENTMCNC: 32.4 G/DL
MCV RBC AUTO: 97 FL
MONOCYTES # BLD AUTO: 0.4 K/UL
MONOCYTES NFR BLD: 9.5 %
NEUTROPHILS # BLD AUTO: 2.2 K/UL
NEUTROPHILS NFR BLD: 48.2 %
PLATELET # BLD AUTO: 253 K/UL
PMV BLD AUTO: 10.2 FL
RBC # BLD AUTO: 4.28 M/UL
WBC # BLD AUTO: 4.62 K/UL

## 2019-01-07 PROCEDURE — 3079F PR MOST RECENT DIASTOLIC BLOOD PRESSURE 80-89 MM HG: ICD-10-PCS | Mod: CPTII,S$GLB,, | Performed by: PHYSICIAN ASSISTANT

## 2019-01-07 PROCEDURE — 90471 PNEUMOCOCCAL POLYSACCHARIDE VACCINE 23-VALENT =>2YO SQ IM: ICD-10-PCS | Mod: S$GLB,,, | Performed by: PEDIATRICS

## 2019-01-07 PROCEDURE — 3079F PR MOST RECENT DIASTOLIC BLOOD PRESSURE 80-89 MM HG: ICD-10-PCS | Mod: CPTII,S$GLB,, | Performed by: PEDIATRICS

## 2019-01-07 PROCEDURE — 3079F DIAST BP 80-89 MM HG: CPT | Mod: CPTII,S$GLB,, | Performed by: PEDIATRICS

## 2019-01-07 PROCEDURE — 99214 OFFICE O/P EST MOD 30 MIN: CPT | Mod: 25,S$GLB,, | Performed by: PHYSICIAN ASSISTANT

## 2019-01-07 PROCEDURE — 99999 PR PBB SHADOW E&M-EST. PATIENT-LVL IV: ICD-10-PCS | Mod: PBBFAC,,, | Performed by: PEDIATRICS

## 2019-01-07 PROCEDURE — 86140 C-REACTIVE PROTEIN: CPT

## 2019-01-07 PROCEDURE — 99999 PR PBB SHADOW E&M-EST. PATIENT-LVL IV: CPT | Mod: PBBFAC,,, | Performed by: PEDIATRICS

## 2019-01-07 PROCEDURE — 90471 IMMUNIZATION ADMIN: CPT | Mod: S$GLB,,, | Performed by: PHYSICIAN ASSISTANT

## 2019-01-07 PROCEDURE — 85651 RBC SED RATE NONAUTOMATED: CPT | Mod: PO

## 2019-01-07 PROCEDURE — 3075F SYST BP GE 130 - 139MM HG: CPT | Mod: CPTII,S$GLB,, | Performed by: PEDIATRICS

## 2019-01-07 PROCEDURE — 3008F PR BODY MASS INDEX (BMI) DOCUMENTED: ICD-10-PCS | Mod: CPTII,S$GLB,, | Performed by: PHYSICIAN ASSISTANT

## 2019-01-07 PROCEDURE — 3075F PR MOST RECENT SYSTOLIC BLOOD PRESS GE 130-139MM HG: ICD-10-PCS | Mod: CPTII,S$GLB,, | Performed by: PEDIATRICS

## 2019-01-07 PROCEDURE — 3079F DIAST BP 80-89 MM HG: CPT | Mod: CPTII,S$GLB,, | Performed by: PHYSICIAN ASSISTANT

## 2019-01-07 PROCEDURE — 3008F BODY MASS INDEX DOCD: CPT | Mod: CPTII,S$GLB,, | Performed by: PEDIATRICS

## 2019-01-07 PROCEDURE — 3008F PR BODY MASS INDEX (BMI) DOCUMENTED: ICD-10-PCS | Mod: CPTII,S$GLB,, | Performed by: PEDIATRICS

## 2019-01-07 PROCEDURE — 90732 PPSV23 VACC 2 YRS+ SUBQ/IM: CPT | Mod: S$GLB,,, | Performed by: PEDIATRICS

## 2019-01-07 PROCEDURE — 85025 COMPLETE CBC W/AUTO DIFF WBC: CPT | Mod: PO

## 2019-01-07 PROCEDURE — 99999 PR PBB SHADOW E&M-EST. PATIENT-LVL IV: ICD-10-PCS | Mod: PBBFAC,,, | Performed by: PHYSICIAN ASSISTANT

## 2019-01-07 PROCEDURE — 3075F PR MOST RECENT SYSTOLIC BLOOD PRESS GE 130-139MM HG: ICD-10-PCS | Mod: CPTII,S$GLB,, | Performed by: PHYSICIAN ASSISTANT

## 2019-01-07 PROCEDURE — 99999 PR PBB SHADOW E&M-EST. PATIENT-LVL IV: CPT | Mod: PBBFAC,,, | Performed by: PHYSICIAN ASSISTANT

## 2019-01-07 PROCEDURE — 90662 IIV NO PRSV INCREASED AG IM: CPT | Mod: S$GLB,,, | Performed by: PHYSICIAN ASSISTANT

## 2019-01-07 PROCEDURE — 90662 FLU VACCINE - HIGH DOSE (65+) PRESERVATIVE FREE IM: ICD-10-PCS | Mod: S$GLB,,, | Performed by: PHYSICIAN ASSISTANT

## 2019-01-07 PROCEDURE — 90732 PNEUMOCOCCAL POLYSACCHARIDE VACCINE 23-VALENT =>2YO SQ IM: ICD-10-PCS | Mod: S$GLB,,, | Performed by: PEDIATRICS

## 2019-01-07 PROCEDURE — 99214 PR OFFICE/OUTPT VISIT, EST, LEVL IV, 30-39 MIN: ICD-10-PCS | Mod: 25,S$GLB,, | Performed by: PHYSICIAN ASSISTANT

## 2019-01-07 PROCEDURE — 82164 ANGIOTENSIN I ENZYME TEST: CPT

## 2019-01-07 PROCEDURE — 90471 IMMUNIZATION ADMIN: CPT | Mod: S$GLB,,, | Performed by: PEDIATRICS

## 2019-01-07 PROCEDURE — 82306 VITAMIN D 25 HYDROXY: CPT

## 2019-01-07 PROCEDURE — 99214 PR OFFICE/OUTPT VISIT, EST, LEVL IV, 30-39 MIN: ICD-10-PCS | Mod: 25,S$GLB,, | Performed by: PEDIATRICS

## 2019-01-07 PROCEDURE — 3008F BODY MASS INDEX DOCD: CPT | Mod: CPTII,S$GLB,, | Performed by: PHYSICIAN ASSISTANT

## 2019-01-07 PROCEDURE — 3075F SYST BP GE 130 - 139MM HG: CPT | Mod: CPTII,S$GLB,, | Performed by: PHYSICIAN ASSISTANT

## 2019-01-07 PROCEDURE — 99214 OFFICE O/P EST MOD 30 MIN: CPT | Mod: 25,S$GLB,, | Performed by: PEDIATRICS

## 2019-01-07 PROCEDURE — 36415 COLL VENOUS BLD VENIPUNCTURE: CPT | Mod: PO

## 2019-01-07 PROCEDURE — 90471 FLU VACCINE - HIGH DOSE (65+) PRESERVATIVE FREE IM: ICD-10-PCS | Mod: S$GLB,,, | Performed by: PHYSICIAN ASSISTANT

## 2019-01-07 NOTE — PROGRESS NOTES
Subjective:       Patient ID: Janna Sheffield is a 58 y.o. female.    Chief Complaint: Follow-up    HTN: No home B/P monitoring. No HTNive symptoms  Hypercholesterol: compliant with statin. No D&E.  Fibromyalgia:Gabapentin working  GERD: pantoprazole is helping but still nauseated. S/P choley.    LIPIDS:following D&E, tolerating and compliant with med(s).      Review of Systems   Constitutional: Negative for fever and unexpected weight change.   HENT: Negative for congestion and rhinorrhea.    Eyes: Negative for discharge and redness.   Respiratory: Negative for cough and wheezing.    Gastrointestinal: Positive for abdominal pain and nausea. Negative for abdominal distention, anal bleeding, blood in stool, constipation, diarrhea and vomiting.   Endocrine: Negative for polydipsia, polyphagia and polyuria.   Genitourinary: Negative for decreased urine volume, difficulty urinating and menstrual problem.   Musculoskeletal: Positive for arthralgias and myalgias. Negative for joint swelling.   Skin: Negative for rash and wound.   Neurological: Negative for syncope and headaches.   Psychiatric/Behavioral: Negative for behavioral problems and sleep disturbance.       Objective:      Physical Exam   Constitutional: She is oriented to person, place, and time. She appears well-developed and well-nourished. No distress.   Neck: No JVD present. No thyromegaly present.   Cardiovascular: Normal rate, regular rhythm and normal heart sounds.   No murmur heard.  Pulmonary/Chest: Effort normal and breath sounds normal. No respiratory distress. She has no wheezes. She has no rales.   Abdominal: Soft. She exhibits no distension and no mass. There is no tenderness. There is no guarding.   Musculoskeletal: She exhibits no edema.   Lymphadenopathy:     She has no cervical adenopathy.   Neurological: She is alert and oriented to person, place, and time. No cranial nerve deficit. Coordination normal.   Skin: Capillary refill takes less  than 2 seconds. No rash noted.   Psychiatric: She has a normal mood and affect. Her behavior is normal. Judgment and thought content normal.       Assessment:       1. Essential hypertension    2. Pure hypercholesterolemia    3. Sarcoidosis    4. Fibromyalgia    5. Class 1 obesity due to excess calories with serious comorbidity and body mass index (BMI) of 33.0 to 33.9 in adult    6. Nausea        Plan:       Essential hypertension  -     Hypertension [employer] [facility] ([facility]) Enrollment Order  -     Hypertension Digital Medicine ([facility]): Assign Onboarding Questionnaires    Pure hypercholesterolemia  -     Lipid panel; Future; Expected date: 01/07/2019  -     ALT (SGPT); Future; Expected date: 01/07/2019  -     AST (SGOT); Future; Expected date: 01/07/2019    Sarcoidosis    Fibromyalgia    Class 1 obesity due to excess calories with serious comorbidity and body mass index (BMI) of 33.0 to 33.9 in adult    Nausea  -     H. pylori antigen, stool; Future; Expected date: 01/07/2019  -     Hepatic function panel; Future; Expected date: 01/07/2019    Other orders  -     Pneumococcal Polysaccharide Vaccine (23 Valent) (SQ/IM)    Rebooster PCV 23 as > 10 years. Flu vaccine today. She has recent CT lungs which had normal abd findings. If pain/nausea persists next week and H pylori negative, see GI. Maintain meds, diet and exercise, weight loss. F/U 6 months with labs.

## 2019-01-07 NOTE — PATIENT INSTRUCTIONS
Imuran continue for now    Labs today     See pulmonary MD    Flu shot today, hold imuran next 2 wks

## 2019-01-07 NOTE — PROGRESS NOTES
Administered 0.5cc Influenza  Vaccination to  Left  Deltoid. Pt. Tolerated well. No acute reaction noted to site. Pt. Instructed on S/S to report. Pt. Verbalized understanding. ptwaited 15 minutes.  Lot: [id number]  Exp: 06/19

## 2019-01-08 ENCOUNTER — TELEPHONE (OUTPATIENT)
Dept: RHEUMATOLOGY | Facility: CLINIC | Age: 59
End: 2019-01-08

## 2019-01-08 LAB — ACE SERPL-CCNC: 18 U/L

## 2019-01-08 NOTE — TELEPHONE ENCOUNTER
----- Message from Lefty Bragg sent at 1/8/2019  9:41 AM CST -----  Contact: pt  She's calling in regards to a missed call,  [phone] (home)
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
## 2019-01-15 ENCOUNTER — OFFICE VISIT (OUTPATIENT)
Dept: PULMONOLOGY | Facility: CLINIC | Age: 59
End: 2019-01-15
Payer: COMMERCIAL

## 2019-01-15 ENCOUNTER — HOSPITAL ENCOUNTER (OUTPATIENT)
Dept: RADIOLOGY | Facility: HOSPITAL | Age: 59
Discharge: HOME | End: 2019-01-15
Attending: INTERNAL MEDICINE
Payer: COMMERCIAL

## 2019-01-15 VITALS
SYSTOLIC BLOOD PRESSURE: 130 MMHG | WEIGHT: 211 LBS | HEIGHT: 67 IN | HEART RATE: 76 BPM | BODY MASS INDEX: 33.12 KG/M2 | OXYGEN SATURATION: 98 % | DIASTOLIC BLOOD PRESSURE: 82 MMHG | RESPIRATION RATE: 16 BRPM

## 2019-01-15 DIAGNOSIS — D86.9 SARCOIDOSIS: Primary | ICD-10-CM

## 2019-01-15 DIAGNOSIS — D86.9 SARCOIDOSIS: ICD-10-CM

## 2019-01-15 DIAGNOSIS — K63.5 POLYP OF COLON, UNSPECIFIED PART OF COLON, UNSPECIFIED TYPE: ICD-10-CM

## 2019-01-15 DIAGNOSIS — J98.4 CHRONIC RESTRICTIVE LUNG DISEASE: ICD-10-CM

## 2019-01-15 PROCEDURE — 99999 PR PBB SHADOW E&M-EST. PATIENT-LVL III: CPT | Mod: PBBFAC,,, | Performed by: INTERNAL MEDICINE

## 2019-01-15 PROCEDURE — 3008F PR BODY MASS INDEX (BMI) DOCUMENTED: ICD-10-PCS | Mod: CPTII,S$GLB,, | Performed by: INTERNAL MEDICINE

## 2019-01-15 PROCEDURE — 3079F DIAST BP 80-89 MM HG: CPT | Mod: CPTII,S$GLB,, | Performed by: INTERNAL MEDICINE

## 2019-01-15 PROCEDURE — 3075F SYST BP GE 130 - 139MM HG: CPT | Mod: CPTII,S$GLB,, | Performed by: INTERNAL MEDICINE

## 2019-01-15 PROCEDURE — 99999 PR PBB SHADOW E&M-EST. PATIENT-LVL III: ICD-10-PCS | Mod: PBBFAC,,, | Performed by: INTERNAL MEDICINE

## 2019-01-15 PROCEDURE — 99214 OFFICE O/P EST MOD 30 MIN: CPT | Mod: S$GLB,,, | Performed by: INTERNAL MEDICINE

## 2019-01-15 PROCEDURE — 3079F PR MOST RECENT DIASTOLIC BLOOD PRESSURE 80-89 MM HG: ICD-10-PCS | Mod: CPTII,S$GLB,, | Performed by: INTERNAL MEDICINE

## 2019-01-15 PROCEDURE — 3008F BODY MASS INDEX DOCD: CPT | Mod: CPTII,S$GLB,, | Performed by: INTERNAL MEDICINE

## 2019-01-15 PROCEDURE — 71046 X-RAY EXAM CHEST 2 VIEWS: CPT | Mod: TC

## 2019-01-15 PROCEDURE — 99214 PR OFFICE/OUTPT VISIT, EST, LEVL IV, 30-39 MIN: ICD-10-PCS | Mod: S$GLB,,, | Performed by: INTERNAL MEDICINE

## 2019-01-15 PROCEDURE — 71046 X-RAY EXAM CHEST 2 VIEWS: CPT | Mod: 26,,, | Performed by: RADIOLOGY

## 2019-01-15 PROCEDURE — 3075F PR MOST RECENT SYSTOLIC BLOOD PRESS GE 130-139MM HG: ICD-10-PCS | Mod: CPTII,S$GLB,, | Performed by: INTERNAL MEDICINE

## 2019-01-15 PROCEDURE — 71046 XR CHEST PA AND LATERAL: ICD-10-PCS | Mod: 26,,, | Performed by: RADIOLOGY

## 2019-01-17 ENCOUNTER — LAB VISIT (OUTPATIENT)
Dept: LAB | Facility: HOSPITAL | Age: 59
End: 2019-01-17
Attending: PEDIATRICS
Payer: COMMERCIAL

## 2019-01-17 DIAGNOSIS — R11.0 NAUSEA: ICD-10-CM

## 2019-01-17 PROCEDURE — 87338 HPYLORI STOOL AG IA: CPT

## 2019-01-21 ENCOUNTER — TELEPHONE (OUTPATIENT)
Dept: URGENT CARE | Facility: CLINIC | Age: 59
End: 2019-01-21

## 2019-01-21 ENCOUNTER — OFFICE VISIT (OUTPATIENT)
Dept: OPHTHALMOLOGY | Facility: CLINIC | Age: 59
End: 2019-01-21
Payer: COMMERCIAL

## 2019-01-21 DIAGNOSIS — H35.342 MACULAR HOLE, LEFT: Primary | ICD-10-CM

## 2019-01-21 DIAGNOSIS — H35.3122 INTERMEDIATE STAGE NONEXUDATIVE AGE-RELATED MACULAR DEGENERATION OF LEFT EYE: ICD-10-CM

## 2019-01-21 DIAGNOSIS — Z98.42 CATARACT EXTRACTION STATUS, LEFT: ICD-10-CM

## 2019-01-21 DIAGNOSIS — H52.7 REFRACTIVE ERROR: ICD-10-CM

## 2019-01-21 DIAGNOSIS — H25.11 NS (NUCLEAR SCLEROSIS), RIGHT: ICD-10-CM

## 2019-01-21 DIAGNOSIS — D86.9 SARCOIDOSIS: ICD-10-CM

## 2019-01-21 PROCEDURE — 92014 COMPRE OPH EXAM EST PT 1/>: CPT | Mod: S$GLB,,, | Performed by: OPHTHALMOLOGY

## 2019-01-21 PROCEDURE — 92014 PR EYE EXAM, EST PATIENT,COMPREHESV: ICD-10-PCS | Mod: S$GLB,,, | Performed by: OPHTHALMOLOGY

## 2019-01-21 PROCEDURE — 99999 PR PBB SHADOW E&M-EST. PATIENT-LVL I: CPT | Mod: PBBFAC,,, | Performed by: OPHTHALMOLOGY

## 2019-01-21 PROCEDURE — 92015 PR REFRACTION: ICD-10-PCS | Mod: S$GLB,,, | Performed by: OPHTHALMOLOGY

## 2019-01-21 PROCEDURE — 92015 DETERMINE REFRACTIVE STATE: CPT | Mod: S$GLB,,, | Performed by: OPHTHALMOLOGY

## 2019-01-21 PROCEDURE — 99999 PR PBB SHADOW E&M-EST. PATIENT-LVL I: ICD-10-PCS | Mod: PBBFAC,,, | Performed by: OPHTHALMOLOGY

## 2019-01-21 NOTE — PROGRESS NOTES
HPI     Patient c/o OD  Is tearing, and sticky for a month , patient states she   has to raise her rx up to read.      LAST SEEN 05/01/17 [facility]  H/O RETINA DETACHMENT OS  LATTICE / COBBLESTONE OU  MAC HOLE OS  HIGH MYOPE -9  PERIPHERAL PIGMENTATION   H/O SARCOID UVEITIS  GUTTATA OU  PCIOL OS    Last edited by IAM Vargas on 1/21/2019  9:32 AM. (History)            Assessment /Plan     For exam results, see Encounter Report.      ICD-10-CM ICD-9-CM    1. Macular hole, left H35.342 362.54 stable   2. Intermediate stage nonexudative age-related macular degeneration of left eye H35.3122 362.51 As above/with some additional findings of AMD   3. Sarcoidosis D86.9 135 Stable without iritis since being on Imuran for many years   4. NS (nuclear sclerosis), right H25.11 366.16 follow   5. Cataract extraction status, left Z98.42 V45.61    6. Refractive error - Both Eyes H52.7 367.9 Disp Mr       Return to clinic 1 year

## 2019-01-21 NOTE — TELEPHONE ENCOUNTER
Left message to return call\      ----- Message from Supriya Villa sent at 1/21/2019 10:38 AM CST -----  Contact: pt  States the her stomach is not feeling any better. Please call pt at [phone]. Thank you

## 2019-01-22 ENCOUNTER — TELEPHONE (OUTPATIENT)
Dept: INTERNAL MEDICINE | Facility: CLINIC | Age: 59
End: 2019-01-22

## 2019-01-22 DIAGNOSIS — R11.0 NAUSEA: Primary | ICD-10-CM

## 2019-01-22 LAB — H PYLORI AG STL QL IA: NOT DETECTED

## 2019-01-22 NOTE — TELEPHONE ENCOUNTER
----- Message from Joan Osborne sent at 1/22/2019  8:02 AM CST -----  Contact: Janna Wasserman is returning call missed yesterday regarding her stomach and not feeling any better. Please call [phone]. Thanks!

## 2019-01-22 NOTE — TELEPHONE ENCOUNTER
Returned call to pt. Pt states that her stomach is cramping and she is having pain. She states Dr. Botello advised her that if she is still continuees to have stomach pains to see GI. I advised her that I will give the message to Dr. Botello to place the referral and return call to schedule appt. She verbalized understanding.

## 2019-01-22 NOTE — TELEPHONE ENCOUNTER
S/w pt and sched for appt w/ NP Home @ [facility] on 01/28/19. Pt confirmed appt date/time and provider/location.

## 2019-01-22 NOTE — TELEPHONE ENCOUNTER
----- Message from Luz Mann sent at 1/21/2019  4:20 PM CST -----  Contact: pt  Pt stated she was returning a call and can be reached at [phone] Thanks

## 2019-01-23 ENCOUNTER — TELEPHONE (OUTPATIENT)
Dept: ENDOSCOPY | Facility: HOSPITAL | Age: 59
End: 2019-01-23

## 2019-01-23 RX ORDER — ERGOCALCIFEROL 1.25 MG/1
CAPSULE ORAL
Qty: 12 CAPSULE | Refills: 4 | Status: SHIPPED | OUTPATIENT
Start: 2019-01-23 | End: 2020-06-24 | Stop reason: SDUPTHER

## 2019-01-23 NOTE — TELEPHONE ENCOUNTER
Endoscopy Scheduling Questionnaire:    Call Type:Outgoing call    1. Have you been admitted overnight to the hospital in the past 3 months? no  2. Do you get CP and SOB while walking up a flight of stairs? no  3. Have you had a stent placed in the past 12 months? no  4. Have you had a stroke or heart attack in the past 6 months? no  5. Have you had any chest pain in the past 3 months? no      If so, have you been evaluated by your PCP or Cardiologist? no  6. Do you take weight loss medications? no  7. Have you been diagnosed with Diverticulitis within the past 3 months? no  8. Are you having any GI symptoms that you feel need to be evaluated prior to your procedure? no  9. Are you on dialysis? no  10. Are you diabetic? no  11. Do you have any other health issues that you feel might limit your ability to safely have the procedure and/or sedation? no  12. Is the patient over 79 yo? no        If so, has the patient been seen by their PCP or GI in the last 3 months? N/A       -I have reviewed the last colonoscopy for recommendations regarding surveillance and bowel prep  Yes  -I have reviewed the patient's medications and allergies. She is not on high risk medications and will require cardiac clearance. A clearance request NA  -I have verified the pharmacy information. The prep being used is Suprep. The patient's prep instructions were sent via mail..    Date Endoscopy Scheduled: Date: 3/14/19  Or  Date Gastro office visit Scheduled: NA

## 2019-01-24 RX ORDER — SODIUM, POTASSIUM,MAG SULFATES 17.5-3.13G
1 SOLUTION, RECONSTITUTED, ORAL ORAL DAILY
Qty: 1 KIT | Refills: 0 | Status: SHIPPED | OUTPATIENT
Start: 2019-01-24 | End: 2019-01-26

## 2019-01-28 ENCOUNTER — OFFICE VISIT (OUTPATIENT)
Dept: GASTROENTEROLOGY | Facility: CLINIC | Age: 59
End: 2019-01-28
Payer: COMMERCIAL

## 2019-01-28 VITALS
BODY MASS INDEX: 32.39 KG/M2 | HEIGHT: 67 IN | DIASTOLIC BLOOD PRESSURE: 78 MMHG | SYSTOLIC BLOOD PRESSURE: 138 MMHG | WEIGHT: 206.38 LBS | HEART RATE: 76 BPM

## 2019-01-28 DIAGNOSIS — R10.13 EPIGASTRIC PAIN: Primary | ICD-10-CM

## 2019-01-28 DIAGNOSIS — R11.0 NAUSEA: ICD-10-CM

## 2019-01-28 DIAGNOSIS — K59.00 CONSTIPATION, UNSPECIFIED CONSTIPATION TYPE: ICD-10-CM

## 2019-01-28 PROCEDURE — 3078F DIAST BP <80 MM HG: CPT | Mod: CPTII,S$GLB,, | Performed by: NURSE PRACTITIONER

## 2019-01-28 PROCEDURE — 3078F PR MOST RECENT DIASTOLIC BLOOD PRESSURE < 80 MM HG: ICD-10-PCS | Mod: CPTII,S$GLB,, | Performed by: NURSE PRACTITIONER

## 2019-01-28 PROCEDURE — 99204 PR OFFICE/OUTPT VISIT, NEW, LEVL IV, 45-59 MIN: ICD-10-PCS | Mod: S$GLB,,, | Performed by: NURSE PRACTITIONER

## 2019-01-28 PROCEDURE — 3075F SYST BP GE 130 - 139MM HG: CPT | Mod: CPTII,S$GLB,, | Performed by: NURSE PRACTITIONER

## 2019-01-28 PROCEDURE — 3075F PR MOST RECENT SYSTOLIC BLOOD PRESS GE 130-139MM HG: ICD-10-PCS | Mod: CPTII,S$GLB,, | Performed by: NURSE PRACTITIONER

## 2019-01-28 PROCEDURE — 99999 PR PBB SHADOW E&M-EST. PATIENT-LVL III: CPT | Mod: PBBFAC,,, | Performed by: NURSE PRACTITIONER

## 2019-01-28 PROCEDURE — 3008F PR BODY MASS INDEX (BMI) DOCUMENTED: ICD-10-PCS | Mod: CPTII,S$GLB,, | Performed by: NURSE PRACTITIONER

## 2019-01-28 PROCEDURE — 3008F BODY MASS INDEX DOCD: CPT | Mod: CPTII,S$GLB,, | Performed by: NURSE PRACTITIONER

## 2019-01-28 PROCEDURE — 99204 OFFICE O/P NEW MOD 45 MIN: CPT | Mod: S$GLB,,, | Performed by: NURSE PRACTITIONER

## 2019-01-28 PROCEDURE — 99999 PR PBB SHADOW E&M-EST. PATIENT-LVL III: ICD-10-PCS | Mod: PBBFAC,,, | Performed by: NURSE PRACTITIONER

## 2019-01-28 RX ORDER — PANTOPRAZOLE SODIUM 40 MG/1
40 TABLET, DELAYED RELEASE ORAL DAILY
Qty: 30 TABLET | Refills: 11 | Status: SHIPPED | OUTPATIENT
Start: 2019-01-28 | End: 2020-04-06

## 2019-01-28 NOTE — PATIENT INSTRUCTIONS
Start with an initial clean out with 1 bottle of Magnesium Citrate. This can be purchased over the counter. After completed, start Miralax once to twice a day. This can also be purchased over the counter. Also, you should drink plenty of water a day and incorporate dietary fiber in your diet, such as fruits, vegetables, and fortified cereals.     Notify me if about 2-4 weeks if lower abdominal discomfort persists. Avoid all anti-inflammatories (Advil, Aleve, etc..) May use Tylenol for pain.

## 2019-01-28 NOTE — PROGRESS NOTES
Clinic Consult:  [facility] Consultation Note    Reason for Consult:  The primary encounter diagnosis was Epigastric pain. Diagnoses of Nausea and Constipation, unspecified constipation type were also pertinent to this visit.    PCP: HEIDE Botello Jr   [address] / [place] [id number]    HPI:  This is a 58 y.o. female here for evaluation of the above. She was referred by Dr. Botello. She presents to clinic today with epigastric pain. Onset of symptoms started years ago but significantly worsened after [place]. Pain has been constant. No relationship to eating. Associated symptoms include nausea. She does use daily ibuprofen for chronic pain. She does admit that her pain seems to have eased up yesterday and today.  She was seen by PCP and had xray done 1/2/18 which revealed constipation. She was placed on pantoprazole for 2 weeks. She also had a H. Pylori stool test that was negative. She was on PPI at time of collection. She reports having bowel movements about every other day. She will get some lower abdominal discomfort that improves after defecation. She admits to sometimes having to massage her lower abdomen in order to defecate. No hematochezia or melena. No vomiting. She is scheduled for screening colonoscopy. Upon review of past colonoscopy and pathology report, she is not due for screening. She had a colonoscopy in 2011 that showed hyperplastic polyp. Repeat recommended in 10 years. Pathology report reviewed and showed normal colon mucosa with lymphoid tissue.     Review of Systems   Constitutional: Negative for fever, malaise/fatigue and weight loss.   HENT: Negative for sore throat.    Respiratory: Negative for cough and wheezing.    Cardiovascular: Negative for chest pain and palpitations.   Gastrointestinal: Positive for abdominal pain, constipation and nausea. Negative for blood in stool, diarrhea, heartburn, melena and vomiting.   Genitourinary: Negative for dysuria and  frequency.   Musculoskeletal: Negative for back pain, joint pain, myalgias and neck pain.   Skin: Negative for itching and rash.   Neurological: Negative for dizziness, speech change, seizures, loss of consciousness and headaches.   Psychiatric/Behavioral: Negative for depression and substance abuse. The patient is not nervous/anxious.        Medical History:  has a past medical history of Arthritis, Chest pain syndrome (10/23/2014), Hyperlipidemia, Hypertension, Macular degeneration, age related, nonexudative - Left Eye (3/24/2014), Retinal detachment, old, partial (4/14/2014), and Sarcoidosis.    Surgical History:  has a past surgical history that includes Tubal ligation; knee surgery; Cataract extraction; Dilation and curettage of uterus; Oophorectomy; and Hysterectomy (2001).    Family History: family history includes Breast cancer in her cousin, maternal aunt, and maternal cousin; Cervical cancer in her mother; Heart attack in her mother; Heart disease in her mother; Hyperlipidemia in her father and mother; Hypertension in her father and mother; Stroke in her brother and father..     Social History:  reports that  has never smoked. she has never used smokeless tobacco. She reports that she does not drink alcohol or use drugs.    Allergies: Reviewed    Home Medications:   Current Outpatient Medications on File Prior to Visit   Medication Sig Dispense Refill    azaTHIOprine (IMURAN) 50 mg Tab TAKE ONE TABLET BY MOUTH ONCE DAILY 90 tablet 3    cholecalciferol, vitamin D3, 50,000 unit capsule Take 1 capsule (50,000 Units total) by mouth once a week. 12 capsule 4    gabapentin (NEURONTIN) 300 MG capsule Take 2 capsules (600 mg total) by mouth every evening. 60 capsule 11    hydroCHLOROthiazide (HYDRODIURIL) 25 MG tablet TAKE ONE TABLET BY MOUTH ONCE DAILY 30 tablet 11    multivitamin (THERAGRAN) per tablet Take 1 tablet by mouth Daily.      rosuvastatin (CRESTOR) 40 MG Tab TAKE ONE TABLET BY MOUTH ONCE DAILY (  " REPLACES  LIPITOR) 30 tablet 11    VITAMIN D2 50,000 unit capsule TAKE ONE CAPSULE BY MOUTH ONCE A WEEK 12 capsule 4    [DISCONTINUED] pantoprazole (PROTONIX) 40 MG tablet Take 1 tablet (40 mg total) by mouth once daily. for 14 days 14 tablet 0     No current facility-administered medications on file prior to visit.        Physical Exam:  /78   Pulse 76   Ht 5' 7" (1.702 m)   Wt 93.6 kg (206 lb 5.6 oz)   BMI 32.32 kg/m²   Body mass index is 32.32 kg/m².  Physical Exam   Constitutional: She is oriented to person, place, and time and well-developed, well-nourished, and in no distress. No distress.   HENT:   Head: Normocephalic.   Eyes: Conjunctivae are normal. Pupils are equal, round, and reactive to light.   Cardiovascular: Normal rate, regular rhythm and normal heart sounds.   Pulmonary/Chest: Effort normal and breath sounds normal. No respiratory distress.   Abdominal: Soft. Bowel sounds are normal. She exhibits no distension. There is no tenderness.   Neurological: She is alert and oriented to person, place, and time. No cranial nerve deficit.   Skin: Skin is warm and dry. No rash noted.   Psychiatric: Mood and affect normal.       Labs: Pertinent labs reviewed.    CRC Screening: up to date    Assessment:  1. Epigastric pain    2. Nausea    3. Constipation, unspecified constipation type         Recommendations:  Epigastric pain  Nausea  - recommend EGD for further evaluation  - restart daily PPI  - recommend gastric biopsies for H. Pylori testing as she was on PPI at time of stool test which can cause inaccurate results.   -     Case request GI: ESOPHAGOGASTRODUODENOSCOPY (EGD)    Constipation, unspecified constipation type  - could be contributing to the above  - lower abdominal discomfort likely secondary to constipation  - will treat with Mag Citrate clean out followed by Miralax  - if lower abdominal complaints continue, then would recommend colonoscopy but for diagnostic reasons.   - she is " actually up to date with screening colonoscopy. The polyp found on colonoscopy in 2010 was benign tissue which does not increase risk for development of colon cancer. She will be due in 2021.     Other orders  -     pantoprazole (PROTONIX) 40 MG tablet; Take 1 tablet (40 mg total) by mouth once daily.  Dispense: 30 tablet; Refill: 11    Follow up to be determined after procedure.    Thank you so much for allowing me to participate in the care of LENO Boone

## 2019-01-28 NOTE — H&P (VIEW-ONLY)
Clinic Consult:  [facility] Consultation Note    Reason for Consult:  The primary encounter diagnosis was Epigastric pain. Diagnoses of Nausea and Constipation, unspecified constipation type were also pertinent to this visit.    PCP: HEIDE Botello Jr   [address] / [place] [phone]    HPI:  This is a 58 y.o. female here for evaluation of the above. She was referred by Dr. Botello. She presents to clinic today with epigastric pain. Onset of symptoms started years ago but significantly worsened after [place]. Pain has been constant. No relationship to eating. Associated symptoms include nausea. She does use daily ibuprofen for chronic pain. She does admit that her pain seems to have eased up yesterday and today.  She was seen by PCP and had xray done 1/2/18 which revealed constipation. She was placed on pantoprazole for 2 weeks. She also had a H. Pylori stool test that was negative. She was on PPI at time of collection. She reports having bowel movements about every other day. She will get some lower abdominal discomfort that improves after defecation. She admits to sometimes having to massage her lower abdomen in order to defecate. No hematochezia or melena. No vomiting. She is scheduled for screening colonoscopy. Upon review of past colonoscopy and pathology report, she is not due for screening. She had a colonoscopy in 2011 that showed hyperplastic polyp. Repeat recommended in 10 years. Pathology report reviewed and showed normal colon mucosa with lymphoid tissue.     Review of Systems   Constitutional: Negative for fever, malaise/fatigue and weight loss.   HENT: Negative for sore throat.    Respiratory: Negative for cough and wheezing.    Cardiovascular: Negative for chest pain and palpitations.   Gastrointestinal: Positive for abdominal pain, constipation and nausea. Negative for blood in stool, diarrhea, heartburn, melena and vomiting.   Genitourinary: Negative for dysuria and  frequency.   Musculoskeletal: Negative for back pain, joint pain, myalgias and neck pain.   Skin: Negative for itching and rash.   Neurological: Negative for dizziness, speech change, seizures, loss of consciousness and headaches.   Psychiatric/Behavioral: Negative for depression and substance abuse. The patient is not nervous/anxious.        Medical History:  has a past medical history of Arthritis, Chest pain syndrome (10/23/2014), Hyperlipidemia, Hypertension, Macular degeneration, age related, nonexudative - Left Eye (3/24/2014), Retinal detachment, old, partial (4/14/2014), and Sarcoidosis.    Surgical History:  has a past surgical history that includes Tubal ligation; knee surgery; Cataract extraction; Dilation and curettage of uterus; Oophorectomy; and Hysterectomy (2001).    Family History: family history includes Breast cancer in her cousin, maternal aunt, and maternal cousin; Cervical cancer in her mother; Heart attack in her mother; Heart disease in her mother; Hyperlipidemia in her father and mother; Hypertension in her father and mother; Stroke in her brother and father..     Social History:  reports that  has never smoked. she has never used smokeless tobacco. She reports that she does not drink alcohol or use drugs.    Allergies: Reviewed    Home Medications:   Current Outpatient Medications on File Prior to Visit   Medication Sig Dispense Refill    azaTHIOprine (IMURAN) 50 mg Tab TAKE ONE TABLET BY MOUTH ONCE DAILY 90 tablet 3    cholecalciferol, vitamin D3, 50,000 unit capsule Take 1 capsule (50,000 Units total) by mouth once a week. 12 capsule 4    gabapentin (NEURONTIN) 300 MG capsule Take 2 capsules (600 mg total) by mouth every evening. 60 capsule 11    hydroCHLOROthiazide (HYDRODIURIL) 25 MG tablet TAKE ONE TABLET BY MOUTH ONCE DAILY 30 tablet 11    multivitamin (THERAGRAN) per tablet Take 1 tablet by mouth Daily.      rosuvastatin (CRESTOR) 40 MG Tab TAKE ONE TABLET BY MOUTH ONCE DAILY (  " REPLACES  LIPITOR) 30 tablet 11    VITAMIN D2 50,000 unit capsule TAKE ONE CAPSULE BY MOUTH ONCE A WEEK 12 capsule 4    [DISCONTINUED] pantoprazole (PROTONIX) 40 MG tablet Take 1 tablet (40 mg total) by mouth once daily. for 14 days 14 tablet 0     No current facility-administered medications on file prior to visit.        Physical Exam:  /78   Pulse 76   Ht 5' 7" (1.702 m)   Wt 93.6 kg (206 lb 5.6 oz)   BMI 32.32 kg/m²   Body mass index is 32.32 kg/m².  Physical Exam   Constitutional: She is oriented to person, place, and time and well-developed, well-nourished, and in no distress. No distress.   HENT:   Head: Normocephalic.   Eyes: Conjunctivae are normal. Pupils are equal, round, and reactive to light.   Cardiovascular: Normal rate, regular rhythm and normal heart sounds.   Pulmonary/Chest: Effort normal and breath sounds normal. No respiratory distress.   Abdominal: Soft. Bowel sounds are normal. She exhibits no distension. There is no tenderness.   Neurological: She is alert and oriented to person, place, and time. No cranial nerve deficit.   Skin: Skin is warm and dry. No rash noted.   Psychiatric: Mood and affect normal.       Labs: Pertinent labs reviewed.    CRC Screening: up to date    Assessment:  1. Epigastric pain    2. Nausea    3. Constipation, unspecified constipation type         Recommendations:  Epigastric pain  Nausea  - recommend EGD for further evaluation  - restart daily PPI  - recommend gastric biopsies for H. Pylori testing as she was on PPI at time of stool test which can cause inaccurate results.   -     Case request GI: ESOPHAGOGASTRODUODENOSCOPY (EGD)    Constipation, unspecified constipation type  - could be contributing to the above  - lower abdominal discomfort likely secondary to constipation  - will treat with Mag Citrate clean out followed by Miralax  - if lower abdominal complaints continue, then would recommend colonoscopy but for diagnostic reasons.   - she is " actually up to date with screening colonoscopy. The polyp found on colonoscopy in 2010 was benign tissue which does not increase risk for development of colon cancer. She will be due in 2021.     Other orders  -     pantoprazole (PROTONIX) 40 MG tablet; Take 1 tablet (40 mg total) by mouth once daily.  Dispense: 30 tablet; Refill: 11    Follow up to be determined after procedure.    Thank you so much for allowing me to participate in the care of LENO Boone

## 2019-02-06 ENCOUNTER — ANESTHESIA (OUTPATIENT)
Dept: ENDOSCOPY | Facility: HOSPITAL | Age: 59
End: 2019-02-06
Payer: COMMERCIAL

## 2019-02-06 ENCOUNTER — ANESTHESIA EVENT (OUTPATIENT)
Dept: ENDOSCOPY | Facility: HOSPITAL | Age: 59
End: 2019-02-06
Payer: COMMERCIAL

## 2019-02-06 ENCOUNTER — HOSPITAL ENCOUNTER (OUTPATIENT)
Facility: HOSPITAL | Age: 59
Discharge: HOME | End: 2019-02-06
Attending: INTERNAL MEDICINE | Admitting: INTERNAL MEDICINE
Payer: COMMERCIAL

## 2019-02-06 DIAGNOSIS — K44.9 SLIDING HIATAL HERNIA: ICD-10-CM

## 2019-02-06 DIAGNOSIS — K31.7 GASTRIC POLYPS: Primary | ICD-10-CM

## 2019-02-06 PROCEDURE — 43239 EGD BIOPSY SINGLE/MULTIPLE: CPT | Performed by: INTERNAL MEDICINE

## 2019-02-06 PROCEDURE — 63600175 PHARM REV CODE 636 W HCPCS: Performed by: NURSE ANESTHETIST, CERTIFIED REGISTERED

## 2019-02-06 PROCEDURE — 88305 TISSUE SPECIMEN TO PATHOLOGY - SURGERY: ICD-10-PCS | Mod: 26,,, | Performed by: PATHOLOGY

## 2019-02-06 PROCEDURE — 88305 TISSUE EXAM BY PATHOLOGIST: CPT | Mod: 26,,, | Performed by: PATHOLOGY

## 2019-02-06 PROCEDURE — 37000009 HC ANESTHESIA EA ADD 15 MINS: Performed by: INTERNAL MEDICINE

## 2019-02-06 PROCEDURE — 25000003 PHARM REV CODE 250: Performed by: INTERNAL MEDICINE

## 2019-02-06 PROCEDURE — 37000008 HC ANESTHESIA 1ST 15 MINUTES: Performed by: INTERNAL MEDICINE

## 2019-02-06 PROCEDURE — 43239 PR EGD, FLEX, W/BIOPSY, SGL/MULTI: ICD-10-PCS | Mod: ,,, | Performed by: INTERNAL MEDICINE

## 2019-02-06 PROCEDURE — 25000003 PHARM REV CODE 250: Performed by: NURSE ANESTHETIST, CERTIFIED REGISTERED

## 2019-02-06 PROCEDURE — 43239 EGD BIOPSY SINGLE/MULTIPLE: CPT | Mod: ,,, | Performed by: INTERNAL MEDICINE

## 2019-02-06 PROCEDURE — 27201012 HC FORCEPS, HOT/COLD, DISP: Performed by: INTERNAL MEDICINE

## 2019-02-06 PROCEDURE — 88305 TISSUE EXAM BY PATHOLOGIST: CPT | Performed by: PATHOLOGY

## 2019-02-06 RX ORDER — PROPOFOL 10 MG/ML
VIAL (ML) INTRAVENOUS
Status: DISCONTINUED | OUTPATIENT
Start: 2019-02-06 | End: 2019-02-06

## 2019-02-06 RX ORDER — SODIUM CHLORIDE, SODIUM LACTATE, POTASSIUM CHLORIDE, CALCIUM CHLORIDE 600; 310; 30; 20 MG/100ML; MG/100ML; MG/100ML; MG/100ML
INJECTION, SOLUTION INTRAVENOUS CONTINUOUS PRN
Status: DISCONTINUED | OUTPATIENT
Start: 2019-02-06 | End: 2019-02-06

## 2019-02-06 RX ORDER — LIDOCAINE HYDROCHLORIDE 10 MG/ML
INJECTION INFILTRATION; PERINEURAL
Status: DISCONTINUED | OUTPATIENT
Start: 2019-02-06 | End: 2019-02-06

## 2019-02-06 RX ORDER — SODIUM CHLORIDE, SODIUM LACTATE, POTASSIUM CHLORIDE, CALCIUM CHLORIDE 600; 310; 30; 20 MG/100ML; MG/100ML; MG/100ML; MG/100ML
INJECTION, SOLUTION INTRAVENOUS CONTINUOUS
Status: DISCONTINUED | OUTPATIENT
Start: 2019-02-06 | End: 2019-02-06 | Stop reason: HOSPADM

## 2019-02-06 RX ADMIN — SODIUM CHLORIDE, SODIUM LACTATE, POTASSIUM CHLORIDE, AND CALCIUM CHLORIDE: .6; .31; .03; .02 INJECTION, SOLUTION INTRAVENOUS at 01:02

## 2019-02-06 RX ADMIN — PROPOFOL 30 MG: 10 INJECTION, EMULSION INTRAVENOUS at 02:02

## 2019-02-06 RX ADMIN — LIDOCAINE HYDROCHLORIDE 50 ML: 10 INJECTION, SOLUTION INFILTRATION; PERINEURAL at 02:02

## 2019-02-06 RX ADMIN — PROPOFOL 20 MG: 10 INJECTION, EMULSION INTRAVENOUS at 02:02

## 2019-02-06 RX ADMIN — PROPOFOL 100 MG: 10 INJECTION, EMULSION INTRAVENOUS at 02:02

## 2019-02-06 RX ADMIN — SODIUM CHLORIDE, SODIUM LACTATE, POTASSIUM CHLORIDE, AND CALCIUM CHLORIDE: .6; .31; .03; .02 INJECTION, SOLUTION INTRAVENOUS at 02:02

## 2019-02-06 NOTE — ANESTHESIA RELEASE NOTE
"Anesthesia Release from PACU Note    Patient: Janna Sheffield    Procedure(s) Performed: Procedure(s) (LRB):  ESOPHAGOGASTRODUODENOSCOPY (EGD) (N/A)    Anesthesia type: MAC    Post pain: Adequate analgesia    Post assessment: no apparent anesthetic complications, tolerated procedure well and no evidence of recall    Last Vitals:   Visit Vitals  BP (!) 142/99 (BP Location: Left arm, Patient Position: Lying)   Pulse 71   Temp 36.6 °C (97.9 °F) (Skin)   Resp 20   Ht 5' 7" (1.702 m)   Wt 93.4 kg (206 lb)   SpO2 98%   Breastfeeding? No   BMI 32.26 kg/m²       Post vital signs: stable    Level of consciousness: awake    Nausea/Vomiting: no nausea/no vomiting    Complications: none    Airway Patency: patent    Respiratory: unassisted, spontaneous ventilation, room air    Cardiovascular: stable and blood pressure at baseline    Hydration: euvolemic  "

## 2019-02-06 NOTE — [employer] PATIENT INSTRUCTIONS
Discharge Summary/Instructions after an Endoscopic Procedure  Patient Name: Janna Sheffield  Patient MRN: [id number]  Patient YOB: 1960 Wednesday, February 06, 2019 Jairo Vargas III, MD  RESTRICTIONS:  During your procedure today, you received medications for sedation.  These   medications may affect your judgment, balance and coordination.  Therefore,   for 24 hours, you have the following restrictions:   - DO NOT drive a car, operate machinery, make legal/financial decisions,   sign important papers or drink alcohol.    ACTIVITY:  Today: no heavy lifting, straining or running due to procedural   sedation/anesthesia.  The following day: return to full activity including work.  DIET:  Eat and drink normally unless instructed otherwise.     TREATMENT FOR COMMON SIDE EFFECTS:  - Mild abdominal pain, nausea, belching, bloating or excessive gas:  rest,   eat lightly and use a heating pad.  - Sore Throat: treat with throat lozenges and/or gargle with warm salt   water.  - Because air was used during the procedure, expelling large amounts of air   from your rectum or belching is normal.  - If a bowel prep was taken, you may not have a bowel movement for 1-3 days.    This is normal.  SYMPTOMS TO WATCH FOR AND REPORT TO YOUR PHYSICIAN:  1. Abdominal pain or bloating, other than gas cramps.  2. Chest pain.  3. Back pain.  4. Signs of infection such as: chills or fever occurring within 24 hours   after the procedure.  5. Rectal bleeding, which would show as bright red, maroon, or black stools.   (A tablespoon of blood from the rectum is not serious, especially if   hemorrhoids are present.)  6. Vomiting.  7. Weakness or dizziness.  GO DIRECTLY TO THE NEAREST EMERGENCY ROOM IF YOU HAVE ANY OF THE FOLLOWING:      Difficulty breathing              Chills and/or fever over 101 F   Persistent vomiting and/or vomiting blood   Severe abdominal pain   Severe chest pain   Black, tarry stools   Bleeding- more than one  tablespoon   Any other symptom or condition that you feel may need urgent attention  Your doctor recommends these additional instructions:  If any biopsies were taken, your doctors clinic will contact you in 1 to 2   weeks with any results.  - Discharge patient to home (via wheelchair).   - Resume previous diet.   - Continue present medications.   - Await pathology results.   - Return to GI clinic as previously scheduled.  For questions, problems or results please call your physician Jairo Vargas III, MD at Work:  [phone]  If you have any questions about the above instructions, call the GI   department at [phone] or call the endoscopy unit at [phone]   from 7am until 3 pm.  [facility], EMERGENCY ROOM PHONE NUMBER:   [phone]  IF A COMPLICATION OR EMERGENCY SITUATION ARISES AND YOU ARE UNABLE TO REACH   YOUR PHYSICIAN - GO DIRECTLY TO THE EMERGENCY ROOM.  I have read or have had read to me these discharge instructions for my   procedure and have received a written copy.  I understand these   instructions and will follow-up with my physician if I have any questions.     __________________________________       _____________________________________  Nurse Signature                                          Patient/Designated   Responsible Party Signature  Jairo Vargas III, MD  2/6/2019 2:37:33 PM  This report has been verified and signed electronically.  [employer]

## 2019-02-06 NOTE — DISCHARGE INSTRUCTIONS
What Is a Hiatal Hernia?    Hiatal hernia is when the area where the stomach and esophagus meet bulges up through the diaphragm into the chest cavity. In some cases, part of the stomach may bulge above the diaphragm. Stomach acid may move up into the esophagus and cause symptoms. The symptoms are often blamed on gastroesophageal reflux disease (GERD). You may only know about the hernia when it shows up on an X-ray taken for other reasons.   What you may feel  The hiatus is a normal hole in the diaphragm. The esophagus passes through this hole and leads to the stomach. In some cases, part of the stomach may bulge above the diaphragm. This bulge is called a hernia. Stomach acid may move up into the esophagus and cause symptoms.  When you eat, the muscle at the hiatus relaxes to allow food to pass into the stomach. It tightens again to keep food and digestive acids in the stomach.  Many people with hiatal hernias have mild symptoms. You may notice the following GERD symptoms:  · Heartburn or other chest discomfort  · A feeling of chest fullness after a meal  · Frequent burping  · Acid taste in the mouth  · Trouble swallowing  Treating symptoms  If you have been diagnosed with hiatal hernia, these suggestions may help improve symptoms:  · Lose excess weight. Extra weight puts pressure on the stomach and esophagus.  · Dont lie down after eating. Sit up for at least an hour after eating. Lying down after eating can increase symptoms.  · Avoid certain foods and drinks. These include fatty foods, chocolate, coffee, mint, and other foods that cause symptoms for you.  · Dont smoke or drink alcohol. These can worsen symptoms.  · Look at your medicines. Discuss your medicines with your healthcare provider. Many medicines can cause symptoms.  · Consider an antacid medicine. Ask your healthcare provider about over-the-counter and prescription medicines that may help.  · Ask about surgery, if needed. Surgery is a treatment  choice for some people. Your healthcare provider can determine if surgery is an option for you.    Date Last Reviewed: 10/1/2016  © 6377-6798 [employer]. [address] [place] [address], [place] [address]. All rights reserved. This information is not intended as a substitute for professional medical care. Always follow your healthcare professional's instructions.        Upper GI Endoscopy     During endoscopy, a long, flexible tube is used to view the inside of your upper GI tract.      Upper GI endoscopy allows your healthcare provider to look directly into the beginning of your gastrointestinal (GI) tract. The esophagus, stomach, and duodenum (the first part of the small intestine) make up the upper GI tract.   Before the exam  Follow these and any other instructions you are given before your endoscopy. If you dont follow the healthcare providers instructions carefully, the test may need to be canceled or done over:  · Don't eat or drink anything after midnight the night before your exam. If your exam is in the afternoon, drink only clear liquids in the morning. Don't eat or drink anything for 8 hours before the exam. In some cases, you may be able to take medicines with sips of water until 2 hours before the procedure. Speak with your healthcare provider about this.   · Bring your X-rays and any other test results you have.  · Because you will be sedated, arrange for an adult to drive you home after the exam.  · Tell your healthcare provider before the exam if you are taking any medicines or have any medical problems.  The procedure  Here is what to expect:  · You will lie on the endoscopy table. Usually patients lie on the left side.  · You will be monitored and given oxygen.  · Your throat may be numbed with a spray or gargle. You are given medicine through an intravenous (IV) line that will help you relax and remain comfortable. You may be awake or asleep during the procedure.  · The healthcare  provider will put the endoscope in your mouth and down your esophagus. It is thinner than most pieces of food that you swallow. It will not affect your breathing. The medicine helps keep you from gagging.  · Air is put into your GI tract to expand it. It can make you burp.  · During the procedure, the healthcare provider can take biopsies (tissue samples), remove abnormalities, such as polyps, or treat abnormalities through a variety of devices placed through the endoscope. You will not feel this.   · The endoscope carries images of your upper GI tract to a video screen. If you are awake, you may be able to look at the images.  · After the procedure is done, you will rest for a time. An adult must drive you home.  When to call your healthcare provider  Contact your healthcare provider if you have:  · Black or tarry stools, or blood in your stool  · Fever  · Pain in your belly that does not go away  · Nausea and vomiting, or vomiting blood   Date Last Reviewed: 7/1/2016  © 9724-0614 [employer]. [address] [place] [address], [place] [address]. All rights reserved. This information is not intended as a substitute for professional medical care. Always follow your healthcare professional's instructions.

## 2019-02-06 NOTE — TRANSFER OF CARE
"Anesthesia Transfer of Care Note    Patient: Janna Sheffield    Procedure(s) Performed: Procedure(s) (LRB):  ESOPHAGOGASTRODUODENOSCOPY (EGD) (N/A)    Patient location: PACU    Anesthesia Type: MAC    Transport from OR: Transported from OR on room air with adequate spontaneous ventilation    Post pain: adequate analgesia    Post assessment: no apparent anesthetic complications and tolerated procedure well    Post vital signs: stable    Level of consciousness: awake    Nausea/Vomiting: no nausea/vomiting    Complications: none    Transfer of care protocol was followed      Last vitals:   Visit Vitals  BP (!) 142/99 (BP Location: Left arm, Patient Position: Lying)   Pulse 71   Temp 36.6 °C (97.9 °F) (Skin)   Resp 20   Ht 5' 7" (1.702 m)   Wt 93.4 kg (206 lb)   SpO2 98%   Breastfeeding? No   BMI 32.26 kg/m²     "

## 2019-02-06 NOTE — DISCHARGE SUMMARY
[facility] -   Brief Operative Note     SUMMARY     Surgery Date: 2/6/2019     Surgeon(s) and Role:     * Jairo Vargas III, MD - Primary    Assisting Surgeon: None    Pre-op Diagnosis:  Epigastric pain [R10.13]  Nausea [R11.0]    Post-op Diagnosis:  Post-Op Diagnosis Codes:     * Epigastric pain [R10.13]     * Nausea [R11.0]     - Gastric polyps     - Hiatal hernia  Procedure(s) (LRB):  ESOPHAGOGASTRODUODENOSCOPY (EGD) (N/A)    Anesthesia: Monitor Anesthesia Care    Description of the findings of the procedure: Procedures completed. See Procedure note for full details.    Findings/Key Components: Procedures completed. See Procedure note for full details.    Prosthetics/Devices: None    Estimated Blood Loss: * No values recorded between 2/6/2019 12:00 AM and 2/6/2019  2:39 PM *         Specimens:   Specimen (12h ago, onward)    Start     Ordered    02/06/19 1422  Specimen to Pathology - Surgery  Once     Comments:  1. Antrum biopsies r/o h pylori     Start Status   02/06/19 1422 Collected (02/06/19 1426)       02/06/19 1425          Discharge Note    SUMMARY     Admit Date: 2/6/2019    Discharge Date and Time: 2/6/2019    Hospital Course (synopsis of major diagnoses, care, treatment, and services provided during the course of the hospital stay):  Procedures completed. See Procedure note for full details. Discharge patient when discharge criteria met.    Final Diagnosis: Post-Op Diagnosis Codes:     * Epigastric pain [R10.13]     * Nausea [R11.0]      - Hiatal Hernia      - Gastric Polyp  Disposition: Discharge patient when discharge criteria met.    Follow Up/Patient Instructions:       Medications:  Reconciled Home Medications:   Current Discharge Medication List      CONTINUE these medications which have NOT CHANGED    Details   azaTHIOprine (IMURAN) 50 mg Tab TAKE ONE TABLET BY MOUTH ONCE DAILY  Qty: 90 tablet, Refills: 3    Associated Diagnoses: Sarcoidosis      cholecalciferol, vitamin D3,  50,000 unit capsule Take 1 capsule (50,000 Units total) by mouth once a week.  Qty: 12 capsule, Refills: 4    Associated Diagnoses: Vitamin D deficiency      gabapentin (NEURONTIN) 300 MG capsule Take 2 capsules (600 mg total) by mouth every evening.  Qty: 60 capsule, Refills: 11    Associated Diagnoses: Fibromyalgia      hydroCHLOROthiazide (HYDRODIURIL) 25 MG tablet TAKE ONE TABLET BY MOUTH ONCE DAILY  Qty: 30 tablet, Refills: 11    Comments: Please consider 90 day supplies to promote better adherence      multivitamin (THERAGRAN) per tablet Take 1 tablet by mouth Daily.      pantoprazole (PROTONIX) 40 MG tablet Take 1 tablet (40 mg total) by mouth once daily.  Qty: 30 tablet, Refills: 11      rosuvastatin (CRESTOR) 40 MG Tab TAKE ONE TABLET BY MOUTH ONCE DAILY (  REPLACES  LIPITOR)  Qty: 30 tablet, Refills: 11    Comments: Please consider 90 day supplies to promote better adherence      VITAMIN D2 50,000 unit capsule TAKE ONE CAPSULE BY MOUTH ONCE A WEEK  Qty: 12 capsule, Refills: 4    Comments: Please consider 90 day supplies to promote better adherence            Discharge Procedure Orders   Diet general     Activity as tolerated

## 2019-02-06 NOTE — ANESTHESIA POSTPROCEDURE EVALUATION
"Anesthesia Post Evaluation    Patient: Janna Sheffield    Procedure(s) Performed: Procedure(s) (LRB):  ESOPHAGOGASTRODUODENOSCOPY (EGD) (N/A)    Final Anesthesia Type: MAC  Patient location during evaluation: PACU  Patient participation: Yes- Able to Participate  Level of consciousness: awake  Post-procedure vital signs: reviewed and stable  Pain management: adequate  Airway patency: patent  PONV status at discharge: No PONV  Anesthetic complications: no      Cardiovascular status: blood pressure returned to baseline and hemodynamically stable  Respiratory status: room air, spontaneous ventilation and unassisted  Hydration status: euvolemic  Follow-up not needed.        Visit Vitals  BP (!) 142/99 (BP Location: Left arm, Patient Position: Lying)   Pulse 71   Temp 36.6 °C (97.9 °F) (Skin)   Resp 20   Ht 5' 7" (1.702 m)   Wt 93.4 kg (206 lb)   SpO2 98%   Breastfeeding? No   BMI 32.26 kg/m²       Pain/Ben Score: No Data Recorded      "

## 2019-02-06 NOTE — ANESTHESIA PREPROCEDURE EVALUATION
02/06/2019  Janna Sheffield is a 58 y.o., female.    Anesthesia Evaluation    I have reviewed the Patient Summary Reports.    I have reviewed the Nursing Notes.   I have reviewed the Medications.     Review of Systems  Anesthesia Hx:  No problems with previous Anesthesia    Social:  Non-Smoker, No Alcohol Use    Hematology/Oncology:  Hematology Normal   Oncology Normal     EENT/Dental:EENT/Dental Normal   Cardiovascular:   Hypertension, well controlled ALLEN    Pulmonary:   Shortness of breath    Renal/:  Renal/ Normal     Hepatic/GI:   Liver Disease,    Musculoskeletal:   Arthritis     Neurological:   Neuromuscular Disease,   Peripheral Neuropathy    Endocrine:  Endocrine Normal    Dermatological:  Skin Normal    Psych:  Psychiatric Normal           Physical Exam  General:  Well nourished    Airway/Jaw/Neck:  Airway Findings: Mouth Opening: Normal Tongue: Normal  Mallampati: I  TM Distance: Normal, at least 6 cm  Jaw/Neck Findings:  Neck ROM: Normal ROM      Dental:  Dental Findings: In tact   Chest/Lungs:  Chest/Lungs Findings: Clear to auscultation, Normal Respiratory Rate     Heart/Vascular:  Heart Findings: Rate: Normal  Rhythm: Regular Rhythm  Sounds: Normal        Mental Status:  Mental Status Findings:  Cooperative, Alert and Oriented         Anesthesia Plan  Type of Anesthesia, risks & benefits discussed:  Anesthesia Type:  MAC  Patient's Preference:   Intra-op Monitoring Plan: standard ASA monitors  Intra-op Monitoring Plan Comments:   Post Op Pain Control Plan:   Post Op Pain Control Plan Comments:   Induction:   IV  Beta Blocker:  Patient is not currently on a Beta-Blocker (No further documentation required).       Informed Consent: Patient understands risks and agrees with Anesthesia plan.  Questions answered. Anesthesia consent signed with patient.  ASA Score: 2     Day of Surgery Review of  History & Physical: I have interviewed and examined the patient. I have reviewed the patient's H&P dated:  There are no significant changes.          Ready For Surgery From Anesthesia Perspective.

## 2019-02-06 NOTE — PLAN OF CARE
Dr Vargas came to bedside and discussed findings. NO N/V,  no abdominal pain, no GI bleeding, and vitals stable.  Pt discharged from unit.

## 2019-02-07 VITALS
RESPIRATION RATE: 17 BRPM | WEIGHT: 206 LBS | DIASTOLIC BLOOD PRESSURE: 79 MMHG | HEART RATE: 66 BPM | OXYGEN SATURATION: 99 % | TEMPERATURE: 98 F | BODY MASS INDEX: 32.33 KG/M2 | SYSTOLIC BLOOD PRESSURE: 123 MMHG | HEIGHT: 67 IN

## 2019-03-12 ENCOUNTER — OFFICE VISIT (OUTPATIENT)
Dept: PULMONOLOGY | Facility: CLINIC | Age: 59
End: 2019-03-12
Payer: COMMERCIAL

## 2019-03-12 ENCOUNTER — LAB VISIT (OUTPATIENT)
Dept: LAB | Facility: HOSPITAL | Age: 59
End: 2019-03-12
Attending: INTERNAL MEDICINE
Payer: COMMERCIAL

## 2019-03-12 ENCOUNTER — CLINICAL SUPPORT (OUTPATIENT)
Dept: PULMONOLOGY | Facility: CLINIC | Age: 59
End: 2019-03-12
Payer: COMMERCIAL

## 2019-03-12 VITALS
WEIGHT: 200 LBS | OXYGEN SATURATION: 98 % | WEIGHT: 200.31 LBS | HEART RATE: 67 BPM | BODY MASS INDEX: 33.37 KG/M2 | HEIGHT: 65 IN | HEIGHT: 65 IN | RESPIRATION RATE: 16 BRPM | SYSTOLIC BLOOD PRESSURE: 133 MMHG | DIASTOLIC BLOOD PRESSURE: 84 MMHG | BODY MASS INDEX: 33.32 KG/M2

## 2019-03-12 DIAGNOSIS — D86.9 SARCOIDOSIS: Primary | ICD-10-CM

## 2019-03-12 DIAGNOSIS — J98.4 CHRONIC RESTRICTIVE LUNG DISEASE: ICD-10-CM

## 2019-03-12 DIAGNOSIS — D86.9 SARCOIDOSIS: ICD-10-CM

## 2019-03-12 DIAGNOSIS — R94.2 ABNORMAL DIFFUSION CAPACITY DETERMINED BY PULMONARY FUNCTION TEST: ICD-10-CM

## 2019-03-12 LAB
BRPFT: ABNORMAL
DLCO ADJ PRE: 11.29 ML/(MIN*MMHG) (ref 17.9–29.37)
DLCO SINGLE BREATH LLN: 17.9
DLCO SINGLE BREATH PRE REF: 47.8 %
DLCO SINGLE BREATH REF: 23.63
DLCOC SBVA LLN: 3.19
DLCOC SBVA PRE REF: 78.5 %
DLCOC SBVA REF: 4.63
DLCOC SINGLE BREATH LLN: 17.9
DLCOC SINGLE BREATH PRE REF: 47.8 %
DLCOC SINGLE BREATH REF: 23.63
DLCOVA LLN: 3.19
DLCOVA PRE REF: 78.5 %
DLCOVA PRE: 3.64 ML/(MIN*MMHG*L) (ref 3.19–6.07)
DLCOVA REF: 4.63
DLVAADJ PRE: 3.64 ML/(MIN*MMHG*L) (ref 3.19–6.07)
ERV LLN: 0.84
ERV PRE REF: 57.2 %
ERV REF: 0.84
ERVN2 LLN: 0.84
ERVN2 REF: 0.84
FEF 25 75 LLN: 0.94
FEF 25 75 PRE REF: 71.8 %
FEF 25 75 REF: 2.12
FEV1 FVC LLN: 68
FEV1 FVC PRE REF: 100 %
FEV1 FVC REF: 80
FEV1 LLN: 1.66
FEV1 PRE REF: 69.4 %
FEV1 REF: 2.27
FEV6 LLN: 2.01
FEV6 PRE REF: 70.7 %
FEV6 PRE: 1.93 L (ref 2.01–3.45)
FEV6 REF: 2.73
FRCN2 LLN: 1.93
FRCN2 REF: 2.75
FRCPLETH LLN: 1.93
FRCPLETH PREREF: 76.2 %
FRCPLETH REF: 2.75
FVC LLN: 2.12
FVC PRE REF: 69 %
FVC REF: 2.85
IVC PRE: 1.9 L (ref 2.12–3.58)
IVC SINGLE BREATH LLN: 2.12
IVC SINGLE BREATH PRE REF: 66.5 %
IVC SINGLE BREATH REF: 2.85
MVV LLN: 84
MVV PRE REF: 65.9 %
MVV REF: 99
PEF LLN: 3.83
PEF PRE REF: 79.4 %
PEF REF: 5.89
PRE DLCO: 11.29 ML/(MIN*MMHG) (ref 17.9–29.37)
PRE ERV: 0.48 L (ref 0.84–0.84)
PRE FEF 25 75: 1.52 L/S (ref 0.94–3.3)
PRE FET 100: 7.7 SEC
PRE FEV1 FVC: 79.89 % (ref 68.38–91.33)
PRE FEV1: 1.57 L (ref 1.66–2.87)
PRE FRC PL: 2.1 L
PRE FVC: 1.97 L (ref 2.12–3.58)
PRE MVV: 65 L/MIN (ref 83.89–113.5)
PRE PEF: 4.68 L/S (ref 3.83–7.96)
PRE RV: 1.56 L (ref 1.34–2.49)
PRE TLC: 3.59 L (ref 4.11–6.09)
RAW LLN: 3.06
RAW PRE REF: 108.3 %
RAW PRE: 3.31 CMH2O*S/L (ref 3.06–3.06)
RAW REF: 3.06
RV LLN: 1.34
RV PRE REF: 81.5 %
RV REF: 1.91
RVN2 LLN: 1.34
RVN2 REF: 1.91
RVN2TLCN2 LLN: 29.09
RVN2TLCN2 REF: 38.68
RVTLC LLN: 29
RVTLC PRE REF: 112.5 %
RVTLC PRE: 43.51 % (ref 29.09–48.27)
RVTLC REF: 39
TLC LLN: 4.11
TLC PRE REF: 70.4 %
TLC REF: 5.1
TLCN2 LLN: 4.11
TLCN2 REF: 5.1
VA PRE: 3.12 L (ref 4.95–4.95)
VA SINGLE BREATH LLN: 4.95
VA SINGLE BREATH PRE REF: 63 %
VA SINGLE BREATH REF: 4.95
VC LLN: 2.12
VC PRE REF: 71.1 %
VC PRE: 2.03 L (ref 2.12–3.58)
VC REF: 2.85
VCMAXN2 LLN: 2.12
VCMAXN2 REF: 2.85
VTGRAWPRE: 2.31 L

## 2019-03-12 PROCEDURE — 94618 PULMONARY STRESS TESTING: ICD-10-PCS | Mod: S$GLB,,, | Performed by: INTERNAL MEDICINE

## 2019-03-12 PROCEDURE — 3079F DIAST BP 80-89 MM HG: CPT | Mod: CPTII,S$GLB,, | Performed by: INTERNAL MEDICINE

## 2019-03-12 PROCEDURE — 99214 PR OFFICE/OUTPT VISIT, EST, LEVL IV, 30-39 MIN: ICD-10-PCS | Mod: 25,S$GLB,, | Performed by: INTERNAL MEDICINE

## 2019-03-12 PROCEDURE — 94618 PULMONARY STRESS TESTING: CPT | Mod: S$GLB,,, | Performed by: INTERNAL MEDICINE

## 2019-03-12 PROCEDURE — 3008F BODY MASS INDEX DOCD: CPT | Mod: CPTII,S$GLB,, | Performed by: INTERNAL MEDICINE

## 2019-03-12 PROCEDURE — 94010 BREATHING CAPACITY TEST: CPT | Mod: 59,S$GLB,, | Performed by: INTERNAL MEDICINE

## 2019-03-12 PROCEDURE — 99999 PR PBB SHADOW E&M-EST. PATIENT-LVL IV: CPT | Mod: PBBFAC,,, | Performed by: INTERNAL MEDICINE

## 2019-03-12 PROCEDURE — 36415 COLL VENOUS BLD VENIPUNCTURE: CPT

## 2019-03-12 PROCEDURE — 94729 PR C02/MEMBANE DIFFUSE CAPACITY: ICD-10-PCS | Mod: S$GLB,,, | Performed by: INTERNAL MEDICINE

## 2019-03-12 PROCEDURE — 94726 PULM FUNCT TST PLETHYSMOGRAP: ICD-10-PCS | Mod: S$GLB,,, | Performed by: INTERNAL MEDICINE

## 2019-03-12 PROCEDURE — 3075F PR MOST RECENT SYSTOLIC BLOOD PRESS GE 130-139MM HG: ICD-10-PCS | Mod: CPTII,S$GLB,, | Performed by: INTERNAL MEDICINE

## 2019-03-12 PROCEDURE — 3075F SYST BP GE 130 - 139MM HG: CPT | Mod: CPTII,S$GLB,, | Performed by: INTERNAL MEDICINE

## 2019-03-12 PROCEDURE — 94729 DIFFUSING CAPACITY: CPT | Mod: S$GLB,,, | Performed by: INTERNAL MEDICINE

## 2019-03-12 PROCEDURE — 82164 ANGIOTENSIN I ENZYME TEST: CPT

## 2019-03-12 PROCEDURE — 99214 OFFICE O/P EST MOD 30 MIN: CPT | Mod: 25,S$GLB,, | Performed by: INTERNAL MEDICINE

## 2019-03-12 PROCEDURE — 3079F PR MOST RECENT DIASTOLIC BLOOD PRESSURE 80-89 MM HG: ICD-10-PCS | Mod: CPTII,S$GLB,, | Performed by: INTERNAL MEDICINE

## 2019-03-12 PROCEDURE — 99999 PR PBB SHADOW E&M-EST. PATIENT-LVL IV: ICD-10-PCS | Mod: PBBFAC,,, | Performed by: INTERNAL MEDICINE

## 2019-03-12 PROCEDURE — 3008F PR BODY MASS INDEX (BMI) DOCUMENTED: ICD-10-PCS | Mod: CPTII,S$GLB,, | Performed by: INTERNAL MEDICINE

## 2019-03-12 PROCEDURE — 94726 PLETHYSMOGRAPHY LUNG VOLUMES: CPT | Mod: S$GLB,,, | Performed by: INTERNAL MEDICINE

## 2019-03-12 PROCEDURE — 94010 BREATHING CAPACITY TEST: ICD-10-PCS | Mod: 59,S$GLB,, | Performed by: INTERNAL MEDICINE

## 2019-03-12 NOTE — PROCEDURES
"[facility] - [facility] Function [facility]  Six Minute Walk     SUMMARY     Ordering Provider: Demetrio Casper MD   Interpreting Provider: Demetrio Casper MD  Performing nurse/tech/RT: VT  Diagnosis: Sarcoidosis(Chronic restrictive lung disease)  Height: 5' 5" (165.1 cm)  Weight: 90.9 kg (200 lb 4.6 oz)  BMI (Calculated): 33.4   Patient Race:          Phase Oxygen Assessment Supplemental O2 Heart   Rate Blood Pressure Magdalena Dyspnea Scale Rating   Resting 100 % Room Air 66 bpm 133/84 0   Exercise        Minute        1 99 % Room Air 98 bpm     2 99 % Room Air 100 bpm     3 98 % Room Air 105 bpm     4 98 % Room Air 103 bpm     5 98 % Room Air 102 bpm     6  98 % Room Air 102 bpm 134/83 1   Recovery        Minute        1 97 % Room Air 74 bpm     2 97 % Room Air 65 bpm     3 97 % Room Air 68 bpm     4 97 % Room Air 68 bpm 134/79 0     Six Minute Walk Summary  6MWT Status: completed without stopping  Patient Reported: No complaints     Interpretation:  Did the patient stop or pause?: No         Total Time Walked (Calculated): 360 seconds  Final Partial Lap Distance (feet): 150 feet  Total Distance Meters (Calculated): 350.52 meters   LLN was 333.07m  Predicted Distance Meters (Calculated): 472.07 meters  Percentage of Predicted (Calculated): 74.25  Peak VO2 (Calculated): 14.5  Mets: 4.14  Has The Patient Had a Previous Six Minute Walk Test?: Yes       Previous 6MWT Results  Has The Patient Had a Previous Six Minute Walk Test?: Yes  Date of Previous Test: 07/22/16  Total Time Walked: 360 seconds  Total Distance (meters): 472.44  Predicted Distance (meters): 481.06 meters  Percentage of Predicted: 98.21  Percent Change (Calculated): 0.26    REPORT       CLINICAL INTERPRETATION:  Six minute walk distance is 350.52m (74.25 % predicted) with very, very light dyspnea.  During exercise, there was no significant desaturation while breathing supplemental oxygen.  The patient did not report non-pulmonary symptoms during " exercise.  The patient did complete the study, walking 360 seconds of the 360 second test.  Since the previous study in 07/22/2016, exercise capacity is unchanged.  Based upon age and body mass index, exercise capacity is normal.    Demetrio Casper MD

## 2019-03-12 NOTE — PROGRESS NOTES
"Subjective:       Patient ID: Janna Sheffield is a 58 y.o. female.    Chief Complaint: Sarcoidosis; Dyspnea on exetion; and Chronic restrictive lung disease    Ms Sheffield  is a 58 years old.  LV was 01/15/2019,   No interval health issues.  She has sarcoidosis currently treated with IMURAN since 1985.  No cough no wheezing no shortness of breath  Her spirometry was consistent with a restrictive defect and reduced DLCO  Echocardiogram was normal  6 minute walk test was normal  Overnight saturation study was normal.  ACE was normal  Immunizations are up-to-date        Review of Systems   Constitutional: Negative.    HENT: Negative.    Eyes: Negative.    Respiratory: Negative.    Cardiovascular: Negative.    Genitourinary: Negative.    Endocrine: endocrine negative   Musculoskeletal: Negative.    Skin: Negative.    Gastrointestinal: Negative.    Neurological: Negative.    Psychiatric/Behavioral: Negative.        Objective:       Vitals:    03/12/19 0934   BP: 133/84   Pulse: 67   Resp: 16   SpO2: 98%   Weight: 90.7 kg (200 lb)   Height: 5' 5" (1.651 m)     Physical Exam   Constitutional: She is oriented to person, place, and time. She appears well-developed and well-nourished. She is obese.   HENT:   Head: Normocephalic.   Nose: Nose normal.   Neck: Normal range of motion. Neck supple. No JVD present. No tracheal deviation present. No thyromegaly present.   Cardiovascular: Normal rate, regular rhythm and normal heart sounds.   No murmur heard.  Pulmonary/Chest: Normal expansion, symmetric chest wall expansion, effort normal and breath sounds normal.   Abdominal: Soft. Bowel sounds are normal.   Musculoskeletal: Normal range of motion. She exhibits no edema.   Lymphadenopathy:     She has no cervical adenopathy.   Neurological: She is alert and oriented to person, place, and time. Gait normal.   Skin: Skin is warm and dry. No cyanosis. Nails show no clubbing.   Psychiatric: She has a normal mood and affect. Her " "behavior is normal.   Nursing note and vitals reviewed.    Personal Diagnostic Review  Chest x-ray: Clear lung fields    ACE  Component      Latest Ref Rng & Units 1/7/2019   Angio Convert Enzyme      8 - 53 U/L 18       6MW Test  Height: 5' 5" (165.1 cm)  Weight: 90.7 kg (200 lb)  BMI (Calculated): 33.4  Predicted Distance: 331.44  Interpretation  Predicted Distance Meters (Calculated): 472.37 meters  SpO2 bjorn was 98%  Distance was 350.52 m  148.5% predicted    PFT:  FEv1, FVC improved  FEV1: 1.57l( 69.4%), FVC 1.97( 69.0%)  FEV1/FVC 80  TLC: 3.59l( 70.4%)  DLCo: 11.29 ( 47.8%)    No flowsheet data found.      Assessment:       1. Sarcoidosis    2. Chronic restrictive lung disease    3. Abnormal diffusion capacity determined by pulmonary function test        Orders Placed This Encounter   Procedures    X-Ray Chest PA And Lateral     Standing Status:   Future     Standing Expiration Date:   3/11/2020    ANGIOTENSIN CONVERTING ENZYME     Standing Status:   Future     Standing Expiration Date:   5/10/2020    Ambulatory Referral to Cardiology     Referral Priority:   Routine     Referral Type:   Consultation     Referral Reason:   Specialty Services Required     Requested Specialty:   Cardiology     Number of Visits Requested:   1    Spirometry without Bronchodilator     Standing Status:   Future     Standing Expiration Date:   3/11/2020     Plan:       Stable sarcoid    Follow-up in about 1 year (around 3/12/2020), or Consider coming off IMURAN, [place], CXR, ref Cardiology.    Thank you for the courtesy of participating in the care of this patient    Demetrio Casper MD    "

## 2019-03-14 DIAGNOSIS — I10 ESSENTIAL HYPERTENSION: Primary | ICD-10-CM

## 2019-03-14 LAB — ACE SERPL-CCNC: 21 U/L

## 2019-03-14 RX ORDER — HYDROCHLOROTHIAZIDE 25 MG/1
TABLET ORAL
Qty: 30 TABLET | Refills: 11 | Status: CANCELLED | OUTPATIENT
Start: 2019-03-14

## 2019-03-14 NOTE — TELEPHONE ENCOUNTER
----- Message from Sigrid Reza sent at 3/14/2019 12:52 PM CDT -----  Contact: self [phone]  Type:  RX Refill Request    Who Called: Janna Sheffield  Refill or New Rx:refill  RX Name and Strength:hctz 25mg   How is the patient currently taking it? (ex. 1XDay):1x day  Is this a 30 day or 90 day RX:30 day  Preferred Pharmacy with phone number:    [id number] - [place] - [address]   [address]   [place] [address]  Phone: [phone] Fax: [phone]    Local or Mail Order:local  Ordering Provider:Rosmery  Would the patient rather a call back or a response via [facility]? Call back   Best Call Back Number:[phone]  Additional Information:  Rosuvastatin 40mg 1x day

## 2019-03-15 RX ORDER — HYDROCHLOROTHIAZIDE 25 MG/1
25 TABLET ORAL DAILY
Qty: 90 TABLET | Refills: 3 | Status: SHIPPED | OUTPATIENT
Start: 2019-03-15 | End: 2020-08-10

## 2019-03-15 RX ORDER — ROSUVASTATIN CALCIUM 40 MG/1
TABLET, COATED ORAL
Qty: 30 TABLET | Refills: 11 | Status: SHIPPED | OUTPATIENT
Start: 2019-03-15 | End: 2020-10-12

## 2019-04-22 ENCOUNTER — CLINICAL SUPPORT (OUTPATIENT)
Dept: CARDIOLOGY | Facility: CLINIC | Age: 59
End: 2019-04-22
Payer: COMMERCIAL

## 2019-04-22 ENCOUNTER — OFFICE VISIT (OUTPATIENT)
Dept: CARDIOLOGY | Facility: CLINIC | Age: 59
End: 2019-04-22
Payer: COMMERCIAL

## 2019-04-22 VITALS
WEIGHT: 202.19 LBS | SYSTOLIC BLOOD PRESSURE: 124 MMHG | HEART RATE: 72 BPM | HEIGHT: 65 IN | BODY MASS INDEX: 33.69 KG/M2 | DIASTOLIC BLOOD PRESSURE: 76 MMHG

## 2019-04-22 DIAGNOSIS — D86.9 SARCOIDOSIS: ICD-10-CM

## 2019-04-22 DIAGNOSIS — D86.9 SARCOIDOSIS: Primary | ICD-10-CM

## 2019-04-22 DIAGNOSIS — I10 ESSENTIAL HYPERTENSION: ICD-10-CM

## 2019-04-22 DIAGNOSIS — E78.00 PURE HYPERCHOLESTEROLEMIA: ICD-10-CM

## 2019-04-22 PROCEDURE — 99244 PR OFFICE CONSULTATION,LEVEL IV: ICD-10-PCS | Mod: S$GLB,,, | Performed by: INTERNAL MEDICINE

## 2019-04-22 PROCEDURE — 93000 EKG 12-LEAD: ICD-10-PCS | Mod: S$GLB,,, | Performed by: INTERNAL MEDICINE

## 2019-04-22 PROCEDURE — 99999 PR PBB SHADOW E&M-EST. PATIENT-LVL III: CPT | Mod: PBBFAC,,, | Performed by: INTERNAL MEDICINE

## 2019-04-22 PROCEDURE — 99244 OFF/OP CNSLTJ NEW/EST MOD 40: CPT | Mod: S$GLB,,, | Performed by: INTERNAL MEDICINE

## 2019-04-22 PROCEDURE — 93000 ELECTROCARDIOGRAM COMPLETE: CPT | Mod: S$GLB,,, | Performed by: INTERNAL MEDICINE

## 2019-04-22 PROCEDURE — 99999 PR PBB SHADOW E&M-EST. PATIENT-LVL III: ICD-10-PCS | Mod: PBBFAC,,, | Performed by: INTERNAL MEDICINE

## 2019-04-22 NOTE — PROGRESS NOTES
Subjective:   Patient ID:  Janna Sheffield is a 58 y.o. female who presents for evaluation of Consult (medication)      59 yo female, came in to r/o cardiac sarcoidosis. 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  PMHl liver sarcoidosis since 1985, h/o Rx of steroid and now chronic on Imuran, HTN, HLD.   No Dm, Heart attack. Stroke and cancer.  No smoking/drinking.  No exercise  Echo in  normal EF  Exercise ECHO in 2014 normal  Serial ekg NSR.        Past Medical History:   Diagnosis Date    Arthritis     Chest pain syndrome 10/23/2014    Hyperlipidemia     Hypertension     Macular degeneration, age related, nonexudative - Left Eye 3/24/2014    Retinal detachment, old, partial 4/14/2014    Sarcoidosis        Past Surgical History:   Procedure Laterality Date    CATARACT EXTRACTION      DILATION AND CURETTAGE OF UTERUS      missed ab x 3    ESOPHAGOGASTRODUODENOSCOPY (EGD) N/A 2/6/2019    Performed [facility]    HYSTERECTOMY  2001    MACARIO - complete    JOINT REPLACEMENT Left     Knee    knee surgery      OOPHORECTOMY      TUBAL LIGATION         Social History     Tobacco Use    Smoking status: Never Smoker    Smokeless tobacco: Never Used   Substance Use Topics    Alcohol use: No    Drug use: No       Family History   Problem Relation Age of Onset    Cervical cancer Mother     Heart disease Mother     Heart attack Mother     Hypertension Mother     Hyperlipidemia Mother     Hypertension Father     Hyperlipidemia Father     Stroke Father     Stroke Brother     Breast cancer Maternal Aunt     Breast cancer Cousin         mat and pat cousin w/ br ca    Breast cancer Maternal Cousin     Colon cancer Neg Hx     Ovarian cancer Neg Hx     Thrombophilia Neg Hx     Deep vein thrombosis Neg Hx     Pulmonary embolism Neg Hx        Review of Systems   Constitution: Negative for decreased appetite, diaphoresis, fever, malaise/fatigue and night sweats.   HENT: Negative  for nosebleeds.    Eyes: Negative for blurred vision and double vision.   Cardiovascular: Negative for chest pain, claudication, dyspnea on exertion, irregular heartbeat, leg swelling, near-syncope, orthopnea, palpitations, paroxysmal nocturnal dyspnea and syncope.   Respiratory: Negative for cough, shortness of breath, sleep disturbances due to breathing, snoring, sputum production and wheezing.    Endocrine: Negative for cold intolerance and polyuria.   Hematologic/Lymphatic: Does not bruise/bleed easily.   Skin: Negative for rash.   Musculoskeletal: Negative for back pain, falls, joint pain, joint swelling and neck pain.   Gastrointestinal: Negative for abdominal pain, heartburn, nausea and vomiting.   Genitourinary: Negative for dysuria, frequency and hematuria.   Neurological: Negative for difficulty with concentration, dizziness, focal weakness, headaches, light-headedness, numbness, seizures and weakness.   Psychiatric/Behavioral: Negative for depression, memory loss and substance abuse. The patient does not have insomnia.    Allergic/Immunologic: Negative for HIV exposure and hives.       Objective:   Physical Exam   Constitutional: She is oriented to person, place, and time. She appears well-nourished.   HENT:   Head: Normocephalic.   Eyes: Pupils are equal, round, and reactive to light.   Neck: Normal carotid pulses and no JVD present. Carotid bruit is not present. No thyromegaly present.   Cardiovascular: Normal rate, regular rhythm, normal heart sounds and normal pulses.  No extrasystoles are present. PMI is not displaced. Exam reveals no gallop and no S3.   No murmur heard.  Pulmonary/Chest: Breath sounds normal. No stridor. No respiratory distress.   Abdominal: Soft. Bowel sounds are normal. There is no tenderness. There is no rebound.   Musculoskeletal: Normal range of motion.   Neurological: She is alert and oriented to person, place, and time.   Skin: Skin is intact. No rash noted.   Psychiatric:  Her behavior is normal.       Lab Results   Component Value Date    CHOL 172 01/03/2019    CHOL 184 11/15/2017    CHOL 166 05/17/2017     Lab Results   Component Value Date    HDL 42 01/03/2019    HDL 45 11/15/2017    HDL 44 05/17/2017     Lab Results   Component Value Date    LDLCALC 115.2 01/03/2019    LDLCALC 121.4 11/15/2017    LDLCALC 106.4 05/17/2017     Lab Results   Component Value Date    TRIG 74 01/03/2019    TRIG 88 11/15/2017    TRIG 78 05/17/2017     Lab Results   Component Value Date    CHOLHDL 24.4 01/03/2019    CHOLHDL 24.5 11/15/2017    CHOLHDL 26.5 05/17/2017       Chemistry        Component Value Date/Time     01/03/2019 1022    K 3.7 01/03/2019 1022     01/03/2019 1022    CO2 30 (H) 01/03/2019 1022    BUN 15 01/03/2019 1022    CREATININE 0.8 01/03/2019 1022     01/03/2019 1022        Component Value Date/Time    CALCIUM 9.7 01/03/2019 1022    ALKPHOS 90 01/07/2019 0935    AST 21 01/07/2019 0935    ALT 16 01/07/2019 0935    BILITOT 0.6 01/07/2019 0935    ESTGFRAFRICA >60.0 01/03/2019 1022    EGFRNONAA >60.0 01/03/2019 1022          No results found for: LABA1C, HGBA1C  Lab Results   Component Value Date    TSH 0.574 04/13/2018     Lab Results   Component Value Date    INR 1.1 10/22/2014     Lab Results   Component Value Date    WBC 4.62 01/07/2019    HGB 13.5 01/07/2019    HCT 41.7 01/07/2019    MCV 97 01/07/2019     01/07/2019     BNP  @LABRCNTIP(BNP,BNPTRIAGEBLO)@  CrCl cannot be calculated (Patient's most recent lab result is older than the maximum 7 days allowed.).  No results found in the last 24 hours.  No results found in the last 24 hours.  No results found in the last 24 hours.    Assessment:      1. Sarcoidosis    2. Essential hypertension    3. Pure hypercholesterolemia        Plan:   Echo with ELLEN  Continue current meds.  Recommend heart-healthy diet, weight control and regular exercise.  Orlin. Risk modification.   F/u with PCP    I have reviewed all  pertinent labs and cardiac studies. Plans and recommendations have been formulated under my direct supervision. All questions answered and patient voiced understanding. Patient to continue current medications.

## 2019-04-22 NOTE — LETTER
April 22, 2019      Demetrio Casper MD  [address]  [place] [address]           [facility]  [address] [place] [id number]  Phone: [phone]  Fax: [phone]          Patient: Janna Sheffield   MR Number: [id number]   YOB: 1960   Date of Visit: 4/22/2019       Dear Dr. Demetrio Casper:    Thank you for referring Janna Sheffield to me for evaluation. Attached you will find relevant portions of my assessment and plan of care.    If you have questions, please do not hesitate to call me. I look forward to following Janna Sheffield along with you.    Sincerely,    Wellington Saucedo MD    Enclosure  CC:  No Recipients    If you would like to receive this communication electronically, please contact [email or web address] or [phone] to request more information on [employer] Link access.    For providers and/or their staff who would like to refer a patient to [facility], please contact us through our one-stop-shop provider referral line, [facility] , at [phone].    If you feel you have received this communication in error or would no longer like to receive these types of communications, please e-mail [email or web address]

## 2019-04-24 ENCOUNTER — TELEPHONE (OUTPATIENT)
Dept: CARDIOLOGY | Facility: CLINIC | Age: 59
End: 2019-04-24

## 2019-04-24 NOTE — TELEPHONE ENCOUNTER
Pt would like to know if the echo is necessary before her appt with Rheumatology.  Pt needs to know if she can postpone test for financial reasons.  Will ask Dr Saucedo know and ask to advise.

## 2019-04-24 NOTE — TELEPHONE ENCOUNTER
----- Message from Ernesto Colbert sent at 4/24/2019  2:23 PM CDT -----  Contact: [phone]  Pt would like to consult with the nurse about U/S appt.  Please call back at [phone]. x-

## 2019-04-25 ENCOUNTER — TELEPHONE (OUTPATIENT)
Dept: CARDIOLOGY | Facility: CLINIC | Age: 59
End: 2019-04-25

## 2019-04-25 NOTE — TELEPHONE ENCOUNTER
Spoke with pt and let her know it's ok to postpone echo until she is ready.      ----- Message from Luz Mann sent at 4/24/2019  4:40 PM CDT -----  Contact: pt  Type:  Patient Returning Call    Who Called: pt   Who Left Message for Patient: Dr Saucedo office ([facility])  Does the patient know what this is regarding?: not sure   Would the patient rather a call back or a response via [facility]? Call back   Best Call Back Number: [phone] or [phone]  Additional Information:

## 2019-05-07 ENCOUNTER — OFFICE VISIT (OUTPATIENT)
Dept: ORTHOPEDICS | Facility: CLINIC | Age: 59
End: 2019-05-07
Payer: COMMERCIAL

## 2019-05-07 ENCOUNTER — HOSPITAL ENCOUNTER (OUTPATIENT)
Dept: RADIOLOGY | Facility: HOSPITAL | Age: 59
Discharge: HOME | End: 2019-05-07
Attending: FAMILY MEDICINE
Payer: COMMERCIAL

## 2019-05-07 VITALS
HEIGHT: 65 IN | HEART RATE: 80 BPM | SYSTOLIC BLOOD PRESSURE: 132 MMHG | BODY MASS INDEX: 33.69 KG/M2 | WEIGHT: 202.19 LBS | DIASTOLIC BLOOD PRESSURE: 91 MMHG

## 2019-05-07 DIAGNOSIS — E78.00 PURE HYPERCHOLESTEROLEMIA: ICD-10-CM

## 2019-05-07 DIAGNOSIS — R52 PAIN: ICD-10-CM

## 2019-05-07 DIAGNOSIS — M17.11 PRIMARY OSTEOARTHRITIS OF RIGHT KNEE: ICD-10-CM

## 2019-05-07 DIAGNOSIS — M17.0 PRIMARY OSTEOARTHRITIS OF BOTH KNEES: Primary | Chronic | ICD-10-CM

## 2019-05-07 DIAGNOSIS — Z96.652 H/O TOTAL KNEE REPLACEMENT, LEFT: ICD-10-CM

## 2019-05-07 DIAGNOSIS — I10 ESSENTIAL HYPERTENSION: ICD-10-CM

## 2019-05-07 DIAGNOSIS — R52 PAIN: Primary | ICD-10-CM

## 2019-05-07 PROCEDURE — 3008F BODY MASS INDEX DOCD: CPT | Mod: CPTII,S$GLB,, | Performed by: FAMILY MEDICINE

## 2019-05-07 PROCEDURE — 99999 PR PBB SHADOW E&M-EST. PATIENT-LVL III: CPT | Mod: PBBFAC,,, | Performed by: FAMILY MEDICINE

## 2019-05-07 PROCEDURE — 73564 XR KNEE ORTHO BILAT WITH FLEXION: ICD-10-PCS | Mod: 26,,, | Performed by: RADIOLOGY

## 2019-05-07 PROCEDURE — 3008F PR BODY MASS INDEX (BMI) DOCUMENTED: ICD-10-PCS | Mod: CPTII,S$GLB,, | Performed by: FAMILY MEDICINE

## 2019-05-07 PROCEDURE — 3075F PR MOST RECENT SYSTOLIC BLOOD PRESS GE 130-139MM HG: ICD-10-PCS | Mod: CPTII,S$GLB,, | Performed by: FAMILY MEDICINE

## 2019-05-07 PROCEDURE — 3075F SYST BP GE 130 - 139MM HG: CPT | Mod: CPTII,S$GLB,, | Performed by: FAMILY MEDICINE

## 2019-05-07 PROCEDURE — 99999 PR PBB SHADOW E&M-EST. PATIENT-LVL III: ICD-10-PCS | Mod: PBBFAC,,, | Performed by: FAMILY MEDICINE

## 2019-05-07 PROCEDURE — 73564 X-RAY EXAM KNEE 4 OR MORE: CPT | Mod: 26,,, | Performed by: RADIOLOGY

## 2019-05-07 PROCEDURE — 99214 OFFICE O/P EST MOD 30 MIN: CPT | Mod: S$GLB,,, | Performed by: FAMILY MEDICINE

## 2019-05-07 PROCEDURE — 73564 X-RAY EXAM KNEE 4 OR MORE: CPT | Mod: TC,50

## 2019-05-07 PROCEDURE — 99214 PR OFFICE/OUTPT VISIT, EST, LEVL IV, 30-39 MIN: ICD-10-PCS | Mod: S$GLB,,, | Performed by: FAMILY MEDICINE

## 2019-05-07 PROCEDURE — 3080F DIAST BP >= 90 MM HG: CPT | Mod: CPTII,S$GLB,, | Performed by: FAMILY MEDICINE

## 2019-05-07 PROCEDURE — 3080F PR MOST RECENT DIASTOLIC BLOOD PRESSURE >= 90 MM HG: ICD-10-PCS | Mod: CPTII,S$GLB,, | Performed by: FAMILY MEDICINE

## 2019-05-07 NOTE — LETTER
May 7, 2019      [place]  [address]  [place] [address]           '[facility]  [address] [place] [address]  Phone: [phone]  Fax: [phone]          Patient: Janna Sheffield   MR Number: [id number]   YOB: 1960   Date of Visit: 5/7/2019       Dear Dr. RUDY Botello Jr.:    Thank you for referring Janna Shfefield to me for evaluation. Attached you will find relevant portions of my assessment and plan of care.    If you have questions, please do not hesitate to call me. I look forward to following Janna Sheffield along with you.    Sincerely,    Kelechi Coley MD    Enclosure  CC:  No Recipients    If you would like to receive this communication electronically, please contact [email or web address] or [phone] to request more information on [employer] Link access.    For providers and/or their staff who would like to refer a patient to [facility], please contact us through our one-stop-shop provider referral line, [facility], at [phone].    If you feel you have received this communication in error or would no longer like to receive these types of communications, please e-mail [email or web address]

## 2019-05-07 NOTE — PROGRESS NOTES
Subjective:     Patient ID: Janna Sheffield is a 58 y.o. female.    Chief Complaint: Pain of the Left Knee and Pain of the Right Knee    Patient is a 58-year-old female presents clinic today complaining of bilateral knee pain. Patient states that she a history of left total knee replacement by Dr. Valerio in 2004.  Patient states that her knees have been hurting worse over the past 1 month.  States she has not had any injuries or falls.  States that the left knee hurts throughout the day, and especially when it is bent or at night.  Patient describes pain is a burning sensation with a throb.  Patient states that the right knee has similar pain, but is more stiff and painful in the anterior and posterior portions of her knee.  Patient states that she has not ever tried a topical compounding cream.  States that she has been taking Tylenol which helped some, but pain is still present.  States that she has given at home, but has not tried it for these knee pain just yet.  Denies any redness, swelling, fever, or chills.      Past Medical History:   Diagnosis Date    Arthritis     Chest pain syndrome 10/23/2014    Hyperlipidemia     Hypertension     Macular degeneration, age related, nonexudative - Left Eye 3/24/2014    Retinal detachment, old, partial 4/14/2014    Sarcoidosis      Past Surgical History:   Procedure Laterality Date    CATARACT EXTRACTION      DILATION AND CURETTAGE OF UTERUS      missed ab x 3    ESOPHAGOGASTRODUODENOSCOPY (EGD) N/A 2/6/2019    Performed by Jairo Vargas III, MD at [facility] END[facility]    HYSTERECTOMY  2001    MAC[facility]IO - complete    JOINT REPLACEMENT Left     Knee    knee surgery      OOPHORECTOMY      TUBAL LIGATION       Family History   Problem Relation Age of Onset    Cervical cancer Mother     Heart disease Mother     Heart attack Mother     Hypertension Mother     Hyperlipidemia Mother     Hypertension Father     Hyperlipidemia Father     Stroke Father     Stroke Brother      Breast cancer Maternal Aunt     Breast cancer Cousin         mat and pat cousin w/ br ca    Breast cancer Maternal Cousin     Colon cancer Neg Hx     Ovarian cancer Neg Hx     Thrombophilia Neg Hx     Deep vein thrombosis Neg Hx     Pulmonary embolism Neg Hx      Social History     Socioeconomic History    Marital status:      Spouse name: Not on file    Number of children: Not on file    Years of education: Not on file    Highest education level: Not on file   Occupational History     Employer: [employer]   Social Needs    Financial resource strain: Not on file    Food insecurity:     Worry: Not on file     Inability: Not on file    Transportation needs:     Medical: Not on file     Non-medical: Not on file   Tobacco Use    Smoking status: Never Smoker    Smokeless tobacco: Never Used   Substance and Sexual Activity    Alcohol use: No    Drug use: No    Sexual activity: Yes     Partners: Male     Comment:    Lifestyle    Physical activity:     Days per week: Not on file     Minutes per session: Not on file    Stress: Not on file   Relationships    Social connections:     Talks on phone: Not on file     Gets together: Not on file     Attends Church service: Not on file     Active member of club or organization: Not on file     Attends meetings of clubs or organizations: Not on file     Relationship status: Not on file   Other Topics Concern    Not on file   Social History Narrative    Not on file     Medication List with Changes/Refills   Current Medications    AZATHIOPRINE (IMURAN) 50 MG TAB    TAKE ONE TABLET BY MOUTH ONCE DAILY    GABAPENTIN (NEURONTIN) 300 MG CAPSULE    Take 2 capsules (600 mg total) by mouth every evening.    HYDROCHLOROTHIAZIDE (HYDRODIURIL) 25 MG TABLET    Take 1 tablet (25 mg total) by mouth once daily.    MULTIVITAMIN (THERAGRAN) PER TABLET    Take 1 tablet by mouth Daily.    PANTOPRAZOLE (PROTONIX) 40 MG TABLET    Take 1 tablet (40  "mg total) by mouth once daily.    ROSUVASTATIN (CRESTOR) 40 MG TAB    TAKE ONE TABLET BY MOUTH ONCE DAILY (  REPLACES  LIPITOR)    VITAMIN D2 50,000 UNIT CAPSULE    TAKE ONE CAPSULE BY MOUTH ONCE A WEEK   Discontinued Medications    CHOLECALCIFEROL, VITAMIN D3, 50,000 UNIT CAPSULE    Take 1 capsule (50,000 Units total) by mouth once a week.     Review of patient's allergies indicates:  No Known Allergies  Review of Systems   Constitutional: Negative for chills, fever and malaise/fatigue.   HENT: Negative for hearing loss.    Eyes: Negative for redness.   Cardiovascular: Negative for leg swelling.   Gastrointestinal: Negative for nausea and vomiting.   Musculoskeletal: Positive for joint pain. Negative for back pain, falls and myalgias.   Skin: Negative for rash.   Neurological: Negative for tingling, sensory change, focal weakness and weakness.        Objective:   Body mass index is 33.64 kg/m².  Vitals:    05/07/19 1615   BP: (!) 132/91   Pulse: 80   Weight: 91.7 kg (202 lb 2.6 oz)   Height: 5' 5" (1.651 m)   PainSc:   7   PainLoc: Knee           General    Nursing note and vitals reviewed.  Constitutional: She is oriented to person, place, and time. She appears well-developed and well-nourished. No distress.   Eyes: Conjunctivae are normal. No scleral icterus.   Pulmonary/Chest: Effort normal.   Neurological: She is alert and oriented to person, place, and time.   Psychiatric: She has a normal mood and affect. Her behavior is normal. Judgment and thought content normal.     General Musculoskeletal Exam   Gait: antalgic       Right Knee Exam     Inspection   Erythema: absent  Effusion: absent  Deformity: absent    Tenderness   The patient is tender to palpation of the medial joint line and lateral joint line.    Crepitus   The patient has crepitus of the patella and medial joint line.    Range of Motion   The patient has normal right knee ROM.    Other   Sensation: normal    Left Knee Exam     Inspection "   Erythema: absent  Scars: present (Postsurgical scars present well healed)  Effusion: absent  Deformity: absent    Tenderness   The patient is experiencing no tenderness.     Crepitus   The patient has crepitus of the patella.    Range of Motion   The patient has normal left knee ROM.    Other   Sensation: normal    Muscle Strength   Right Lower Extremity   Quadriceps:  5/5   Left Lower Extremity   Quadriceps:  5/5       EXAMINATION:  XR KNEE ORTHO BILAT WITH FLEXION    CLINICAL HISTORY:  Pain, unspecified    TECHNIQUE:  AP standing of both knees, PA flexion standing views of both knees, and Merchant views of both knees were performed.  Lateral views of both knees were also performed.    COMPARISON  04/27/2016    FINDINGS:  There is equivocal mild joint space narrowing involving the medial compartment of the right knee.  No significant degenerative change at the right patellofemoral compartment.  A left total knee arthroplasty is in place.  No hardware failure or loosening.  No periprosthetic fracture.  No joint effusions are suggested.  No acute fracture or dislocation.      Impression       1.  As above      Electronically signed by: Reese Montes DO  Date: 05/07/2019           Janna was seen today for pain and pain.    Diagnoses and all orders for this visit:    Primary osteoarthritis of both knees    Primary osteoarthritis of right knee    H/O total knee replacement, left    Essential hypertension    Pure hypercholesterolemia    -discussed the clinical course and nature of osteoarthritis with patient.  Advised patient that her knee replacement hardware appears to be functioning well and does not appear to have shifted or moved on x-rays.  At this time patient would like to conservative approach with Tylenol, gabapentin, and home physical therapy.  If patient does not get significant relief, would recommend formal physical therapy and topical compounding cream.  Discussed with patient that the definitive  treatment for knee osteoarthritis is total knee replacement.  -bilateral knee Xray images were independently viewed and read by me showing left total knee replacement hardware the does not appear to have shifted or moved.  Right knee has moderate medial joint space narrowing.  No acute fractures or dislocations.  -Formal read by radiologist is as described above  -Discussed findings with patient    -Chronic hypertension.  Managed by patient's PCP.  -Chronic hyperlipidemia.  Managed by patient's PCP.    -Treatment options and alternatives were discussed with the patient. Patient expressed understanding. Patient was given the opportunity to ask questions and be an active participant in their medical care. Patient had no further questions or concerns at this time.   -Patient is an overall moderate risk for health complications from their medical conditions.

## 2019-05-09 NOTE — PROGRESS NOTES
"Subjective:       Patient ID: Janna Sheffield is a 58 y.o. female.    Chief Complaint: Sarcoidosis, osteoarthritis    Mrs. Sheffield is a 59y/o AAF with history of osteoarthritis and sarcoidosis. Sarcoidosis was diagnosed in 1985 through a biopsy of a spot on her liver. 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     Currently she is on Imuran 50mg daily.. She has been doing well in regards to her Sarcoidosis.  She does have restrictive lung disease and SOB when she is more active but no worsening. She sees Dr. Casper, most recent visit everything was stable and he rec'd coming off imuran.      She has low vitamin D and started on 50Kunits weekly improved to now normal level. No issues with joint swelling, rash, worsening sob, cp, eye issues. She has mao knee pain with a TKA on the left, saw ortho who rx'd topical     She has generalized osteoarthritis with radiating neck pain. MRI showed disc disease and degenerative arthritis. She was sent to pain mgt for ESIs which helped. She also is taking gabapentin at night 3-600mg, makes her drowsy.          Review of Systems   Constitutional: Negative for chills, fatigue and fever.   HENT: Negative for mouth sores, rhinorrhea and sore throat.    Eyes: Negative for pain and redness.        Dry eyes   Respiratory: Positive for shortness of breath. Negative for cough.    Cardiovascular: Negative for chest pain.   Gastrointestinal: Negative for abdominal pain, constipation, diarrhea, nausea and vomiting.   Genitourinary: Negative for dysuria and hematuria.   Musculoskeletal: Positive for arthralgias and neck pain. Negative for joint swelling and myalgias.        Left knee replacement   Skin: Negative for rash.   Neurological: Negative for weakness, numbness and headaches.   Psychiatric/Behavioral: The patient is not nervous/anxious.            Objective:   BP (!) 140/82   Pulse 84   Ht 5' 5" (1.651 m)   Wt 93.6 " kg (206 lb 5.6 oz)   BMI 34.34 kg/m²      Physical Exam   Constitutional: She is oriented to person, place, and time and well-developed, well-nourished, and in no distress.   HENT:   Head: Normocephalic and atraumatic.   Eyes: Pupils are equal, round, and reactive to light. Right eye exhibits no discharge.   Neck: Normal range of motion.   Cardiovascular: Normal rate, regular rhythm and normal heart sounds.  Exam reveals no friction rub.    Pulmonary/Chest: Effort normal and breath sounds normal. No respiratory distress.   Abdominal: Soft. She exhibits no distension. There is no tenderness.   Lymphadenopathy:     She has no cervical adenopathy.   Neurological: She is alert and oriented to person, place, and time.   Skin: No rash noted. No erythema.     Psychiatric: Mood normal.   Musculoskeletal: Normal range of motion. She exhibits no edema or deformity.   mao wrists, mcps, pips no synovitis, no tenderness, full rom, grib strength equal bilaterally  Left knee s/p replacement, no effusion, no warmth  Right knee no effusion, no warmth, full rom         Recent Results (from the past 672 hour(s))   Comprehensive metabolic panel    Collection Time: 05/10/19  1:56 PM   Result Value Ref Range    Sodium 141 136 - 145 mmol/L    Potassium 3.5 3.5 - 5.1 mmol/L    Chloride 103 95 - 110 mmol/L    CO2 29 23 - 29 mmol/L    Glucose 127 (H) 70 - 110 mg/dL    BUN, Bld 13 6 - 20 mg/dL    Creatinine 0.9 0.5 - 1.4 mg/dL    Calcium 9.7 8.7 - 10.5 mg/dL    Total Protein 7.4 6.0 - 8.4 g/dL    Albumin 3.7 3.5 - 5.2 g/dL    Total Bilirubin 0.5 0.1 - 1.0 mg/dL    Alkaline Phosphatase 86 55 - 135 U/L    AST 19 10 - 40 U/L    ALT 16 10 - 44 U/L    Anion Gap 9 8 - 16 mmol/L    eGFR if African American >60 >60 mL/min/1.73 m^2    eGFR if non African American >60 >60 mL/min/1.73 m^2   CBC auto differential    Collection Time: 05/10/19  1:56 PM   Result Value Ref Range    WBC 5.80 3.90 - 12.70 K/uL    RBC 3.91 (L) 4.00 - 5.40 M/uL    Hemoglobin  12.4 12.0 - 16.0 g/dL    Hematocrit 38.1 37.0 - 48.5 %    Mean Corpuscular Volume 97 82 - 98 fL    Mean Corpuscular Hemoglobin 31.7 (H) 27.0 - 31.0 pg    Mean Corpuscular Hemoglobin Conc 32.5 32.0 - 36.0 g/dL    RDW 12.8 11.5 - 14.5 %    Platelets 245 150 - 350 K/uL    MPV 10.0 9.2 - 12.9 fL    Immature Granulocytes 0.2 0.0 - 0.5 %    Gran # (ANC) 3.2 1.8 - 7.7 K/uL    Immature Grans (Abs) 0.01 0.00 - 0.04 K/uL    Lymph # 2.1 1.0 - 4.8 K/uL    Mono # 0.4 0.3 - 1.0 K/uL    Eos # 0.1 0.0 - 0.5 K/uL    Baso # 0.02 0.00 - 0.20 K/uL    nRBC 0 0 /100 WBC    Gran% 54.6 38.0 - 73.0 %    Lymph% 36.6 18.0 - 48.0 %    Mono% 7.1 4.0 - 15.0 %    Eosinophil% 1.4 0.0 - 8.0 %    Basophil% 0.3 0.0 - 1.9 %    Differential Method Automated    C-reactive protein    Collection Time: 05/10/19  1:56 PM   Result Value Ref Range    CRP 7.3 0.0 - 8.2 mg/L       CTA chest 8/24/17  Impression:       Negative for pulmonary embolus, aneurysm or dissection.  Lungs are clear.  No evidence of aortic dissection.  No evidence of aneurysm or retroperitoneal hematoma.  Small umbilical hernia containing fat.  Hernia involving the right anterolateral abdominal wall containing fat.    Cspine xr:   The vertebral bodies demonstrate normal height.  The alignment is within normal limits. There is moderate displaced narrowing and spondylosis present at the C4-C5 through the C6-C7 levels. The C1-C2 articulation is within normal limits. No prevertebral soft tissue swelling.    MRI C spine:   Findings: There is straightening of the cervical lordosis.  Degenerative vertebral endplate spurring and disc desiccation present at multiple levels with moderate disc space narrowing from C4-C5 through C6-C7.  Vertebral marrow signal pattern and architecture are normal.  Cervical cord and craniocervical junction are intact.  Paravertebral soft tissues are symmetric and normal in appearance.  C2-C3: Unremarkable.  C3-C4: Unremarkable.  C4-C5: Posterior disc osteophyte  "complex and bilateral uncovertebral hypertrophy resulting in moderate left greater than right foraminal narrowing.  Indentation of ventral thecal sac with associated anterior cord contact and mild central canal stenosis.  Minimum AP canal diameter is 9.0 mm.  C5-C6: Posterior disc osteophyte complex and asymmetric left greater than right uncovertebral hypertrophy producing marked left and moderate right foraminal narrowing.  Mild indentation of the ventral thecal sac without significant canal stenosis.  C6-C7: Posterior disc osteophyte complex and uncovertebral hypertrophy producing moderate to marked left greater than right foraminal narrowing.  Mild indentation of the ventral thecal sac without significant canal stenosis.  C7-T1: Unremarkable.     6MW Test  Height: 5' 5" (165.1 cm)  Weight: 90.7 kg (200 lb)  BMI (Calculated): 33.4  Predicted Distance: 331.44  Interpretation  Predicted Distance Meters (Calculated): 472.37 meters  SpO2 bjorn was 98%  Distance was 350.52 m  148.5% predicted     PFT:  FEv1, FVC improved  FEV1: 1.57l( 69.4%), FVC 1.97( 69.0%)  FEV1/FVC 80  TLC: 3.59l( 70.4%)  DLCo: 11.29 ( 47.8%)    Assessment:       1. Sarcoidosis    2. Chronic restrictive lung disease    3. Vitamin D deficiency    4. Medication monitoring encounter    5. Immunocompromised    6. Cervical radiculopathy        Impression:  Sarcoidosis:  Imuran 50mg/day, no evidence of activity     Chronic restrictive lung disease: stable recently seen by pulm rec coming off imuran, occasional SOB;  sees dr. Casper, last CT of chest 7/2016 stable, PFTs with moderately reduced diffusion--mild restriction, stable    High risk Medication Monitoring: no issues with toxicity    Immunocompromised: utd except zoster and flu     Cervical spondylosis with radiation into the left arm: saw Dr. Isabel now, s/p HIMANSHU w good result, gabapentin makes her drowsy    Vit d def: started 50K units weekly, level in normal range now 43    R knee pain: left " knee replaced, saw ortho, compound topical rx'd      Plan:       Lower imuran to 25mg/day (50mg every other day), if no worsening symptoms and remains stable we can dc   Cont weekly vitamin D, last level wnl, check later this year  Take gabapentin at night 300-600  Cont f/u with Dr. Isabel for cervical spondylosis and HIMANSHU's as needed  F/u with dr. silva yearly   If R knee worsens can inject     rtc in 3 mos with reg labs, ace

## 2019-05-10 ENCOUNTER — OFFICE VISIT (OUTPATIENT)
Dept: RHEUMATOLOGY | Facility: CLINIC | Age: 59
End: 2019-05-10
Payer: COMMERCIAL

## 2019-05-10 ENCOUNTER — LAB VISIT (OUTPATIENT)
Dept: LAB | Facility: HOSPITAL | Age: 59
End: 2019-05-10
Attending: PHYSICIAN ASSISTANT
Payer: COMMERCIAL

## 2019-05-10 VITALS
BODY MASS INDEX: 34.38 KG/M2 | WEIGHT: 206.38 LBS | HEART RATE: 84 BPM | HEIGHT: 65 IN | SYSTOLIC BLOOD PRESSURE: 140 MMHG | DIASTOLIC BLOOD PRESSURE: 82 MMHG

## 2019-05-10 DIAGNOSIS — Z51.81 MEDICATION MONITORING ENCOUNTER: ICD-10-CM

## 2019-05-10 DIAGNOSIS — D84.9 IMMUNOCOMPROMISED: ICD-10-CM

## 2019-05-10 DIAGNOSIS — E55.9 VITAMIN D DEFICIENCY: ICD-10-CM

## 2019-05-10 DIAGNOSIS — D86.9 SARCOIDOSIS: ICD-10-CM

## 2019-05-10 DIAGNOSIS — M17.11 PRIMARY OSTEOARTHRITIS OF RIGHT KNEE: ICD-10-CM

## 2019-05-10 DIAGNOSIS — D86.9 SARCOIDOSIS: Primary | ICD-10-CM

## 2019-05-10 DIAGNOSIS — J98.4 CHRONIC RESTRICTIVE LUNG DISEASE: ICD-10-CM

## 2019-05-10 DIAGNOSIS — M54.12 CERVICAL RADICULOPATHY: ICD-10-CM

## 2019-05-10 LAB
ALBUMIN SERPL BCP-MCNC: 3.7 G/DL (ref 3.5–5.2)
ALP SERPL-CCNC: 86 U/L (ref 55–135)
ALT SERPL W/O P-5'-P-CCNC: 16 U/L (ref 10–44)
ANION GAP SERPL CALC-SCNC: 9 MMOL/L (ref 8–16)
AST SERPL-CCNC: 19 U/L (ref 10–40)
BASOPHILS # BLD AUTO: 0.02 K/UL (ref 0–0.2)
BASOPHILS NFR BLD: 0.3 % (ref 0–1.9)
BILIRUB SERPL-MCNC: 0.5 MG/DL (ref 0.1–1)
BUN SERPL-MCNC: 13 MG/DL (ref 6–20)
CALCIUM SERPL-MCNC: 9.7 MG/DL (ref 8.7–10.5)
CHLORIDE SERPL-SCNC: 103 MMOL/L (ref 95–110)
CO2 SERPL-SCNC: 29 MMOL/L (ref 23–29)
CREAT SERPL-MCNC: 0.9 MG/DL (ref 0.5–1.4)
CRP SERPL-MCNC: 7.3 MG/L (ref 0–8.2)
DIFFERENTIAL METHOD: ABNORMAL
EOSINOPHIL # BLD AUTO: 0.1 K/UL (ref 0–0.5)
EOSINOPHIL NFR BLD: 1.4 % (ref 0–8)
ERYTHROCYTE [DISTWIDTH] IN BLOOD BY AUTOMATED COUNT: 12.8 % (ref 11.5–14.5)
ERYTHROCYTE [SEDIMENTATION RATE] IN BLOOD BY WESTERGREN METHOD: 34 MM/HR (ref 0–36)
EST. GFR  (AFRICAN AMERICAN): >60 ML/MIN/1.73 M^2
EST. GFR  (NON AFRICAN AMERICAN): >60 ML/MIN/1.73 M^2
GLUCOSE SERPL-MCNC: 127 MG/DL (ref 70–110)
HCT VFR BLD AUTO: 38.1 % (ref 37–48.5)
HGB BLD-MCNC: 12.4 G/DL (ref 12–16)
IMM GRANULOCYTES # BLD AUTO: 0.01 K/UL (ref 0–0.04)
IMM GRANULOCYTES NFR BLD AUTO: 0.2 % (ref 0–0.5)
LYMPHOCYTES # BLD AUTO: 2.1 K/UL (ref 1–4.8)
LYMPHOCYTES NFR BLD: 36.6 % (ref 18–48)
MCH RBC QN AUTO: 31.7 PG (ref 27–31)
MCHC RBC AUTO-ENTMCNC: 32.5 G/DL (ref 32–36)
MCV RBC AUTO: 97 FL (ref 82–98)
MONOCYTES # BLD AUTO: 0.4 K/UL (ref 0.3–1)
MONOCYTES NFR BLD: 7.1 % (ref 4–15)
NEUTROPHILS # BLD AUTO: 3.2 K/UL (ref 1.8–7.7)
NEUTROPHILS NFR BLD: 54.6 % (ref 38–73)
NRBC BLD-RTO: 0 /100 WBC
PLATELET # BLD AUTO: 245 K/UL (ref 150–350)
PMV BLD AUTO: 10 FL (ref 9.2–12.9)
POTASSIUM SERPL-SCNC: 3.5 MMOL/L (ref 3.5–5.1)
PROT SERPL-MCNC: 7.4 G/DL (ref 6–8.4)
RBC # BLD AUTO: 3.91 M/UL (ref 4–5.4)
SODIUM SERPL-SCNC: 141 MMOL/L (ref 136–145)
WBC # BLD AUTO: 5.8 K/UL (ref 3.9–12.7)

## 2019-05-10 PROCEDURE — 3008F BODY MASS INDEX DOCD: CPT | Mod: CPTII,S$GLB,, | Performed by: PHYSICIAN ASSISTANT

## 2019-05-10 PROCEDURE — 3079F PR MOST RECENT DIASTOLIC BLOOD PRESSURE 80-89 MM HG: ICD-10-PCS | Mod: CPTII,S$GLB,, | Performed by: PHYSICIAN ASSISTANT

## 2019-05-10 PROCEDURE — 3077F PR MOST RECENT SYSTOLIC BLOOD PRESSURE >= 140 MM HG: ICD-10-PCS | Mod: CPTII,S$GLB,, | Performed by: PHYSICIAN ASSISTANT

## 2019-05-10 PROCEDURE — 36415 COLL VENOUS BLD VENIPUNCTURE: CPT

## 2019-05-10 PROCEDURE — 99214 OFFICE O/P EST MOD 30 MIN: CPT | Mod: S$GLB,,, | Performed by: PHYSICIAN ASSISTANT

## 2019-05-10 PROCEDURE — 99999 PR PBB SHADOW E&M-EST. PATIENT-LVL III: CPT | Mod: PBBFAC,,, | Performed by: PHYSICIAN ASSISTANT

## 2019-05-10 PROCEDURE — 3008F PR BODY MASS INDEX (BMI) DOCUMENTED: ICD-10-PCS | Mod: CPTII,S$GLB,, | Performed by: PHYSICIAN ASSISTANT

## 2019-05-10 PROCEDURE — 85652 RBC SED RATE AUTOMATED: CPT

## 2019-05-10 PROCEDURE — 3079F DIAST BP 80-89 MM HG: CPT | Mod: CPTII,S$GLB,, | Performed by: PHYSICIAN ASSISTANT

## 2019-05-10 PROCEDURE — 86140 C-REACTIVE PROTEIN: CPT

## 2019-05-10 PROCEDURE — 99214 PR OFFICE/OUTPT VISIT, EST, LEVL IV, 30-39 MIN: ICD-10-PCS | Mod: S$GLB,,, | Performed by: PHYSICIAN ASSISTANT

## 2019-05-10 PROCEDURE — 80053 COMPREHEN METABOLIC PANEL: CPT

## 2019-05-10 PROCEDURE — 82164 ANGIOTENSIN I ENZYME TEST: CPT

## 2019-05-10 PROCEDURE — 3077F SYST BP >= 140 MM HG: CPT | Mod: CPTII,S$GLB,, | Performed by: PHYSICIAN ASSISTANT

## 2019-05-10 PROCEDURE — 99999 PR PBB SHADOW E&M-EST. PATIENT-LVL III: ICD-10-PCS | Mod: PBBFAC,,, | Performed by: PHYSICIAN ASSISTANT

## 2019-05-10 PROCEDURE — 85025 COMPLETE CBC W/AUTO DIFF WBC: CPT

## 2019-05-10 RX ORDER — AZATHIOPRINE 50 MG/1
TABLET ORAL
Qty: 45 TABLET | Refills: 0 | Status: SHIPPED | OUTPATIENT
Start: 2019-05-10 | End: 2019-08-16

## 2019-05-10 NOTE — PATIENT INSTRUCTIONS
One imuran every other day now, if does well we will stop     Inject R knee if needed     Cont weekly vit D

## 2019-05-13 LAB — ACE SERPL-CCNC: 16 U/L (ref 16–85)

## 2019-06-20 ENCOUNTER — OFFICE VISIT (OUTPATIENT)
Dept: INTERNAL MEDICINE | Facility: CLINIC | Age: 59
End: 2019-06-20
Payer: COMMERCIAL

## 2019-06-20 ENCOUNTER — LAB VISIT (OUTPATIENT)
Dept: LAB | Facility: HOSPITAL | Age: 59
End: 2019-06-20
Payer: COMMERCIAL

## 2019-06-20 VITALS
DIASTOLIC BLOOD PRESSURE: 78 MMHG | SYSTOLIC BLOOD PRESSURE: 128 MMHG | HEART RATE: 77 BPM | HEIGHT: 65 IN | TEMPERATURE: 97 F | WEIGHT: 203.69 LBS | OXYGEN SATURATION: 100 % | BODY MASS INDEX: 33.94 KG/M2

## 2019-06-20 DIAGNOSIS — M62.830 BACK SPASM: ICD-10-CM

## 2019-06-20 DIAGNOSIS — M54.50 ACUTE RIGHT-SIDED LOW BACK PAIN WITHOUT SCIATICA: Primary | ICD-10-CM

## 2019-06-20 DIAGNOSIS — M54.50 ACUTE RIGHT-SIDED LOW BACK PAIN WITHOUT SCIATICA: ICD-10-CM

## 2019-06-20 LAB
BILIRUB UR QL STRIP: NEGATIVE
CLARITY UR: CLEAR
COLOR UR: YELLOW
GLUCOSE UR QL STRIP: NEGATIVE
HGB UR QL STRIP: NEGATIVE
KETONES UR QL STRIP: NEGATIVE
LEUKOCYTE ESTERASE UR QL STRIP: NEGATIVE
NITRITE UR QL STRIP: NEGATIVE
PH UR STRIP: 6 [PH] (ref 5–8)
PROT UR QL STRIP: NEGATIVE
SP GR UR STRIP: 1.02 (ref 1–1.03)
URN SPEC COLLECT METH UR: NORMAL

## 2019-06-20 PROCEDURE — 3008F BODY MASS INDEX DOCD: CPT | Mod: CPTII,S$GLB,, | Performed by: NURSE PRACTITIONER

## 2019-06-20 PROCEDURE — 3078F DIAST BP <80 MM HG: CPT | Mod: CPTII,S$GLB,, | Performed by: NURSE PRACTITIONER

## 2019-06-20 PROCEDURE — 3008F PR BODY MASS INDEX (BMI) DOCUMENTED: ICD-10-PCS | Mod: CPTII,S$GLB,, | Performed by: NURSE PRACTITIONER

## 2019-06-20 PROCEDURE — 96372 THER/PROPH/DIAG INJ SC/IM: CPT | Mod: S$GLB,,, | Performed by: NURSE PRACTITIONER

## 2019-06-20 PROCEDURE — 99214 PR OFFICE/OUTPT VISIT, EST, LEVL IV, 30-39 MIN: ICD-10-PCS | Mod: 25,S$GLB,, | Performed by: NURSE PRACTITIONER

## 2019-06-20 PROCEDURE — 3074F PR MOST RECENT SYSTOLIC BLOOD PRESSURE < 130 MM HG: ICD-10-PCS | Mod: CPTII,S$GLB,, | Performed by: NURSE PRACTITIONER

## 2019-06-20 PROCEDURE — 3074F SYST BP LT 130 MM HG: CPT | Mod: CPTII,S$GLB,, | Performed by: NURSE PRACTITIONER

## 2019-06-20 PROCEDURE — 99999 PR PBB SHADOW E&M-EST. PATIENT-LVL IV: CPT | Mod: PBBFAC,,, | Performed by: NURSE PRACTITIONER

## 2019-06-20 PROCEDURE — 99999 PR PBB SHADOW E&M-EST. PATIENT-LVL IV: ICD-10-PCS | Mod: PBBFAC,,, | Performed by: NURSE PRACTITIONER

## 2019-06-20 PROCEDURE — 96372 PR INJECTION,THERAP/PROPH/DIAG2ST, IM OR SUBCUT: ICD-10-PCS | Mod: S$GLB,,, | Performed by: NURSE PRACTITIONER

## 2019-06-20 PROCEDURE — 99214 OFFICE O/P EST MOD 30 MIN: CPT | Mod: 25,S$GLB,, | Performed by: NURSE PRACTITIONER

## 2019-06-20 PROCEDURE — 3078F PR MOST RECENT DIASTOLIC BLOOD PRESSURE < 80 MM HG: ICD-10-PCS | Mod: CPTII,S$GLB,, | Performed by: NURSE PRACTITIONER

## 2019-06-20 PROCEDURE — 81003 URINALYSIS AUTO W/O SCOPE: CPT

## 2019-06-20 RX ORDER — METHYLPREDNISOLONE ACETATE 80 MG/ML
60 INJECTION, SUSPENSION INTRA-ARTICULAR; INTRALESIONAL; INTRAMUSCULAR; SOFT TISSUE ONCE
Status: COMPLETED | OUTPATIENT
Start: 2019-06-20 | End: 2019-06-20

## 2019-06-20 RX ORDER — TRAMADOL HYDROCHLORIDE 50 MG/1
50 TABLET ORAL
Qty: 10 TABLET | Refills: 0 | Status: SHIPPED | OUTPATIENT
Start: 2019-06-20 | End: 2019-08-16

## 2019-06-20 RX ORDER — CYCLOBENZAPRINE HCL 10 MG
10 TABLET ORAL EVERY 12 HOURS PRN
Qty: 20 TABLET | Refills: 0 | Status: SHIPPED | OUTPATIENT
Start: 2019-06-20 | End: 2019-06-30

## 2019-06-20 RX ORDER — METHYLPREDNISOLONE 4 MG/1
TABLET ORAL
Qty: 1 PACKAGE | Refills: 0 | Status: SHIPPED | OUTPATIENT
Start: 2019-06-20 | End: 2019-07-08

## 2019-06-20 RX ADMIN — METHYLPREDNISOLONE ACETATE 60 MG: 80 INJECTION, SUSPENSION INTRA-ARTICULAR; INTRALESIONAL; INTRAMUSCULAR; SOFT TISSUE at 10:06

## 2019-06-20 NOTE — PROGRESS NOTES
"Subjective:       Patient ID: Janna Sheffield is a 58 y.o. female.    Chief Complaint: Back Pain    HPI     Pt reports that yesterday she was getting out of bed when she felt a "strain" in her right lower back and she has been in intense pain 10/10 ever since. She has taken tylenol and gabapentin with no relief. Heat provided mild relief. There are no urinary complaints, fever, pain/weakness/numbness/tingling in legs.  No hx kidney stones      Review of Systems   Constitutional: Negative for activity change, appetite change, chills, diaphoresis, fatigue, fever and unexpected weight change.   HENT: Negative for congestion, ear pain, postnasal drip, rhinorrhea, sinus pressure, sinus pain, sneezing, sore throat, tinnitus, trouble swallowing and voice change.    Eyes: Negative for photophobia, pain and visual disturbance.   Respiratory: Negative for cough, chest tightness, shortness of breath and wheezing.    Cardiovascular: Negative for chest pain, palpitations and leg swelling.   Gastrointestinal: Negative for abdominal distention, abdominal pain, constipation, diarrhea, nausea and vomiting.   Genitourinary: Negative for decreased urine volume, difficulty urinating, dysuria, flank pain, frequency, hematuria and urgency.   Musculoskeletal: Positive for back pain and myalgias. Negative for arthralgias, joint swelling, neck pain and neck stiffness.   Allergic/Immunologic: Negative for immunocompromised state.   Neurological: Negative for dizziness, tremors, seizures, syncope, facial asymmetry, speech difficulty, weakness, light-headedness, numbness and headaches.   Hematological: Negative for adenopathy. Does not bruise/bleed easily.   Psychiatric/Behavioral: Negative for confusion and sleep disturbance.       Objective:      Physical Exam   Cardiovascular:   Pulses:       Dorsalis pedis pulses are 2+ on the right side, and 2+ on the left side.   Musculoskeletal:        Lumbar back: She exhibits decreased range of " motion, tenderness, pain and spasm. She exhibits no bony tenderness, no swelling, no edema, no deformity, no laceration and normal pulse.        Back:        Assessment:       1. Acute right-sided low back pain without sciatica    2. Back spasm        Plan:   -heat/ice/muscle rub  -rest  -steroid IM now, steroid pack tomorrow, flexeril prn caution for sed, tramadol breakthrough  -Ua r/o infection/hematuria  -pt has routine f/u in 1 week, but return sooner if warranted

## 2019-07-01 ENCOUNTER — LAB VISIT (OUTPATIENT)
Dept: LAB | Facility: HOSPITAL | Age: 59
End: 2019-07-01
Attending: PEDIATRICS
Payer: COMMERCIAL

## 2019-07-01 DIAGNOSIS — E78.00 PURE HYPERCHOLESTEROLEMIA: ICD-10-CM

## 2019-07-01 LAB
ALT SERPL W/O P-5'-P-CCNC: 18 U/L (ref 10–44)
AST SERPL-CCNC: 16 U/L (ref 10–40)
CHOLEST SERPL-MCNC: 202 MG/DL (ref 120–199)
CHOLEST/HDLC SERPL: 4.2 {RATIO} (ref 2–5)
HDLC SERPL-MCNC: 48 MG/DL (ref 40–75)
HDLC SERPL: 23.8 % (ref 20–50)
LDLC SERPL CALC-MCNC: 139 MG/DL (ref 63–159)
NONHDLC SERPL-MCNC: 154 MG/DL
TRIGL SERPL-MCNC: 75 MG/DL (ref 30–150)

## 2019-07-01 PROCEDURE — 80061 LIPID PANEL: CPT

## 2019-07-01 PROCEDURE — 36415 COLL VENOUS BLD VENIPUNCTURE: CPT

## 2019-07-01 PROCEDURE — 84450 TRANSFERASE (AST) (SGOT): CPT

## 2019-07-01 PROCEDURE — 84460 ALANINE AMINO (ALT) (SGPT): CPT

## 2019-07-08 ENCOUNTER — OFFICE VISIT (OUTPATIENT)
Dept: INTERNAL MEDICINE | Facility: CLINIC | Age: 59
End: 2019-07-08
Payer: COMMERCIAL

## 2019-07-08 ENCOUNTER — APPOINTMENT (OUTPATIENT)
Dept: RADIOLOGY | Facility: HOSPITAL | Age: 59
End: 2019-07-08
Attending: NURSE PRACTITIONER
Payer: COMMERCIAL

## 2019-07-08 VITALS
OXYGEN SATURATION: 95 % | TEMPERATURE: 97 F | BODY MASS INDEX: 33.83 KG/M2 | WEIGHT: 203.06 LBS | HEIGHT: 65 IN | DIASTOLIC BLOOD PRESSURE: 74 MMHG | SYSTOLIC BLOOD PRESSURE: 132 MMHG | HEART RATE: 79 BPM

## 2019-07-08 DIAGNOSIS — J98.4 CHRONIC RESTRICTIVE LUNG DISEASE: ICD-10-CM

## 2019-07-08 DIAGNOSIS — M89.9 BONE DISORDER: ICD-10-CM

## 2019-07-08 DIAGNOSIS — M79.7 FIBROMYALGIA: ICD-10-CM

## 2019-07-08 DIAGNOSIS — E55.9 VITAMIN D DEFICIENCY: ICD-10-CM

## 2019-07-08 DIAGNOSIS — I10 ESSENTIAL HYPERTENSION: ICD-10-CM

## 2019-07-08 DIAGNOSIS — D86.9 SARCOIDOSIS: ICD-10-CM

## 2019-07-08 DIAGNOSIS — E78.00 PURE HYPERCHOLESTEROLEMIA: ICD-10-CM

## 2019-07-08 DIAGNOSIS — M15.9 GENERALIZED OSTEOARTHRITIS OF MULTIPLE SITES: Chronic | ICD-10-CM

## 2019-07-08 DIAGNOSIS — E66.09 CLASS 1 OBESITY DUE TO EXCESS CALORIES WITH SERIOUS COMORBIDITY AND BODY MASS INDEX (BMI) OF 33.0 TO 33.9 IN ADULT: ICD-10-CM

## 2019-07-08 DIAGNOSIS — M47.22 OSTEOARTHRITIS OF SPINE WITH RADICULOPATHY, CERVICAL REGION: Primary | ICD-10-CM

## 2019-07-08 DIAGNOSIS — D84.9 IMMUNOCOMPROMISED: ICD-10-CM

## 2019-07-08 DIAGNOSIS — K59.09 OTHER CONSTIPATION: ICD-10-CM

## 2019-07-08 PROBLEM — M54.12 CERVICAL RADICULOPATHY: Status: RESOLVED | Noted: 2017-09-28 | Resolved: 2019-07-08

## 2019-07-08 PROBLEM — M54.10 RADICULOPATHY AFFECTING UPPER EXTREMITY: Status: RESOLVED | Noted: 2017-08-31 | Resolved: 2019-07-08

## 2019-07-08 PROBLEM — M54.2 NECK PAIN ON RIGHT SIDE: Status: RESOLVED | Noted: 2017-08-31 | Resolved: 2019-07-08

## 2019-07-08 PROCEDURE — 99999 PR PBB SHADOW E&M-EST. PATIENT-LVL IV: CPT | Mod: PBBFAC,,, | Performed by: NURSE PRACTITIONER

## 2019-07-08 PROCEDURE — 3078F DIAST BP <80 MM HG: CPT | Mod: CPTII,S$GLB,, | Performed by: NURSE PRACTITIONER

## 2019-07-08 PROCEDURE — 99396 PREV VISIT EST AGE 40-64: CPT | Mod: S$GLB,,, | Performed by: NURSE PRACTITIONER

## 2019-07-08 PROCEDURE — 3078F PR MOST RECENT DIASTOLIC BLOOD PRESSURE < 80 MM HG: ICD-10-PCS | Mod: CPTII,S$GLB,, | Performed by: NURSE PRACTITIONER

## 2019-07-08 PROCEDURE — 3075F SYST BP GE 130 - 139MM HG: CPT | Mod: CPTII,S$GLB,, | Performed by: NURSE PRACTITIONER

## 2019-07-08 PROCEDURE — 77080 DEXA BONE DENSITY SPINE HIP: ICD-10-PCS | Mod: 26,,, | Performed by: RADIOLOGY

## 2019-07-08 PROCEDURE — 3075F PR MOST RECENT SYSTOLIC BLOOD PRESS GE 130-139MM HG: ICD-10-PCS | Mod: CPTII,S$GLB,, | Performed by: NURSE PRACTITIONER

## 2019-07-08 PROCEDURE — 99396 PR PREVENTIVE VISIT,EST,40-64: ICD-10-PCS | Mod: S$GLB,,, | Performed by: NURSE PRACTITIONER

## 2019-07-08 PROCEDURE — 99999 PR PBB SHADOW E&M-EST. PATIENT-LVL IV: ICD-10-PCS | Mod: PBBFAC,,, | Performed by: NURSE PRACTITIONER

## 2019-07-08 PROCEDURE — 77080 DXA BONE DENSITY AXIAL: CPT | Mod: 26,,, | Performed by: RADIOLOGY

## 2019-07-08 PROCEDURE — 77080 DXA BONE DENSITY AXIAL: CPT | Mod: TC

## 2019-07-08 RX ORDER — DOCUSATE SODIUM 100 MG/1
100 CAPSULE, LIQUID FILLED ORAL 2 TIMES DAILY
Qty: 20 CAPSULE | Refills: 0 | Status: SHIPPED | OUTPATIENT
Start: 2019-07-08 | End: 2019-07-18

## 2019-07-08 NOTE — PROGRESS NOTES
Subjective:       Patient ID: Janna Sheffield is a 58 y.o. female.    Chief Complaint: Follow-up (6mth f/u)    HPI     HTN: No home B/P monitoring. No HTNive symptoms  Hypercholesterol: compliant with statin. No D&E.  Fibromyalgia:Gabapentin working  GERD: pantoprazole is helping but still nauseated.   LIPIDS:somewhat following D&E, tolerating and compliant with med(s).  Back pain - much improved  Recent passing of her father due to CVA (burial last week)- coping well. Has familial support     Review of Systems   Constitutional: Negative for activity change, appetite change, chills, diaphoresis, fatigue, fever and unexpected weight change.   HENT: Negative for congestion, ear pain, postnasal drip, rhinorrhea, sinus pressure, sinus pain, sneezing, sore throat, tinnitus, trouble swallowing and voice change.    Eyes: Negative for photophobia, pain and visual disturbance.   Respiratory: Negative for cough, chest tightness, shortness of breath and wheezing.    Cardiovascular: Negative for chest pain, palpitations and leg swelling.   Gastrointestinal: Negative for abdominal distention, abdominal pain, constipation, diarrhea, nausea and vomiting.   Genitourinary: Negative for decreased urine volume, difficulty urinating, dysuria, flank pain, frequency, hematuria and urgency.   Musculoskeletal: Negative for arthralgias, back pain, joint swelling, neck pain and neck stiffness.   Allergic/Immunologic: Negative for immunocompromised state.   Neurological: Negative for dizziness, tremors, seizures, syncope, facial asymmetry, speech difficulty, weakness, light-headedness, numbness and headaches.   Hematological: Negative for adenopathy. Does not bruise/bleed easily.   Psychiatric/Behavioral: Positive for dysphoric mood. Negative for agitation, behavioral problems, confusion, decreased concentration, hallucinations, self-injury, sleep disturbance and suicidal ideas. The patient is not nervous/anxious and is not hyperactive.         Objective:      Physical Exam   Constitutional: She is oriented to person, place, and time.   HENT:   Head: Normocephalic and atraumatic.   Right Ear: Tympanic membrane normal.   Left Ear: Tympanic membrane normal.   Eyes: Conjunctivae and EOM are normal.   Neck: Normal range of motion. Neck supple.   Cardiovascular: Normal rate, regular rhythm, normal heart sounds and intact distal pulses.   Pulmonary/Chest: Effort normal and breath sounds normal.   Abdominal: Soft. Bowel sounds are normal.   Musculoskeletal: Normal range of motion.   Neurological: She is alert and oriented to person, place, and time.   Skin: Skin is warm and dry.       Assessment:       1. Osteoarthritis of spine with radiculopathy, cervical region    2. Chronic restrictive lung disease    3. Essential hypertension    4. Pure hypercholesterolemia    5. Sarcoidosis    6. Immunocompromised    7. Class 1 obesity due to excess calories with serious comorbidity and body mass index (BMI) of 33.0 to 33.9 in adult    8. Vitamin D deficiency    9. Fibromyalgia    10. Generalized osteoarthritis of multiple sites    11. Other constipation    12. Bone disorder        Plan:   Verbal support given due to death of her father  Water intake (try sugar free flavored  packets)  Take miralax daily, stop if diarrhea develops, continue every other day and so on  Colace twice daily x 2 weeks   Weight loss/diet/exercise   Keep subspecialty care  DEXA  F/u 6 months w/ PCP

## 2019-07-08 NOTE — PATIENT INSTRUCTIONS
Water intake (try packets)  Take miralax daily, stop if diarrhea develops, continue every other day and so on  Colace twice daily x 2 weeks   Weight loss/diet/exercise

## 2019-07-08 NOTE — Clinical Note
The patient wanted me to inform you that her father passed away a week or so ago, he was a patient of yours. Not sure if you were notified yet.

## 2019-08-15 ENCOUNTER — OFFICE VISIT (OUTPATIENT)
Dept: ORTHOPEDICS | Facility: CLINIC | Age: 59
End: 2019-08-15
Payer: COMMERCIAL

## 2019-08-15 VITALS
SYSTOLIC BLOOD PRESSURE: 139 MMHG | HEART RATE: 72 BPM | HEIGHT: 65 IN | DIASTOLIC BLOOD PRESSURE: 88 MMHG | BODY MASS INDEX: 33.83 KG/M2 | WEIGHT: 203.06 LBS

## 2019-08-15 DIAGNOSIS — E78.00 PURE HYPERCHOLESTEROLEMIA: ICD-10-CM

## 2019-08-15 DIAGNOSIS — M17.11 PRIMARY OSTEOARTHRITIS OF RIGHT KNEE: ICD-10-CM

## 2019-08-15 DIAGNOSIS — M17.0 PRIMARY OSTEOARTHRITIS OF BOTH KNEES: Primary | ICD-10-CM

## 2019-08-15 DIAGNOSIS — Z96.652 H/O TOTAL KNEE REPLACEMENT, LEFT: ICD-10-CM

## 2019-08-15 DIAGNOSIS — I10 ESSENTIAL HYPERTENSION: ICD-10-CM

## 2019-08-15 PROCEDURE — 3008F PR BODY MASS INDEX (BMI) DOCUMENTED: ICD-10-PCS | Mod: CPTII,S$GLB,, | Performed by: FAMILY MEDICINE

## 2019-08-15 PROCEDURE — 3075F SYST BP GE 130 - 139MM HG: CPT | Mod: CPTII,S$GLB,, | Performed by: FAMILY MEDICINE

## 2019-08-15 PROCEDURE — 99999 PR PBB SHADOW E&M-EST. PATIENT-LVL III: ICD-10-PCS | Mod: PBBFAC,,, | Performed by: FAMILY MEDICINE

## 2019-08-15 PROCEDURE — 3079F DIAST BP 80-89 MM HG: CPT | Mod: CPTII,S$GLB,, | Performed by: FAMILY MEDICINE

## 2019-08-15 PROCEDURE — 99999 PR PBB SHADOW E&M-EST. PATIENT-LVL III: CPT | Mod: PBBFAC,,, | Performed by: FAMILY MEDICINE

## 2019-08-15 PROCEDURE — 3079F PR MOST RECENT DIASTOLIC BLOOD PRESSURE 80-89 MM HG: ICD-10-PCS | Mod: CPTII,S$GLB,, | Performed by: FAMILY MEDICINE

## 2019-08-15 PROCEDURE — 3075F PR MOST RECENT SYSTOLIC BLOOD PRESS GE 130-139MM HG: ICD-10-PCS | Mod: CPTII,S$GLB,, | Performed by: FAMILY MEDICINE

## 2019-08-15 PROCEDURE — 99214 PR OFFICE/OUTPT VISIT, EST, LEVL IV, 30-39 MIN: ICD-10-PCS | Mod: S$GLB,,, | Performed by: FAMILY MEDICINE

## 2019-08-15 PROCEDURE — 99214 OFFICE O/P EST MOD 30 MIN: CPT | Mod: S$GLB,,, | Performed by: FAMILY MEDICINE

## 2019-08-15 PROCEDURE — 3008F BODY MASS INDEX DOCD: CPT | Mod: CPTII,S$GLB,, | Performed by: FAMILY MEDICINE

## 2019-08-15 NOTE — PROGRESS NOTES
Subjective:       Patient ID: Janna Sheffield is a 58 y.o. female.    Chief Complaint: Sarcoidosis, osteoarthritis    Mrs. Sheffield is a 57y/o AAF with history of osteoarthritis and sarcoidosis. Sarcoidosis was diagnosed in 1985 through a biopsy of a spot on her liver. 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     Currently she is on Imuran 50mg every other day.. She has been doing well in regards to her Sarcoidosis.  She does have restrictive lung disease and SOB when she is more active but no worsening. She sees Dr. Casper, most recent visit everything was stable and he rec'd coming off imuran (visit in march)      She has low vitamin D and started on 50Kunits weekly improved to now normal level. No issues with joint swelling, rash, worsening sob, cp, eye issues. She has mao knee pain with a TKA on the left, saw ortho who rx'd topical     She has generalized osteoarthritis with radiating neck pain. MRI showed disc disease and degenerative arthritis. She was sent to pain mgt for ESIs which helped. She also is taking gabapentin at night 3-600mg occasionally.  Recent flare up of pain in her back improved with tramadol, would like a few more.            Review of Systems   Constitutional: Negative for chills, fatigue and fever.   HENT: Negative for mouth sores, rhinorrhea and sore throat.    Eyes: Negative for pain and redness.        Dry eyes   Respiratory: Positive for shortness of breath. Negative for cough.    Cardiovascular: Negative for chest pain.   Gastrointestinal: Negative for abdominal pain, constipation, diarrhea, nausea and vomiting.   Genitourinary: Negative for dysuria and hematuria.   Musculoskeletal: Positive for arthralgias, back pain and neck pain. Negative for joint swelling and myalgias.        Left knee replacement   Skin: Negative for rash.   Neurological: Negative for weakness, numbness and headaches.   Psychiatric/Behavioral:  "The patient is not nervous/anxious.            Objective:   Ht 5' 5" (1.651 m)   Wt 94.1 kg (207 lb 7.3 oz)   BMI 34.52 kg/m²      Physical Exam   Constitutional: She is oriented to person, place, and time and well-developed, well-nourished, and in no distress.   HENT:   Head: Normocephalic and atraumatic.   Eyes: Pupils are equal, round, and reactive to light. Right eye exhibits no discharge.   Neck: Normal range of motion.   Cardiovascular: Normal rate, regular rhythm and normal heart sounds.  Exam reveals no friction rub.    Pulmonary/Chest: Effort normal and breath sounds normal. No respiratory distress.   Abdominal: Soft. She exhibits no distension. There is no tenderness.   Lymphadenopathy:     She has no cervical adenopathy.   Neurological: She is alert and oriented to person, place, and time.   Skin: No rash noted. No erythema.     Psychiatric: Mood normal.   Musculoskeletal: Normal range of motion. She exhibits no edema or deformity.   mao wrists, mcps, pips no synovitis, no tenderness, full rom, grib strength equal bilaterally  Left knee s/p replacement, no effusion, no warmth  Right knee no effusion, no warmth, full rom         Recent Results (from the past 672 hour(s))   Comprehensive metabolic panel    Collection Time: 08/16/19  1:51 PM   Result Value Ref Range    CO2 28 23 - 29 mmol/L    Glucose 85 70 - 110 mg/dL    BUN, Bld 11 6 - 20 mg/dL    Creatinine 0.8 0.5 - 1.4 mg/dL    Calcium 9.7 8.7 - 10.5 mg/dL    Total Protein 6.9 6.0 - 8.4 g/dL    Albumin 3.7 3.5 - 5.2 g/dL    Total Bilirubin 0.5 0.1 - 1.0 mg/dL    Alkaline Phosphatase 89 55 - 135 U/L    AST 17 10 - 40 U/L    ALT 12 10 - 44 U/L    eGFR if African American >60 >60 mL/min/1.73 m^2    eGFR if non African American >60 >60 mL/min/1.73 m^2   CBC auto differential    Collection Time: 08/16/19  1:51 PM   Result Value Ref Range    WBC 4.32 3.90 - 12.70 K/uL    RBC 3.80 (L) 4.00 - 5.40 M/uL    Hemoglobin 11.9 (L) 12.0 - 16.0 g/dL    Hematocrit " 37.1 37.0 - 48.5 %    Mean Corpuscular Volume 98 82 - 98 fL    Mean Corpuscular Hemoglobin 31.3 (H) 27.0 - 31.0 pg    Mean Corpuscular Hemoglobin Conc 32.1 32.0 - 36.0 g/dL    RDW 13.1 11.5 - 14.5 %    Platelets 232 150 - 350 K/uL    MPV 10.1 9.2 - 12.9 fL    Immature Granulocytes 0.2 0.0 - 0.5 %    Gran # (ANC) 1.8 1.8 - 7.7 K/uL    Immature Grans (Abs) 0.01 0.00 - 0.04 K/uL    Lymph # 2.0 1.0 - 4.8 K/uL    Mono # 0.4 0.3 - 1.0 K/uL    Eos # 0.1 0.0 - 0.5 K/uL    Baso # 0.03 0.00 - 0.20 K/uL    nRBC 0 0 /100 WBC    Gran% 42.1 38.0 - 73.0 %    Lymph% 46.8 18.0 - 48.0 %    Mono% 8.3 4.0 - 15.0 %    Eosinophil% 2.1 0.0 - 8.0 %    Basophil% 0.7 0.0 - 1.9 %    Differential Method Automated    C-reactive protein    Collection Time: 08/16/19  1:51 PM   Result Value Ref Range    CRP 6.7 0.0 - 8.2 mg/L       CTA chest 8/24/17  Impression:       Negative for pulmonary embolus, aneurysm or dissection.  Lungs are clear.  No evidence of aortic dissection.  No evidence of aneurysm or retroperitoneal hematoma.  Small umbilical hernia containing fat.  Hernia involving the right anterolateral abdominal wall containing fat.    Cspine xr:   The vertebral bodies demonstrate normal height.  The alignment is within normal limits. There is moderate displaced narrowing and spondylosis present at the C4-C5 through the C6-C7 levels. The C1-C2 articulation is within normal limits. No prevertebral soft tissue swelling.    MRI C spine:   Findings: There is straightening of the cervical lordosis.  Degenerative vertebral endplate spurring and disc desiccation present at multiple levels with moderate disc space narrowing from C4-C5 through C6-C7.  Vertebral marrow signal pattern and architecture are normal.  Cervical cord and craniocervical junction are intact.  Paravertebral soft tissues are symmetric and normal in appearance.  C2-C3: Unremarkable.  C3-C4: Unremarkable.  C4-C5: Posterior disc osteophyte complex and bilateral uncovertebral  "hypertrophy resulting in moderate left greater than right foraminal narrowing.  Indentation of ventral thecal sac with associated anterior cord contact and mild central canal stenosis.  Minimum AP canal diameter is 9.0 mm.  C5-C6: Posterior disc osteophyte complex and asymmetric left greater than right uncovertebral hypertrophy producing marked left and moderate right foraminal narrowing.  Mild indentation of the ventral thecal sac without significant canal stenosis.  C6-C7: Posterior disc osteophyte complex and uncovertebral hypertrophy producing moderate to marked left greater than right foraminal narrowing.  Mild indentation of the ventral thecal sac without significant canal stenosis.  C7-T1: Unremarkable.     6MW Test  Height: 5' 5" (165.1 cm)  Weight: 90.7 kg (200 lb)  BMI (Calculated): 33.4  Predicted Distance: 331.44  Interpretation  Predicted Distance Meters (Calculated): 472.37 meters  SpO2 bjorn was 98%  Distance was 350.52 m  148.5% predicted     PFT:  FEv1, FVC improved  FEV1: 1.57l( 69.4%), FVC 1.97( 69.0%)  FEV1/FVC 80  TLC: 3.59l( 70.4%)  DLCo: 11.29 ( 47.8%)    Assessment:       1. Sarcoidosis    2. Chronic restrictive lung disease    3. Vitamin D deficiency    4. Medication monitoring encounter    5. Immunocompromised    6. Cervical radiculopathy        Impression:  Sarcoidosis:  Imuran 50mg every other day, no evidence of activity     Chronic restrictive lung disease: stable recently seen by pulm rec coming off imuran, occasional SOB;  sees dr. Casper, last CT of chest 7/2016 stable, PFTs with moderately reduced diffusion--mild restriction, stable    High risk Medication Monitoring: no issues with toxicity    Immunocompromised: utd except zoster and flu     Cervical spondylosis with radiation into the left arm: saw Dr. Isabel now, s/p HIMANSHU w good result, gabapentin makes her drowsy    Vit d def: started 50K units weekly, level in normal range now 43    Recent muscle spasm/back pain: given tramadol " by pcp but needs a few more due to continued back pain       Plan:       Ayo imuran and see how she does, if any worsening lung issues or other symptoms suggesting sarcoid we can resume  Cont weekly vitamin D, last level wnl,  Will give 7 days of tramadol for her back spasms, she knows she wont get this long term     Cont f/u with Dr. Isabel for cervical spondylosis and HIMANSHU's as needed  F/u with dr. silva yearly       rtc in 4mos with reg labs, ace and vit D early

## 2019-08-15 NOTE — PROGRESS NOTES
Subjective:     Patient ID: Janna Sheffield is a 58 y.o. female.    Chief Complaint: Pain of the Left Knee and Pain of the Right Knee    Patient is a 58-year-old female presents clinic today for follow-up of bilateral knee pain. Patient states that the topical compounding cream has helped significantly.  States that she will use it once or twice a day.  Denies any new injuries, falls, fever, chills, or redness.  Patient states that overall she is happy with her improvement.  States she still has some pain and discomfort, but not nearly as severe as it was.  Patient states that she would like to continue the cream and Tylenol as needed.    Previous Note:  Patient states that she a history of left total knee replacement by Dr. Valerio in 2004.  Patient states that her knees have been hurting worse over the past 1 month.  States she has not had any injuries or falls.  States that the left knee hurts throughout the day, and especially when it is bent or at night.  Patient describes pain is a burning sensation with a throb.  Patient states that the right knee has similar pain, but is more stiff and painful in the anterior and posterior portions of her knee.  Patient states that she has not ever tried a topical compounding cream.  States that she has been taking Tylenol which helped some, but pain is still present.  States that she has given at home, but has not tried it for these knee pain just yet.  Denies any redness, swelling, fever, or chills.    Pain   Pertinent negatives include no chills, fever, myalgias, nausea, rash, vomiting or weakness.       Past Medical History:   Diagnosis Date    Arthritis     Chest pain syndrome 10/23/2014    Hyperlipidemia     Hypertension     Macular degeneration, age related, nonexudative - Left Eye 3/24/2014    Retinal detachment, old, partial 4/14/2014    Sarcoidosis      Past Surgical History:   Procedure Laterality Date    CATARACT EXTRACTION      DILATION AND CURETTAGE OF  UTERUS      missed ab x 3    ESOPHAGOGASTRODUODENOSCOPY (EGD) N/A 2/6/2019    Performed by Jairo Vargas III, MD at [facility]    HYSTERECTOMY  2001    MACARIO - complete    JOINT REPLACEMENT Left     Knee    knee surgery      OOPHORECTOMY      TUBAL LIGATION       Family History   Problem Relation Age of Onset    Cervical cancer Mother     Heart disease Mother     Heart attack Mother     Hypertension Mother     Hyperlipidemia Mother     Hypertension Father     Hyperlipidemia Father     Stroke Father     Stroke Brother     Breast cancer Maternal Aunt     Breast cancer Cousin         mat and pat cousin w/ br ca    Breast cancer Maternal Cousin     Colon cancer Neg Hx     Ovarian cancer Neg Hx     Thrombophilia Neg Hx     Deep vein thrombosis Neg Hx     Pulmonary embolism Neg Hx      Social History     Socioeconomic History    Marital status:      Spouse name: Not on file    Number of children: Not on file    Years of education: Not on file    Highest education level: Not on file   Occupational History     Employer: [employer]    Social Needs    Financial resource strain: Not on file    Food insecurity:     Worry: Not on file     Inability: Not on file    Transportation needs:     Medical: Not on file     Non-medical: Not on file   Tobacco Use    Smoking status: Never Smoker    Smokeless tobacco: Never Used   Substance and Sexual Activity    Alcohol use: No    Drug use: No    Sexual activity: Yes     Partners: Male     Comment:    Lifestyle    Physical activity:     Days per week: Not on file     Minutes per session: Not on file    Stress: Not on file   Relationships    Social connections:     Talks on phone: Not on file     Gets together: Not on file     Attends [employer] service: Not on file     Active member of club or organization: Not on file     Attends meetings of clubs or organizations: Not on file     Relationship status: Not on file   Other Topics  "Concern    Not on file   Social History Narrative    Not on file     Medication List with Changes/Refills   Current Medications    AZATHIOPRINE (IMURAN) 50 MG TAB    TAKE ONE TABLET BY MOUTH EVERY OTHER DAY    GABAPENTIN (NEURONTIN) 300 MG CAPSULE    Take 2 capsules (600 mg total) by mouth every evening.    HYDROCHLOROTHIAZIDE (HYDRODIURIL) 25 MG TABLET    Take 1 tablet (25 mg total) by mouth once daily.    MULTIVITAMIN (THERAGRAN) PER TABLET    Take 1 tablet by mouth Daily.    PANTOPRAZOLE (PROTONIX) 40 MG TABLET    Take 1 tablet (40 mg total) by mouth once daily.    ROSUVASTATIN (CRESTOR) 40 MG TAB    TAKE ONE TABLET BY MOUTH ONCE DAILY (  REPLACES  LIPITOR)    TRAMADOL (ULTRAM) 50 MG TABLET    Take 1 tablet (50 mg total) by mouth every 24 hours as needed for Pain (breakthrough pain).    VITAMIN D2 50,000 UNIT CAPSULE    TAKE ONE CAPSULE BY MOUTH ONCE A WEEK     Review of patient's allergies indicates:  No Known Allergies  Review of Systems   Constitutional: Negative for chills, fever and malaise/fatigue.   HENT: Negative for hearing loss.    Eyes: Negative for redness.   Cardiovascular: Negative for leg swelling.   Gastrointestinal: Negative for nausea and vomiting.   Musculoskeletal: Positive for joint pain. Negative for back pain, falls and myalgias.   Skin: Negative for rash.   Neurological: Negative for tingling, sensory change, focal weakness and weakness.        Objective:   Body mass index is 33.79 kg/m².  Vitals:    08/15/19 1613   BP: 139/88   Pulse: 72   Weight: 92.1 kg (203 lb 0.7 oz)   Height: 5' 5" (1.651 m)   PainSc:   7   PainLoc: Knee           General    Nursing note and vitals reviewed.  Constitutional: She is oriented to person, place, and time. She appears well-developed and well-nourished. No distress.   HENT:   Head: Normocephalic and atraumatic.   Eyes: Conjunctivae are normal. No scleral icterus.   Pulmonary/Chest: Effort normal.   Neurological: She is alert and oriented to person, " place, and time.   Psychiatric: She has a normal mood and affect. Her behavior is normal. Judgment and thought content normal.     General Musculoskeletal Exam   Gait: antalgic       Right Knee Exam     Inspection   Erythema: absent  Effusion: absent  Deformity: absent    Tenderness   The patient is tender to palpation of the medial joint line and lateral joint line.    Crepitus   The patient has crepitus of the patella and medial joint line.    Range of Motion   The patient has normal right knee ROM.    Other   Sensation: normal    Left Knee Exam     Inspection   Erythema: absent  Scars: present (Postsurgical scars present well healed)  Effusion: absent  Deformity: absent    Tenderness   The patient is experiencing no tenderness.     Crepitus   The patient has crepitus of the patella.    Range of Motion   The patient has normal left knee ROM.    Other   Sensation: normal    Muscle Strength   Right Lower Extremity   Quadriceps:  5/5   Left Lower Extremity   Quadriceps:  5/5       EXAMINATION:  XR KNEE ORTHO BILAT WITH FLEXION    CLINICAL HISTORY:  Pain, unspecified    TECHNIQUE:  AP standing of both knees, PA flexion standing views of both knees, and Merchant views of both knees were performed.  Lateral views of both knees were also performed.    COMPARISON  04/27/2016    FINDINGS:  There is equivocal mild joint space narrowing involving the medial compartment of the right knee.  No significant degenerative change at the right patellofemoral compartment.  A left total knee arthroplasty is in place.  No hardware failure or loosening.  No periprosthetic fracture.  No joint effusions are suggested.  No acute fracture or dislocation.      Impression       1.  As above      Electronically signed by: Reese Montes DO  Date: 05/07/2019           Janna was seen today for pain and pain.    Diagnoses and all orders for this visit:    Primary osteoarthritis of both knees    H/O total knee replacement, left    Primary  osteoarthritis of right knee    Essential hypertension    Pure hypercholesterolemia    -discussed the clinical course and nature of osteoarthritis with patient.  Patient is doing well with conservative management.  Follow-up as needed.  At this time patient would like to conservative approach with Tylenol, gabapentin, and home physical therapy.   -bilateral knee Xray images were independently viewed and read by me showing left total knee replacement hardware the does not appear to have shifted or moved.  Right knee has moderate medial joint space narrowing.  No acute fractures or dislocations.  -Formal read by radiologist is as described above  -Discussed findings with patient    -Chronic hypertension.  Managed by patient's PCP.  -Chronic hyperlipidemia.  Managed by patient's PCP.    -Treatment options and alternatives were discussed with the patient. Patient expressed understanding. Patient was given the opportunity to ask questions and be an active participant in their medical care. Patient had no further questions or concerns at this time.   -Patient is an overall moderate risk for health complications from their medical conditions.

## 2019-08-16 ENCOUNTER — LAB VISIT (OUTPATIENT)
Dept: LAB | Facility: HOSPITAL | Age: 59
End: 2019-08-16
Attending: PHYSICIAN ASSISTANT
Payer: COMMERCIAL

## 2019-08-16 ENCOUNTER — OFFICE VISIT (OUTPATIENT)
Dept: RHEUMATOLOGY | Facility: CLINIC | Age: 59
End: 2019-08-16
Payer: COMMERCIAL

## 2019-08-16 VITALS — WEIGHT: 207.44 LBS | BODY MASS INDEX: 34.56 KG/M2 | HEIGHT: 65 IN

## 2019-08-16 DIAGNOSIS — D86.9 SARCOIDOSIS: Primary | ICD-10-CM

## 2019-08-16 DIAGNOSIS — M54.12 CERVICAL RADICULOPATHY: ICD-10-CM

## 2019-08-16 DIAGNOSIS — Z51.81 MEDICATION MONITORING ENCOUNTER: ICD-10-CM

## 2019-08-16 DIAGNOSIS — D84.9 IMMUNOCOMPROMISED: ICD-10-CM

## 2019-08-16 DIAGNOSIS — D86.9 SARCOIDOSIS: ICD-10-CM

## 2019-08-16 DIAGNOSIS — E55.9 VITAMIN D DEFICIENCY: ICD-10-CM

## 2019-08-16 DIAGNOSIS — J98.4 CHRONIC RESTRICTIVE LUNG DISEASE: ICD-10-CM

## 2019-08-16 LAB
ALBUMIN SERPL BCP-MCNC: 3.7 G/DL (ref 3.5–5.2)
ALP SERPL-CCNC: 89 U/L (ref 55–135)
ALT SERPL W/O P-5'-P-CCNC: 12 U/L (ref 10–44)
ANION GAP SERPL CALC-SCNC: 9 MMOL/L (ref 8–16)
AST SERPL-CCNC: 17 U/L (ref 10–40)
BASOPHILS # BLD AUTO: 0.03 K/UL (ref 0–0.2)
BASOPHILS NFR BLD: 0.7 % (ref 0–1.9)
BILIRUB SERPL-MCNC: 0.5 MG/DL (ref 0.1–1)
BUN SERPL-MCNC: 11 MG/DL (ref 6–20)
CALCIUM SERPL-MCNC: 9.7 MG/DL (ref 8.7–10.5)
CHLORIDE SERPL-SCNC: 105 MMOL/L (ref 95–110)
CO2 SERPL-SCNC: 28 MMOL/L (ref 23–29)
CREAT SERPL-MCNC: 0.8 MG/DL (ref 0.5–1.4)
CRP SERPL-MCNC: 6.7 MG/L (ref 0–8.2)
DIFFERENTIAL METHOD: ABNORMAL
EOSINOPHIL # BLD AUTO: 0.1 K/UL (ref 0–0.5)
EOSINOPHIL NFR BLD: 2.1 % (ref 0–8)
ERYTHROCYTE [DISTWIDTH] IN BLOOD BY AUTOMATED COUNT: 13.1 % (ref 11.5–14.5)
ERYTHROCYTE [SEDIMENTATION RATE] IN BLOOD BY WESTERGREN METHOD: 41 MM/HR (ref 0–36)
EST. GFR  (AFRICAN AMERICAN): >60 ML/MIN/1.73 M^2
EST. GFR  (NON AFRICAN AMERICAN): >60 ML/MIN/1.73 M^2
GLUCOSE SERPL-MCNC: 85 MG/DL (ref 70–110)
HCT VFR BLD AUTO: 37.1 % (ref 37–48.5)
HGB BLD-MCNC: 11.9 G/DL (ref 12–16)
IMM GRANULOCYTES # BLD AUTO: 0.01 K/UL (ref 0–0.04)
IMM GRANULOCYTES NFR BLD AUTO: 0.2 % (ref 0–0.5)
LYMPHOCYTES # BLD AUTO: 2 K/UL (ref 1–4.8)
LYMPHOCYTES NFR BLD: 46.8 % (ref 18–48)
MCH RBC QN AUTO: 31.3 PG (ref 27–31)
MCHC RBC AUTO-ENTMCNC: 32.1 G/DL (ref 32–36)
MCV RBC AUTO: 98 FL (ref 82–98)
MONOCYTES # BLD AUTO: 0.4 K/UL (ref 0.3–1)
MONOCYTES NFR BLD: 8.3 % (ref 4–15)
NEUTROPHILS # BLD AUTO: 1.8 K/UL (ref 1.8–7.7)
NEUTROPHILS NFR BLD: 42.1 % (ref 38–73)
NRBC BLD-RTO: 0 /100 WBC
PLATELET # BLD AUTO: 232 K/UL (ref 150–350)
PMV BLD AUTO: 10.1 FL (ref 9.2–12.9)
POTASSIUM SERPL-SCNC: 3.8 MMOL/L (ref 3.5–5.1)
PROT SERPL-MCNC: 6.9 G/DL (ref 6–8.4)
RBC # BLD AUTO: 3.8 M/UL (ref 4–5.4)
SODIUM SERPL-SCNC: 142 MMOL/L (ref 136–145)
WBC # BLD AUTO: 4.32 K/UL (ref 3.9–12.7)

## 2019-08-16 PROCEDURE — 99999 PR PBB SHADOW E&M-EST. PATIENT-LVL III: ICD-10-PCS | Mod: PBBFAC,,, | Performed by: PHYSICIAN ASSISTANT

## 2019-08-16 PROCEDURE — 86140 C-REACTIVE PROTEIN: CPT

## 2019-08-16 PROCEDURE — 36415 COLL VENOUS BLD VENIPUNCTURE: CPT

## 2019-08-16 PROCEDURE — 82164 ANGIOTENSIN I ENZYME TEST: CPT

## 2019-08-16 PROCEDURE — 85652 RBC SED RATE AUTOMATED: CPT

## 2019-08-16 PROCEDURE — 99214 OFFICE O/P EST MOD 30 MIN: CPT | Mod: S$GLB,,, | Performed by: PHYSICIAN ASSISTANT

## 2019-08-16 PROCEDURE — 85025 COMPLETE CBC W/AUTO DIFF WBC: CPT

## 2019-08-16 PROCEDURE — 80053 COMPREHEN METABOLIC PANEL: CPT

## 2019-08-16 PROCEDURE — 99214 PR OFFICE/OUTPT VISIT, EST, LEVL IV, 30-39 MIN: ICD-10-PCS | Mod: S$GLB,,, | Performed by: PHYSICIAN ASSISTANT

## 2019-08-16 PROCEDURE — 3008F PR BODY MASS INDEX (BMI) DOCUMENTED: ICD-10-PCS | Mod: CPTII,S$GLB,, | Performed by: PHYSICIAN ASSISTANT

## 2019-08-16 PROCEDURE — 99999 PR PBB SHADOW E&M-EST. PATIENT-LVL III: CPT | Mod: PBBFAC,,, | Performed by: PHYSICIAN ASSISTANT

## 2019-08-16 PROCEDURE — 3008F BODY MASS INDEX DOCD: CPT | Mod: CPTII,S$GLB,, | Performed by: PHYSICIAN ASSISTANT

## 2019-08-16 RX ORDER — TRAMADOL HYDROCHLORIDE 50 MG/1
50 TABLET ORAL EVERY 4 HOURS PRN
Qty: 42 TABLET | Refills: 0 | Status: SHIPPED | OUTPATIENT
Start: 2019-08-16 | End: 2022-01-18 | Stop reason: SDUPTHER

## 2019-08-16 NOTE — PATIENT INSTRUCTIONS
Stop imuran     Cont weekly vit D and check level next time     Early labs next visit     Flu shot this fall high dose

## 2019-08-19 LAB — ACE SERPL-CCNC: 16 U/L (ref 16–85)

## 2019-12-18 NOTE — PROGRESS NOTES
Subjective:       Patient ID: Janna Sheffield is a 59 y.o. female.    Chief Complaint: Sarcoidosis, osteoarthritis    Mrs. Sheffield is a 60y/o AAF with history of osteoarthritis and sarcoidosis. Sarcoidosis was diagnosed in 1985 through a biopsy of a spot on her liver. 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     She was on imuran 50mg every other day but her sarcoid seemed to be in remission.  Lungs stable. She does have restrictive lung disease and SOB when she is more active but no worsening. She sees Dr. Casper, most recent visit everything was stable and he rec'd coming off imuran (visit in march '19).  So we did.  dc'd imuran in august with no new or worsening symtpoms.     She has low vitamin D and started on 50Kunits weekly improved to now normal level. No issues with joint swelling, rash, worsening sob, cp, eye issues. She has mao knee pain with a TKA on the left, saw ortho who rx'd topical     She has generalized osteoarthritis with radiating neck pain. MRI showed disc disease and degenerative arthritis. She was sent to pain mgt for ESIs which helped. She also is taking gabapentin at night 3-600mg occasionally.  Tramadol occasionally helps        Review of Systems   Constitutional: Negative for chills, fatigue and fever.   HENT: Negative for mouth sores, rhinorrhea and sore throat.    Eyes: Negative for pain and redness.        Dry eyes   Respiratory: Positive for shortness of breath. Negative for cough.    Cardiovascular: Negative for chest pain.   Gastrointestinal: Negative for abdominal pain, constipation, diarrhea, nausea and vomiting.   Genitourinary: Negative for dysuria and hematuria.   Musculoskeletal: Positive for arthralgias, back pain and neck pain. Negative for joint swelling and myalgias.        Left knee replacement   Skin: Negative for rash.   Neurological: Negative for weakness, numbness and headaches.  "  Psychiatric/Behavioral: The patient is not nervous/anxious.            Objective:   BP (!) 142/82   Pulse 66   Ht 5' 5" (1.651 m)   Wt 94.2 kg (207 lb 10.8 oz)   BMI 34.56 kg/m²      Physical Exam   Constitutional: She is oriented to person, place, and time and well-developed, well-nourished, and in no distress.   HENT:   Head: Normocephalic and atraumatic.   Eyes: Pupils are equal, round, and reactive to light. Right eye exhibits no discharge.   Neck: Normal range of motion.   Cardiovascular: Normal rate, regular rhythm and normal heart sounds.  Exam reveals no friction rub.    Pulmonary/Chest: Effort normal and breath sounds normal. No respiratory distress.   Abdominal: Soft. She exhibits no distension. There is no tenderness.   Lymphadenopathy:     She has no cervical adenopathy.   Neurological: She is alert and oriented to person, place, and time.   Skin: No rash noted. No erythema.     Psychiatric: Mood normal.   Musculoskeletal: Normal range of motion. She exhibits no edema or deformity.   mao wrists, mcps, pips no synovitis, no tenderness, full rom, grib strength equal bilaterally  Left knee s/p replacement, no effusion, no warmth  Right knee no effusion, no warmth, full rom         No results found for this or any previous visit (from the past 672 hour(s)).    CTA chest 8/24/17  Impression:       Negative for pulmonary embolus, aneurysm or dissection.  Lungs are clear.  No evidence of aortic dissection.  No evidence of aneurysm or retroperitoneal hematoma.  Small umbilical hernia containing fat.  Hernia involving the right anterolateral abdominal wall containing fat.    Cspine xr:   The vertebral bodies demonstrate normal height.  The alignment is within normal limits. There is moderate displaced narrowing and spondylosis present at the C4-C5 through the C6-C7 levels. The C1-C2 articulation is within normal limits. No prevertebral soft tissue swelling.    MRI C spine:   Findings: There is straightening " "of the cervical lordosis.  Degenerative vertebral endplate spurring and disc desiccation present at multiple levels with moderate disc space narrowing from C4-C5 through C6-C7.  Vertebral marrow signal pattern and architecture are normal.  Cervical cord and craniocervical junction are intact.  Paravertebral soft tissues are symmetric and normal in appearance.  C2-C3: Unremarkable.  C3-C4: Unremarkable.  C4-C5: Posterior disc osteophyte complex and bilateral uncovertebral hypertrophy resulting in moderate left greater than right foraminal narrowing.  Indentation of ventral thecal sac with associated anterior cord contact and mild central canal stenosis.  Minimum AP canal diameter is 9.0 mm.  C5-C6: Posterior disc osteophyte complex and asymmetric left greater than right uncovertebral hypertrophy producing marked left and moderate right foraminal narrowing.  Mild indentation of the ventral thecal sac without significant canal stenosis.  C6-C7: Posterior disc osteophyte complex and uncovertebral hypertrophy producing moderate to marked left greater than right foraminal narrowing.  Mild indentation of the ventral thecal sac without significant canal stenosis.  C7-T1: Unremarkable.     6MW Test  Height: 5' 5" (165.1 cm)  Weight: 90.7 kg (200 lb)  BMI (Calculated): 33.4  Predicted Distance: 331.44  Interpretation  Predicted Distance Meters (Calculated): 472.37 meters  SpO2 bjorn was 98%  Distance was 350.52 m  148.5% predicted     PFT:  FEv1, FVC improved  FEV1: 1.57l( 69.4%), FVC 1.97( 69.0%)  FEV1/FVC 80  TLC: 3.59l( 70.4%)  DLCo: 11.29 ( 47.8%)    Assessment:       1. Vitamin D deficiency    2. Sarcoidosis    3. Chronic restrictive lung disease    4. Medication monitoring encounter    5. Immunocompromised    6. Cervical radiculopathy        Impression:  Sarcoidosis: stopped Imuran 50mg every other day (8/2019-after pulm rec'd) no evidence of activity, remains stable in remission     Chronic restrictive lung disease: " stable recently occasional SOB;  sees dr. Casper, last CT of chest 7/2016 stable, PFTs with moderately reduced diffusion--mild restriction, stable    High risk Medication Monitoring: now off imuran     Immunocompromised: utd except zoster and flu     Cervical spondylosis with radiation into the left arm: saw Dr. Isabel now, s/p HIMANSHU w good result, gabapentin makes her drowsy    Vit d def: started 50K units weekly, level in normal range now 43    Plan:       Stay off imuran no need to add back at this time  Cont weekly vitamin D, last level wnl,  Cont f/u with Dr. Isabel for cervical spondylosis and HIMANSHU's as needed  F/u with dr. casper yearly (sees in march 2020 with xray and pfts)  Get labs today reg 4, vit d, ace level and vit D  Flu shot today       rtc in 6mos with reg labs, ace

## 2019-12-19 ENCOUNTER — OFFICE VISIT (OUTPATIENT)
Dept: RHEUMATOLOGY | Facility: CLINIC | Age: 59
End: 2019-12-19
Payer: COMMERCIAL

## 2019-12-19 ENCOUNTER — LAB VISIT (OUTPATIENT)
Dept: LAB | Facility: HOSPITAL | Age: 59
End: 2019-12-19
Attending: PHYSICIAN ASSISTANT
Payer: COMMERCIAL

## 2019-12-19 VITALS
BODY MASS INDEX: 34.6 KG/M2 | HEIGHT: 65 IN | HEART RATE: 66 BPM | WEIGHT: 207.69 LBS | DIASTOLIC BLOOD PRESSURE: 82 MMHG | SYSTOLIC BLOOD PRESSURE: 142 MMHG

## 2019-12-19 DIAGNOSIS — M54.12 CERVICAL RADICULOPATHY: ICD-10-CM

## 2019-12-19 DIAGNOSIS — Z51.81 MEDICATION MONITORING ENCOUNTER: ICD-10-CM

## 2019-12-19 DIAGNOSIS — E55.9 VITAMIN D DEFICIENCY: ICD-10-CM

## 2019-12-19 DIAGNOSIS — D86.9 SARCOIDOSIS: ICD-10-CM

## 2019-12-19 DIAGNOSIS — D84.9 IMMUNOCOMPROMISED: ICD-10-CM

## 2019-12-19 DIAGNOSIS — E55.9 VITAMIN D DEFICIENCY: Primary | ICD-10-CM

## 2019-12-19 DIAGNOSIS — J98.4 CHRONIC RESTRICTIVE LUNG DISEASE: ICD-10-CM

## 2019-12-19 LAB
ALBUMIN SERPL BCP-MCNC: 3.8 G/DL (ref 3.5–5.2)
ALP SERPL-CCNC: 83 U/L (ref 55–135)
ALT SERPL W/O P-5'-P-CCNC: 22 U/L (ref 10–44)
ANION GAP SERPL CALC-SCNC: 7 MMOL/L (ref 8–16)
AST SERPL-CCNC: 21 U/L (ref 10–40)
BASOPHILS # BLD AUTO: 0.03 K/UL (ref 0–0.2)
BASOPHILS NFR BLD: 0.6 % (ref 0–1.9)
BILIRUB SERPL-MCNC: 0.4 MG/DL (ref 0.1–1)
BUN SERPL-MCNC: 9 MG/DL (ref 6–20)
CALCIUM SERPL-MCNC: 9.2 MG/DL (ref 8.7–10.5)
CHLORIDE SERPL-SCNC: 105 MMOL/L (ref 95–110)
CO2 SERPL-SCNC: 29 MMOL/L (ref 23–29)
CREAT SERPL-MCNC: 0.8 MG/DL (ref 0.5–1.4)
CRP SERPL-MCNC: 4.1 MG/L (ref 0–8.2)
DIFFERENTIAL METHOD: ABNORMAL
EOSINOPHIL # BLD AUTO: 0.1 K/UL (ref 0–0.5)
EOSINOPHIL NFR BLD: 2.1 % (ref 0–8)
ERYTHROCYTE [DISTWIDTH] IN BLOOD BY AUTOMATED COUNT: 13.2 % (ref 11.5–14.5)
ERYTHROCYTE [SEDIMENTATION RATE] IN BLOOD BY WESTERGREN METHOD: 15 MM/HR (ref 0–36)
EST. GFR  (AFRICAN AMERICAN): >60 ML/MIN/1.73 M^2
EST. GFR  (NON AFRICAN AMERICAN): >60 ML/MIN/1.73 M^2
GLUCOSE SERPL-MCNC: 83 MG/DL (ref 70–110)
HCT VFR BLD AUTO: 38.2 % (ref 37–48.5)
HGB BLD-MCNC: 12.3 G/DL (ref 12–16)
IMM GRANULOCYTES # BLD AUTO: 0.01 K/UL (ref 0–0.04)
IMM GRANULOCYTES NFR BLD AUTO: 0.2 % (ref 0–0.5)
LYMPHOCYTES # BLD AUTO: 2.4 K/UL (ref 1–4.8)
LYMPHOCYTES NFR BLD: 45.4 % (ref 18–48)
MCH RBC QN AUTO: 31.4 PG (ref 27–31)
MCHC RBC AUTO-ENTMCNC: 32.2 G/DL (ref 32–36)
MCV RBC AUTO: 97 FL (ref 82–98)
MONOCYTES # BLD AUTO: 0.4 K/UL (ref 0.3–1)
MONOCYTES NFR BLD: 7.3 % (ref 4–15)
NEUTROPHILS # BLD AUTO: 2.3 K/UL (ref 1.8–7.7)
NEUTROPHILS NFR BLD: 44.6 % (ref 38–73)
NRBC BLD-RTO: 0 /100 WBC
PLATELET # BLD AUTO: 248 K/UL (ref 150–350)
PMV BLD AUTO: 10.4 FL (ref 9.2–12.9)
POTASSIUM SERPL-SCNC: 3.8 MMOL/L (ref 3.5–5.1)
PROT SERPL-MCNC: 7 G/DL (ref 6–8.4)
RBC # BLD AUTO: 3.92 M/UL (ref 4–5.4)
SODIUM SERPL-SCNC: 141 MMOL/L (ref 136–145)
WBC # BLD AUTO: 5.2 K/UL (ref 3.9–12.7)

## 2019-12-19 PROCEDURE — 90471 FLU VACCINE (QUAD) GREATER THAN OR EQUAL TO 3YO PRESERVATIVE FREE IM: ICD-10-PCS | Mod: S$GLB,,, | Performed by: PHYSICIAN ASSISTANT

## 2019-12-19 PROCEDURE — 86140 C-REACTIVE PROTEIN: CPT

## 2019-12-19 PROCEDURE — 90686 IIV4 VACC NO PRSV 0.5 ML IM: CPT | Mod: S$GLB,,, | Performed by: PHYSICIAN ASSISTANT

## 2019-12-19 PROCEDURE — 99999 PR PBB SHADOW E&M-EST. PATIENT-LVL III: ICD-10-PCS | Mod: PBBFAC,,, | Performed by: PHYSICIAN ASSISTANT

## 2019-12-19 PROCEDURE — 3008F PR BODY MASS INDEX (BMI) DOCUMENTED: ICD-10-PCS | Mod: CPTII,S$GLB,, | Performed by: PHYSICIAN ASSISTANT

## 2019-12-19 PROCEDURE — 99214 OFFICE O/P EST MOD 30 MIN: CPT | Mod: 25,S$GLB,, | Performed by: PHYSICIAN ASSISTANT

## 2019-12-19 PROCEDURE — 36415 COLL VENOUS BLD VENIPUNCTURE: CPT

## 2019-12-19 PROCEDURE — 80053 COMPREHEN METABOLIC PANEL: CPT

## 2019-12-19 PROCEDURE — 3077F SYST BP >= 140 MM HG: CPT | Mod: CPTII,S$GLB,, | Performed by: PHYSICIAN ASSISTANT

## 2019-12-19 PROCEDURE — 3077F PR MOST RECENT SYSTOLIC BLOOD PRESSURE >= 140 MM HG: ICD-10-PCS | Mod: CPTII,S$GLB,, | Performed by: PHYSICIAN ASSISTANT

## 2019-12-19 PROCEDURE — 99999 PR PBB SHADOW E&M-EST. PATIENT-LVL III: CPT | Mod: PBBFAC,,, | Performed by: PHYSICIAN ASSISTANT

## 2019-12-19 PROCEDURE — 90471 IMMUNIZATION ADMIN: CPT | Mod: S$GLB,,, | Performed by: PHYSICIAN ASSISTANT

## 2019-12-19 PROCEDURE — 82164 ANGIOTENSIN I ENZYME TEST: CPT

## 2019-12-19 PROCEDURE — 3008F BODY MASS INDEX DOCD: CPT | Mod: CPTII,S$GLB,, | Performed by: PHYSICIAN ASSISTANT

## 2019-12-19 PROCEDURE — 85652 RBC SED RATE AUTOMATED: CPT

## 2019-12-19 PROCEDURE — 85025 COMPLETE CBC W/AUTO DIFF WBC: CPT

## 2019-12-19 PROCEDURE — 82306 VITAMIN D 25 HYDROXY: CPT

## 2019-12-19 PROCEDURE — 90686 FLU VACCINE (QUAD) GREATER THAN OR EQUAL TO 3YO PRESERVATIVE FREE IM: ICD-10-PCS | Mod: S$GLB,,, | Performed by: PHYSICIAN ASSISTANT

## 2019-12-19 PROCEDURE — 3079F DIAST BP 80-89 MM HG: CPT | Mod: CPTII,S$GLB,, | Performed by: PHYSICIAN ASSISTANT

## 2019-12-19 PROCEDURE — 99214 PR OFFICE/OUTPT VISIT, EST, LEVL IV, 30-39 MIN: ICD-10-PCS | Mod: 25,S$GLB,, | Performed by: PHYSICIAN ASSISTANT

## 2019-12-19 PROCEDURE — 3079F PR MOST RECENT DIASTOLIC BLOOD PRESSURE 80-89 MM HG: ICD-10-PCS | Mod: CPTII,S$GLB,, | Performed by: PHYSICIAN ASSISTANT

## 2019-12-20 LAB — 25(OH)D3+25(OH)D2 SERPL-MCNC: 36 NG/ML (ref 30–96)

## 2019-12-21 LAB — ACE SERPL-CCNC: 15 U/L (ref 16–85)

## 2020-01-08 ENCOUNTER — LAB VISIT (OUTPATIENT)
Dept: LAB | Facility: HOSPITAL | Age: 60
End: 2020-01-08
Payer: COMMERCIAL

## 2020-01-08 DIAGNOSIS — E78.00 PURE HYPERCHOLESTEROLEMIA: ICD-10-CM

## 2020-01-08 DIAGNOSIS — E55.9 VITAMIN D DEFICIENCY: ICD-10-CM

## 2020-01-08 DIAGNOSIS — I10 ESSENTIAL HYPERTENSION: ICD-10-CM

## 2020-01-08 LAB
25(OH)D3+25(OH)D2 SERPL-MCNC: 39 NG/ML (ref 30–96)
ALBUMIN SERPL BCP-MCNC: 3.8 G/DL (ref 3.5–5.2)
ALP SERPL-CCNC: 92 U/L (ref 55–135)
ALT SERPL W/O P-5'-P-CCNC: 25 U/L (ref 10–44)
ANION GAP SERPL CALC-SCNC: 11 MMOL/L (ref 8–16)
AST SERPL-CCNC: 28 U/L (ref 10–40)
BILIRUB SERPL-MCNC: 0.4 MG/DL (ref 0.1–1)
BUN SERPL-MCNC: 11 MG/DL (ref 6–20)
CALCIUM SERPL-MCNC: 9.5 MG/DL (ref 8.7–10.5)
CHLORIDE SERPL-SCNC: 102 MMOL/L (ref 95–110)
CHOLEST SERPL-MCNC: 183 MG/DL (ref 120–199)
CHOLEST/HDLC SERPL: 3.9 {RATIO} (ref 2–5)
CO2 SERPL-SCNC: 30 MMOL/L (ref 23–29)
CREAT SERPL-MCNC: 0.9 MG/DL (ref 0.5–1.4)
EST. GFR  (AFRICAN AMERICAN): >60 ML/MIN/1.73 M^2
EST. GFR  (NON AFRICAN AMERICAN): >60 ML/MIN/1.73 M^2
GLUCOSE SERPL-MCNC: 98 MG/DL (ref 70–110)
HDLC SERPL-MCNC: 47 MG/DL (ref 40–75)
HDLC SERPL: 25.7 % (ref 20–50)
LDLC SERPL CALC-MCNC: 121 MG/DL (ref 63–159)
NONHDLC SERPL-MCNC: 136 MG/DL
POTASSIUM SERPL-SCNC: 3.8 MMOL/L (ref 3.5–5.1)
PROT SERPL-MCNC: 7.2 G/DL (ref 6–8.4)
SODIUM SERPL-SCNC: 143 MMOL/L (ref 136–145)
TRIGL SERPL-MCNC: 75 MG/DL (ref 30–150)
TSH SERPL DL<=0.005 MIU/L-ACNC: 0.93 UIU/ML (ref 0.4–4)

## 2020-01-08 PROCEDURE — 84443 ASSAY THYROID STIM HORMONE: CPT

## 2020-01-08 PROCEDURE — 36415 COLL VENOUS BLD VENIPUNCTURE: CPT

## 2020-01-08 PROCEDURE — 80061 LIPID PANEL: CPT

## 2020-01-08 PROCEDURE — 82306 VITAMIN D 25 HYDROXY: CPT

## 2020-01-08 PROCEDURE — 80053 COMPREHEN METABOLIC PANEL: CPT

## 2020-01-13 ENCOUNTER — OFFICE VISIT (OUTPATIENT)
Dept: INTERNAL MEDICINE | Facility: CLINIC | Age: 60
End: 2020-01-13
Payer: COMMERCIAL

## 2020-01-13 VITALS
WEIGHT: 203.25 LBS | HEART RATE: 83 BPM | DIASTOLIC BLOOD PRESSURE: 82 MMHG | HEIGHT: 65 IN | SYSTOLIC BLOOD PRESSURE: 126 MMHG | OXYGEN SATURATION: 97 % | BODY MASS INDEX: 33.86 KG/M2 | TEMPERATURE: 98 F

## 2020-01-13 DIAGNOSIS — D84.9 IMMUNOCOMPROMISED: ICD-10-CM

## 2020-01-13 DIAGNOSIS — J98.4 CHRONIC RESTRICTIVE LUNG DISEASE: ICD-10-CM

## 2020-01-13 DIAGNOSIS — Z12.39 BREAST CANCER SCREENING: ICD-10-CM

## 2020-01-13 DIAGNOSIS — E78.00 PURE HYPERCHOLESTEROLEMIA: ICD-10-CM

## 2020-01-13 DIAGNOSIS — E55.9 VITAMIN D DEFICIENCY: ICD-10-CM

## 2020-01-13 DIAGNOSIS — I10 ESSENTIAL HYPERTENSION: Primary | ICD-10-CM

## 2020-01-13 DIAGNOSIS — E66.09 CLASS 1 OBESITY DUE TO EXCESS CALORIES WITH SERIOUS COMORBIDITY AND BODY MASS INDEX (BMI) OF 33.0 TO 33.9 IN ADULT: ICD-10-CM

## 2020-01-13 DIAGNOSIS — M79.7 FIBROMYALGIA: ICD-10-CM

## 2020-01-13 DIAGNOSIS — M15.9 GENERALIZED OSTEOARTHRITIS OF MULTIPLE SITES: Chronic | ICD-10-CM

## 2020-01-13 DIAGNOSIS — D86.9 SARCOIDOSIS: ICD-10-CM

## 2020-01-13 PROCEDURE — 3008F PR BODY MASS INDEX (BMI) DOCUMENTED: ICD-10-PCS | Mod: CPTII,S$GLB,, | Performed by: PEDIATRICS

## 2020-01-13 PROCEDURE — 3079F PR MOST RECENT DIASTOLIC BLOOD PRESSURE 80-89 MM HG: ICD-10-PCS | Mod: CPTII,S$GLB,, | Performed by: PEDIATRICS

## 2020-01-13 PROCEDURE — 3074F SYST BP LT 130 MM HG: CPT | Mod: CPTII,S$GLB,, | Performed by: PEDIATRICS

## 2020-01-13 PROCEDURE — 99214 PR OFFICE/OUTPT VISIT, EST, LEVL IV, 30-39 MIN: ICD-10-PCS | Mod: S$GLB,,, | Performed by: PEDIATRICS

## 2020-01-13 PROCEDURE — 3079F DIAST BP 80-89 MM HG: CPT | Mod: CPTII,S$GLB,, | Performed by: PEDIATRICS

## 2020-01-13 PROCEDURE — 99999 PR PBB SHADOW E&M-EST. PATIENT-LVL III: CPT | Mod: PBBFAC,,, | Performed by: PEDIATRICS

## 2020-01-13 PROCEDURE — 99999 PR PBB SHADOW E&M-EST. PATIENT-LVL III: ICD-10-PCS | Mod: PBBFAC,,, | Performed by: PEDIATRICS

## 2020-01-13 PROCEDURE — 3074F PR MOST RECENT SYSTOLIC BLOOD PRESSURE < 130 MM HG: ICD-10-PCS | Mod: CPTII,S$GLB,, | Performed by: PEDIATRICS

## 2020-01-13 PROCEDURE — 99214 OFFICE O/P EST MOD 30 MIN: CPT | Mod: S$GLB,,, | Performed by: PEDIATRICS

## 2020-01-13 PROCEDURE — 3008F BODY MASS INDEX DOCD: CPT | Mod: CPTII,S$GLB,, | Performed by: PEDIATRICS

## 2020-01-13 NOTE — PROGRESS NOTES
Subjective:       Patient ID: Janna Sheffield is a 59 y.o. female.     Chief Complaint: Follow-up     HTN: No home B/P monitoring. No HTNive symptoms  Hypercholesterol: compliant with statin. No D&E.  Fibromyalgia:Gabapentin working  GERD: pantoprazole is helping but still nauseated. S/P choley.  LIPIDS:following D&E, tolerating and compliant with med(s).  Sarcoid/fibromyalgia/immunocompromised: seeing pulmonary and rheum    LABS REVIEWED AND DISCUSSED WITH PATIENT        Review of Systems   Constitutional: Negative for fever and unexpected weight change.   HENT: Negative for congestion and rhinorrhea.    Eyes: Negative for discharge and redness.   Respiratory: Negative for cough and wheezing.    Gastrointestinal:  Negative for abdominal distention, pain, nausea,  anal bleeding, blood in stool, constipation, diarrhea and vomiting.   Endocrine: Negative for polydipsia, polyphagia and polyuria.   Genitourinary: Negative for decreased urine volume, difficulty urinating and menstrual problem.   Musculoskeletal: Positive for arthralgias and myalgias. Negative for joint swelling.   Skin: Negative for rash and wound.   Neurological: Negative for syncope and headaches.   Psychiatric/Behavioral: Negative for behavioral problems and sleep disturbance.       Objective:   Physical Exam   Constitutional: She is oriented to person, place, and time. She appears well-developed and well-nourished. No distress.   Neck: No JVD present. No thyromegaly present.   Cardiovascular: Normal rate, regular rhythm and normal heart sounds.   No murmur heard.  Pulmonary/Chest: Effort normal and breath sounds normal. No respiratory distress. She has no wheezes. She has no rales.   Abdominal: Soft. She exhibits no distension and no mass. There is no tenderness. There is no guarding.   Musculoskeletal: She exhibits no edema.   Lymphadenopathy:     She has no cervical adenopathy.   Neurological: She is alert and oriented to person, place, and  time. No cranial nerve deficit. Coordination normal.   Skin: Capillary refill takes less than 2 seconds. No rash noted.   Psychiatric: She has a normal mood and affect. Her behavior is normal. Judgment and thought content normal.       Assessment:       1. Essential hypertension    2. Pure hypercholesterolemia    3. Sarcoidosis    4. Fibromyalgia    5. Class 1 obesity due to excess calories with serious comorbidity and body mass index (BMI) of 33.0 to 33.9 in adult    6. immunocompromised       Plan:   Shingrix at pharmacy. Overall stable. Keep subspecialty appts. Maintain meds, diet and exercise, weight loss. F/U 6 months with labs. Mammogram due. She has some dry mouth, use sugar free lemon drops, artificial saliva, increase hydration, can change HCTZ to ARB should patient desire.

## 2020-01-27 ENCOUNTER — OFFICE VISIT (OUTPATIENT)
Dept: OPHTHALMOLOGY | Facility: CLINIC | Age: 60
End: 2020-01-27
Payer: COMMERCIAL

## 2020-01-27 DIAGNOSIS — H52.7 REFRACTION DISORDER: ICD-10-CM

## 2020-01-27 DIAGNOSIS — H25.11 NS (NUCLEAR SCLEROSIS), RIGHT: ICD-10-CM

## 2020-01-27 DIAGNOSIS — D86.9 SARCOIDOSIS: ICD-10-CM

## 2020-01-27 DIAGNOSIS — H35.342 MACULAR HOLE, LEFT: ICD-10-CM

## 2020-01-27 DIAGNOSIS — Z98.42 CATARACT EXTRACTION STATUS, LEFT: ICD-10-CM

## 2020-01-27 DIAGNOSIS — H35.3122 INTERMEDIATE STAGE NONEXUDATIVE AGE-RELATED MACULAR DEGENERATION OF LEFT EYE: Primary | ICD-10-CM

## 2020-01-27 PROCEDURE — 92014 PR EYE EXAM, EST PATIENT,COMPREHESV: ICD-10-PCS | Mod: S$GLB,,, | Performed by: OPHTHALMOLOGY

## 2020-01-27 PROCEDURE — 92014 COMPRE OPH EXAM EST PT 1/>: CPT | Mod: S$GLB,,, | Performed by: OPHTHALMOLOGY

## 2020-01-27 PROCEDURE — 92015 DETERMINE REFRACTIVE STATE: CPT | Mod: S$GLB,,, | Performed by: OPHTHALMOLOGY

## 2020-01-27 PROCEDURE — 99999 PR PBB SHADOW E&M-EST. PATIENT-LVL I: CPT | Mod: PBBFAC,,, | Performed by: OPHTHALMOLOGY

## 2020-01-27 PROCEDURE — 92015 PR REFRACTION: ICD-10-PCS | Mod: S$GLB,,, | Performed by: OPHTHALMOLOGY

## 2020-01-27 PROCEDURE — 99999 PR PBB SHADOW E&M-EST. PATIENT-LVL I: ICD-10-PCS | Mod: PBBFAC,,, | Performed by: OPHTHALMOLOGY

## 2020-01-27 NOTE — PROGRESS NOTES
HPI     The patient states her eyes are doing fine and she denies any ocular pain   or problems at this time.    1. H/O RETINA DETACHMENT OS  2. LATTICE / COBBLESTONE OU  3. MAC HOLE OS  HIGH MYOPE -9  4. PERIPHERAL PIGMENTATION   5. H/O SARCOID UVEITIS  6. GUTTATA OU  7. PCIOL OS  NSC OD    Last edited by Leslye Duenas on 1/27/2020  9:02 AM. (History)            Assessment /Plan     For exam results, see Encounter Report.      ICD-10-CM ICD-9-CM    1. Intermediate stage nonexudative age-related macular degeneration of left eye H35.3122 362.51 As before, stable    2. Macular hole, left H35.342 362.54 Treated in the past, stable    3. NS (nuclear sclerosis), right H25.11 366.16   You were found to have an early cataract in your eye(s) today, however the cataract is not affecting your activities of daily living, such as reading and driving.You do not need  surgery at this time. We will recheck your cataract at your next visit. You are welcome to call for an earlier appointment if your vision gets worse.      4. Sarcoidosis D86.9 135    5. Cataract extraction status, left Z98.42 V45.61      6     Refractive Error   Disp MR       RETURN TO CLINIC 1 year with [facility]

## 2020-03-03 NOTE — PROGRESS NOTES
Subjective:       Patient ID: Janna Sheffield is a 59 y.o. female.  Patient Active Problem List   Diagnosis    HTN (hypertension)    Pure hypercholesterolemia    Painful total knee replacement    Macular degeneration, age related, nonexudative - Left Eye    Retinal lattice degeneration    Retinal detachment, old, partial    Macular hole    Cobblestone retinal degeneration    Pulmonary sarcoidosis    Osteoarthritis of both knees    Chest pain syndrome    Class 2 obesity in adult    Fibromyalgia    ALLEN (dyspnea on exertion)    Chronic restrictive lung disease    Medication monitoring encounter    Generalized osteoarthritis of multiple sites    Immunocompromised    Vitamin D deficiency    Osteoarthritis of spine with radiculopathy, cervical region    Abnormal diffusion capacity determined by pulmonary function test    H/O total knee replacement, left    Primary osteoarthritis of right knee      Immunization History   Administered Date(s) Administered    Influenza 11/27/2006, 11/28/2007, 10/20/2008, 11/24/2010, 12/26/2012, 12/18/2013    Influenza - High Dose - PF (65 years and older) 10/31/2017, 01/07/2019    Influenza - Quadrivalent 10/25/2016    Influenza - Quadrivalent - PF (6 months and older) 12/19/2019    Influenza A (H1N1) 2009 Monovalent - IM - PF 12/14/2009    Influenza Split 11/27/2006, 11/28/2007, 10/20/2008, 11/24/2010, 12/26/2012, 12/18/2013    Pneumococcal Conjugate - 13 Valent 04/10/2015    Pneumococcal Polysaccharide - 23 Valent 11/19/2003, 01/07/2019    Td (ADULT) 06/17/2008    Tdap 04/07/2015      Social History     Tobacco Use   Smoking Status Never Smoker   Smokeless Tobacco Never Used      Chief Complaint: Sarcoidosis (REV CXR/ARLEEN)    Ms Sheffield  is a 59 y.o. .  LV was 03/12 /2019,   Here to review CXR and [place]  No interval health issues.  She has sarcoidosis currently treated with IMURAN since 1985.  Was discontinued Last year  Last ACE level normal.  No  "cough no wheezing no shortness of breath  Her spirometry was consistent with a restrictive defect    Echocardiogram update pending  CXR was normal     Immunizations are up-to-date      Review of Systems   Constitutional: Negative.    HENT: Negative.    Eyes: Negative.    Respiratory: Negative.    Cardiovascular: Negative.    Genitourinary: Negative.    Endocrine: endocrine negative   Musculoskeletal: Negative.    Skin: Negative.    Gastrointestinal: Negative.    Neurological: Negative.    Psychiatric/Behavioral: Negative.    All other systems reviewed and are negative.      Objective:       Vitals:    03/04/20 1519   BP: 122/80   Pulse: 84   Resp: 18   SpO2: 97%   Weight: 94.3 kg (208 lb)   Height: 5' 4" (1.626 m)     Physical Exam   Constitutional: She is oriented to person, place, and time. She appears well-developed and well-nourished. She is obese.   HENT:   Head: Normocephalic.   Nose: Nose normal.   Neck: Normal range of motion. Neck supple. No JVD present. No tracheal deviation present. No thyromegaly present.   Cardiovascular: Normal rate, regular rhythm and normal heart sounds.   No murmur heard.  Pulmonary/Chest: Normal expansion, symmetric chest wall expansion, effort normal and breath sounds normal. No respiratory distress. She has no decreased breath sounds. She has no rales. Chest wall is not dull to percussion. She exhibits no tenderness. Negative for egophony.   Abdominal: Soft. Bowel sounds are normal.   Musculoskeletal: Normal range of motion. She exhibits no edema.   Lymphadenopathy:     She has no cervical adenopathy.   Neurological: She is alert and oriented to person, place, and time. Gait normal.   Skin: Skin is warm and dry. No cyanosis. Nails show no clubbing.   Psychiatric: She has a normal mood and affect. Her behavior is normal.   Nursing note and vitals reviewed.    Personal Diagnostic Review  Chest x-ray:    EXAMINATION:  XR CHEST PA AND LATERAL    CLINICAL HISTORY:  Other disorders of " lung    TECHNIQUE:  PA and lateral views of the chest were performed.    COMPARISON:  01/15/2019    FINDINGS:  The lungs are clear and free of infiltrate.  No pleural effusion or pneumothorax. The heart is not enlarged.      Impression       1.  No acute cardiopulmonary process.          PFT:     FEV1: 1.44L( 66.1%), FVC 1.80L( 65.5%)  FEV1/FVC  80  RESTRICTIVE VENTILATORY DEFECT    Component      Latest Ref Rng & Units 12/19/2019   Angio Convert Enzyme      16 - 85 U/L 15 (L)   Vit D, 25-Hydroxy      30 - 96 ng/mL 36       No flowsheet data found.      Assessment:       Problem List Items Addressed This Visit     HTN (hypertension)     STABLE ON HCTZ         Pure hypercholesterolemia     STABLE ON CRESTOR         Pulmonary sarcoidosis - Primary     ASSESSMENT:     Sarcoidosis stage:   []        Stage I Sarcoidosis  [x]        Stage II Sarcoidosis  []        Stage III Sarcoidosis  []        Stage IV Srcoidosis     Clinical assessment:   []        Symptomatic  [x]        Assymptomatic         Indications for treatment of pulmonary sarcoidosis:   [x]        NO Evidence of progressive disease   []        Severe disease at presentation     Management options:   []        Observation without therapy  []        Systemic glucocorticoids  []        Methotrexate.  [x]        Azathioprine  discontinued last year  []        Leuflonamide  []        Mycophenolate  []        Anti TNF alpha antagonists            Relevant Orders    Vitamin D    ANGIOTENSIN CONVERTING ENZYME    X-Ray Chest PA And Lateral    Spirometry with/without bronchodilator    Class 2 obesity in adult     General weight loss/lifestyle modification strategies discussed (elicit support from others; identify saboteurs; non-food rewards).  Diet interventions: low calorie (1000 kCal/d) deficit diet          Chronic restrictive lung disease     FVC 1.80L ( 65.5%) FEV1/FVC 80             Plan:      Reschedule echo which was missed  Currently off IMURAN  Stable  sarcoid  Follow up cardiology and Opthamology    Follow up in about 1 year (around 3/4/2021), or Schedule cardiology f/u after echo , order in epic, for Basic Spirometry and CXR next visit, ACE, Vit D..    Thank you for the courtesy of participating in the care of this patient    Demetrio Casper MD

## 2020-03-04 ENCOUNTER — CLINICAL SUPPORT (OUTPATIENT)
Dept: PULMONOLOGY | Facility: CLINIC | Age: 60
End: 2020-03-04
Payer: COMMERCIAL

## 2020-03-04 ENCOUNTER — OFFICE VISIT (OUTPATIENT)
Dept: PULMONOLOGY | Facility: CLINIC | Age: 60
End: 2020-03-04
Payer: COMMERCIAL

## 2020-03-04 ENCOUNTER — HOSPITAL ENCOUNTER (OUTPATIENT)
Dept: RADIOLOGY | Facility: HOSPITAL | Age: 60
Discharge: HOME | End: 2020-03-04
Attending: INTERNAL MEDICINE
Payer: COMMERCIAL

## 2020-03-04 VITALS
BODY MASS INDEX: 35.51 KG/M2 | HEART RATE: 84 BPM | WEIGHT: 208 LBS | RESPIRATION RATE: 18 BRPM | SYSTOLIC BLOOD PRESSURE: 122 MMHG | DIASTOLIC BLOOD PRESSURE: 80 MMHG | OXYGEN SATURATION: 97 % | HEIGHT: 64 IN

## 2020-03-04 DIAGNOSIS — I10 ESSENTIAL HYPERTENSION: ICD-10-CM

## 2020-03-04 DIAGNOSIS — D86.0 PULMONARY SARCOIDOSIS: Primary | ICD-10-CM

## 2020-03-04 DIAGNOSIS — D86.9 SARCOIDOSIS: ICD-10-CM

## 2020-03-04 DIAGNOSIS — J98.4 CHRONIC RESTRICTIVE LUNG DISEASE: ICD-10-CM

## 2020-03-04 DIAGNOSIS — E66.01 CLASS 2 SEVERE OBESITY DUE TO EXCESS CALORIES WITH SERIOUS COMORBIDITY AND BODY MASS INDEX (BMI) OF 35.0 TO 35.9 IN ADULT: ICD-10-CM

## 2020-03-04 DIAGNOSIS — E78.00 PURE HYPERCHOLESTEROLEMIA: ICD-10-CM

## 2020-03-04 LAB
BRPFT: ABNORMAL
FEF 25 75 LLN: 0.89
FEF 25 75 PRE REF: 68.6 %
FEF 25 75 REF: 2.04
FEV1 FVC LLN: 68
FEV1 FVC PRE REF: 100.4 %
FEV1 FVC REF: 80
FEV1 LLN: 1.6
FEV1 PRE REF: 66.1 %
FEV1 REF: 2.18
FVC LLN: 2.04
FVC PRE REF: 65.5 %
FVC REF: 2.75
PEF LLN: 3.68
PEF PRE REF: 87.1 %
PEF REF: 5.7
PRE FEF 25 75: 1.4 L/S (ref 0.89–3.2)
PRE FET 100: 7.32 SEC
PRE FEV1 FVC: 80.18 % (ref 68.25–91.44)
PRE FEV1: 1.44 L (ref 1.6–2.77)
PRE FVC: 1.8 L (ref 2.04–3.46)
PRE PEF: 4.96 L/S (ref 3.68–7.71)

## 2020-03-04 PROCEDURE — 71046 XR CHEST PA AND LATERAL: ICD-10-PCS | Mod: 26,,, | Performed by: RADIOLOGY

## 2020-03-04 PROCEDURE — 71046 X-RAY EXAM CHEST 2 VIEWS: CPT | Mod: 26,,, | Performed by: RADIOLOGY

## 2020-03-04 PROCEDURE — 94010 BREATHING CAPACITY TEST: CPT | Mod: S$GLB,,, | Performed by: INTERNAL MEDICINE

## 2020-03-04 PROCEDURE — 3079F PR MOST RECENT DIASTOLIC BLOOD PRESSURE 80-89 MM HG: ICD-10-PCS | Mod: CPTII,S$GLB,, | Performed by: INTERNAL MEDICINE

## 2020-03-04 PROCEDURE — 94010 BREATHING CAPACITY TEST: ICD-10-PCS | Mod: S$GLB,,, | Performed by: INTERNAL MEDICINE

## 2020-03-04 PROCEDURE — 99999 PR PBB SHADOW E&M-EST. PATIENT-LVL III: CPT | Mod: PBBFAC,,, | Performed by: INTERNAL MEDICINE

## 2020-03-04 PROCEDURE — 3008F PR BODY MASS INDEX (BMI) DOCUMENTED: ICD-10-PCS | Mod: CPTII,S$GLB,, | Performed by: INTERNAL MEDICINE

## 2020-03-04 PROCEDURE — 3079F DIAST BP 80-89 MM HG: CPT | Mod: CPTII,S$GLB,, | Performed by: INTERNAL MEDICINE

## 2020-03-04 PROCEDURE — 99214 PR OFFICE/OUTPT VISIT, EST, LEVL IV, 30-39 MIN: ICD-10-PCS | Mod: 25,S$GLB,, | Performed by: INTERNAL MEDICINE

## 2020-03-04 PROCEDURE — 71046 X-RAY EXAM CHEST 2 VIEWS: CPT | Mod: TC

## 2020-03-04 PROCEDURE — 3074F PR MOST RECENT SYSTOLIC BLOOD PRESSURE < 130 MM HG: ICD-10-PCS | Mod: CPTII,S$GLB,, | Performed by: INTERNAL MEDICINE

## 2020-03-04 PROCEDURE — 3074F SYST BP LT 130 MM HG: CPT | Mod: CPTII,S$GLB,, | Performed by: INTERNAL MEDICINE

## 2020-03-04 PROCEDURE — 99214 OFFICE O/P EST MOD 30 MIN: CPT | Mod: 25,S$GLB,, | Performed by: INTERNAL MEDICINE

## 2020-03-04 PROCEDURE — 3008F BODY MASS INDEX DOCD: CPT | Mod: CPTII,S$GLB,, | Performed by: INTERNAL MEDICINE

## 2020-03-04 PROCEDURE — 99999 PR PBB SHADOW E&M-EST. PATIENT-LVL III: ICD-10-PCS | Mod: PBBFAC,,, | Performed by: INTERNAL MEDICINE

## 2020-03-04 NOTE — ASSESSMENT & PLAN NOTE
ASSESSMENT:     Sarcoidosis stage:   []        Stage I Sarcoidosis  [x]        Stage II Sarcoidosis  []        Stage III Sarcoidosis  []        Stage IV Srcoidosis     Clinical assessment:   []        Symptomatic  [x]        Assymptomatic         Indications for treatment of pulmonary sarcoidosis:   [x]        NO Evidence of progressive disease   []        Severe disease at presentation     Management options:   []        Observation without therapy  []        Systemic glucocorticoids  []        Methotrexate.  [x]        Azathioprine discontinued last year  []        Leuflonamide  []        Mycophenolate  []        Anti TNF alpha antagonists

## 2020-03-04 NOTE — PATIENT INSTRUCTIONS
Lung Anatomy  Your lungs take air in to give your body oxygen, which the body needs to work. Your lungs, like all the tissues in your body, are made up of billions of tiny specialized cells. Old lung cells die and are replaced by new, identical lung cells. This natural process helps ensure healthy lungs.    Date Last Reviewed: 11/1/2016  © 7317-3093 [employer]. [address] [place] [address], [place] [address]. All rights reserved. This information is not intended as a substitute for professional medical care. Always follow your healthcare professional's instructions.

## 2020-03-09 ENCOUNTER — OFFICE VISIT (OUTPATIENT)
Dept: OBSTETRICS AND GYNECOLOGY | Facility: CLINIC | Age: 60
End: 2020-03-09
Payer: COMMERCIAL

## 2020-03-09 ENCOUNTER — HOSPITAL ENCOUNTER (OUTPATIENT)
Dept: RADIOLOGY | Facility: HOSPITAL | Age: 60
Discharge: HOME | End: 2020-03-09
Attending: PEDIATRICS
Payer: COMMERCIAL

## 2020-03-09 VITALS
SYSTOLIC BLOOD PRESSURE: 116 MMHG | DIASTOLIC BLOOD PRESSURE: 76 MMHG | WEIGHT: 207 LBS | HEIGHT: 64 IN | BODY MASS INDEX: 35.34 KG/M2

## 2020-03-09 VITALS — BODY MASS INDEX: 35.51 KG/M2 | HEIGHT: 64 IN | WEIGHT: 208 LBS

## 2020-03-09 DIAGNOSIS — Z01.419 ENCOUNTER FOR GYNECOLOGICAL EXAMINATION WITHOUT ABNORMAL FINDING: Primary | ICD-10-CM

## 2020-03-09 DIAGNOSIS — Z12.39 BREAST CANCER SCREENING: ICD-10-CM

## 2020-03-09 PROCEDURE — 99396 PR PREVENTIVE VISIT,EST,40-64: ICD-10-PCS | Mod: S$GLB,,, | Performed by: OBSTETRICS & GYNECOLOGY

## 2020-03-09 PROCEDURE — 77063 BREAST TOMOSYNTHESIS BI: CPT | Mod: 26,,, | Performed by: RADIOLOGY

## 2020-03-09 PROCEDURE — 99396 PREV VISIT EST AGE 40-64: CPT | Mod: S$GLB,,, | Performed by: OBSTETRICS & GYNECOLOGY

## 2020-03-09 PROCEDURE — 3078F DIAST BP <80 MM HG: CPT | Mod: CPTII,S$GLB,, | Performed by: OBSTETRICS & GYNECOLOGY

## 2020-03-09 PROCEDURE — 99999 PR PBB SHADOW E&M-EST. PATIENT-LVL III: CPT | Mod: PBBFAC,,, | Performed by: OBSTETRICS & GYNECOLOGY

## 2020-03-09 PROCEDURE — 77067 MAMMO DIGITAL SCREENING BILAT WITH TOMOSYNTHESIS_CAD: ICD-10-PCS | Mod: 26,,, | Performed by: RADIOLOGY

## 2020-03-09 PROCEDURE — 3074F PR MOST RECENT SYSTOLIC BLOOD PRESSURE < 130 MM HG: ICD-10-PCS | Mod: CPTII,S$GLB,, | Performed by: OBSTETRICS & GYNECOLOGY

## 2020-03-09 PROCEDURE — 77067 SCR MAMMO BI INCL CAD: CPT | Mod: 26,,, | Performed by: RADIOLOGY

## 2020-03-09 PROCEDURE — 3078F PR MOST RECENT DIASTOLIC BLOOD PRESSURE < 80 MM HG: ICD-10-PCS | Mod: CPTII,S$GLB,, | Performed by: OBSTETRICS & GYNECOLOGY

## 2020-03-09 PROCEDURE — 77063 MAMMO DIGITAL SCREENING BILAT WITH TOMOSYNTHESIS_CAD: ICD-10-PCS | Mod: 26,,, | Performed by: RADIOLOGY

## 2020-03-09 PROCEDURE — 3074F SYST BP LT 130 MM HG: CPT | Mod: CPTII,S$GLB,, | Performed by: OBSTETRICS & GYNECOLOGY

## 2020-03-09 PROCEDURE — 77067 SCR MAMMO BI INCL CAD: CPT | Mod: TC

## 2020-03-09 PROCEDURE — 99999 PR PBB SHADOW E&M-EST. PATIENT-LVL III: ICD-10-PCS | Mod: PBBFAC,,, | Performed by: OBSTETRICS & GYNECOLOGY

## 2020-03-09 NOTE — LETTER
March 9, 2020      Tuyet Mead, LEIF  [address]  [place] [address]           [facility]  [address] [facility] [address]  Phone: [phone]  Fax: [phone]          Patient: Janna Sheffield   MR Number: [id number]   YOB: 1960   Date of Visit: 3/9/2020       Dear Tuyet Mead:    Thank you for referring Janna Sheffield to me for evaluation. Attached you will find relevant portions of my assessment and plan of care.    If you have questions, please do not hesitate to call me. I look forward to following Janna Sheffield along with you.    Sincerely,    Marlee Lopez MD    Enclosure  CC:  No Recipients    If you would like to receive this communication electronically, please contact [email or web address] or [phone] to request more information on [employer] Link access.    For providers and/or their staff who would like to refer a patient to [facility], please contact us through our one-stop-shop provider referral line, [facility], at [phone].    If you feel you have received this communication in error or would no longer like to receive these types of communications, please e-mail [email or web address]

## 2020-03-09 NOTE — PROGRESS NOTES
CC: Well woman exam    Janna Sheffield is a 59 y.o. female  presents for a well woman exam.  No issues, problems, or complaints.  Having climacteric symptoms, tolerable at this time    Past Medical History:   Diagnosis Date    Arthritis     Chest pain syndrome 10/23/2014    Hyperlipidemia     Hypertension     Macular degeneration, age related, nonexudative - Left Eye 3/24/2014    Retinal detachment, old, partial 2014    Sarcoidosis      Past Surgical History:   Procedure Laterality Date    CATARACT EXTRACTION      DILATION AND CURETTAGE OF UTERUS      missed ab x 3    ESOPHAGOGASTRODUODENOSCOPY N/A 2019    Procedure: ESOPHAGOGASTRODUODENOSCOPY (EGD);  Surgeon: Jairo Vargas III, MD;  Location: [facility];  Service: Endoscopy;  Laterality: N/A;    HYSTERECTOMY      MACARIO - complete    JOINT REPLACEMENT Left     Knee    knee surgery      OOPHORECTOMY      TUBAL LIGATION       Family History   Problem Relation Age of Onset    Cervical cancer Mother     Heart disease Mother     Heart attack Mother     Hypertension Mother     Hyperlipidemia Mother     Hypertension Father     Hyperlipidemia Father     Stroke Father     Stroke Brother     Breast cancer Maternal Aunt     Breast cancer Maternal Cousin     Breast cancer Maternal Cousin     Breast cancer Paternal Cousin     Ovarian cancer Paternal Cousin     Colon cancer Neg Hx     Thrombophilia Neg Hx     Deep vein thrombosis Neg Hx     Pulmonary embolism Neg Hx      Social History     Tobacco Use    Smoking status: Never Smoker    Smokeless tobacco: Never Used   Substance Use Topics    Alcohol use: No    Drug use: No     OB History        6    Para   3    Term   2       1    AB   3    Living   2       SAB   3    TAB        Ectopic        Multiple        Live Births   2                 Current Outpatient Medications:     gabapentin (NEURONTIN) 300 MG capsule, Take 2 capsules (600 mg total) by mouth  "every evening., Disp: 60 capsule, Rfl: 11    hydroCHLOROthiazide (HYDRODIURIL) 25 MG tablet, Take 1 tablet (25 mg total) by mouth once daily., Disp: 90 tablet, Rfl: 3    multivitamin (THERAGRAN) per tablet, Take 1 tablet by mouth Daily., Disp: , Rfl:     pantoprazole (PROTONIX) 40 MG tablet, Take 1 tablet (40 mg total) by mouth once daily., Disp: 30 tablet, Rfl: 11    rosuvastatin (CRESTOR) 40 MG Tab, TAKE ONE TABLET BY MOUTH ONCE DAILY (  REPLACES  LIPITOR), Disp: 30 tablet, Rfl: 11    traMADol (ULTRAM) 50 mg tablet, Take 1 tablet (50 mg total) by mouth every 4 (four) hours as needed for Pain., Disp: 42 tablet, Rfl: 0    VITAMIN D2 50,000 unit capsule, TAKE ONE CAPSULE BY MOUTH ONCE A WEEK, Disp: 12 capsule, Rfl: 4    GYNECOLOGY HISTORY:  No abnormal pap/std    DATA REVIEWED:  Last pap: no abnormal history  Last mmg: normal Date: 2019; done today  DEXA normal 2019  Colonoscopy normal 2011-due 2021    /76 (BP Location: Right arm, Patient Position: Sitting, BP Method: Large (Manual))   Ht 5' 4" (1.626 m)   Wt 93.9 kg (207 lb 0.2 oz)   BMI 35.53 kg/m²     ROS:  GENERAL: Denies weight gain or weight loss. Feeling well overall.   SKIN: Denies rash or lesions.   HEAD: Denies head injury or headache.   NODES: Denies enlarged lymph nodes.   CHEST: Denies chest pain or shortness of breath.   CARDIOVASCULAR: Denies palpitations or left sided chest pain.   ABDOMEN: No abdominal pain, constipation, diarrhea, nausea, vomiting or rectal bleeding.   URINARY: No frequency, dysuria, hematuria, or burning on urination.  REPRODUCTIVE: See HPI.   BREASTS: The patient denies pain, lumps, or nipple discharge.   HEMATOLOGIC: No easy bruisability or excessive bleeding.   MUSCULOSKELETAL: Denies joint pain or swelling.   NEUROLOGIC: Denies syncope or weakness.   PSYCHIATRIC: Denies depression, anxiety or mood swings.    PE:   APPEARANCE: Well nourished, well developed, in no acute distress.  AFFECT: WNL, alert and oriented x " 3.  SKIN: No acne or hirsutism.  NECK: Neck symmetric without masses or thyromegaly.  NODES: No inguinal, cervical, axillary or femoral lymph node enlargement.  CHEST: Good respiratory effort.   ABDOMEN: Soft. No tenderness or masses. No hepatosplenomegaly. No hernias.  BREASTS: Symmetrical, no skin changes or visible lesions. No palpable masses, nipple discharge bilaterally.  PELVIC: Normal external female genitalia without lesions. Normal hair distribution. Adequate perineal body, normal urethral meatus. Vagina atrophic without lesions or discharge. No significant  rectocele. Grade 2 cystocele. Bimanual exam shows uterus and cervix to be surgically absent. Adnexa without masses or tenderness.  EXTREMITIES: No edema.    Encounter for gynecological examination without abnormal finding    Patient was counseled today on A.C.S. Pap guidelines (none needed) and recommendations for pelvic exams q3-5y, yearly mammograms starting age 40, and clinical breast exams; to see her PCP for other health maintenance.

## 2020-03-19 ENCOUNTER — OFFICE VISIT (OUTPATIENT)
Dept: OPHTHALMOLOGY | Facility: CLINIC | Age: 60
End: 2020-03-19
Payer: COMMERCIAL

## 2020-03-19 ENCOUNTER — TELEPHONE (OUTPATIENT)
Dept: OPHTHALMOLOGY | Facility: CLINIC | Age: 60
End: 2020-03-19

## 2020-03-19 DIAGNOSIS — H04.129 DRY EYE: Primary | ICD-10-CM

## 2020-03-19 PROCEDURE — 92012 INTRM OPH EXAM EST PATIENT: CPT | Mod: S$GLB,,, | Performed by: OPTOMETRIST

## 2020-03-19 PROCEDURE — 99999 PR PBB SHADOW E&M-EST. PATIENT-LVL I: ICD-10-PCS | Mod: PBBFAC,,, | Performed by: OPTOMETRIST

## 2020-03-19 PROCEDURE — 99999 PR PBB SHADOW E&M-EST. PATIENT-LVL I: CPT | Mod: PBBFAC,,, | Performed by: OPTOMETRIST

## 2020-03-19 PROCEDURE — 92012 PR EYE EXAM, EST PATIENT,INTERMED: ICD-10-PCS | Mod: S$GLB,,, | Performed by: OPTOMETRIST

## 2020-03-19 NOTE — PROGRESS NOTES
HPI     Eye Problem      Additional comments: OU              Comments     Patient states been having a burning sensation with both eyes and painful   since last Thursday.  Pain Scale: 5  Onset: 3/12/2020  OD, OS, OU: OU  Discharge: No  A.M. Matting: No  Itch: No  Redness: Yes Sometimes  Photophobia: Yes Sometimes   Foreign body sensation: Burning   Deep pain: Yes  Previous occurrence: No  Drops: No              Last edited by Amee Wood on 3/19/2020  1:19 PM. (History)            Assessment /Plan     For exam results, see Encounter Report.    Dry eye    no evidence of uveitis at this time  +spk OU with trace conj inj  Begin Theratears bid-PRN OU    RTC PRN if symptoms worsen or not resolved x 1 week  Discussed above and all questions were answered.

## 2020-03-19 NOTE — TELEPHONE ENCOUNTER
Spoke with pt.  She has had burning for a few days OU, worsening.  Is becoming concerned due to her sarcoidosis.  Was trying to delay appt due to COVID concerns.  Appt today with Dr. Grady.  ----- Message from Negrita Castro sent at 3/19/2020  9:10 AM CDT -----  Contact: pt  Please call pt @ [phone], pt states she having burning  in eyes, pt need to speak with Dr Sebastien augustin.

## 2020-03-19 NOTE — TELEPHONE ENCOUNTER
----- Message from Negrita Castro sent at 3/19/2020  9:10 AM CDT -----  Contact: pt  Please call pt @ [phone], pt states she having burning  in eyes, pt need to speak with Dr Sebastien augustin.

## 2020-03-23 ENCOUNTER — TELEPHONE (OUTPATIENT)
Dept: RHEUMATOLOGY | Facility: CLINIC | Age: 60
End: 2020-03-23

## 2020-03-23 NOTE — TELEPHONE ENCOUNTER
----- Message from Sigrid Reza sent at 3/23/2020  8:26 AM CDT -----  Contact: self [phone]  States that she is calling to speak to Ms Fish. States that she is still going to work and with her job she is not 6 feet apart from other employees. Wants to know if she should be still working. Please call back at [phone]//thank you acc

## 2020-03-23 NOTE — TELEPHONE ENCOUNTER
This is something she will need to work out with her employer, based on the governors recommendations of essential positions etc.  If she is able to work from home then work from home. If she is unable then recommend mask, frequent hand washing.            Patient aware

## 2020-04-06 RX ORDER — PANTOPRAZOLE SODIUM 40 MG/1
40 TABLET, DELAYED RELEASE ORAL DAILY
Qty: 90 TABLET | Refills: 0 | Status: SHIPPED | OUTPATIENT
Start: 2020-04-06 | End: 2020-12-10

## 2020-06-22 NOTE — PROGRESS NOTES
Subjective:       Patient ID: Janna Sheffield is a 59 y.o. female.    Chief Complaint: Sarcoidosis, osteoarthritis    Mrs. Sheffield is a 60y/o AAF with history of osteoarthritis and sarcoidosis. Sarcoidosis was diagnosed in 1985 through a biopsy of a spot on her liver. 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     She was on imuran 50mg every other day but her sarcoid seemed to be in remission.  Lungs stable. She does have restrictive lung disease and SOB when she is more active but no worsening. She sees Dr. Casper.  We dc'd imuran in august 2019 (suggested by pulm initially) with no new or worsening symtpoms.  Last visit with pulm was march 2020, cxr and pfts stable, he wanted echo and cards follow up but never happened d/t covid.     She has low vitamin D and started on 50Kunits weekly improved to now normal level. No issues with joint swelling, rash, worsening sob, cp, eye issues. She has mao knee pain with a TKA on the left, uses topical nsaid which helps     She has generalized osteoarthritis with radiating neck pain. MRI showed disc disease and degenerative arthritis. She was sent to pain mgt for ESIs which helped. She also is taking gabapentin at night 3-600mg occasionally.  Tramadol occasionally helps        Review of Systems   Constitutional: Negative for chills, fatigue and fever.   HENT: Negative for mouth sores, rhinorrhea and sore throat.    Eyes: Negative for pain and redness.        Dry eyes   Respiratory: Positive for shortness of breath. Negative for cough.    Cardiovascular: Negative for chest pain.   Gastrointestinal: Negative for abdominal pain, constipation, diarrhea, nausea and vomiting.   Genitourinary: Negative for dysuria and hematuria.   Musculoskeletal: Positive for arthralgias, back pain and neck pain. Negative for joint swelling and myalgias.        Left knee replacement   Skin: Negative for rash.   Neurological:  "Negative for weakness, numbness and headaches.   Psychiatric/Behavioral: The patient is not nervous/anxious.            Objective:   BP (!) 146/90   Pulse 70   Ht 5' 4" (1.626 m)   Wt 94.8 kg (208 lb 15.9 oz)   BMI 35.87 kg/m²      Physical Exam   Constitutional: She is oriented to person, place, and time and well-developed, well-nourished, and in no distress.   HENT:   Head: Normocephalic and atraumatic.   Eyes: Pupils are equal, round, and reactive to light. Right eye exhibits no discharge.   Neck: Normal range of motion.   Cardiovascular: Normal rate, regular rhythm and normal heart sounds.  Exam reveals no friction rub.    Pulmonary/Chest: Effort normal and breath sounds normal. No respiratory distress.   Abdominal: Soft. She exhibits no distension. There is no abdominal tenderness.   Lymphadenopathy:     She has no cervical adenopathy.   Neurological: She is alert and oriented to person, place, and time.   Skin: No rash noted. No erythema.     Psychiatric: Mood normal.   Musculoskeletal: Normal range of motion. No deformity or edema.      Comments: mao wrists, mcps, pips no synovitis, no tenderness, full rom, grib strength equal bilaterally  Left knee s/p replacement, no effusion, no warmth  Right knee no effusion, no warmth, full rom         Recent Results (from the past 672 hour(s))   Comprehensive metabolic panel    Collection Time: 06/24/20 10:18 AM   Result Value Ref Range    Sodium 141 136 - 145 mmol/L    Potassium 3.9 3.5 - 5.1 mmol/L    Chloride 107 95 - 110 mmol/L    CO2 27 23 - 29 mmol/L    Glucose 95 70 - 110 mg/dL    BUN, Bld 12 6 - 20 mg/dL    Creatinine 0.8 0.5 - 1.4 mg/dL    Calcium 9.1 8.7 - 10.5 mg/dL    Total Protein 7.2 6.0 - 8.4 g/dL    Albumin 3.7 3.5 - 5.2 g/dL    Total Bilirubin 0.5 0.1 - 1.0 mg/dL    Alkaline Phosphatase 76 55 - 135 U/L    AST 22 10 - 40 U/L    ALT 21 10 - 44 U/L    Anion Gap 7 (L) 8 - 16 mmol/L    eGFR if African American >60 >60 mL/min/1.73 m^2    eGFR if non " African American >60 >60 mL/min/1.73 m^2   CBC auto differential    Collection Time: 06/24/20 10:18 AM   Result Value Ref Range    WBC 5.04 3.90 - 12.70 K/uL    RBC 3.93 (L) 4.00 - 5.40 M/uL    Hemoglobin 12.2 12.0 - 16.0 g/dL    Hematocrit 38.6 37.0 - 48.5 %    Mean Corpuscular Volume 98 82 - 98 fL    Mean Corpuscular Hemoglobin 31.0 27.0 - 31.0 pg    Mean Corpuscular Hemoglobin Conc 31.6 (L) 32.0 - 36.0 g/dL    RDW 13.0 11.5 - 14.5 %    Platelets 220 150 - 350 K/uL    MPV 10.1 9.2 - 12.9 fL    Immature Granulocytes 0.2 0.0 - 0.5 %    Gran # (ANC) 2.0 1.8 - 7.7 K/uL    Immature Grans (Abs) 0.01 0.00 - 0.04 K/uL    Lymph # 2.4 1.0 - 4.8 K/uL    Mono # 0.5 0.3 - 1.0 K/uL    Eos # 0.1 0.0 - 0.5 K/uL    Baso # 0.02 0.00 - 0.20 K/uL    nRBC 0 0 /100 WBC    Gran% 40.1 38.0 - 73.0 %    Lymph% 48.4 (H) 18.0 - 48.0 %    Mono% 9.5 4.0 - 15.0 %    Eosinophil% 1.6 0.0 - 8.0 %    Basophil% 0.4 0.0 - 1.9 %    Differential Method Automated    C-reactive protein    Collection Time: 06/24/20 10:18 AM   Result Value Ref Range    CRP 6.2 0.0 - 8.2 mg/L   Lipid panel    Collection Time: 06/24/20 10:18 AM   Result Value Ref Range    Cholesterol 230 (H) 120 - 199 mg/dL    Triglycerides 83 30 - 150 mg/dL    HDL 40 40 - 75 mg/dL    LDL Cholesterol 173.4 (H) 63.0 - 159.0 mg/dL    Hdl/Cholesterol Ratio 17.4 (L) 20.0 - 50.0 %    Total Cholesterol/HDL Ratio 5.8 (H) 2.0 - 5.0    Non-HDL Cholesterol 190 mg/dL       CTA chest 8/24/17  Impression:       Negative for pulmonary embolus, aneurysm or dissection.  Lungs are clear.  No evidence of aortic dissection.  No evidence of aneurysm or retroperitoneal hematoma.  Small umbilical hernia containing fat.  Hernia involving the right anterolateral abdominal wall containing fat.    Cspine xr:   The vertebral bodies demonstrate normal height.  The alignment is within normal limits. There is moderate displaced narrowing and spondylosis present at the C4-C5 through the C6-C7 levels. The C1-C2  "articulation is within normal limits. No prevertebral soft tissue swelling.    MRI C spine:   Findings: There is straightening of the cervical lordosis.  Degenerative vertebral endplate spurring and disc desiccation present at multiple levels with moderate disc space narrowing from C4-C5 through C6-C7.  Vertebral marrow signal pattern and architecture are normal.  Cervical cord and craniocervical junction are intact.  Paravertebral soft tissues are symmetric and normal in appearance.  C2-C3: Unremarkable.  C3-C4: Unremarkable.  C4-C5: Posterior disc osteophyte complex and bilateral uncovertebral hypertrophy resulting in moderate left greater than right foraminal narrowing.  Indentation of ventral thecal sac with associated anterior cord contact and mild central canal stenosis.  Minimum AP canal diameter is 9.0 mm.  C5-C6: Posterior disc osteophyte complex and asymmetric left greater than right uncovertebral hypertrophy producing marked left and moderate right foraminal narrowing.  Mild indentation of the ventral thecal sac without significant canal stenosis.  C6-C7: Posterior disc osteophyte complex and uncovertebral hypertrophy producing moderate to marked left greater than right foraminal narrowing.  Mild indentation of the ventral thecal sac without significant canal stenosis.  C7-T1: Unremarkable.     6MW Test  Height: 5' 5" (165.1 cm)  Weight: 90.7 kg (200 lb)  BMI (Calculated): 33.4  Predicted Distance: 331.44  Interpretation  Predicted Distance Meters (Calculated): 472.37 meters  SpO2 bjorn was 98%  Distance was 350.52 m  148.5% predicted     PFT:  FEv1, FVC improved  FEV1: 1.57l( 69.4%), FVC 1.97( 69.0%)  FEV1/FVC 80  TLC: 3.59l( 70.4%)  DLCo: 11.29 ( 47.8%)    pft 3/2020  Mild restriction. (TLC< LLN and > or equal to 70% of predicted).  Moderate reduction in diffusion capacity - unadjusted for hemoglobin. (DLCO > or equal to 40% and < 60% predicted).    Assessment:       1. Sarcoidosis    2. Chronic " restrictive lung disease    3. Vitamin D deficiency    4. Medication monitoring encounter        Impression:  Sarcoidosis: stopped Imuran 50mg every other day (8/2019-after pulm rec'd) no evidence of activity, remains stable in remission     Chronic restrictive lung disease: stable recently occasional SOB;  sees dr. Casper, last CT of chest 7/2016 stable, PFTs with moderately reduced diffusion--mild restriction, stable; cxr stable     High risk Medication Monitoring: now off imuran     Cervical spondylosis with radiation into the left arm: saw Dr. Isabel now, s/p HIMANSHU w good result, gabapentin makes her drowsy    Vit d def: started 50K units weekly, level in normal range now 39    Plan:       Stay off imuran no need to add back at this time  Cont weekly vitamin D, last level wnl,  Cont f/u with Dr. Isabel for cervical spondylosis and HIMANSHU's as needed  F/u with dr. casper yearly for pfts  Get into see cards and get echo as requested by Dr. Casper, will send his office a message       rtc in 6mos with reg labs, ace

## 2020-06-23 ENCOUNTER — TELEPHONE (OUTPATIENT)
Dept: RHEUMATOLOGY | Facility: CLINIC | Age: 60
End: 2020-06-23

## 2020-06-24 ENCOUNTER — OFFICE VISIT (OUTPATIENT)
Dept: RHEUMATOLOGY | Facility: CLINIC | Age: 60
End: 2020-06-24
Payer: COMMERCIAL

## 2020-06-24 ENCOUNTER — LAB VISIT (OUTPATIENT)
Dept: LAB | Facility: HOSPITAL | Age: 60
End: 2020-06-24
Attending: PHYSICIAN ASSISTANT
Payer: COMMERCIAL

## 2020-06-24 VITALS
WEIGHT: 209 LBS | BODY MASS INDEX: 35.68 KG/M2 | SYSTOLIC BLOOD PRESSURE: 146 MMHG | HEIGHT: 64 IN | DIASTOLIC BLOOD PRESSURE: 90 MMHG | HEART RATE: 70 BPM

## 2020-06-24 DIAGNOSIS — E78.00 PURE HYPERCHOLESTEROLEMIA: ICD-10-CM

## 2020-06-24 DIAGNOSIS — Z51.81 MEDICATION MONITORING ENCOUNTER: ICD-10-CM

## 2020-06-24 DIAGNOSIS — E55.9 VITAMIN D DEFICIENCY: ICD-10-CM

## 2020-06-24 DIAGNOSIS — D86.9 SARCOIDOSIS: Primary | ICD-10-CM

## 2020-06-24 DIAGNOSIS — J98.4 CHRONIC RESTRICTIVE LUNG DISEASE: ICD-10-CM

## 2020-06-24 DIAGNOSIS — D86.9 SARCOIDOSIS: ICD-10-CM

## 2020-06-24 DIAGNOSIS — M54.12 CERVICAL RADICULOPATHY: ICD-10-CM

## 2020-06-24 LAB
25(OH)D3+25(OH)D2 SERPL-MCNC: 41 NG/ML (ref 30–96)
ALBUMIN SERPL BCP-MCNC: 3.7 G/DL (ref 3.5–5.2)
ALP SERPL-CCNC: 76 U/L (ref 55–135)
ALT SERPL W/O P-5'-P-CCNC: 21 U/L (ref 10–44)
ANION GAP SERPL CALC-SCNC: 7 MMOL/L (ref 8–16)
AST SERPL-CCNC: 22 U/L (ref 10–40)
BASOPHILS # BLD AUTO: 0.02 K/UL (ref 0–0.2)
BASOPHILS NFR BLD: 0.4 % (ref 0–1.9)
BILIRUB SERPL-MCNC: 0.5 MG/DL (ref 0.1–1)
BUN SERPL-MCNC: 12 MG/DL (ref 6–20)
CALCIUM SERPL-MCNC: 9.1 MG/DL (ref 8.7–10.5)
CHLORIDE SERPL-SCNC: 107 MMOL/L (ref 95–110)
CHOLEST SERPL-MCNC: 230 MG/DL (ref 120–199)
CHOLEST/HDLC SERPL: 5.8 {RATIO} (ref 2–5)
CO2 SERPL-SCNC: 27 MMOL/L (ref 23–29)
CREAT SERPL-MCNC: 0.8 MG/DL (ref 0.5–1.4)
CRP SERPL-MCNC: 6.2 MG/L (ref 0–8.2)
DIFFERENTIAL METHOD: ABNORMAL
EOSINOPHIL # BLD AUTO: 0.1 K/UL (ref 0–0.5)
EOSINOPHIL NFR BLD: 1.6 % (ref 0–8)
ERYTHROCYTE [DISTWIDTH] IN BLOOD BY AUTOMATED COUNT: 13 % (ref 11.5–14.5)
ERYTHROCYTE [SEDIMENTATION RATE] IN BLOOD BY WESTERGREN METHOD: 31 MM/HR (ref 0–36)
EST. GFR  (AFRICAN AMERICAN): >60 ML/MIN/1.73 M^2
EST. GFR  (NON AFRICAN AMERICAN): >60 ML/MIN/1.73 M^2
GLUCOSE SERPL-MCNC: 95 MG/DL (ref 70–110)
HCT VFR BLD AUTO: 38.6 % (ref 37–48.5)
HDLC SERPL-MCNC: 40 MG/DL (ref 40–75)
HDLC SERPL: 17.4 % (ref 20–50)
HGB BLD-MCNC: 12.2 G/DL (ref 12–16)
IMM GRANULOCYTES # BLD AUTO: 0.01 K/UL (ref 0–0.04)
IMM GRANULOCYTES NFR BLD AUTO: 0.2 % (ref 0–0.5)
LDLC SERPL CALC-MCNC: 173.4 MG/DL (ref 63–159)
LYMPHOCYTES # BLD AUTO: 2.4 K/UL (ref 1–4.8)
LYMPHOCYTES NFR BLD: 48.4 % (ref 18–48)
MCH RBC QN AUTO: 31 PG (ref 27–31)
MCHC RBC AUTO-ENTMCNC: 31.6 G/DL (ref 32–36)
MCV RBC AUTO: 98 FL (ref 82–98)
MONOCYTES # BLD AUTO: 0.5 K/UL (ref 0.3–1)
MONOCYTES NFR BLD: 9.5 % (ref 4–15)
NEUTROPHILS # BLD AUTO: 2 K/UL (ref 1.8–7.7)
NEUTROPHILS NFR BLD: 40.1 % (ref 38–73)
NONHDLC SERPL-MCNC: 190 MG/DL
NRBC BLD-RTO: 0 /100 WBC
PLATELET # BLD AUTO: 220 K/UL (ref 150–350)
PMV BLD AUTO: 10.1 FL (ref 9.2–12.9)
POTASSIUM SERPL-SCNC: 3.9 MMOL/L (ref 3.5–5.1)
PROT SERPL-MCNC: 7.2 G/DL (ref 6–8.4)
RBC # BLD AUTO: 3.93 M/UL (ref 4–5.4)
SODIUM SERPL-SCNC: 141 MMOL/L (ref 136–145)
TRIGL SERPL-MCNC: 83 MG/DL (ref 30–150)
WBC # BLD AUTO: 5.04 K/UL (ref 3.9–12.7)

## 2020-06-24 PROCEDURE — 82306 VITAMIN D 25 HYDROXY: CPT

## 2020-06-24 PROCEDURE — 86140 C-REACTIVE PROTEIN: CPT

## 2020-06-24 PROCEDURE — 80053 COMPREHEN METABOLIC PANEL: CPT

## 2020-06-24 PROCEDURE — 36415 COLL VENOUS BLD VENIPUNCTURE: CPT

## 2020-06-24 PROCEDURE — 85652 RBC SED RATE AUTOMATED: CPT

## 2020-06-24 PROCEDURE — 3008F PR BODY MASS INDEX (BMI) DOCUMENTED: ICD-10-PCS | Mod: CPTII,S$GLB,, | Performed by: PHYSICIAN ASSISTANT

## 2020-06-24 PROCEDURE — 99214 PR OFFICE/OUTPT VISIT, EST, LEVL IV, 30-39 MIN: ICD-10-PCS | Mod: S$GLB,,, | Performed by: PHYSICIAN ASSISTANT

## 2020-06-24 PROCEDURE — 3080F DIAST BP >= 90 MM HG: CPT | Mod: CPTII,S$GLB,, | Performed by: PHYSICIAN ASSISTANT

## 2020-06-24 PROCEDURE — 85025 COMPLETE CBC W/AUTO DIFF WBC: CPT

## 2020-06-24 PROCEDURE — 3077F SYST BP >= 140 MM HG: CPT | Mod: CPTII,S$GLB,, | Performed by: PHYSICIAN ASSISTANT

## 2020-06-24 PROCEDURE — 3080F PR MOST RECENT DIASTOLIC BLOOD PRESSURE >= 90 MM HG: ICD-10-PCS | Mod: CPTII,S$GLB,, | Performed by: PHYSICIAN ASSISTANT

## 2020-06-24 PROCEDURE — 80061 LIPID PANEL: CPT

## 2020-06-24 PROCEDURE — 99999 PR PBB SHADOW E&M-EST. PATIENT-LVL III: CPT | Mod: PBBFAC,,, | Performed by: PHYSICIAN ASSISTANT

## 2020-06-24 PROCEDURE — 99214 OFFICE O/P EST MOD 30 MIN: CPT | Mod: S$GLB,,, | Performed by: PHYSICIAN ASSISTANT

## 2020-06-24 PROCEDURE — 3008F BODY MASS INDEX DOCD: CPT | Mod: CPTII,S$GLB,, | Performed by: PHYSICIAN ASSISTANT

## 2020-06-24 PROCEDURE — 82164 ANGIOTENSIN I ENZYME TEST: CPT

## 2020-06-24 PROCEDURE — 3077F PR MOST RECENT SYSTOLIC BLOOD PRESSURE >= 140 MM HG: ICD-10-PCS | Mod: CPTII,S$GLB,, | Performed by: PHYSICIAN ASSISTANT

## 2020-06-24 PROCEDURE — 99999 PR PBB SHADOW E&M-EST. PATIENT-LVL III: ICD-10-PCS | Mod: PBBFAC,,, | Performed by: PHYSICIAN ASSISTANT

## 2020-06-24 RX ORDER — ERGOCALCIFEROL 1.25 MG/1
50000 CAPSULE ORAL
Qty: 12 CAPSULE | Refills: 4 | Status: SHIPPED | OUTPATIENT
Start: 2020-06-24 | End: 2020-12-30 | Stop reason: SDUPTHER

## 2020-06-24 NOTE — PATIENT INSTRUCTIONS
Cont off imuran     Will contact Dr. Casper office about cardiologist referral     Cont weekly vit D     See back in 6 months

## 2020-06-24 NOTE — Clinical Note
Patient seen in march, was supposed to get an echo and cards referral but she said covid happened and she never had what Dr. Casper had requested.  Can someone please follow up with her.

## 2020-06-26 ENCOUNTER — TELEPHONE (OUTPATIENT)
Dept: PULMONOLOGY | Facility: CLINIC | Age: 60
End: 2020-06-26

## 2020-06-26 DIAGNOSIS — J98.4 CHRONIC RESTRICTIVE LUNG DISEASE: Primary | ICD-10-CM

## 2020-06-26 DIAGNOSIS — D86.0 PULMONARY SARCOIDOSIS: ICD-10-CM

## 2020-06-26 LAB — ACE SERPL-CCNC: 20 U/L (ref 16–85)

## 2020-06-26 NOTE — TELEPHONE ENCOUNTER
----- Message from Shanon Atkinson MA sent at 6/26/2020  4:12 PM CDT -----  Can you put in order for Echo so I can call and schedule it with pt?   ----- Message -----  From: Christi Fish PA-C  Sent: 6/24/2020  10:46 AM CDT  To: Tremayne Atkinson Staff    Patient seen in march, was supposed to get an echo and cards referral but she said covid happened and she never had what Dr. Casper had requested.  Can someone please follow up with her.

## 2020-06-26 NOTE — TELEPHONE ENCOUNTER
Orders Placed This Encounter   Procedures    Echo Color Flow Doppler? Yes     Standing Status:   Future     Standing Expiration Date:   6/26/2021     Order Specific Question:   Color Flow Doppler?     Answer:   Yes

## 2020-07-13 ENCOUNTER — OFFICE VISIT (OUTPATIENT)
Dept: INTERNAL MEDICINE | Facility: CLINIC | Age: 60
End: 2020-07-13
Payer: COMMERCIAL

## 2020-07-13 ENCOUNTER — IMMUNIZATION (OUTPATIENT)
Dept: PHARMACY | Facility: CLINIC | Age: 60
End: 2020-07-13
Payer: COMMERCIAL

## 2020-07-13 VITALS
WEIGHT: 207 LBS | OXYGEN SATURATION: 100 % | TEMPERATURE: 97 F | DIASTOLIC BLOOD PRESSURE: 72 MMHG | HEART RATE: 61 BPM | SYSTOLIC BLOOD PRESSURE: 130 MMHG | BODY MASS INDEX: 35.34 KG/M2 | HEIGHT: 64 IN

## 2020-07-13 DIAGNOSIS — M15.9 GENERALIZED OSTEOARTHRITIS OF MULTIPLE SITES: Chronic | ICD-10-CM

## 2020-07-13 DIAGNOSIS — M79.7 FIBROMYALGIA: ICD-10-CM

## 2020-07-13 DIAGNOSIS — J98.4 CHRONIC RESTRICTIVE LUNG DISEASE: ICD-10-CM

## 2020-07-13 DIAGNOSIS — D86.0 PULMONARY SARCOIDOSIS: ICD-10-CM

## 2020-07-13 DIAGNOSIS — E78.00 PURE HYPERCHOLESTEROLEMIA: ICD-10-CM

## 2020-07-13 DIAGNOSIS — D84.9 IMMUNOCOMPROMISED: ICD-10-CM

## 2020-07-13 DIAGNOSIS — E55.9 VITAMIN D DEFICIENCY: ICD-10-CM

## 2020-07-13 DIAGNOSIS — I10 ESSENTIAL HYPERTENSION: Primary | ICD-10-CM

## 2020-07-13 PROCEDURE — 99999 PR PBB SHADOW E&M-EST. PATIENT-LVL IV: ICD-10-PCS | Mod: PBBFAC,,, | Performed by: NURSE PRACTITIONER

## 2020-07-13 PROCEDURE — 99214 OFFICE O/P EST MOD 30 MIN: CPT | Mod: S$GLB,,, | Performed by: NURSE PRACTITIONER

## 2020-07-13 PROCEDURE — 3075F SYST BP GE 130 - 139MM HG: CPT | Mod: CPTII,S$GLB,, | Performed by: NURSE PRACTITIONER

## 2020-07-13 PROCEDURE — 3075F PR MOST RECENT SYSTOLIC BLOOD PRESS GE 130-139MM HG: ICD-10-PCS | Mod: CPTII,S$GLB,, | Performed by: NURSE PRACTITIONER

## 2020-07-13 PROCEDURE — 3078F DIAST BP <80 MM HG: CPT | Mod: CPTII,S$GLB,, | Performed by: NURSE PRACTITIONER

## 2020-07-13 PROCEDURE — 3008F PR BODY MASS INDEX (BMI) DOCUMENTED: ICD-10-PCS | Mod: CPTII,S$GLB,, | Performed by: NURSE PRACTITIONER

## 2020-07-13 PROCEDURE — 3008F BODY MASS INDEX DOCD: CPT | Mod: CPTII,S$GLB,, | Performed by: NURSE PRACTITIONER

## 2020-07-13 PROCEDURE — 3078F PR MOST RECENT DIASTOLIC BLOOD PRESSURE < 80 MM HG: ICD-10-PCS | Mod: CPTII,S$GLB,, | Performed by: NURSE PRACTITIONER

## 2020-07-13 PROCEDURE — 99214 PR OFFICE/OUTPT VISIT, EST, LEVL IV, 30-39 MIN: ICD-10-PCS | Mod: S$GLB,,, | Performed by: NURSE PRACTITIONER

## 2020-07-13 PROCEDURE — 99999 PR PBB SHADOW E&M-EST. PATIENT-LVL IV: CPT | Mod: PBBFAC,,, | Performed by: NURSE PRACTITIONER

## 2020-07-13 NOTE — PROGRESS NOTES
Subjective:       Patient ID: Janna Sheffield is a 59 y.o. female.    Chief Complaint: Follow-up (6mth f/u)    HPI    HTN: No home B/P monitoring. No HTNive symptoms. NOT compliant with BP med  Hypercholesterol: NOT compliant with statin. Has gained weight  Fibromyalgia:Gabapentin working  GERD: pantoprazole is helping but still nauseated. S/P choley.  LIPIDS NOT following D&E, not taking statin every day, taking maybe 2-3 x a week if that. Admits to being more sedentary due to covid, only working 2 days per week currently  Vit D: on supp, but takes sporadically   Sarcoid/fibromyalgia/immunocompromised: seeing pulmonary and rheum      LABS REVIEWED AND DISCUSSED WITH PATIENT      Review of Systems   Constitutional: Negative for activity change, appetite change, chills, diaphoresis, fatigue, fever and unexpected weight change.   HENT: Negative for congestion, ear pain, postnasal drip, rhinorrhea, sinus pressure, sinus pain, sneezing, sore throat, tinnitus, trouble swallowing and voice change.         Dry mouth    Eyes: Negative for photophobia, pain and visual disturbance.   Respiratory: Negative for cough, chest tightness, shortness of breath and wheezing.    Cardiovascular: Negative for chest pain, palpitations and leg swelling.   Gastrointestinal: Negative for abdominal distention, abdominal pain, constipation, diarrhea, nausea and vomiting.   Genitourinary: Negative for decreased urine volume, difficulty urinating, dysuria, flank pain, frequency, hematuria and urgency.   Musculoskeletal: Negative for arthralgias, back pain, joint swelling, neck pain and neck stiffness.   Allergic/Immunologic: Negative for immunocompromised state.   Neurological: Negative for dizziness, tremors, seizures, syncope, facial asymmetry, speech difficulty, weakness, light-headedness, numbness and headaches.   Hematological: Negative for adenopathy. Does not bruise/bleed easily.   Psychiatric/Behavioral: Negative for confusion and  sleep disturbance.       Objective:      Physical Exam  HENT:      Head: Normocephalic and atraumatic.      Right Ear: Tympanic membrane normal.      Left Ear: Tympanic membrane normal.   Eyes:      Conjunctiva/sclera: Conjunctivae normal.   Neck:      Musculoskeletal: Normal range of motion and neck supple.   Cardiovascular:      Rate and Rhythm: Normal rate and regular rhythm.      Heart sounds: Normal heart sounds.   Pulmonary:      Effort: Pulmonary effort is normal.      Breath sounds: Normal breath sounds.   Abdominal:      General: Bowel sounds are normal.      Palpations: Abdomen is soft.   Musculoskeletal: Normal range of motion.   Skin:     General: Skin is warm and dry.   Neurological:      Mental Status: She is alert and oriented to person, place, and time.         Assessment:     Vitals:    07/13/20 1025   BP: 130/72   Pulse: 61   Temp: 96.8 °F (36 °C)         1. Essential hypertension    2. Pure hypercholesterolemia    3. Chronic restrictive lung disease    4. Pulmonary sarcoidosis    5. Immunocompromised    6. Vitamin D deficiency    7. Generalized osteoarthritis of multiple sites    8. Fibromyalgia        Plan:   Essential hypertension  -     TSH; Future; Expected date: 01/13/2021    Pure hypercholesterolemia  -     Lipid Panel; Future; Expected date: 01/13/2021  -     Comprehensive metabolic panel; Future; Expected date: 01/13/2021    Chronic restrictive lung disease    Pulmonary sarcoidosis    Immunocompromised    Vitamin D deficiency  -     Vitamin D; Future; Expected date: 01/13/2021    Generalized osteoarthritis of multiple sites    Fibromyalgia      Still having dry mouth. Discussed sugar free lemon drops/saliva substitute, changing from hctz to ARB (not quite ready to do switch)  Diet, weight loss, exercise discussed in detail- print outs on healthy carb/low fat diet given  Med compliance discussed/ASCVD risk  [facility] pharmacy  F/u in 6 months

## 2020-07-13 NOTE — PATIENT INSTRUCTIONS
Low-Fat Diet    A low-fat diet will help you lose weight. It also can lower cholesterol and prevent symptoms of gallbladder disease. The average American diet contains up to 50% fat. This means that 50% of all calories come from fat (about 80 grams to 100 grams of fat per day). Choosing normal portions of foods from the list below can help lower your fat intake. Experts recommend that only 20% to 35% of your daily calories come from fat. The remaining 65% to 80% of calories will come from protein and carbohydrates. This is much healthier for you.  Breads  Ok: Whole-wheat or rye bread, isela or soda crackers, yuliana toast, plain rolls, bagels, English muffins  Avoid: Rolls and breads containing whole milk or egg; waffles, pancakes, biscuits, corn bread; cheese crackers, other flavored crackers, pastries, doughnuts  Cereals  Ok: Oatmeal, whole-wheat, bran, multigrain, rice  Avoid: Granola or other cereals that have oil, coconut, or more than 2 grams of fat per serving  Cheese and eggs  Ok: Cheeses labeled low-fat; 3 whole eggs per week; egg whites and egg substitutes as desired  Avoid: All other cheeses  Desserts  Ok: Gelatin, slushy, nilam food cake, meringues, nonfat yogurt, and puddings or sherbet made with nonfat milk  Avoid: Any other store-bought desserts, or desserts that have fat, whole milk, cream, chocolate, and coconut  Drinks  Ok: Nonfat milk, coffee, tea, fizzy (carbonated) drinks  Avoid: Whole and reduced-fat milk, evaporated and condensed milk, hot chocolate mixes, milk shakes, malts, eggnog  Fats  Ok: You may have up to 3 teaspoons of fat daily. This can be butter, margarine, mayonnaise, or healthy oils (canola or olive)  Avoid: Cream, nondairy creams, cream cheese, gravies, and cream sauces  Fruits  Ok: All fruits made without fat  Avoid: Coconut, olives  Meats, poultry, fish  Ok: Limit meat to 6 ounces daily (broiled, roasted, baked, grilled, or boiled). Buy lean cuts, and trim off the fat. Try  beef, fish, lamb, pork, and canned fish packed in water; also chicken and turkey with the skin removed.  Avoid: Fried meats, fish, or poultry; fried eggs, and fish canned in oils; fatty meats such as multani, sausage, corned beef, hot dogs, and lunch meats; meats with gravies and sauces  Potatoes, beans, pasta  Ok: Dried beans, split peas, lentils, potatoes, rice, pasta made without added fat  Avoid: French fries, potato chips, potatoes prepared with butter, refried beans  Soups  Ok: Clear broth soups without fat and with allowed vegetables  Avoid: Cream-based soups  Vegetables  Ok: Fresh, frozen, canned or dried vegetables, all made without added fat  Avoid: Fried vegetables and those prepared with butter, cream, sauces  Miscellaneous  Ok: Salt, sugar, jelly, hard candy, marshmallows, honey, syrup, spices and herbs, mustard, ketchup, lemon, and vinegar. Try to limit sweets and added sugars.  Avoid: Chocolate, nuts, coconut, and cream candies; sunflower, sesame, and other seeds; fried foods; cream sauces and gravies; pizza  Date Last Reviewed: 8/1/2016 © 2000-2017 [employer]. [address] [place] [address]. All rights reserved. This information is not intended as a substitute for professional medical care. Always follow your healthcare professional's instructions.        Low-Cholesterol Diet  Your body needs cholesterol to build new cells and create certain hormones. There are 2 kinds of cholesterol in your blood:     · HDL (good) cholesterol. This prevents fat deposits (plaque) from building up in your arteries. In this way it protects against heart disease and stroke.  · LDL (bad) cholesterol. This stays in your body and sticks to artery walls. Over time it may block blood flow to the heart and brain. This can cause a heart attack or stroke.  The cholesterol in your blood comes from 2 sources: cholesterol in food that you eat and cholesterol that your liver makes. You should limit the  amount of cholesterol in your diet. But the cholesterol that your body makes has the greatest disease risk. And your body makes more cholesterol when your diet is high in bad fats (saturated and trans fats). There are 2 kinds of fats you can eat:  · Good fats, or unsaturated fats (mono-unsaturated and poly-unsaturated). They raise the level of good cholesterol and lower the level of bad cholesterol. Good fats are found in vegetable oils such as olive, sunflower, corn, and soybean oils, and in nuts and seeds.  · Bad fats, or saturated fats (including foods high in cholesterol) and trans fats. These raise your risk of disease. They lower the good cholesterol and raise the level of bad cholesterol. Bad fats are found in animal products, including meat, whole-milk dairy products, and butter. Some plants are also high in bad fats (coconut and palm plants). Trans fats are found in hard (stick) margarines. They are also in many fast foods and commercially baked goods. Soft margarine sold in tubs has fewer trans fats and is safer to use.  High blood cholesterol is usually due to a diet high in saturated fat, along with not being physically active. In some cases, genetics plays a role in causing high cholesterol. The tips below will help you create healthy eating habits that will help lower your blood cholesterol level.  Create a diet high in good fats, low in bad fats (and low in cholesterol)  The following steps will help you create a diet high in good fats and low in bad fats:  · Talk with your doctor before starting a low cholesterol diet or weight loss program.  · Learn to read nutrition labels and select appropriate portion sizes.  · When cooking, use plant-based unsaturated vegetable oils (sunflower, corn, soybean, canola, peanut, and olive oils).  · Avoid saturated fats found in animal products such as meat, dairy (whole-milk, cheese and ice cream), poultry skin, and egg yolks. Plants high in saturated oils include  coconut oil, palm oil, and palm kernel oil.  · If you eat meat, choose smaller portions and lean cuts, such as round, kristine, sirloin, or loin. Eat more meatless meals.  · Replace meat with fish at least 2 times a week. Fish is an important source of the unsaturated fat called omega-3 fatty acids. This fat has potential to lower the risk of heart disease.  · Replace whole-milk dairy products with low-fat or nonfat products. Try soy products. Soy helps to reduce total cholesterol.  · Supplement your diet with protective fibers. Eat nuts, seeds, and whole grains rather than white rice and bread. These foods lower both cholesterol and triglyceride levels. (Triglycerides are another fat found in the blood.) Walnuts are one of the best sources of omega-3 fatty acids.  · Eat plenty of fresh fruits and vegetables daily.  · Avoid fast foods and commercial baked goods. Assume they contain saturated fat unless labeled otherwise.  Date Last Reviewed: 8/1/2016  © 4902-3315 [employer]. [address] [place] [address]. All rights reserved. This information is not intended as a substitute for professional medical care. Always follow your healthcare professional's instructions.        Adding Flavor to Low-Fat Meals    There are endless ways to add more variety and flavor to your diet. You dont need salt or high-fat additions.  · Keep plenty of fresh fruit on hand. Try adding it to your main dishes. For example, peaches go well with chicken. They can be very flavorful in casseroles or with roasted poultry. Bananas or raisins add flavor to koehler dishes with a Sylvester theme.  · Buy fruits and vegetables you havent tried before. Often, recipes or suggestions for their use will be listed in the produce section at the grocery store. This is often the case with more exotic or rare foods. Perrpers can add sweet or hot characteristics to your dish.  · Go to the cookbook section at the bookstore or library. Many  of the unique flavors we associate with U[place], , or Sylvester dishes come from the seasonings used in their recipes. Try one new recipe a week. Youll soon know what spices to add to a dish to give it the taste of exotic places.  · Marinate meats, poultry, and fish before grilling or baking. Try mixtures of wine, fruit juice, low-sodium tomato juice, vinegar, lemon juice, herbs, and spices. Be aware that soy sauce and teriyaki sauce are high in sodium. Use low-sodium versions, and use less of them. Ginger, dry mehran, and sesame seeds can add Asian flavors to foods.  · High-heat cooking. Consider cooking meat, poultry and fish by pan-searing, grilling, or broiling. These methods use high-heat and add flavor.  · Hit the juice. Add citrus juice or peel to help boost flavor.  · Happy it up. Grilling vegetables add a different layer of flavor than other cooking methods.  Date Last Reviewed: 6/8/2015  © 6470-3630 [employer]. [address] [place] [address]. All rights reserved. This information is not intended as a substitute for professional medical care. Always follow your healthcare professional's instructions.        Healthy Eating on the Go    Wherever your family goes, healthy eating can still be easy for you and fun for your kids. Pack sliced vegetables, fruits, and nonfat or low-fat dips in plastic bags or containers. Make fun and easy snacks like celery with peanut butter and raisins. Bring plenty of bottled water. Anywhere you go, you'll be ready when you and your child feel hungry.  At a fast-food restaurant  Eating healthy at fast-food restaurants means choosing the right foods.  · Instead of fried foods, try grilled meats like chicken and fish. Look for baked potatoes topped with vegetables or salads. Order low-fat or nonfat milk instead of soda. Get fruit or yogurt instead of milkshakes or cookies.  · If your child isn't ready for you to stop ordering French fries, get one  "serving for everyone to share. This gives everyone a taste without making French fries the center of the meal.  · Lead by example. Make healthy choices for yourself. Kids watch how and what you eat. They'll be more likely to eat healthy foods that you eat, too.  At the store  Do you shop at corner markets or convenience stores? You can still find healthy foods for your family. Look for:  · Canned vegetables and fruits (packed in water, not heavy syrup). If you have to buy fruit packed in syrup, rinse the fruit with water and throw away the syrup.  · Whole grain products like brown rice, corn tortillas, and whole-wheat breads. Look for the words "whole grain" on the package, not just "wheat."  · Good sources of protein like canned beans, tuna canned in water, eggs, low-fat or nonfat milk, and low-fat or nonfat cheese and yogurt.  · Avoid the snack foods! Don't be drawn in by all the chips, candy, soda, and sugar-filled cereals.  Date Last Reviewed: 6/4/2015  © 5677-5363 [employer]. [address] [place] [address], [place] [address]. All rights reserved. This information is not intended as a substitute for professional medical care. Always follow your healthcare professional's instructions.      Dry mouth  -artificial saliva  -sugar free lemon drops  - let us know if you would like to change your blood pressure medication which is a diuretic and can cause dry mouth    PLEASE TAKE YOUR BLOOD PRESSURE MEDICATION AND CHOLESTEROL MEDICATION EVERY DAY  "

## 2020-07-21 ENCOUNTER — HOSPITAL ENCOUNTER (OUTPATIENT)
Dept: CARDIOLOGY | Facility: HOSPITAL | Age: 60
Discharge: HOME | End: 2020-07-21
Attending: INTERNAL MEDICINE
Payer: COMMERCIAL

## 2020-07-21 VITALS
WEIGHT: 207 LBS | BODY MASS INDEX: 35.34 KG/M2 | HEIGHT: 64 IN | HEART RATE: 75 BPM | DIASTOLIC BLOOD PRESSURE: 70 MMHG | SYSTOLIC BLOOD PRESSURE: 130 MMHG

## 2020-07-21 DIAGNOSIS — J98.4 CHRONIC RESTRICTIVE LUNG DISEASE: ICD-10-CM

## 2020-07-21 DIAGNOSIS — D86.0 PULMONARY SARCOIDOSIS: ICD-10-CM

## 2020-07-21 LAB
AORTIC ROOT ANNULUS: 2.6 CM
AV INDEX (PROSTH): 0.81
AV MEAN GRADIENT: 4 MMHG
AV PEAK GRADIENT: 7 MMHG
AV VALVE AREA: 2.53 CM2
AV VELOCITY RATIO: 0.78
BSA FOR ECHO PROCEDURE: 2.06 M2
CV ECHO LV RWT: 0.47 CM
DOP CALC AO PEAK VEL: 1.31 M/S
DOP CALC AO VTI: 26.09 CM
DOP CALC LVOT AREA: 3.1 CM2
DOP CALC LVOT DIAMETER: 2 CM
DOP CALC LVOT PEAK VEL: 1.02 M/S
DOP CALC LVOT STROKE VOLUME: 66.07 CM3
DOP CALC RVOT PEAK VEL: 0.85 M/S
DOP CALC RVOT VTI: 19.05 CM
DOP CALCLVOT PEAK VEL VTI: 21.04 CM
E WAVE DECELERATION TIME: 178.12 MSEC
E/A RATIO: 0.9
E/E' RATIO: 9.86 M/S
ECHO LV POSTERIOR WALL: 0.98 CM (ref 0.6–1.1)
FRACTIONAL SHORTENING: 37 % (ref 28–44)
INTERVENTRICULAR SEPTUM: 1.09 CM (ref 0.6–1.1)
IVRT: 110.73 MSEC
LA MAJOR: 4.21 CM
LA MINOR: 3.67 CM
LA WIDTH: 3.45 CM
LEFT ATRIUM SIZE: 3.61 CM
LEFT ATRIUM VOLUME INDEX: 20.9 ML/M2
LEFT ATRIUM VOLUME: 41.51 CM3
LEFT INTERNAL DIMENSION IN SYSTOLE: 2.6 CM (ref 2.1–4)
LEFT VENTRICLE DIASTOLIC VOLUME INDEX: 38.51 ML/M2
LEFT VENTRICLE DIASTOLIC VOLUME: 76.43 ML
LEFT VENTRICLE MASS INDEX: 71 G/M2
LEFT VENTRICLE SYSTOLIC VOLUME INDEX: 12.4 ML/M2
LEFT VENTRICLE SYSTOLIC VOLUME: 24.68 ML
LEFT VENTRICULAR INTERNAL DIMENSION IN DIASTOLE: 4.15 CM (ref 3.5–6)
LEFT VENTRICULAR MASS: 141.35 G
LV LATERAL E/E' RATIO: 8.63 M/S
LV SEPTAL E/E' RATIO: 11.5 M/S
MV PEAK A VEL: 0.77 M/S
MV PEAK E VEL: 0.69 M/S
MV STENOSIS PRESSURE HALF TIME: 51.66 MS
MV VALVE AREA P 1/2 METHOD: 4.26 CM2
PISA TR MAX VEL: 2.4 M/S
PULM VEIN S/D RATIO: 1.42
PV MEAN GRADIENT: 1.49 MMHG
PV PEAK D VEL: 0.31 M/S
PV PEAK S VEL: 0.44 M/S
PV PEAK VELOCITY: 0.83 CM/S
RA MAJOR: 4.17 CM
RA PRESSURE: 3 MMHG
RA WIDTH: 3.01 CM
RIGHT VENTRICULAR END-DIASTOLIC DIMENSION: 3.12 CM
SINUS: 2.95 CM
STJ: 3.05 CM
TDI LATERAL: 0.08 M/S
TDI SEPTAL: 0.06 M/S
TDI: 0.07 M/S
TR MAX PG: 23 MMHG
TRICUSPID ANNULAR PLANE SYSTOLIC EXCURSION: 1.91 CM
TV REST PULMONARY ARTERY PRESSURE: 26 MMHG

## 2020-07-21 PROCEDURE — 93306 TTE W/DOPPLER COMPLETE: CPT

## 2020-07-21 PROCEDURE — 93306 ECHO (CUPID ONLY): ICD-10-PCS | Mod: 26,,, | Performed by: INTERNAL MEDICINE

## 2020-07-21 PROCEDURE — 93306 TTE W/DOPPLER COMPLETE: CPT | Mod: 26,,, | Performed by: INTERNAL MEDICINE

## 2020-07-24 ENCOUNTER — TELEPHONE (OUTPATIENT)
Dept: INTERNAL MEDICINE | Facility: CLINIC | Age: 60
End: 2020-07-24

## 2020-07-24 NOTE — TELEPHONE ENCOUNTER
----- Message from Ioana Castro sent at 7/24/2020  9:04 AM CDT -----  Contact: pt  The pt request a call concerning her COVID test, no orders in the system, the pt want sot be advised and can be reached at [phone]///[id number]

## 2020-07-24 NOTE — TELEPHONE ENCOUNTER
S/w pt stating she was advised by work [school] that employees needed to have COVID-19 swab test completed, pt denies s/s at this time. Advised pt d/t high testing volume at this time, per [facility] and protocols, we are not testing asymp pt's, we are referring pt's to community testing sites which pt can find at website listed below. Pt voiced understanding, confirmed isntrx and website given, and to CB PRN.    [email or web address]

## 2020-10-06 ENCOUNTER — IMMUNIZATION (OUTPATIENT)
Dept: PHARMACY | Facility: CLINIC | Age: 60
End: 2020-10-06
Payer: COMMERCIAL

## 2020-10-20 ENCOUNTER — CLINICAL SUPPORT (OUTPATIENT)
Dept: INTERNAL MEDICINE | Facility: CLINIC | Age: 60
End: 2020-10-20
Payer: COMMERCIAL

## 2020-10-20 ENCOUNTER — TELEPHONE (OUTPATIENT)
Dept: INTERNAL MEDICINE | Facility: CLINIC | Age: 60
End: 2020-10-20

## 2020-10-20 DIAGNOSIS — Z23 NEED FOR IMMUNIZATION AGAINST INFLUENZA: Primary | ICD-10-CM

## 2020-10-20 PROCEDURE — 90686 IIV4 VACC NO PRSV 0.5 ML IM: CPT | Mod: S$GLB,,, | Performed by: PEDIATRICS

## 2020-10-20 PROCEDURE — 90686 FLU VACCINE (QUAD) GREATER THAN OR EQUAL TO 3YO PRESERVATIVE FREE IM: ICD-10-PCS | Mod: S$GLB,,, | Performed by: PEDIATRICS

## 2020-10-20 PROCEDURE — 90471 FLU VACCINE (QUAD) GREATER THAN OR EQUAL TO 3YO PRESERVATIVE FREE IM: ICD-10-PCS | Mod: S$GLB,,, | Performed by: PEDIATRICS

## 2020-10-20 PROCEDURE — 90471 IMMUNIZATION ADMIN: CPT | Mod: S$GLB,,, | Performed by: PEDIATRICS

## 2020-10-20 NOTE — TELEPHONE ENCOUNTER
----- Message from Flor Fenton sent at 10/20/2020  9:02 AM CDT -----  Regarding: flu shot clarity  Good morning,      Pt would like to know if you want her to take flu shot now or wait till appt in Jan. She can be reached at [phone]      Thanks,  Flor Fenton

## 2020-10-20 NOTE — TELEPHONE ENCOUNTER
S/w pt and confirmed flu vaccines available in clinic, pt confirmed [facility] appt sched today for flu vaccine same day as pt's 's appt sched w/ Dr. Botello today.

## 2020-10-22 NOTE — PROGRESS NOTES
Pt came in for flu shot, administered vaccine, pt tolerated well and advised wait 15 min, verbalized understanding

## 2020-12-03 ENCOUNTER — TELEPHONE (OUTPATIENT)
Dept: GASTROENTEROLOGY | Facility: CLINIC | Age: 60
End: 2020-12-03

## 2020-12-03 NOTE — TELEPHONE ENCOUNTER
----- Message from Alondra Douglas sent at 12/3/2020  9:41 AM CST -----  Regarding: refill denial  Type:  RX Refill Request    Who Called: pt  Refill or New Rx:refill  RX Name and Strength:pantoprazole (PROTONIX) 40 MG tablet  How is the patient currently taking it? (ex. 1XDay):once a day  Is this a 30 day or 90 day RX: 30 day   Preferred Pharmacy with phone number:  [phone] - [place] - [address]   [address]  [place] [address]  Phone: [phone] Fax: [phone]  Local or Mail Order:local  Ordering Provider: Home  Would the patient rather a call back or a response via [facility]? Call back   Best Call Back Number:[phone]  Additional Information: Please call back Thanks

## 2020-12-03 NOTE — TELEPHONE ENCOUNTER
Spoke with patient and scheduled her for an appointment with MARIBEL Bhat NP on 12/10/20 @ 9:00am at [facility].

## 2020-12-10 ENCOUNTER — OFFICE VISIT (OUTPATIENT)
Dept: GASTROENTEROLOGY | Facility: CLINIC | Age: 60
End: 2020-12-10
Payer: COMMERCIAL

## 2020-12-10 VITALS — HEIGHT: 64 IN | BODY MASS INDEX: 35.53 KG/M2

## 2020-12-10 DIAGNOSIS — K21.9 GASTROESOPHAGEAL REFLUX DISEASE WITHOUT ESOPHAGITIS: ICD-10-CM

## 2020-12-10 DIAGNOSIS — K44.9 HIATAL HERNIA: Primary | ICD-10-CM

## 2020-12-10 PROCEDURE — 99213 OFFICE O/P EST LOW 20 MIN: CPT | Mod: 95,,, | Performed by: PHYSICIAN ASSISTANT

## 2020-12-10 PROCEDURE — 3008F PR BODY MASS INDEX (BMI) DOCUMENTED: ICD-10-PCS | Mod: CPTII,,, | Performed by: PHYSICIAN ASSISTANT

## 2020-12-10 PROCEDURE — 3008F BODY MASS INDEX DOCD: CPT | Mod: CPTII,,, | Performed by: PHYSICIAN ASSISTANT

## 2020-12-10 PROCEDURE — 99213 PR OFFICE/OUTPT VISIT, EST, LEVL III, 20-29 MIN: ICD-10-PCS | Mod: 95,,, | Performed by: PHYSICIAN ASSISTANT

## 2020-12-10 RX ORDER — PANTOPRAZOLE SODIUM 20 MG/1
20 TABLET, DELAYED RELEASE ORAL DAILY
Qty: 30 TABLET | Refills: 1 | Status: SHIPPED | OUTPATIENT
Start: 2020-12-10 | End: 2021-06-08

## 2020-12-10 NOTE — PROGRESS NOTES
Subjective:      Patient ID: Janna Sheffield is a 59 y.o. female.    Chief Complaint: Medication Refill (Protonix)    The patient location is: [place]  The chief complaint leading to consultation is: Med refill for GERD    Visit type: audiovisual    Face to Face time with patient: 15 minutes  10 minutes of total time spent on the encounter, which includes face to face time and non-face to face time preparing to see the patient (eg, review of tests), Obtaining and/or reviewing separately obtained history, Documenting clinical information in the electronic or other health record, Independently interpreting results (not separately reported) and communicating results to the patient/family/caregiver, or Care coordination (not separately reported).         Each patient to whom he or she provides medical services by telemedicine is:  (1) informed of the relationship between the physician and patient and the respective role of any other health care provider with respect to management of the patient; and (2) notified that he or she may decline to receive medical services by telemedicine and may withdraw from such care at any time.    Notes:     HPI  The patient has a history of acid reflux and constipation. She was last seen by Demetria Bhat January 2019. Today she presents requesting a refill of Protonix. She is taking 40 mg daily. When asked why she takes it, she was unsure. She said it was prescribed last visit. The patient denies nausea, vomiting, change in appetite, weight loss, abdominal pain or dysphagia. She rarely has heartburn.     EGD (02/06/19): showed a small hiatal hernia, gastric polyps with normal biopsies.     Review of Systems  As per HPI.     Objective:     Physical Exam  Constitutional:       General: She is not in acute distress.     Appearance: She is well-developed.   HENT:      Head: Normocephalic and atraumatic.   Pulmonary:      Effort: Pulmonary effort is normal.   Neurological:      Mental Status:  She is alert and oriented to person, place, and time.      Cranial Nerves: No cranial nerve deficit.   Psychiatric:         Behavior: Behavior normal.         Assessment:     1. Hiatal hernia    2. Gastroesophageal reflux disease without esophagitis        Plan:     The patient's EGD only showed a hiatal hernia. No findings requiring PPI therapy. Given her current history, I would recommend decreasing Protonix to 20 mg daily. If after a month or two she is not having symptoms, I would recommend she try stopping it. If symptoms return, I will restart it at the lost effective dose.   She reports understanding.     No orders of the defined types were placed in this encounter.      No follow-ups on file.    Thank you for the opportunity to participate in the care of this patient.   Rufino Camp PA-C.

## 2020-12-29 ENCOUNTER — TELEPHONE (OUTPATIENT)
Dept: RHEUMATOLOGY | Facility: CLINIC | Age: 60
End: 2020-12-29

## 2020-12-29 NOTE — PROGRESS NOTES
Subjective:       Patient ID: Janna Sheffield is a 60 y.o. female.    Chief Complaint: Sarcoidosis, osteoarthritis    Mrs. Sheffield is a 59y/o AAF with history of osteoarthritis and sarcoidosis. Sarcoidosis was diagnosed in 1985 through a biopsy of a spot on her liver. 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     She was on imuran 50mg every other day but her sarcoid seemed to be in remission.  Lungs stable. She does have restrictive lung disease and SOB when she is more active but no worsening. She sees Dr. Casper.  We dc'd imuran in august 2019 (suggested by pulm initially) with no new or worsening symtpoms.  Last visit with pulm was march 2020, cxr and pfts stable, echo stable     She has low vitamin D and started on 50Kunits weekly improved to now normal level. No issues with joint swelling, rash, worsening sob, cp, eye issues. She has mao knee pain with a TKA on the left, uses topical nsaid which helps     She has generalized osteoarthritis with radiating neck pain. MRI showed disc disease and degenerative arthritis. She was sent to pain mgt for ESIs which helped. She also is taking gabapentin at night 3-600mg occasionally.  Tramadol occasionally helps   no complaints today. No cp, sob, rashes, joint swelling       Review of Systems   Constitutional: Negative for chills, fatigue and fever.   HENT: Negative for mouth sores, rhinorrhea and sore throat.    Eyes: Negative for pain and redness.        Dry eyes   Respiratory: Positive for shortness of breath. Negative for cough.    Cardiovascular: Negative for chest pain.   Gastrointestinal: Negative for abdominal pain, constipation, diarrhea, nausea and vomiting.   Genitourinary: Negative for dysuria and hematuria.   Musculoskeletal: Positive for arthralgias, back pain and neck pain. Negative for joint swelling and myalgias.        Left knee replacement   Skin: Negative for rash.   Neurological:  "Negative for weakness, numbness and headaches.   Psychiatric/Behavioral: The patient is not nervous/anxious.            Objective:   /78   Pulse 71   Ht 5' 4" (1.626 m)   Wt 92.9 kg (204 lb 12.9 oz)   BMI 35.16 kg/m²      Physical Exam   Constitutional: She is oriented to person, place, and time and well-developed, well-nourished, and in no distress.   HENT:   Head: Normocephalic and atraumatic.   Eyes: Pupils are equal, round, and reactive to light. Right eye exhibits no discharge.   Neck: Normal range of motion.   Cardiovascular: Normal rate, regular rhythm and normal heart sounds.  Exam reveals no friction rub.    Pulmonary/Chest: Effort normal and breath sounds normal. No respiratory distress.   Abdominal: Soft. She exhibits no distension. There is no abdominal tenderness.   Lymphadenopathy:     She has no cervical adenopathy.   Neurological: She is alert and oriented to person, place, and time.   Skin: No rash noted. No erythema.     Psychiatric: Mood normal.   Musculoskeletal: Normal range of motion. No deformity or edema.      Comments: mao wrists, mcps, pips no synovitis, no tenderness, full rom, grib strength equal bilaterally  Left knee s/p replacement, no effusion, no warmth  Right knee no effusion, no warmth, full rom         Recent Results (from the past 672 hour(s))   CBC auto differential    Collection Time: 12/30/20 10:15 AM   Result Value Ref Range    WBC 5.64 3.90 - 12.70 K/uL    RBC 4.11 4.00 - 5.40 M/uL    Hemoglobin 12.7 12.0 - 16.0 g/dL    Hematocrit 40.2 37.0 - 48.5 %    MCV 98 82 - 98 fL    MCH 30.9 27.0 - 31.0 pg    MCHC 31.6 (L) 32.0 - 36.0 g/dL    RDW 13.2 11.5 - 14.5 %    Platelets 236 150 - 350 K/uL    MPV 10.2 9.2 - 12.9 fL    Immature Granulocytes 0.2 0.0 - 0.5 %    Gran # (ANC) 2.7 1.8 - 7.7 K/uL    Immature Grans (Abs) 0.01 0.00 - 0.04 K/uL    Lymph # 2.4 1.0 - 4.8 K/uL    Mono # 0.5 0.3 - 1.0 K/uL    Eos # 0.1 0.0 - 0.5 K/uL    Baso # 0.02 0.00 - 0.20 K/uL    nRBC 0 0 /100 " WBC    Gran % 46.9 38.0 - 73.0 %    Lymph % 42.9 18.0 - 48.0 %    Mono % 8.5 4.0 - 15.0 %    Eosinophil % 1.1 0.0 - 8.0 %    Basophil % 0.4 0.0 - 1.9 %    Differential Method Automated        CTA chest 8/24/17  Impression:       Negative for pulmonary embolus, aneurysm or dissection.  Lungs are clear.  No evidence of aortic dissection.  No evidence of aneurysm or retroperitoneal hematoma.  Small umbilical hernia containing fat.  Hernia involving the right anterolateral abdominal wall containing fat.    Cspine xr:   The vertebral bodies demonstrate normal height.  The alignment is within normal limits. There is moderate displaced narrowing and spondylosis present at the C4-C5 through the C6-C7 levels. The C1-C2 articulation is within normal limits. No prevertebral soft tissue swelling.    MRI C spine:   Findings: There is straightening of the cervical lordosis.  Degenerative vertebral endplate spurring and disc desiccation present at multiple levels with moderate disc space narrowing from C4-C5 through C6-C7.  Vertebral marrow signal pattern and architecture are normal.  Cervical cord and craniocervical junction are intact.  Paravertebral soft tissues are symmetric and normal in appearance.  C2-C3: Unremarkable.  C3-C4: Unremarkable.  C4-C5: Posterior disc osteophyte complex and bilateral uncovertebral hypertrophy resulting in moderate left greater than right foraminal narrowing.  Indentation of ventral thecal sac with associated anterior cord contact and mild central canal stenosis.  Minimum AP canal diameter is 9.0 mm.  C5-C6: Posterior disc osteophyte complex and asymmetric left greater than right uncovertebral hypertrophy producing marked left and moderate right foraminal narrowing.  Mild indentation of the ventral thecal sac without significant canal stenosis.  C6-C7: Posterior disc osteophyte complex and uncovertebral hypertrophy producing moderate to marked left greater than right foraminal narrowing.  Mild  "indentation of the ventral thecal sac without significant canal stenosis.  C7-T1: Unremarkable.     6MW Test  Height: 5' 5" (165.1 cm)  Weight: 90.7 kg (200 lb)  BMI (Calculated): 33.4  Predicted Distance: 331.44  Interpretation  Predicted Distance Meters (Calculated): 472.37 meters  SpO2 bjorn was 98%  Distance was 350.52 m  148.5% predicted     PFT:  FEv1, FVC improved  FEV1: 1.57l( 69.4%), FVC 1.97( 69.0%)  FEV1/FVC 80  TLC: 3.59l( 70.4%)  DLCo: 11.29 ( 47.8%)    pft 3/2020  Mild restriction. (TLC< LLN and > or equal to 70% of predicted).  Moderate reduction in diffusion capacity - unadjusted for hemoglobin. (DLCO > or equal to 40% and < 60% predicted).    Echo 7.21.20  · Normal left ventricular systolic function. The estimated ejection fraction is 65%.  · Normal right ventricular systolic function.  · Normal LV diastolic function.  · The estimated PA systolic pressure is 26 mmHg.    Assessment:       1. Sarcoidosis    2. Cervical radiculopathy    3. Chronic restrictive lung disease    4. Vitamin D deficiency        Impression:  Sarcoidosis: stopped Imuran 50mg every other day (8/2019-after pulm rec'd) no evidence of activity, remains stable in remission     Chronic restrictive lung disease: stable recently occasional SOB;  sees dr. Casper, last CT of chest 7/2016 stable, PFTs with moderately reduced diffusion--mild restriction, stable; cxr stable     High risk Medication Monitoring: now off imuran     Cervical spondylosis with radiation into the left arm: saw Dr. Isabel  s/p HIMANSHU w good result, gabapentin makes her drowsy    Vit d def: started 50K units weekly, level in normal range now 41    Plan:       Stay off imuran no need to add back at this time  Cont weekly vitamin D, last level wnl, 41  Cont f/u with Dr. Isabel for cervical spondylosis and HIMANSHU's as needed  F/u with dr. casper yearly for pfts    rtc in 6mos with reg labs, ace  If stable consider yearly visits                           "

## 2020-12-30 ENCOUNTER — OFFICE VISIT (OUTPATIENT)
Dept: RHEUMATOLOGY | Facility: CLINIC | Age: 60
End: 2020-12-30
Payer: COMMERCIAL

## 2020-12-30 ENCOUNTER — LAB VISIT (OUTPATIENT)
Dept: LAB | Facility: HOSPITAL | Age: 60
End: 2020-12-30
Attending: PHYSICIAN ASSISTANT
Payer: COMMERCIAL

## 2020-12-30 VITALS
WEIGHT: 204.81 LBS | DIASTOLIC BLOOD PRESSURE: 78 MMHG | SYSTOLIC BLOOD PRESSURE: 128 MMHG | BODY MASS INDEX: 34.97 KG/M2 | HEART RATE: 71 BPM | HEIGHT: 64 IN

## 2020-12-30 DIAGNOSIS — E55.9 VITAMIN D DEFICIENCY: ICD-10-CM

## 2020-12-30 DIAGNOSIS — D86.9 SARCOIDOSIS: ICD-10-CM

## 2020-12-30 DIAGNOSIS — D86.9 SARCOIDOSIS: Primary | ICD-10-CM

## 2020-12-30 DIAGNOSIS — J98.4 CHRONIC RESTRICTIVE LUNG DISEASE: ICD-10-CM

## 2020-12-30 DIAGNOSIS — Z51.81 MEDICATION MONITORING ENCOUNTER: ICD-10-CM

## 2020-12-30 DIAGNOSIS — M54.12 CERVICAL RADICULOPATHY: ICD-10-CM

## 2020-12-30 LAB
ALBUMIN SERPL BCP-MCNC: 3.7 G/DL (ref 3.5–5.2)
ALP SERPL-CCNC: 78 U/L (ref 55–135)
ALT SERPL W/O P-5'-P-CCNC: 27 U/L (ref 10–44)
ANION GAP SERPL CALC-SCNC: 8 MMOL/L (ref 8–16)
AST SERPL-CCNC: 24 U/L (ref 10–40)
BASOPHILS # BLD AUTO: 0.02 K/UL (ref 0–0.2)
BASOPHILS NFR BLD: 0.4 % (ref 0–1.9)
BILIRUB SERPL-MCNC: 0.5 MG/DL (ref 0.1–1)
BUN SERPL-MCNC: 14 MG/DL (ref 6–20)
CALCIUM SERPL-MCNC: 8.9 MG/DL (ref 8.7–10.5)
CHLORIDE SERPL-SCNC: 104 MMOL/L (ref 95–110)
CO2 SERPL-SCNC: 29 MMOL/L (ref 23–29)
CREAT SERPL-MCNC: 0.8 MG/DL (ref 0.5–1.4)
CRP SERPL-MCNC: 6.5 MG/L (ref 0–8.2)
DIFFERENTIAL METHOD: ABNORMAL
EOSINOPHIL # BLD AUTO: 0.1 K/UL (ref 0–0.5)
EOSINOPHIL NFR BLD: 1.1 % (ref 0–8)
ERYTHROCYTE [DISTWIDTH] IN BLOOD BY AUTOMATED COUNT: 13.2 % (ref 11.5–14.5)
ERYTHROCYTE [SEDIMENTATION RATE] IN BLOOD BY WESTERGREN METHOD: 30 MM/HR (ref 0–36)
EST. GFR  (AFRICAN AMERICAN): >60 ML/MIN/1.73 M^2
EST. GFR  (NON AFRICAN AMERICAN): >60 ML/MIN/1.73 M^2
GLUCOSE SERPL-MCNC: 107 MG/DL (ref 70–110)
HCT VFR BLD AUTO: 40.2 % (ref 37–48.5)
HGB BLD-MCNC: 12.7 G/DL (ref 12–16)
IMM GRANULOCYTES # BLD AUTO: 0.01 K/UL (ref 0–0.04)
IMM GRANULOCYTES NFR BLD AUTO: 0.2 % (ref 0–0.5)
LYMPHOCYTES # BLD AUTO: 2.4 K/UL (ref 1–4.8)
LYMPHOCYTES NFR BLD: 42.9 % (ref 18–48)
MCH RBC QN AUTO: 30.9 PG (ref 27–31)
MCHC RBC AUTO-ENTMCNC: 31.6 G/DL (ref 32–36)
MCV RBC AUTO: 98 FL (ref 82–98)
MONOCYTES # BLD AUTO: 0.5 K/UL (ref 0.3–1)
MONOCYTES NFR BLD: 8.5 % (ref 4–15)
NEUTROPHILS # BLD AUTO: 2.7 K/UL (ref 1.8–7.7)
NEUTROPHILS NFR BLD: 46.9 % (ref 38–73)
NRBC BLD-RTO: 0 /100 WBC
PLATELET # BLD AUTO: 236 K/UL (ref 150–350)
PMV BLD AUTO: 10.2 FL (ref 9.2–12.9)
POTASSIUM SERPL-SCNC: 3.8 MMOL/L (ref 3.5–5.1)
PROT SERPL-MCNC: 7.1 G/DL (ref 6–8.4)
RBC # BLD AUTO: 4.11 M/UL (ref 4–5.4)
SODIUM SERPL-SCNC: 141 MMOL/L (ref 136–145)
WBC # BLD AUTO: 5.64 K/UL (ref 3.9–12.7)

## 2020-12-30 PROCEDURE — 3078F PR MOST RECENT DIASTOLIC BLOOD PRESSURE < 80 MM HG: ICD-10-PCS | Mod: CPTII,S$GLB,, | Performed by: PHYSICIAN ASSISTANT

## 2020-12-30 PROCEDURE — 36415 COLL VENOUS BLD VENIPUNCTURE: CPT

## 2020-12-30 PROCEDURE — 3074F SYST BP LT 130 MM HG: CPT | Mod: CPTII,S$GLB,, | Performed by: PHYSICIAN ASSISTANT

## 2020-12-30 PROCEDURE — 85025 COMPLETE CBC W/AUTO DIFF WBC: CPT

## 2020-12-30 PROCEDURE — 80053 COMPREHEN METABOLIC PANEL: CPT

## 2020-12-30 PROCEDURE — 99999 PR PBB SHADOW E&M-EST. PATIENT-LVL III: CPT | Mod: PBBFAC,,, | Performed by: PHYSICIAN ASSISTANT

## 2020-12-30 PROCEDURE — 86140 C-REACTIVE PROTEIN: CPT

## 2020-12-30 PROCEDURE — 85652 RBC SED RATE AUTOMATED: CPT

## 2020-12-30 PROCEDURE — 3074F PR MOST RECENT SYSTOLIC BLOOD PRESSURE < 130 MM HG: ICD-10-PCS | Mod: CPTII,S$GLB,, | Performed by: PHYSICIAN ASSISTANT

## 2020-12-30 PROCEDURE — 3008F BODY MASS INDEX DOCD: CPT | Mod: CPTII,S$GLB,, | Performed by: PHYSICIAN ASSISTANT

## 2020-12-30 PROCEDURE — 1126F AMNT PAIN NOTED NONE PRSNT: CPT | Mod: S$GLB,,, | Performed by: PHYSICIAN ASSISTANT

## 2020-12-30 PROCEDURE — 3008F PR BODY MASS INDEX (BMI) DOCUMENTED: ICD-10-PCS | Mod: CPTII,S$GLB,, | Performed by: PHYSICIAN ASSISTANT

## 2020-12-30 PROCEDURE — 3078F DIAST BP <80 MM HG: CPT | Mod: CPTII,S$GLB,, | Performed by: PHYSICIAN ASSISTANT

## 2020-12-30 PROCEDURE — 99214 PR OFFICE/OUTPT VISIT, EST, LEVL IV, 30-39 MIN: ICD-10-PCS | Mod: S$GLB,,, | Performed by: PHYSICIAN ASSISTANT

## 2020-12-30 PROCEDURE — 99214 OFFICE O/P EST MOD 30 MIN: CPT | Mod: S$GLB,,, | Performed by: PHYSICIAN ASSISTANT

## 2020-12-30 PROCEDURE — 1126F PR PAIN SEVERITY QUANTIFIED, NO PAIN PRESENT: ICD-10-PCS | Mod: S$GLB,,, | Performed by: PHYSICIAN ASSISTANT

## 2020-12-30 PROCEDURE — 99999 PR PBB SHADOW E&M-EST. PATIENT-LVL III: ICD-10-PCS | Mod: PBBFAC,,, | Performed by: PHYSICIAN ASSISTANT

## 2020-12-30 PROCEDURE — 82164 ANGIOTENSIN I ENZYME TEST: CPT

## 2020-12-30 RX ORDER — ERGOCALCIFEROL 1.25 MG/1
50000 CAPSULE ORAL
Qty: 12 CAPSULE | Refills: 4 | Status: SHIPPED | OUTPATIENT
Start: 2020-12-30 | End: 2022-01-19 | Stop reason: SDUPTHER

## 2020-12-31 LAB — ACE SERPL-CCNC: 18 U/L (ref 16–85)

## 2021-01-13 ENCOUNTER — LAB VISIT (OUTPATIENT)
Dept: LAB | Facility: HOSPITAL | Age: 61
End: 2021-01-13
Attending: NURSE PRACTITIONER
Payer: COMMERCIAL

## 2021-01-13 DIAGNOSIS — I10 ESSENTIAL HYPERTENSION: ICD-10-CM

## 2021-01-13 DIAGNOSIS — E78.00 PURE HYPERCHOLESTEROLEMIA: ICD-10-CM

## 2021-01-13 DIAGNOSIS — E55.9 VITAMIN D DEFICIENCY: ICD-10-CM

## 2021-01-13 LAB
ALBUMIN SERPL BCP-MCNC: 4 G/DL (ref 3.5–5.2)
ALP SERPL-CCNC: 86 U/L (ref 55–135)
ALT SERPL W/O P-5'-P-CCNC: 20 U/L (ref 10–44)
ANION GAP SERPL CALC-SCNC: 15 MMOL/L (ref 8–16)
AST SERPL-CCNC: 21 U/L (ref 10–40)
BILIRUB SERPL-MCNC: 0.4 MG/DL (ref 0.1–1)
BUN SERPL-MCNC: 13 MG/DL (ref 6–20)
CALCIUM SERPL-MCNC: 9.4 MG/DL (ref 8.7–10.5)
CHLORIDE SERPL-SCNC: 100 MMOL/L (ref 95–110)
CO2 SERPL-SCNC: 25 MMOL/L (ref 23–29)
CREAT SERPL-MCNC: 0.9 MG/DL (ref 0.5–1.4)
EST. GFR  (AFRICAN AMERICAN): >60 ML/MIN/1.73 M^2
EST. GFR  (NON AFRICAN AMERICAN): >60 ML/MIN/1.73 M^2
GLUCOSE SERPL-MCNC: 117 MG/DL (ref 70–110)
POTASSIUM SERPL-SCNC: 4 MMOL/L (ref 3.5–5.1)
PROT SERPL-MCNC: 7.4 G/DL (ref 6–8.4)
SODIUM SERPL-SCNC: 140 MMOL/L (ref 136–145)
TSH SERPL DL<=0.005 MIU/L-ACNC: 2.35 UIU/ML (ref 0.4–4)

## 2021-01-13 PROCEDURE — 84443 ASSAY THYROID STIM HORMONE: CPT

## 2021-01-13 PROCEDURE — 36415 COLL VENOUS BLD VENIPUNCTURE: CPT

## 2021-01-13 PROCEDURE — 82306 VITAMIN D 25 HYDROXY: CPT

## 2021-01-13 PROCEDURE — 80061 LIPID PANEL: CPT

## 2021-01-13 PROCEDURE — 80053 COMPREHEN METABOLIC PANEL: CPT

## 2021-01-14 LAB
25(OH)D3+25(OH)D2 SERPL-MCNC: 43 NG/ML (ref 30–96)
CHOLEST SERPL-MCNC: 191 MG/DL (ref 120–199)
CHOLEST/HDLC SERPL: 3.8 {RATIO} (ref 2–5)
HDLC SERPL-MCNC: 50 MG/DL (ref 40–75)
HDLC SERPL: 26.2 % (ref 20–50)
LDLC SERPL CALC-MCNC: 125.4 MG/DL (ref 63–159)
NONHDLC SERPL-MCNC: 141 MG/DL
TRIGL SERPL-MCNC: 78 MG/DL (ref 30–150)

## 2021-01-26 ENCOUNTER — OFFICE VISIT (OUTPATIENT)
Dept: OPHTHALMOLOGY | Facility: CLINIC | Age: 61
End: 2021-01-26
Payer: COMMERCIAL

## 2021-01-26 DIAGNOSIS — Z98.42 CATARACT EXTRACTION STATUS, LEFT: ICD-10-CM

## 2021-01-26 DIAGNOSIS — H52.7 REFRACTION DISORDER: ICD-10-CM

## 2021-01-26 DIAGNOSIS — D86.9 SARCOIDOSIS: Primary | ICD-10-CM

## 2021-01-26 DIAGNOSIS — H25.11 NS (NUCLEAR SCLEROSIS), RIGHT: ICD-10-CM

## 2021-01-26 DIAGNOSIS — H35.3122 INTERMEDIATE STAGE NONEXUDATIVE AGE-RELATED MACULAR DEGENERATION OF LEFT EYE: ICD-10-CM

## 2021-01-26 DIAGNOSIS — H35.342 MACULAR HOLE, LEFT: ICD-10-CM

## 2021-01-26 PROCEDURE — 99999 PR PBB SHADOW E&M-EST. PATIENT-LVL II: ICD-10-PCS | Mod: PBBFAC,,, | Performed by: OPHTHALMOLOGY

## 2021-01-26 PROCEDURE — 92014 COMPRE OPH EXAM EST PT 1/>: CPT | Mod: S$GLB,,, | Performed by: OPHTHALMOLOGY

## 2021-01-26 PROCEDURE — 92014 PR EYE EXAM, EST PATIENT,COMPREHESV: ICD-10-PCS | Mod: S$GLB,,, | Performed by: OPHTHALMOLOGY

## 2021-01-26 PROCEDURE — 99999 PR PBB SHADOW E&M-EST. PATIENT-LVL II: CPT | Mod: PBBFAC,,, | Performed by: OPHTHALMOLOGY

## 2021-01-26 PROCEDURE — 92015 DETERMINE REFRACTIVE STATE: CPT | Mod: S$GLB,,, | Performed by: OPHTHALMOLOGY

## 2021-01-26 PROCEDURE — 92015 PR REFRACTION: ICD-10-PCS | Mod: S$GLB,,, | Performed by: OPHTHALMOLOGY

## 2021-02-04 ENCOUNTER — OFFICE VISIT (OUTPATIENT)
Dept: INTERNAL MEDICINE | Facility: CLINIC | Age: 61
End: 2021-02-04
Payer: COMMERCIAL

## 2021-02-04 VITALS
WEIGHT: 205.5 LBS | HEART RATE: 76 BPM | DIASTOLIC BLOOD PRESSURE: 76 MMHG | TEMPERATURE: 99 F | BODY MASS INDEX: 35.08 KG/M2 | SYSTOLIC BLOOD PRESSURE: 122 MMHG | HEIGHT: 64 IN | RESPIRATION RATE: 16 BRPM | OXYGEN SATURATION: 97 %

## 2021-02-04 DIAGNOSIS — D86.0 PULMONARY SARCOIDOSIS: ICD-10-CM

## 2021-02-04 DIAGNOSIS — E66.01 CLASS 2 SEVERE OBESITY DUE TO EXCESS CALORIES WITH SERIOUS COMORBIDITY AND BODY MASS INDEX (BMI) OF 35.0 TO 35.9 IN ADULT: ICD-10-CM

## 2021-02-04 DIAGNOSIS — E55.9 VITAMIN D DEFICIENCY: ICD-10-CM

## 2021-02-04 DIAGNOSIS — E78.00 PURE HYPERCHOLESTEROLEMIA: ICD-10-CM

## 2021-02-04 DIAGNOSIS — I10 ESSENTIAL HYPERTENSION: Primary | ICD-10-CM

## 2021-02-04 DIAGNOSIS — J98.4 CHRONIC RESTRICTIVE LUNG DISEASE: ICD-10-CM

## 2021-02-04 DIAGNOSIS — M17.0 PRIMARY OSTEOARTHRITIS OF BOTH KNEES: Chronic | ICD-10-CM

## 2021-02-04 DIAGNOSIS — M79.7 FIBROMYALGIA: ICD-10-CM

## 2021-02-04 DIAGNOSIS — Z12.31 ENCOUNTER FOR SCREENING MAMMOGRAM FOR MALIGNANT NEOPLASM OF BREAST: ICD-10-CM

## 2021-02-04 PROCEDURE — 3008F BODY MASS INDEX DOCD: CPT | Mod: CPTII,S$GLB,, | Performed by: PEDIATRICS

## 2021-02-04 PROCEDURE — 99214 OFFICE O/P EST MOD 30 MIN: CPT | Mod: S$GLB,,, | Performed by: PEDIATRICS

## 2021-02-04 PROCEDURE — 99214 PR OFFICE/OUTPT VISIT, EST, LEVL IV, 30-39 MIN: ICD-10-PCS | Mod: S$GLB,,, | Performed by: PEDIATRICS

## 2021-02-04 PROCEDURE — 3078F DIAST BP <80 MM HG: CPT | Mod: CPTII,S$GLB,, | Performed by: PEDIATRICS

## 2021-02-04 PROCEDURE — 3008F PR BODY MASS INDEX (BMI) DOCUMENTED: ICD-10-PCS | Mod: CPTII,S$GLB,, | Performed by: PEDIATRICS

## 2021-02-04 PROCEDURE — 99999 PR PBB SHADOW E&M-EST. PATIENT-LVL IV: CPT | Mod: PBBFAC,,, | Performed by: PEDIATRICS

## 2021-02-04 PROCEDURE — 1126F AMNT PAIN NOTED NONE PRSNT: CPT | Mod: S$GLB,,, | Performed by: PEDIATRICS

## 2021-02-04 PROCEDURE — 99999 PR PBB SHADOW E&M-EST. PATIENT-LVL IV: ICD-10-PCS | Mod: PBBFAC,,, | Performed by: PEDIATRICS

## 2021-02-04 PROCEDURE — 1126F PR PAIN SEVERITY QUANTIFIED, NO PAIN PRESENT: ICD-10-PCS | Mod: S$GLB,,, | Performed by: PEDIATRICS

## 2021-02-04 PROCEDURE — 3074F PR MOST RECENT SYSTOLIC BLOOD PRESSURE < 130 MM HG: ICD-10-PCS | Mod: CPTII,S$GLB,, | Performed by: PEDIATRICS

## 2021-02-04 PROCEDURE — 3074F SYST BP LT 130 MM HG: CPT | Mod: CPTII,S$GLB,, | Performed by: PEDIATRICS

## 2021-02-04 PROCEDURE — 3078F PR MOST RECENT DIASTOLIC BLOOD PRESSURE < 80 MM HG: ICD-10-PCS | Mod: CPTII,S$GLB,, | Performed by: PEDIATRICS

## 2021-03-03 ENCOUNTER — TELEPHONE (OUTPATIENT)
Dept: INTERNAL MEDICINE | Facility: CLINIC | Age: 61
End: 2021-03-03

## 2021-03-11 ENCOUNTER — HOSPITAL ENCOUNTER (OUTPATIENT)
Dept: RADIOLOGY | Facility: HOSPITAL | Age: 61
Discharge: HOME | End: 2021-03-11
Attending: PEDIATRICS
Payer: COMMERCIAL

## 2021-03-11 VITALS — WEIGHT: 205.5 LBS | HEIGHT: 64 IN | BODY MASS INDEX: 35.08 KG/M2

## 2021-03-11 DIAGNOSIS — Z12.31 ENCOUNTER FOR SCREENING MAMMOGRAM FOR MALIGNANT NEOPLASM OF BREAST: ICD-10-CM

## 2021-03-11 PROCEDURE — 77063 MAMMO DIGITAL SCREENING BILAT WITH TOMO: ICD-10-PCS | Mod: 26,,, | Performed by: RADIOLOGY

## 2021-03-11 PROCEDURE — 77067 SCR MAMMO BI INCL CAD: CPT | Mod: 26,,, | Performed by: RADIOLOGY

## 2021-03-11 PROCEDURE — 77067 SCR MAMMO BI INCL CAD: CPT | Mod: TC

## 2021-03-11 PROCEDURE — 77067 MAMMO DIGITAL SCREENING BILAT WITH TOMO: ICD-10-PCS | Mod: 26,,, | Performed by: RADIOLOGY

## 2021-03-11 PROCEDURE — 77063 BREAST TOMOSYNTHESIS BI: CPT | Mod: 26,,, | Performed by: RADIOLOGY

## 2021-04-22 ENCOUNTER — TELEPHONE (OUTPATIENT)
Dept: INTERNAL MEDICINE | Facility: CLINIC | Age: 61
End: 2021-04-22

## 2021-04-26 NOTE — TELEPHONE ENCOUNTER
Received call from [address] at [facility]) [address] with [employer] Complaint. Brief description of triage: rash to forehead that is worsening  and noted whole body swelling or overall large for his age per father     Triage indicates for patient to be seen within the next three days     Care advice provided, patient verbalizes understanding; denies any other questions or concerns; instructed to call back for any new or worsening symptoms. Writer provided warm transfer to [place] at [facility] for appointment scheduling. Attention Provider: Thank you for allowing me to participate in the care of your patient. The patient was connected to triage in response to information provided to the [facility]. Please do not respond through this encounter as the response is not directed to a shared pool. Reason for Disposition   Unexplained mild or moderate swelling of one ankle or foot  (Exception: swelling is itchy)    Answer Assessment - Initial Assessment Questions  1. APPEARANCE of IMPETIGO: \"What does the rash look like? \"       Red raised and then they turn white when washing and they burn and hurt they do itch at times and they are on both sides of foreheads  They were at a water park this weekend     2. LOCATION: \"Where is the rash located? \"       For forehead     3. NUMBER: \"How many sores are there? \"       Two the one on the left front side of forehead at hairline there is 8-10 dots or bumps like a [place] of dots that are about 3/25 of inch in diameter     4. SIZE: \"How big is the largest sore? \" (inches, cm or compare to size of a coin)       about 3/25 of inch in diameter and the right side is catching up to the left     5. ONSET: \"When did the sores start? \"      A couple of months ago there was just one and then the last 3-4 weeks it has gotten worse they do occassionally turn grey and white and they have been putting neosporin on and this did help    Answer Assessment - Initial Assessment Rescheduled appts to 10/13/17   Questions  1. ONSET: \"When did the swelling start? \" (e.g., minutes, hours, days)      Last year or so     2. LOCATION: \"What part of the leg is swollen? \"  \"Are both legs swollen or just one leg? \"      Hands, fingers, face, and legs and toes have been getting fatter over the last year or so and they have been working on a family diet giving him better options he feels it is working this weekend they went to [place] and dad noted that the watch was not able to go on his arm due to the wrist was so large     3. DEGREE OF SWELLING: \"How large is the swelling? \"   - LOCALIZED - Small area of swelling on part of one leg (estimate the size)  - WIDESPREAD - Swelling involves a large part of leg (calf, thigh or whole leg) or both legs/feet      He is big all over a big solid big boned kid and father thinks that he is retaining fluids and father noted increased thirst     4. SEVERITY of WIDESPREAD SWELLING (e.g., Edema): \"How bad is the swelling? \"  - MILD edema - swelling limited to foot and ankle, pitting edema < 1/4 inch deep, rest and elevation eliminate most or all swelling  - MODERATE edema - swelling of lower leg to knee, pitting edema > 1/4 inch deep, rest and elevation only partially reduce swelling  - SEVERE edema - swelling extends above knee, facial or hand swelling also present       Father thinks that there is some sort of issue possibly with thyroid or something else he does have IBS like  Mom per father, he is swollen all over he also doesn't think that his scrotum is descending like a normal shaft     5. REDNESS: \"Does the swelling look red or infected? \"      None noted     6. PAIN: \"Is there any pain? \" If so, ask, \"How bad is it? \"      Some knee pain in the past only he plays sports so it could be related but not sure     7. ITCH: \"Does the swelling itch? \" If so, ask, \"How much? \"      No itching except forehead     8. CAUSE: \"What do you think caused the swelling? \"       Thyroid, eczema,     9.  CHRONIC DISEASE: \"Does your child have kidney, heart or liver disease? \"      None noted    Protocols used: LEG OR FOOT SWELLING-PEDIATRIC-[place], IMPETIGO (INFECTED SORE)-PEDIATRIC-[place]

## 2021-06-08 ENCOUNTER — LAB VISIT (OUTPATIENT)
Dept: LAB | Facility: HOSPITAL | Age: 61
End: 2021-06-08
Payer: COMMERCIAL

## 2021-06-08 ENCOUNTER — OFFICE VISIT (OUTPATIENT)
Dept: INTERNAL MEDICINE | Facility: CLINIC | Age: 61
End: 2021-06-08
Payer: COMMERCIAL

## 2021-06-08 VITALS
SYSTOLIC BLOOD PRESSURE: 132 MMHG | BODY MASS INDEX: 35.12 KG/M2 | HEART RATE: 88 BPM | WEIGHT: 205.69 LBS | HEIGHT: 64 IN | DIASTOLIC BLOOD PRESSURE: 78 MMHG | OXYGEN SATURATION: 95 % | TEMPERATURE: 100 F

## 2021-06-08 DIAGNOSIS — M54.50 ACUTE RIGHT-SIDED LOW BACK PAIN WITHOUT SCIATICA: ICD-10-CM

## 2021-06-08 DIAGNOSIS — M54.50 ACUTE RIGHT-SIDED LOW BACK PAIN WITHOUT SCIATICA: Primary | ICD-10-CM

## 2021-06-08 LAB
BILIRUB UR QL STRIP: NEGATIVE
CLARITY UR: CLEAR
COLOR UR: YELLOW
GLUCOSE UR QL STRIP: NEGATIVE
HGB UR QL STRIP: NEGATIVE
KETONES UR QL STRIP: NEGATIVE
LEUKOCYTE ESTERASE UR QL STRIP: NEGATIVE
NITRITE UR QL STRIP: NEGATIVE
PH UR STRIP: 5 [PH] (ref 5–8)
PROT UR QL STRIP: NEGATIVE
SP GR UR STRIP: 1.02 (ref 1–1.03)
URN SPEC COLLECT METH UR: NORMAL

## 2021-06-08 PROCEDURE — 99999 PR PBB SHADOW E&M-EST. PATIENT-LVL IV: CPT | Mod: PBBFAC,,, | Performed by: PHYSICIAN ASSISTANT

## 2021-06-08 PROCEDURE — 1125F PR PAIN SEVERITY QUANTIFIED, PAIN PRESENT: ICD-10-PCS | Mod: S$GLB,,, | Performed by: PHYSICIAN ASSISTANT

## 2021-06-08 PROCEDURE — 3008F PR BODY MASS INDEX (BMI) DOCUMENTED: ICD-10-PCS | Mod: CPTII,S$GLB,, | Performed by: PHYSICIAN ASSISTANT

## 2021-06-08 PROCEDURE — 99999 PR PBB SHADOW E&M-EST. PATIENT-LVL IV: ICD-10-PCS | Mod: PBBFAC,,, | Performed by: PHYSICIAN ASSISTANT

## 2021-06-08 PROCEDURE — 99214 PR OFFICE/OUTPT VISIT, EST, LEVL IV, 30-39 MIN: ICD-10-PCS | Mod: 25,S$GLB,, | Performed by: PHYSICIAN ASSISTANT

## 2021-06-08 PROCEDURE — 96372 PR INJECTION,THERAP/PROPH/DIAG2ST, IM OR SUBCUT: ICD-10-PCS | Mod: S$GLB,,, | Performed by: PHYSICIAN ASSISTANT

## 2021-06-08 PROCEDURE — 99214 OFFICE O/P EST MOD 30 MIN: CPT | Mod: 25,S$GLB,, | Performed by: PHYSICIAN ASSISTANT

## 2021-06-08 PROCEDURE — 1125F AMNT PAIN NOTED PAIN PRSNT: CPT | Mod: S$GLB,,, | Performed by: PHYSICIAN ASSISTANT

## 2021-06-08 PROCEDURE — 96372 THER/PROPH/DIAG INJ SC/IM: CPT | Mod: S$GLB,,, | Performed by: PHYSICIAN ASSISTANT

## 2021-06-08 PROCEDURE — 3008F BODY MASS INDEX DOCD: CPT | Mod: CPTII,S$GLB,, | Performed by: PHYSICIAN ASSISTANT

## 2021-06-08 PROCEDURE — 81003 URINALYSIS AUTO W/O SCOPE: CPT | Performed by: PHYSICIAN ASSISTANT

## 2021-06-08 RX ORDER — METHOCARBAMOL 750 MG/1
TABLET, FILM COATED ORAL
Qty: 20 TABLET | Refills: 0 | Status: SHIPPED | OUTPATIENT
Start: 2021-06-08 | End: 2022-01-18 | Stop reason: SDUPTHER

## 2021-06-08 RX ORDER — KETOROLAC TROMETHAMINE 30 MG/ML
60 INJECTION, SOLUTION INTRAMUSCULAR; INTRAVENOUS
Status: COMPLETED | OUTPATIENT
Start: 2021-06-08 | End: 2021-06-08

## 2021-06-08 RX ORDER — MELOXICAM 15 MG/1
15 TABLET ORAL DAILY
Qty: 10 TABLET | Refills: 0 | Status: SHIPPED | OUTPATIENT
Start: 2021-06-08 | End: 2021-06-18

## 2021-06-08 RX ADMIN — KETOROLAC TROMETHAMINE 60 MG: 30 INJECTION, SOLUTION INTRAMUSCULAR; INTRAVENOUS at 03:06

## 2021-06-21 ENCOUNTER — TELEPHONE (OUTPATIENT)
Dept: INTERNAL MEDICINE | Facility: CLINIC | Age: 61
End: 2021-06-21

## 2021-06-29 ENCOUNTER — TELEPHONE (OUTPATIENT)
Dept: RHEUMATOLOGY | Facility: CLINIC | Age: 61
End: 2021-06-29

## 2021-06-30 ENCOUNTER — HOSPITAL ENCOUNTER (OUTPATIENT)
Dept: RADIOLOGY | Facility: HOSPITAL | Age: 61
Discharge: HOME | End: 2021-06-30
Attending: PHYSICIAN ASSISTANT
Payer: COMMERCIAL

## 2021-06-30 ENCOUNTER — OFFICE VISIT (OUTPATIENT)
Dept: RHEUMATOLOGY | Facility: CLINIC | Age: 61
End: 2021-06-30
Payer: COMMERCIAL

## 2021-06-30 VITALS
BODY MASS INDEX: 36.1 KG/M2 | DIASTOLIC BLOOD PRESSURE: 92 MMHG | SYSTOLIC BLOOD PRESSURE: 175 MMHG | HEIGHT: 64 IN | HEART RATE: 79 BPM | WEIGHT: 211.44 LBS

## 2021-06-30 DIAGNOSIS — E55.9 VITAMIN D DEFICIENCY: ICD-10-CM

## 2021-06-30 DIAGNOSIS — J98.4 CHRONIC RESTRICTIVE LUNG DISEASE: ICD-10-CM

## 2021-06-30 DIAGNOSIS — M54.6 ACUTE BILATERAL THORACIC BACK PAIN: ICD-10-CM

## 2021-06-30 DIAGNOSIS — M54.12 CERVICAL RADICULOPATHY: ICD-10-CM

## 2021-06-30 DIAGNOSIS — M54.9 DORSALGIA, UNSPECIFIED: ICD-10-CM

## 2021-06-30 DIAGNOSIS — D86.9 SARCOIDOSIS: Primary | ICD-10-CM

## 2021-06-30 PROCEDURE — 1125F AMNT PAIN NOTED PAIN PRSNT: CPT | Mod: S$GLB,,, | Performed by: PHYSICIAN ASSISTANT

## 2021-06-30 PROCEDURE — 72100 XR LUMBAR SPINE AP AND LATERAL: ICD-10-PCS | Mod: 26,,, | Performed by: RADIOLOGY

## 2021-06-30 PROCEDURE — 71046 X-RAY EXAM CHEST 2 VIEWS: CPT | Mod: TC

## 2021-06-30 PROCEDURE — 99214 PR OFFICE/OUTPT VISIT, EST, LEVL IV, 30-39 MIN: ICD-10-PCS | Mod: S$GLB,,, | Performed by: PHYSICIAN ASSISTANT

## 2021-06-30 PROCEDURE — 72070 X-RAY EXAM THORAC SPINE 2VWS: CPT | Mod: TC

## 2021-06-30 PROCEDURE — 71046 XR CHEST PA AND LATERAL: ICD-10-PCS | Mod: 26,,, | Performed by: RADIOLOGY

## 2021-06-30 PROCEDURE — 72100 X-RAY EXAM L-S SPINE 2/3 VWS: CPT | Mod: 26,,, | Performed by: RADIOLOGY

## 2021-06-30 PROCEDURE — 99999 PR PBB SHADOW E&M-EST. PATIENT-LVL IV: CPT | Mod: PBBFAC,,, | Performed by: PHYSICIAN ASSISTANT

## 2021-06-30 PROCEDURE — 3008F PR BODY MASS INDEX (BMI) DOCUMENTED: ICD-10-PCS | Mod: CPTII,S$GLB,, | Performed by: PHYSICIAN ASSISTANT

## 2021-06-30 PROCEDURE — 99214 OFFICE O/P EST MOD 30 MIN: CPT | Mod: S$GLB,,, | Performed by: PHYSICIAN ASSISTANT

## 2021-06-30 PROCEDURE — 72070 XR THORACIC SPINE AP LATERAL: ICD-10-PCS | Mod: 26,,, | Performed by: RADIOLOGY

## 2021-06-30 PROCEDURE — 72070 X-RAY EXAM THORAC SPINE 2VWS: CPT | Mod: 26,,, | Performed by: RADIOLOGY

## 2021-06-30 PROCEDURE — 72100 X-RAY EXAM L-S SPINE 2/3 VWS: CPT | Mod: TC

## 2021-06-30 PROCEDURE — 3008F BODY MASS INDEX DOCD: CPT | Mod: CPTII,S$GLB,, | Performed by: PHYSICIAN ASSISTANT

## 2021-06-30 PROCEDURE — 99999 PR PBB SHADOW E&M-EST. PATIENT-LVL IV: ICD-10-PCS | Mod: PBBFAC,,, | Performed by: PHYSICIAN ASSISTANT

## 2021-06-30 PROCEDURE — 1125F PR PAIN SEVERITY QUANTIFIED, PAIN PRESENT: ICD-10-PCS | Mod: S$GLB,,, | Performed by: PHYSICIAN ASSISTANT

## 2021-06-30 PROCEDURE — 71046 X-RAY EXAM CHEST 2 VIEWS: CPT | Mod: 26,,, | Performed by: RADIOLOGY

## 2021-06-30 RX ORDER — MELOXICAM 15 MG/1
15 TABLET ORAL DAILY
Qty: 30 TABLET | Refills: 1 | Status: SHIPPED | OUTPATIENT
Start: 2021-06-30 | End: 2022-01-18 | Stop reason: SDUPTHER

## 2021-08-05 ENCOUNTER — LAB VISIT (OUTPATIENT)
Dept: LAB | Facility: HOSPITAL | Age: 61
End: 2021-08-05
Attending: PEDIATRICS
Payer: COMMERCIAL

## 2021-08-05 ENCOUNTER — OFFICE VISIT (OUTPATIENT)
Dept: INTERNAL MEDICINE | Facility: CLINIC | Age: 61
End: 2021-08-05
Payer: COMMERCIAL

## 2021-08-05 VITALS
RESPIRATION RATE: 18 BRPM | HEIGHT: 64 IN | WEIGHT: 206.81 LBS | SYSTOLIC BLOOD PRESSURE: 128 MMHG | BODY MASS INDEX: 35.31 KG/M2 | OXYGEN SATURATION: 98 % | DIASTOLIC BLOOD PRESSURE: 82 MMHG | TEMPERATURE: 98 F | HEART RATE: 88 BPM

## 2021-08-05 DIAGNOSIS — J98.4 CHRONIC RESTRICTIVE LUNG DISEASE: ICD-10-CM

## 2021-08-05 DIAGNOSIS — Z12.11 SCREENING FOR MALIGNANT NEOPLASM OF COLON: Primary | ICD-10-CM

## 2021-08-05 DIAGNOSIS — E55.9 VITAMIN D DEFICIENCY: ICD-10-CM

## 2021-08-05 DIAGNOSIS — I10 ESSENTIAL HYPERTENSION: ICD-10-CM

## 2021-08-05 DIAGNOSIS — E78.00 PURE HYPERCHOLESTEROLEMIA: ICD-10-CM

## 2021-08-05 DIAGNOSIS — E66.01 CLASS 2 SEVERE OBESITY DUE TO EXCESS CALORIES WITH SERIOUS COMORBIDITY AND BODY MASS INDEX (BMI) OF 35.0 TO 35.9 IN ADULT: ICD-10-CM

## 2021-08-05 DIAGNOSIS — M15.9 GENERALIZED OSTEOARTHRITIS OF MULTIPLE SITES: Chronic | ICD-10-CM

## 2021-08-05 DIAGNOSIS — D84.9 IMMUNOCOMPROMISED: ICD-10-CM

## 2021-08-05 DIAGNOSIS — D86.0 PULMONARY SARCOIDOSIS: ICD-10-CM

## 2021-08-05 DIAGNOSIS — M79.7 FIBROMYALGIA: ICD-10-CM

## 2021-08-05 LAB
ALBUMIN SERPL BCP-MCNC: 3.9 G/DL (ref 3.5–5.2)
ALP SERPL-CCNC: 81 U/L (ref 55–135)
ALT SERPL W/O P-5'-P-CCNC: 24 U/L (ref 10–44)
ANION GAP SERPL CALC-SCNC: 12 MMOL/L (ref 8–16)
AST SERPL-CCNC: 24 U/L (ref 10–40)
BILIRUB SERPL-MCNC: 0.4 MG/DL (ref 0.1–1)
BUN SERPL-MCNC: 10 MG/DL (ref 6–20)
CALCIUM SERPL-MCNC: 9.8 MG/DL (ref 8.7–10.5)
CHLORIDE SERPL-SCNC: 102 MMOL/L (ref 95–110)
CHOLEST SERPL-MCNC: 192 MG/DL (ref 120–199)
CHOLEST/HDLC SERPL: 4.3 {RATIO} (ref 2–5)
CO2 SERPL-SCNC: 28 MMOL/L (ref 23–29)
CREAT SERPL-MCNC: 0.8 MG/DL (ref 0.5–1.4)
EST. GFR  (AFRICAN AMERICAN): >60 ML/MIN/1.73 M^2
EST. GFR  (NON AFRICAN AMERICAN): >60 ML/MIN/1.73 M^2
GLUCOSE SERPL-MCNC: 89 MG/DL (ref 70–110)
HDLC SERPL-MCNC: 45 MG/DL (ref 40–75)
HDLC SERPL: 23.4 % (ref 20–50)
LDLC SERPL CALC-MCNC: 133.2 MG/DL (ref 63–159)
NONHDLC SERPL-MCNC: 147 MG/DL
POTASSIUM SERPL-SCNC: 4.2 MMOL/L (ref 3.5–5.1)
PROT SERPL-MCNC: 7.5 G/DL (ref 6–8.4)
SODIUM SERPL-SCNC: 142 MMOL/L (ref 136–145)
TRIGL SERPL-MCNC: 69 MG/DL (ref 30–150)

## 2021-08-05 PROCEDURE — 3079F PR MOST RECENT DIASTOLIC BLOOD PRESSURE 80-89 MM HG: ICD-10-PCS | Mod: CPTII,S$GLB,, | Performed by: NURSE PRACTITIONER

## 2021-08-05 PROCEDURE — 3008F BODY MASS INDEX DOCD: CPT | Mod: CPTII,S$GLB,, | Performed by: NURSE PRACTITIONER

## 2021-08-05 PROCEDURE — 99396 PR PREVENTIVE VISIT,EST,40-64: ICD-10-PCS | Mod: S$GLB,,, | Performed by: NURSE PRACTITIONER

## 2021-08-05 PROCEDURE — 99999 PR PBB SHADOW E&M-EST. PATIENT-LVL III: ICD-10-PCS | Mod: PBBFAC,,, | Performed by: NURSE PRACTITIONER

## 2021-08-05 PROCEDURE — 3079F DIAST BP 80-89 MM HG: CPT | Mod: CPTII,S$GLB,, | Performed by: NURSE PRACTITIONER

## 2021-08-05 PROCEDURE — 80061 LIPID PANEL: CPT | Performed by: PEDIATRICS

## 2021-08-05 PROCEDURE — 80053 COMPREHEN METABOLIC PANEL: CPT | Performed by: PEDIATRICS

## 2021-08-05 PROCEDURE — 3074F SYST BP LT 130 MM HG: CPT | Mod: CPTII,S$GLB,, | Performed by: NURSE PRACTITIONER

## 2021-08-05 PROCEDURE — 1159F PR MEDICATION LIST DOCUMENTED IN MEDICAL RECORD: ICD-10-PCS | Mod: CPTII,S$GLB,, | Performed by: NURSE PRACTITIONER

## 2021-08-05 PROCEDURE — 3008F PR BODY MASS INDEX (BMI) DOCUMENTED: ICD-10-PCS | Mod: CPTII,S$GLB,, | Performed by: NURSE PRACTITIONER

## 2021-08-05 PROCEDURE — 99999 PR PBB SHADOW E&M-EST. PATIENT-LVL III: CPT | Mod: PBBFAC,,, | Performed by: NURSE PRACTITIONER

## 2021-08-05 PROCEDURE — 99396 PREV VISIT EST AGE 40-64: CPT | Mod: S$GLB,,, | Performed by: NURSE PRACTITIONER

## 2021-08-05 PROCEDURE — 3074F PR MOST RECENT SYSTOLIC BLOOD PRESSURE < 130 MM HG: ICD-10-PCS | Mod: CPTII,S$GLB,, | Performed by: NURSE PRACTITIONER

## 2021-08-05 PROCEDURE — 1159F MED LIST DOCD IN RCRD: CPT | Mod: CPTII,S$GLB,, | Performed by: NURSE PRACTITIONER

## 2021-08-05 PROCEDURE — 36415 COLL VENOUS BLD VENIPUNCTURE: CPT | Performed by: PEDIATRICS

## 2021-08-05 RX ORDER — ROSUVASTATIN CALCIUM 40 MG/1
TABLET, COATED ORAL
Qty: 90 TABLET | Refills: 3 | Status: SHIPPED | OUTPATIENT
Start: 2021-08-05 | End: 2023-04-26 | Stop reason: SDUPTHER

## 2021-08-05 RX ORDER — HYDROCHLOROTHIAZIDE 25 MG/1
25 TABLET ORAL DAILY
Qty: 90 TABLET | Refills: 3 | Status: SHIPPED | OUTPATIENT
Start: 2021-08-05 | End: 2021-10-24

## 2021-10-21 ENCOUNTER — TELEPHONE (OUTPATIENT)
Dept: RHEUMATOLOGY | Facility: CLINIC | Age: 61
End: 2021-10-21

## 2021-11-16 ENCOUNTER — TELEPHONE (OUTPATIENT)
Dept: RHEUMATOLOGY | Facility: CLINIC | Age: 61
End: 2021-11-16
Payer: COMMERCIAL

## 2021-11-16 ENCOUNTER — OFFICE VISIT (OUTPATIENT)
Dept: PAIN MEDICINE | Facility: CLINIC | Age: 61
End: 2021-11-16
Payer: COMMERCIAL

## 2021-11-16 ENCOUNTER — TELEPHONE (OUTPATIENT)
Dept: PAIN MEDICINE | Facility: CLINIC | Age: 61
End: 2021-11-16

## 2021-11-16 VITALS
HEART RATE: 91 BPM | HEIGHT: 64 IN | SYSTOLIC BLOOD PRESSURE: 154 MMHG | DIASTOLIC BLOOD PRESSURE: 103 MMHG | WEIGHT: 205.69 LBS | BODY MASS INDEX: 35.12 KG/M2

## 2021-11-16 DIAGNOSIS — M54.12 CERVICAL RADICULOPATHY: ICD-10-CM

## 2021-11-16 DIAGNOSIS — M79.18 MYOFASCIAL PAIN: ICD-10-CM

## 2021-11-16 DIAGNOSIS — M54.12 RADICULOPATHY, CERVICAL REGION: ICD-10-CM

## 2021-11-16 DIAGNOSIS — M50.30 DDD (DEGENERATIVE DISC DISEASE), CERVICAL: Primary | ICD-10-CM

## 2021-11-16 PROCEDURE — 1159F PR MEDICATION LIST DOCUMENTED IN MEDICAL RECORD: ICD-10-PCS | Mod: CPTII,S$GLB,, | Performed by: ANESTHESIOLOGY

## 2021-11-16 PROCEDURE — 99999 PR PBB SHADOW E&M-EST. PATIENT-LVL IV: CPT | Mod: PBBFAC,,, | Performed by: ANESTHESIOLOGY

## 2021-11-16 PROCEDURE — 3008F PR BODY MASS INDEX (BMI) DOCUMENTED: ICD-10-PCS | Mod: CPTII,S$GLB,, | Performed by: ANESTHESIOLOGY

## 2021-11-16 PROCEDURE — 3080F DIAST BP >= 90 MM HG: CPT | Mod: CPTII,S$GLB,, | Performed by: ANESTHESIOLOGY

## 2021-11-16 PROCEDURE — 1159F MED LIST DOCD IN RCRD: CPT | Mod: CPTII,S$GLB,, | Performed by: ANESTHESIOLOGY

## 2021-11-16 PROCEDURE — 3077F PR MOST RECENT SYSTOLIC BLOOD PRESSURE >= 140 MM HG: ICD-10-PCS | Mod: CPTII,S$GLB,, | Performed by: ANESTHESIOLOGY

## 2021-11-16 PROCEDURE — 3080F PR MOST RECENT DIASTOLIC BLOOD PRESSURE >= 90 MM HG: ICD-10-PCS | Mod: CPTII,S$GLB,, | Performed by: ANESTHESIOLOGY

## 2021-11-16 PROCEDURE — 3008F BODY MASS INDEX DOCD: CPT | Mod: CPTII,S$GLB,, | Performed by: ANESTHESIOLOGY

## 2021-11-16 PROCEDURE — 3077F SYST BP >= 140 MM HG: CPT | Mod: CPTII,S$GLB,, | Performed by: ANESTHESIOLOGY

## 2021-11-16 PROCEDURE — 99999 PR PBB SHADOW E&M-EST. PATIENT-LVL IV: ICD-10-PCS | Mod: PBBFAC,,, | Performed by: ANESTHESIOLOGY

## 2021-11-16 PROCEDURE — 99204 OFFICE O/P NEW MOD 45 MIN: CPT | Mod: S$GLB,,, | Performed by: ANESTHESIOLOGY

## 2021-11-16 PROCEDURE — 99204 PR OFFICE/OUTPT VISIT, NEW, LEVL IV, 45-59 MIN: ICD-10-PCS | Mod: S$GLB,,, | Performed by: ANESTHESIOLOGY

## 2021-11-16 RX ORDER — GABAPENTIN 300 MG/1
CAPSULE ORAL
Qty: 138 CAPSULE | Refills: 0 | Status: SHIPPED | OUTPATIENT
Start: 2021-11-16 | End: 2022-01-18 | Stop reason: SDUPTHER

## 2021-11-23 ENCOUNTER — CLINICAL SUPPORT (OUTPATIENT)
Dept: REHABILITATION | Facility: HOSPITAL | Age: 61
End: 2021-11-23
Attending: ANESTHESIOLOGY
Payer: COMMERCIAL

## 2021-11-23 DIAGNOSIS — M54.50 ACUTE BILATERAL LOW BACK PAIN WITHOUT SCIATICA: ICD-10-CM

## 2021-11-23 DIAGNOSIS — M25.512 ACUTE PAIN OF LEFT SHOULDER: ICD-10-CM

## 2021-11-23 DIAGNOSIS — M79.18 MYOFASCIAL PAIN: ICD-10-CM

## 2021-11-23 DIAGNOSIS — M54.12 CERVICAL RADICULOPATHY: ICD-10-CM

## 2021-11-23 DIAGNOSIS — M25.619 LIMITED RANGE OF MOTION (ROM) OF SHOULDER: ICD-10-CM

## 2021-11-23 DIAGNOSIS — M50.30 DDD (DEGENERATIVE DISC DISEASE), CERVICAL: ICD-10-CM

## 2021-11-23 DIAGNOSIS — M53.86 DECREASED ROM OF LUMBAR SPINE: ICD-10-CM

## 2021-11-23 PROCEDURE — 97162 PT EVAL MOD COMPLEX 30 MIN: CPT

## 2021-11-23 PROCEDURE — 97110 THERAPEUTIC EXERCISES: CPT

## 2021-11-23 PROCEDURE — 97140 MANUAL THERAPY 1/> REGIONS: CPT

## 2021-11-30 ENCOUNTER — CLINICAL SUPPORT (OUTPATIENT)
Dept: REHABILITATION | Facility: HOSPITAL | Age: 61
End: 2021-11-30
Payer: COMMERCIAL

## 2021-11-30 DIAGNOSIS — M54.50 ACUTE BILATERAL LOW BACK PAIN WITHOUT SCIATICA: ICD-10-CM

## 2021-11-30 DIAGNOSIS — M53.86 DECREASED ROM OF LUMBAR SPINE: ICD-10-CM

## 2021-11-30 DIAGNOSIS — M25.512 ACUTE PAIN OF LEFT SHOULDER: ICD-10-CM

## 2021-11-30 DIAGNOSIS — M25.619 LIMITED RANGE OF MOTION (ROM) OF SHOULDER: ICD-10-CM

## 2021-11-30 PROCEDURE — 97110 THERAPEUTIC EXERCISES: CPT | Mod: CQ

## 2021-11-30 PROCEDURE — 97140 MANUAL THERAPY 1/> REGIONS: CPT | Mod: CQ

## 2021-12-06 ENCOUNTER — HOSPITAL ENCOUNTER (OUTPATIENT)
Dept: RADIOLOGY | Facility: HOSPITAL | Age: 61
Discharge: HOME | End: 2021-12-06
Attending: PEDIATRICS
Payer: COMMERCIAL

## 2021-12-06 ENCOUNTER — OFFICE VISIT (OUTPATIENT)
Dept: INTERNAL MEDICINE | Facility: CLINIC | Age: 61
End: 2021-12-06
Payer: COMMERCIAL

## 2021-12-06 ENCOUNTER — LAB VISIT (OUTPATIENT)
Dept: LAB | Facility: HOSPITAL | Age: 61
End: 2021-12-06
Attending: PEDIATRICS
Payer: COMMERCIAL

## 2021-12-06 VITALS
SYSTOLIC BLOOD PRESSURE: 144 MMHG | TEMPERATURE: 98 F | OXYGEN SATURATION: 99 % | DIASTOLIC BLOOD PRESSURE: 86 MMHG | HEART RATE: 65 BPM | BODY MASS INDEX: 34.97 KG/M2 | RESPIRATION RATE: 18 BRPM | WEIGHT: 203.69 LBS

## 2021-12-06 DIAGNOSIS — R10.12 COLICKY LUQ ABDOMINAL PAIN: Primary | ICD-10-CM

## 2021-12-06 DIAGNOSIS — R10.12 COLICKY LUQ ABDOMINAL PAIN: ICD-10-CM

## 2021-12-06 DIAGNOSIS — Z12.11 COLON CANCER SCREENING: ICD-10-CM

## 2021-12-06 PROCEDURE — 90686 IIV4 VACC NO PRSV 0.5 ML IM: CPT | Mod: S$GLB,,, | Performed by: PEDIATRICS

## 2021-12-06 PROCEDURE — 99999 PR PBB SHADOW E&M-EST. PATIENT-LVL V: CPT | Mod: PBBFAC,,, | Performed by: PEDIATRICS

## 2021-12-06 PROCEDURE — 74018 XR ABDOMEN AP 1 VIEW: ICD-10-PCS | Mod: 26,,, | Performed by: RADIOLOGY

## 2021-12-06 PROCEDURE — 74018 RADEX ABDOMEN 1 VIEW: CPT | Mod: 26,,, | Performed by: RADIOLOGY

## 2021-12-06 PROCEDURE — 99214 OFFICE O/P EST MOD 30 MIN: CPT | Mod: 25,S$GLB,, | Performed by: PEDIATRICS

## 2021-12-06 PROCEDURE — 90471 IMMUNIZATION ADMIN: CPT | Mod: S$GLB,,, | Performed by: PEDIATRICS

## 2021-12-06 PROCEDURE — 90471 FLU VACCINE (QUAD) GREATER THAN OR EQUAL TO 3YO PRESERVATIVE FREE IM: ICD-10-PCS | Mod: S$GLB,,, | Performed by: PEDIATRICS

## 2021-12-06 PROCEDURE — 90686 FLU VACCINE (QUAD) GREATER THAN OR EQUAL TO 3YO PRESERVATIVE FREE IM: ICD-10-PCS | Mod: S$GLB,,, | Performed by: PEDIATRICS

## 2021-12-06 PROCEDURE — 99999 PR PBB SHADOW E&M-EST. PATIENT-LVL V: ICD-10-PCS | Mod: PBBFAC,,, | Performed by: PEDIATRICS

## 2021-12-06 PROCEDURE — 81003 URINALYSIS AUTO W/O SCOPE: CPT | Performed by: PEDIATRICS

## 2021-12-06 PROCEDURE — 99214 PR OFFICE/OUTPT VISIT, EST, LEVL IV, 30-39 MIN: ICD-10-PCS | Mod: 25,S$GLB,, | Performed by: PEDIATRICS

## 2021-12-06 PROCEDURE — 74018 RADEX ABDOMEN 1 VIEW: CPT | Mod: TC

## 2021-12-14 ENCOUNTER — TELEPHONE (OUTPATIENT)
Dept: REHABILITATION | Facility: HOSPITAL | Age: 61
End: 2021-12-14
Payer: COMMERCIAL

## 2021-12-17 ENCOUNTER — HOSPITAL ENCOUNTER (OUTPATIENT)
Dept: RADIOLOGY | Facility: HOSPITAL | Age: 61
Discharge: HOME | End: 2021-12-17
Attending: ANESTHESIOLOGY
Payer: COMMERCIAL

## 2021-12-17 DIAGNOSIS — M54.12 RADICULOPATHY, CERVICAL REGION: ICD-10-CM

## 2021-12-17 PROCEDURE — 72141 MRI NECK SPINE W/O DYE: CPT | Mod: TC

## 2021-12-21 ENCOUNTER — HOSPITAL ENCOUNTER (OUTPATIENT)
Facility: HOSPITAL | Age: 61
Discharge: HOME | End: 2021-12-21
Attending: ANESTHESIOLOGY | Admitting: ANESTHESIOLOGY
Payer: COMMERCIAL

## 2021-12-21 VITALS
DIASTOLIC BLOOD PRESSURE: 73 MMHG | HEIGHT: 64 IN | TEMPERATURE: 98 F | SYSTOLIC BLOOD PRESSURE: 121 MMHG | OXYGEN SATURATION: 96 % | WEIGHT: 203.5 LBS | RESPIRATION RATE: 16 BRPM | BODY MASS INDEX: 34.74 KG/M2 | HEART RATE: 64 BPM

## 2021-12-21 DIAGNOSIS — M54.12 CERVICAL RADICULOPATHY: ICD-10-CM

## 2021-12-21 PROCEDURE — 62321 NJX INTERLAMINAR CRV/THRC: CPT | Mod: ,,, | Performed by: ANESTHESIOLOGY

## 2021-12-21 PROCEDURE — 62321 PR INJ CERV/THORAC, W/GUIDANCE: ICD-10-PCS | Mod: ,,, | Performed by: ANESTHESIOLOGY

## 2021-12-21 PROCEDURE — 62321 NJX INTERLAMINAR CRV/THRC: CPT | Performed by: ANESTHESIOLOGY

## 2021-12-21 PROCEDURE — 25000003 PHARM REV CODE 250: Performed by: ANESTHESIOLOGY

## 2021-12-21 PROCEDURE — 63600175 PHARM REV CODE 636 W HCPCS: Performed by: ANESTHESIOLOGY

## 2021-12-21 PROCEDURE — 25500020 PHARM REV CODE 255: Performed by: ANESTHESIOLOGY

## 2021-12-21 RX ORDER — FENTANYL CITRATE 50 UG/ML
INJECTION, SOLUTION INTRAMUSCULAR; INTRAVENOUS
Status: DISCONTINUED | OUTPATIENT
Start: 2021-12-21 | End: 2021-12-21 | Stop reason: HOSPADM

## 2021-12-21 RX ORDER — MIDAZOLAM HYDROCHLORIDE 1 MG/ML
INJECTION, SOLUTION INTRAMUSCULAR; INTRAVENOUS
Status: DISCONTINUED | OUTPATIENT
Start: 2021-12-21 | End: 2021-12-21 | Stop reason: HOSPADM

## 2021-12-21 RX ORDER — DEXAMETHASONE SODIUM PHOSPHATE 10 MG/ML
INJECTION INTRAMUSCULAR; INTRAVENOUS
Status: DISCONTINUED | OUTPATIENT
Start: 2021-12-21 | End: 2021-12-21 | Stop reason: HOSPADM

## 2021-12-21 RX ORDER — BUPIVACAINE HYDROCHLORIDE 2.5 MG/ML
INJECTION, SOLUTION EPIDURAL; INFILTRATION; INTRACAUDAL
Status: DISCONTINUED | OUTPATIENT
Start: 2021-12-21 | End: 2021-12-21 | Stop reason: HOSPADM

## 2021-12-21 RX ORDER — INDOMETHACIN 25 MG/1
CAPSULE ORAL
Status: DISCONTINUED | OUTPATIENT
Start: 2021-12-21 | End: 2021-12-21 | Stop reason: HOSPADM

## 2021-12-22 ENCOUNTER — HOSPITAL ENCOUNTER (OUTPATIENT)
Dept: RADIOLOGY | Facility: HOSPITAL | Age: 61
Discharge: HOME | End: 2021-12-22
Attending: PEDIATRICS
Payer: COMMERCIAL

## 2021-12-22 DIAGNOSIS — K43.9 SPIGELIAN HERNIA: Primary | ICD-10-CM

## 2021-12-22 DIAGNOSIS — R10.12 COLICKY LUQ ABDOMINAL PAIN: ICD-10-CM

## 2021-12-22 PROCEDURE — 74176 CT ABD & PELVIS W/O CONTRAST: CPT | Mod: TC

## 2021-12-22 PROCEDURE — A9698 NON-RAD CONTRAST MATERIALNOC: HCPCS | Performed by: PEDIATRICS

## 2021-12-22 PROCEDURE — 25500020 PHARM REV CODE 255: Performed by: PEDIATRICS

## 2021-12-22 RX ADMIN — IOHEXOL 1000 ML: 9 SOLUTION ORAL at 03:12

## 2021-12-23 ENCOUNTER — TELEPHONE (OUTPATIENT)
Dept: INTERNAL MEDICINE | Facility: CLINIC | Age: 61
End: 2021-12-23
Payer: COMMERCIAL

## 2021-12-30 ENCOUNTER — TELEPHONE (OUTPATIENT)
Dept: PAIN MEDICINE | Facility: CLINIC | Age: 61
End: 2021-12-30
Payer: COMMERCIAL

## 2022-01-13 ENCOUNTER — PATIENT OUTREACH (OUTPATIENT)
Dept: ADMINISTRATIVE | Facility: OTHER | Age: 62
End: 2022-01-13
Payer: COMMERCIAL

## 2022-01-13 DIAGNOSIS — Z12.31 ENCOUNTER FOR SCREENING MAMMOGRAM FOR BREAST CANCER: Primary | ICD-10-CM

## 2022-01-14 ENCOUNTER — TELEPHONE (OUTPATIENT)
Dept: PAIN MEDICINE | Facility: CLINIC | Age: 62
End: 2022-01-14
Payer: COMMERCIAL

## 2022-01-14 NOTE — PROGRESS NOTES
Health Maintenance Due   Topic Date Due    HIV Screening  Never done    Colorectal Cancer Screening  09/06/2021    Mammogram  03/11/2022     Updates were requested from care everywhere.  Chart was reviewed for overdue Proactive [facility] Encounters (KESHIA) topics (CRS, Breast Cancer Screening, Eye exam)  Health Maintenance has been updated.  LINKS immunization registry triggered.  Immunizations were reconciled.

## 2022-01-17 NOTE — PROGRESS NOTES
Established Patient Chronic Pain Note     Referring Physician: No ref. provider found    PCP: HEIDE Botello Jr, MD    Chief Complaint:   Chief Complaint   Patient presents with    Back Pain        SUBJECTIVE:  Interval history 01/18/2022  Patient presents status post C6-7 interlaminar epidural steroid injection with left paramedian approach 12/21/2021.  Patient reports no discernible relief following her cervical C6-7 epidural steroid injection.  Today she again reports neck pain which radiates into the trapezius distribution, today on the left in C5-6 distribution and down the left upper extremity to the cubital fossa.  Patient reports she was unclear on gabapentin titration and discontinued this medication following her injection.  Patient continues to report significant weakness in the upper extremities requiring help in activities of daily living such as brushing her hair reporting on her clothes.    HPI 11/16/2021  Janna Sheffield is a 61 y.o. female with past medical history significant for fibromyalgia, history of left total knee arthroplasty, hypertension, hypercholesterolemia, pulmonary sarcoidosis on immunosuppression, chronic restrictive lung disease who presents to the clinic for the evaluation of neck and upper extremity pain.  Today patient reports pain which has been longstanding over several years with particularly exacerbated over the last 5 months.  Today patient reports pain is constant and is rated as a 9/10.  Patient reports pain in bilateral cervical paraspinous musculature which radiates down bilateral upper extremities in C7-8 distribution to the hand.  Pain is described as cramping with numbness and tingling in bilateral hands.  Pain is exacerbated with cervical flexion and extension and reaching and pulling motions with bilateral upper extremities.  Patient does report associated weakness in bilateral upper extremities.  Pain has been improved with prior cervical transforaminal  epidural steroid injection and gabapentin medication.  Patient denies any lower extremity weakness, gait ataxia or imbalance.    Patient reports significant motor weakness and loss of sensations.  Patient denies night fever/night sweats, urinary incontinence, bowel incontinence and significant weight loss.    Of note patient last saw Dr. Isabel October 2017 following cervical TF epidural steroid injection with significant relief and continuation of gabapentin.      Pain Disability Index Review:     Last 3 PDI Scores 1/18/2022 11/16/2021 10/11/2017   Pain Disability Index (PDI) 51 61 49       Non-Pharmacologic Treatments:  Physical Therapy/Home Exercise: no  Ice/Heat:yes  TENS: no  Acupuncture: no  Massage: no  Chiropractic: no    Other: no      Pain Medications:  - Adjuvant Medications: Neurontin (Gabapentin) and Tylenol (Acetaminophen)    Pain Procedures:   -12/21/2021:  C6-7 interlaminar epidural steroid injection with left paramedian approach    -09/28/2017:  Left-sided C6-7 cervical transforaminal epidural steroid injection; Dr. Isabel    Past Medical History:   Diagnosis Date    Arthritis     Chest pain syndrome 10/23/2014    Hyperlipidemia     Hypertension     Macular degeneration, age related, nonexudative - Left Eye 3/24/2014    Retinal detachment, old, partial 4/14/2014    Sarcoidosis      Past Surgical History:   Procedure Laterality Date    CATARACT EXTRACTION      DILATION AND CURETTAGE OF UTERUS      missed ab x 3    EPIDURAL STEROID INJECTION INTO CERVICAL SPINE N/A 12/21/2021    Procedure: C6/C7 IL HIMANSHU RN IV sedation;  Surgeon: Hadley Marsh MD;  [facility];  Service: Pain Management;  Laterality: N/A;    ESOPHAGOGASTRODUODENOSCOPY N/A 2/6/2019    Procedure: ESOPHAGOGASTRODUODENOSCOPY (EGD);  Surgeon: Jairo Vargas III, MD;  Location: [facility];  Service: Endoscopy;  Laterality: N/A;    HYSTERECTOMY  2001    MACARIO - complete    JOINT REPLACEMENT Left     Knee    knee surgery  "     OOPHORECTOMY      TUBAL LIGATION       Review of patient's allergies indicates:  No Known Allergies    Current Outpatient Medications   Medication Sig    ergocalciferol (VITAMIN D2) 50,000 unit Cap Take 1 capsule (50,000 Units total) by mouth every 7 days.    hydroCHLOROthiazide (HYDRODIURIL) 25 MG tablet Take 1 tablet by mouth once daily    multivitamin (THERAGRAN) per tablet Take 1 tablet by mouth Daily.    rosuvastatin (CRESTOR) 40 MG Tab TAKE 1 TABLET BY MOUTH ONCE DAILY (  REPLACES  LIPITOR)    gabapentin (NEURONTIN) 300 MG capsule Take 1 capsule (300 mg total) by mouth every evening for 3 days, THEN 1 capsule (300 mg total) 2 (two) times daily for 3 days, THEN 1 capsule (300 mg total) 3 (three) times daily for 3 days, THEN 2 capsules (600 mg total) 2 (two) times daily for 3 days, THEN 2 capsules (600 mg total) 3 (three) times daily for 18 days.     No current facility-administered medications for this visit.       Review of Systems     GENERAL:  No weight loss, malaise or fevers.  HEENT:   No recent changes in vision or hearing  NECK:  Negative for lumps, no difficulty with swallowing.  RESPIRATORY:  Negative for cough, wheezing or shortness of breath, patient denies any recent URI.  CARDIOVASCULAR:  Negative for chest pain, leg swelling or palpitations.  GI:  Negative for abdominal discomfort, blood in stools or black stools or change in bowel habits.  MUSCULOSKELETAL:  See HPI.  SKIN:  Negative for lesions, rash, and itching.  PSYCH:  No mood disorder or recent psychosocial stressors.   HEMATOLOGY/LYMPHOLOGY:  Negative for prolonged bleeding, bruising easily or swollen nodes.    NEURO:   No history of headaches, syncope, paralysis, seizures or tremors.  All other reviewed and negative other than HPI.    OBJECTIVE:    /84   Pulse 82   Resp 18   Ht 5' 4" (1.626 m)   Wt 91.7 kg (202 lb 2.6 oz)   BMI 34.70 kg/m²       Physical Exam    GENERAL: Well appearing, in no acute distress, alert " and oriented x3.  PSYCH:  Mood and affect appropriate.  SKIN: Skin color, texture, turgor normal, no rashes or lesions.  HEAD/FACE:  Normocephalic, atraumatic. Cranial nerves grossly intact.    NECK: pain to palpation over the cervical paraspinous muscles. Spurling Negative.  pain with neck flexion, extension, or lateral flexion.   Normaltesting biceps, triceps and brachioradialis bilaterally.    NegativeHoffmann's bilaterally.    5/5 strength testing deltoid, biceps, triceps, wrist extensor, wrist flexor and ulnar intrinsics bilaterally.    Normal  strength bilaterally    CV: RRR with palpation of the radial artery.  PULM: No evidence of respiratory difficulty, symmetric chest rise.  GI:  Soft and non-tender.    NEURO: Bilateral upper and lower extremity coordination and muscle stretch reflexes are physiologic and symmetric. No loss of sensation is noted.  GAIT: normal.    Imaging:   MRI cervical spine 11/16/2021  FINDINGS:  The cervical cord remains normal in signal and morphology.     The cervical vertebra reveal mild straightening of the normal lordosis.  No intrinsic marrow signal abnormality is seen.     Multilevel cervical degenerative disc disease is present.     C2-C3: Minimal annular bulge.     C3-C4: Mild central annular disc bulge.     C4-C5: Moderate disc degeneration with disc narrowing, desiccation and disc bulging osteophyte with ventral sac impression.  Mild central stenosis.  Uncovertebral joint spurring with moderate right and mild left foraminal stenosis.  AP diameter of the canal approximately 8 mm.     C5-C6: Moderate disc degeneration with disc narrowing, desiccation and disc bulging osteophyte.  Mild ventral sac impression.  Uncovertebral joint spurring with moderate left and mild right foraminal stenosis.     C6-C7: Mild-to-moderate disc degeneration with disc narrowing, desiccation and disc bulging osteophyte with mild ventral sac impression.  AP diameter canal at this level is  approximately 8.1 mm.  Uncovertebral joint spurring with mild foraminal stenosis.     C7-T1: Mild disc bulge.    09/14/17  MRI Cervical Spine Without Contrast    Findings: There is straightening of the cervical lordosis.  Degenerative vertebral endplate spurring and disc desiccation present at multiple levels with moderate disc space narrowing from C4-C5 through C6-C7.  Vertebral marrow signal pattern and architecture are normal.  Cervical cord and craniocervical junction are intact.  Paravertebral soft tissues are symmetric and normal in appearance.    C2-C3: Unremarkable.    C3-C4: Unremarkable.    C4-C5: Posterior disc osteophyte complex and bilateral uncovertebral hypertrophy resulting in moderate left greater than right foraminal narrowing.  Indentation of ventral thecal sac with associated anterior cord contact and mild central canal stenosis.  Minimum AP canal diameter is 9.0 mm.    C5-C6: Posterior disc osteophyte complex and asymmetric left greater than right uncovertebral hypertrophy producing marked left and moderate right foraminal narrowing.  Mild indentation of the ventral thecal sac without significant canal stenosis.    C6-C7: Posterior disc osteophyte complex and uncovertebral hypertrophy producing moderate to marked left greater than right foraminal narrowing.  Mild indentation of the ventral thecal sac without significant canal stenosis.    C7-T1: Unremarkable.    06/30/21    X-Ray Lumbar Spine AP And Lateral  FINDINGS:  Vertebral body height well maintained.  Minimal multilevel marginal spurring at the thoracolumbar junction region.  Facet disease identified in the mid and lower L-spine.  Most prominent findings at the L4-5 and L5-S1 levels.  Disc spaces appear adequate at all levels.  There is equivocal appearance minimal anterior subluxation L5 on S1.  Pedicles and SI joints intact.  Status post cholecystectomy      08/31/17  X-Ray Cervical Spine AP And Lateral  Findings: The vertebral bodies  demonstrate normal height.  The alignment is within normal limits. There is moderate displaced narrowing and spondylosis present at the C4-C5 through the C6-C7 levels. The C1-C2 articulation is within normal limits. No prevertebral soft tissue swelling.      ASSESSMENT: 61 y.o. year old female with neck and upper extremity pain, consistent with     1. Cervical radiculopathy  EMG W/ ULTRASOUND AND NERVE CONDUCTION TEST 2 Extremities    Ambulatory referral/consult to Neurosurgery   2. DDD (degenerative disc disease), cervical  EMG W/ ULTRASOUND AND NERVE CONDUCTION TEST 2 Extremities    Ambulatory referral/consult to Neurosurgery   3. Myofascial pain     4. Radiculopathy, cervical region  MRI Cervical Spine Without Contrast         PLAN:   - Interventions:  Patient obtained no significant relief after cervical epidural steroid injection.  We have discussed potentially considering repeat cervical transforaminal epidural injection with Dr. Isabel.  I will follow-up on neurosurgical recommendations and EMG results initially.    - Anticoagulation use: no no anticoagulation     report:  Reviewed and consistent with medication use as prescribed.    - Medications:  -  We have discussed increasing the patient's gabapentin to a more therapeutic goal.  Patient will increase her medication according to the following algorithm.  We have reviewed potential side effects of this medication including daytime somnolence, weight gain and peripheral edema  Gabapentin Titration:  Day 1: 300mg qHS  Day 4: 300mg AM, 300mg qHS  Day 7: 300mg TID  Day 10: 300mg AM, 300mg afternoon, 600mg qHS  Day 13: 300mg AM, 600mg afternoon, 600mg qHS  Day 16+: and after 600mg TID      - Therapy:   We discussed initiating physical therapy to help manage the patient/s painful condition. The patient was counseled that muscle strengthening will improve the long term prognosis in regards to pain and may also help increase range of motion and mobility. They  were told that one of the goals of physical therapy is that they learn how to do the exercises so that they can do them independently at home daily upon completion. The patient's questions were answered and they were agreeable to this course. A referral for physical therapy was provided to the patient.    -consult/referrals:  Physical Medicine Rehabilitation for bilateral upper extremity electromyography to help guide diagnosis and treatment.    - Imaging: Reviewed available imaging with patient and answered any questions they had regarding study.      - Follow up visit: return to clinic in 2 months    The above plan and management options were discussed at length with patient. Patient is in agreement with the above and verbalized understanding.    - I discussed the goals of interventional chronic pain management with the patient on today's visit. We discussed a multimodal and systematic approach to pain.  This includes diagnostic and therapeutic injections, adjuvant pharmacologic treatment, physical therapy, and at times psychiatry.  I emphasized the importance of regular exercise, core strengthening and stretching, diet and weight loss as a cornerstone of long-term pain management.    - This condition does not require this patient to take time off of work, and the primary goal of our Pain Management services is to improve the patient's functional capacity.  - Patient Questions: Answered all of the patient's questions regarding diagnoses, therapy, treatment and next steps      Hadley Marsh MD  Interventional Pain Management  [facility]    Disclaimer:  This note was prepared using voice recognition system and is likely to have sound alike errors that may have been overlooked even after proof reading.  Please call me with any questions

## 2022-01-18 ENCOUNTER — TELEPHONE (OUTPATIENT)
Dept: PAIN MEDICINE | Facility: CLINIC | Age: 62
End: 2022-01-18

## 2022-01-18 ENCOUNTER — OFFICE VISIT (OUTPATIENT)
Dept: PAIN MEDICINE | Facility: CLINIC | Age: 62
End: 2022-01-18
Payer: COMMERCIAL

## 2022-01-18 ENCOUNTER — TELEPHONE (OUTPATIENT)
Dept: NEUROSURGERY | Facility: CLINIC | Age: 62
End: 2022-01-18
Payer: COMMERCIAL

## 2022-01-18 VITALS
DIASTOLIC BLOOD PRESSURE: 84 MMHG | HEIGHT: 64 IN | HEART RATE: 82 BPM | WEIGHT: 202.19 LBS | RESPIRATION RATE: 18 BRPM | SYSTOLIC BLOOD PRESSURE: 138 MMHG | BODY MASS INDEX: 34.52 KG/M2

## 2022-01-18 DIAGNOSIS — M50.30 DDD (DEGENERATIVE DISC DISEASE), CERVICAL: ICD-10-CM

## 2022-01-18 DIAGNOSIS — M54.12 RADICULOPATHY, CERVICAL REGION: ICD-10-CM

## 2022-01-18 DIAGNOSIS — M79.18 MYOFASCIAL PAIN: ICD-10-CM

## 2022-01-18 DIAGNOSIS — M54.12 CERVICAL RADICULOPATHY: Primary | ICD-10-CM

## 2022-01-18 PROCEDURE — 99214 OFFICE O/P EST MOD 30 MIN: CPT | Mod: S$GLB,,, | Performed by: ANESTHESIOLOGY

## 2022-01-18 PROCEDURE — 1159F PR MEDICATION LIST DOCUMENTED IN MEDICAL RECORD: ICD-10-PCS | Mod: CPTII,S$GLB,, | Performed by: ANESTHESIOLOGY

## 2022-01-18 PROCEDURE — 3079F PR MOST RECENT DIASTOLIC BLOOD PRESSURE 80-89 MM HG: ICD-10-PCS | Mod: CPTII,S$GLB,, | Performed by: ANESTHESIOLOGY

## 2022-01-18 PROCEDURE — 3008F PR BODY MASS INDEX (BMI) DOCUMENTED: ICD-10-PCS | Mod: CPTII,S$GLB,, | Performed by: ANESTHESIOLOGY

## 2022-01-18 PROCEDURE — 99999 PR PBB SHADOW E&M-EST. PATIENT-LVL IV: ICD-10-PCS | Mod: PBBFAC,,, | Performed by: ANESTHESIOLOGY

## 2022-01-18 PROCEDURE — 3079F DIAST BP 80-89 MM HG: CPT | Mod: CPTII,S$GLB,, | Performed by: ANESTHESIOLOGY

## 2022-01-18 PROCEDURE — 1160F RVW MEDS BY RX/DR IN RCRD: CPT | Mod: CPTII,S$GLB,, | Performed by: ANESTHESIOLOGY

## 2022-01-18 PROCEDURE — 99999 PR PBB SHADOW E&M-EST. PATIENT-LVL IV: CPT | Mod: PBBFAC,,, | Performed by: ANESTHESIOLOGY

## 2022-01-18 PROCEDURE — 99214 PR OFFICE/OUTPT VISIT, EST, LEVL IV, 30-39 MIN: ICD-10-PCS | Mod: S$GLB,,, | Performed by: ANESTHESIOLOGY

## 2022-01-18 PROCEDURE — 3075F SYST BP GE 130 - 139MM HG: CPT | Mod: CPTII,S$GLB,, | Performed by: ANESTHESIOLOGY

## 2022-01-18 PROCEDURE — 3075F PR MOST RECENT SYSTOLIC BLOOD PRESS GE 130-139MM HG: ICD-10-PCS | Mod: CPTII,S$GLB,, | Performed by: ANESTHESIOLOGY

## 2022-01-18 PROCEDURE — 1160F PR REVIEW ALL MEDS BY PRESCRIBER/CLIN PHARMACIST DOCUMENTED: ICD-10-PCS | Mod: CPTII,S$GLB,, | Performed by: ANESTHESIOLOGY

## 2022-01-18 PROCEDURE — 3008F BODY MASS INDEX DOCD: CPT | Mod: CPTII,S$GLB,, | Performed by: ANESTHESIOLOGY

## 2022-01-18 PROCEDURE — 1159F MED LIST DOCD IN RCRD: CPT | Mod: CPTII,S$GLB,, | Performed by: ANESTHESIOLOGY

## 2022-01-18 RX ORDER — GABAPENTIN 300 MG/1
CAPSULE ORAL
Qty: 138 CAPSULE | Refills: 0 | Status: SHIPPED | OUTPATIENT
Start: 2022-01-18 | End: 2022-05-17 | Stop reason: DRUGHIGH

## 2022-01-18 NOTE — TELEPHONE ENCOUNTER
Tried to call. No answer. Left   Alexis Boyd, MA [facility]   [facility]

## 2022-01-18 NOTE — TELEPHONE ENCOUNTER
Hi, refill request received for vit D2 50,000u- take 1 cap po once a week from [facility] pharmacy off [address], JULIO Mcwilliams is no longer with [facility]. Please see if PCP can take over refills, we are recommending PCP take over now.    Thanks

## 2022-01-18 NOTE — TELEPHONE ENCOUNTER
----- Message from Luci Dover sent at 1/18/2022  1:44 PM CST -----  Contact: self/[phone]  Type:  Patient Returning Call    Who Called:Janna Sheffield  Who Left Message for Patient:nurse  Does the patient know what this is regarding?:appt  Would the patient rather a call back or a response via [facility]? Call back   Best Call Back Number:[phone]  Additional Information:

## 2022-01-18 NOTE — PATIENT INSTRUCTIONS
General Neck and Back Pain    Both neck and back pain are usually caused by injury to the muscles or ligaments of the spine. Sometimes the disks that separate each bone of the spine may cause pain by pressing on a nearby nerve. Back and neck pain may appear after a sudden twisting or bending force (such as in a car accident), or sometimes after a simple awkward movement. In either case, muscle spasm is often present and adds to the pain.  Acute neck and back pain usually gets better in 1 to 2 weeks. Pain related to disk disease, arthritis in the spinal joints or spinal stenosis (narrowing of the spinal canal) can become chronic and last for months or years.  Back and neck pain are common problems. Most people feel better in 1 or 2 weeks, and most of the rest in 1 to 2 months. Most people can remain active.  People experience and describe pain differently.  · Pain can be sharp, stabbing, shooting, aching, cramping, or burning  · Movement, standing, bending, lifting, sitting, or walking may worsen the pain  · Pain can be localized to one spot or area, or it can be more generalized  · Pain can spread or radiate upwards, downwards, to the front, or go down your arms  · Muscle spasm may occur.  Most of the time mechanical problems with the muscles or spine cause the pain. it is usually caused by an injury, whether known or not, to the muscles or ligaments. While illnesses can cause back pain, it is usually not caused by a serious illness. Pain is usually related to physical activity, whether sports, exercise, work, or normal activity. Sometimes it can occur without an identifiable cause. This can happen simply by stretching or moving wrong, without noting pain at the time. Other causes include:  · Overexertion, lifting, pushing, pulling incorrectly or too aggressively.  · Sudden twisting, bending or stretching from an accident (car or fall), or accidental movement.  · Poor posture  · Poor conditioning, lack of regular  exercise  · Spinal disc disease or arthritis  · Stress  · Pregnancy, or illness like appendicitis, bladder or kidney infection, pelvic infections   Home care  · For neck pain: Use a comfortable pillow that supports the head and keeps the spine in a neutral position. The position of the head should not be tilted forward or backward.  · When in bed, try to find a position of comfort. A firm mattress is best. Try lying flat on your back with pillows under your knees. You can also try lying on your side with your knees bent up towards your chest and a pillow between your knees.  · At first, do not try to stretch out the sore spots. If there is a strain, it is not like the good soreness you get after exercising without an injury. In this case, stretching may make it worse.  · Avoid prolonged sitting, long car rides or travel. This puts more stress on the lower back than standing or walking.  · During the first 24 to 72 hours after an injury, apply an ice pack to the painful area for 20 minutes and then remove it for 20 minutes over a period of 60 to 90 minutes or several times a day.   · You can alternate ice and heat therapies. Talk with your healthcare provider about the best treatment for your back or neck pain. As a safety precaution, do not use a heating pad at bedtime. Sleeping with a heating pad can lead to skin burns or tissue damage.  · Therapeutic massage can help relax the back and neck muscles without stretching them.  · Be aware of safe lifting methods and do not lift anything over 15 pounds until all the pain is gone.  Medications  Talk to your healthcare provider before using medicine, especially if you have other medical problems or are taking other medicines.  · You may use over-the-counter medicine to control pain, unless another pain medicine was prescribed. If you have chronic conditions like diabetes, liver or kidney disease, stomach ulcers,  gastrointestinal bleeding, or are taking blood thinner  medicines.  · Be careful if you are given pain medicines, narcotics, or medicine for muscle spasm. They can cause drowsiness, and can affect your coordination, reflexes, and judgment. Do not drive or operate heavy machinery.  Follow-up care  Follow up with your healthcare provider, or as advised. Physical therapy or further tests may be needed.  If X-rays were taken, you will be notified of any new findings that may affect your care.  Call 911  Seek emergency medical care if any of the following occur:  · Trouble breathing  · Confusion  · Very drowsy or trouble awakening  · Fainting or loss of consciousness  · Rapid or very slow heart rate  · Loss of bowel or bladder control  When to seek medical advice  Call your healthcare provider right away if any of these occur:  · Pain becomes worse or spreads into your arms or legs  · Weakness, numbness or pain in one or both arms or legs  · Numbness in the groin area  · Difficulty walking  · Fever of 100.4ºF (38ºC) or higher, or as directed by your healthcare provider  Date Last Reviewed: 7/1/2016 © 2000-2017 [employer]. [address] [place] [address]. All rights reserved. This information is not intended as a substitute for professional medical care. Always follow your healthcare professional's instructions.          Understanding Cervical Radiculopathy    Cervical radiculopathy is irritation or inflammation of a nerve root in the neck. It causes neck pain and other symptoms that may spread into the chest or down the arm. To understand this condition, it helps to understand the parts of the spine:  · Vertebrae. These are bones that stack to form the spine. The cervical spine contains the 7 vertebrae in the neck.  · Disks. These are soft pads of tissue between the vertebrae. They act as shock absorbers for the spine.  · The spinal canal. This is a tunnel formed within the stacked vertebrae. The spinal cord runs through this canal.  · Nerves. These  branch off the spinal cord. As they leave the spinal canal, nerves pass through openings between the vertebrae. The nerve root is the part of the nerve that is closest to the spinal cord.   With cervical radiculopathy, nerve roots in the neck become irritated. This leads to pain and symptoms that can travel to the nerves that go from the spinal cord down the arms and into the torso.  What causes cervical radiculopathy?  Aging, injury, poor posture, and other issues can lead to problems in the neck. These problems may then irritate nerve roots. These include:  · Damage to a disk in the cervical spine. The damaged disk may then press on nearby nerve roots.  · Degeneration from wear and tear, and aging. This can lead to narrowing (stenosis) of the openings between the vertebrae. The narrowed openings press on nerve roots as they leave the spinal canal.  · An unstable spine. This is when a vertebra slips forward. It can then press on a nerve root.  There are other, less common causes of pressure on nerves in the neck. These include infection, cysts, and tumors.  Symptoms of cervical radiculopathy  These include:  · Neck pain  · Pain, numbness, tingling, or weakness that travels down the arm  · Loss of neck movement  · Muscle spasms  Treatment for cervical radiculopathy  In most cases, your healthcare provider will first try treatments that help relieve symptoms. These may include:  · Prescription or over-the-counter pain medicines. These help relieve pain and swelling.  · Cold packs. These help reduce pain.  · Resting. This involves avoiding positions and activities that increase pain.  · Neck brace (cervical collar). This can help relieve inflammation and pain.  · Physical therapy, including exercises and stretches. This can help decrease pain and increase movement and function.  · Shots of medicinesaround the nerve roots. This is done to help relieve symptoms for a time.  In some cases, your healthcare provider may  advise surgery to fix the underlying problem. This depends on the cause, the symptoms, and how long the pain has lasted.  Possible complications  Over time, an irritated and inflamed nerve may become damaged. This may lead to long-lasting (permanent) numbness or weakness. If symptoms change suddenly or get worse, be sure to let your healthcare provider know.     When to call your healthcare provider  Call your healthcare provider right away if you have any of these:  · New pain or pain that gets worse  · New or increasing weakness, numbness, or tingling in your arm or hand  · Bowel or bladder changes   Date Last Reviewed: 3/10/2016  © 6273-9518 [employer]. [address] [place] [address]. All rights reserved. This information is not intended as a substitute for professional medical care. Always follow your healthcare professional's instructions.          Exercises: Neck Isometrics  To start, sit in a chair with your feet flat on the floor. Your weight should be slightly forward so that youre balanced evenly on your buttocks. Relax your shoulders and keep your head level. Using a chair with arms may help you keep your balance.       1. Press your palm against your forehead. Resist with your neck muscles. Hold for 10 seconds. Relax. Repeat 5 times.  2. Do the exercise again, pressing on the side of your head. Repeat 5 times. Switch sides.  3. Do the exercise again, pressing on the back of your head. Repeat 5 times.  For your safety, check with your healthcare provider before starting an exercise program.   Date Last Reviewed: 8/16/2015  © 8303-6879 [employer]. [address] [place] [address]. All rights reserved. This information is not intended as a substitute for professional medical care. Always follow your healthcare professional's instructions.

## 2022-01-20 ENCOUNTER — PATIENT MESSAGE (OUTPATIENT)
Dept: NEUROSURGERY | Facility: CLINIC | Age: 62
End: 2022-01-20
Payer: COMMERCIAL

## 2022-01-20 ENCOUNTER — TELEPHONE (OUTPATIENT)
Dept: NEUROSURGERY | Facility: CLINIC | Age: 62
End: 2022-01-20
Payer: COMMERCIAL

## 2022-01-20 RX ORDER — ERGOCALCIFEROL 1.25 MG/1
50000 CAPSULE ORAL
Qty: 12 CAPSULE | Refills: 4 | Status: SHIPPED | OUTPATIENT
Start: 2022-01-20 | End: 2023-02-14

## 2022-01-20 NOTE — TELEPHONE ENCOUNTER
Contacted patient regarding referral with [facility]. Scheduled appointment.  ([facility]uro[facility])  Roxanna George RN

## 2022-01-27 ENCOUNTER — OFFICE VISIT (OUTPATIENT)
Dept: RHEUMATOLOGY | Facility: CLINIC | Age: 62
End: 2022-01-27
Payer: COMMERCIAL

## 2022-01-27 VITALS
HEART RATE: 70 BPM | DIASTOLIC BLOOD PRESSURE: 83 MMHG | HEIGHT: 64 IN | BODY MASS INDEX: 34.52 KG/M2 | WEIGHT: 202.19 LBS | SYSTOLIC BLOOD PRESSURE: 128 MMHG

## 2022-01-27 DIAGNOSIS — M54.12 CERVICAL RADICULOPATHY: ICD-10-CM

## 2022-01-27 DIAGNOSIS — J98.4 CHRONIC RESTRICTIVE LUNG DISEASE: ICD-10-CM

## 2022-01-27 DIAGNOSIS — M17.11 PRIMARY OSTEOARTHRITIS OF RIGHT KNEE: ICD-10-CM

## 2022-01-27 DIAGNOSIS — I10 HYPERTENSION, UNSPECIFIED TYPE: ICD-10-CM

## 2022-01-27 DIAGNOSIS — Z51.81 MEDICATION MONITORING ENCOUNTER: ICD-10-CM

## 2022-01-27 DIAGNOSIS — D86.9 SARCOIDOSIS: Primary | ICD-10-CM

## 2022-01-27 DIAGNOSIS — E55.9 VITAMIN D DEFICIENCY: ICD-10-CM

## 2022-01-27 PROCEDURE — 3074F PR MOST RECENT SYSTOLIC BLOOD PRESSURE < 130 MM HG: ICD-10-PCS | Mod: CPTII,S$GLB,,

## 2022-01-27 PROCEDURE — 99999 PR PBB SHADOW E&M-EST. PATIENT-LVL III: ICD-10-PCS | Mod: PBBFAC,,,

## 2022-01-27 PROCEDURE — 3008F PR BODY MASS INDEX (BMI) DOCUMENTED: ICD-10-PCS | Mod: CPTII,S$GLB,,

## 2022-01-27 PROCEDURE — 3074F SYST BP LT 130 MM HG: CPT | Mod: CPTII,S$GLB,,

## 2022-01-27 PROCEDURE — 99999 PR PBB SHADOW E&M-EST. PATIENT-LVL III: CPT | Mod: PBBFAC,,,

## 2022-01-27 PROCEDURE — 1159F PR MEDICATION LIST DOCUMENTED IN MEDICAL RECORD: ICD-10-PCS | Mod: CPTII,S$GLB,,

## 2022-01-27 PROCEDURE — 99215 OFFICE O/P EST HI 40 MIN: CPT | Mod: S$GLB,,,

## 2022-01-27 PROCEDURE — 1160F RVW MEDS BY RX/DR IN RCRD: CPT | Mod: CPTII,S$GLB,,

## 2022-01-27 PROCEDURE — 3008F BODY MASS INDEX DOCD: CPT | Mod: CPTII,S$GLB,,

## 2022-01-27 PROCEDURE — 3079F PR MOST RECENT DIASTOLIC BLOOD PRESSURE 80-89 MM HG: ICD-10-PCS | Mod: CPTII,S$GLB,,

## 2022-01-27 PROCEDURE — 99215 PR OFFICE/OUTPT VISIT, EST, LEVL V, 40-54 MIN: ICD-10-PCS | Mod: S$GLB,,,

## 2022-01-27 PROCEDURE — 1159F MED LIST DOCD IN RCRD: CPT | Mod: CPTII,S$GLB,,

## 2022-01-27 PROCEDURE — 1160F PR REVIEW ALL MEDS BY PRESCRIBER/CLIN PHARMACIST DOCUMENTED: ICD-10-PCS | Mod: CPTII,S$GLB,,

## 2022-01-27 PROCEDURE — 3079F DIAST BP 80-89 MM HG: CPT | Mod: CPTII,S$GLB,,

## 2022-01-27 RX ORDER — DICLOFENAC SODIUM 10 MG/G
2 GEL TOPICAL 3 TIMES DAILY PRN
Qty: 100 G | Refills: 3 | Status: SHIPPED | OUTPATIENT
Start: 2022-01-27

## 2022-01-27 NOTE — PROGRESS NOTES
Subjective:       Patient ID: Janna Sheffield is a 61 y.o. female.    Chief Complaint: Sarcoidosis, osteoarthritis    HPI   Mrs. Sheffield is a 61 y.o.  AAF with history of osteoarthritis and sarcoidosis. Sarcoidosis was diagnosed in 1985 through a biopsy of a spot on her liver. 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     She was on imuran 50mg every other day but her sarcoid seemed to be in remission.  Lungs stable. She does have restrictive lung disease and SOB when she is more active but no worsening. She sees Dr. Casper.  We dc'd imuran in august 2019 (suggested by pulm initially) with no new or worsening symtpoms.  Last visit with pulm was march 2020, cxr and pfts stable, echo stable at the time, hasn't seen them since     She has low vitamin D and started on 50Kunits weekly improved to now normal level. No issues with joint swelling, rash, worsening sob, cp, eye issues. She has mao knee pain with a TKA on the left, uses topical nsaid which helps     She has generalized osteoarthritis with radiating neck pain. MRI showed disc disease and degenerative arthritis. She was sent to pain mgt for ESIs which helped. She also is taking gabapentin at night 3-600mg occasionally.  Tramadol occasionally helps    Today with no cp, sob, rashes, or joint swelling.  The patient has been following with Pain Management.  Has not been able to establish with Cardiology.  Pain 6/10 today bilateral knee pain, worse with colder weather.   applies compound topical daily to her knees.    Review of Systems   Constitutional: Negative for chills, fatigue and fever.   HENT: Negative for mouth sores, rhinorrhea and sore throat.    Eyes: Negative for pain and redness.        Dry eyes   Respiratory: Positive for shortness of breath. Negative for cough.    Cardiovascular: Negative for chest pain.   Gastrointestinal: Negative for abdominal pain, constipation, diarrhea,  "nausea and vomiting.   Genitourinary: Negative for dysuria and hematuria.   Musculoskeletal: Positive for arthralgias, back pain and neck pain. Negative for joint swelling and myalgias.        Left knee replacement   Skin: Negative for rash.   Neurological: Negative for weakness, numbness and headaches.   Psychiatric/Behavioral: The patient is not nervous/anxious.            Objective:   /83   Pulse 70   Ht 5' 4" (1.626 m)   Wt 91.7 kg (202 lb 2.6 oz)   BMI 34.70 kg/m²      Physical Exam   Constitutional: She is oriented to person, place, and time.   HENT:   Head: Normocephalic and atraumatic.   Eyes: Pupils are equal, round, and reactive to light. Right eye exhibits no discharge.   Cardiovascular: Normal rate, regular rhythm and normal heart sounds. Exam reveals no friction rub.   Pulmonary/Chest: Effort normal and breath sounds normal. No respiratory distress.   Abdominal: Soft. She exhibits no distension. There is no abdominal tenderness.   Musculoskeletal:         General: Tenderness (Bilateral knees) present. No swelling or deformity. Normal range of motion.      Cervical back: Normal range of motion.      Right lower leg: No edema.      Left lower leg: No edema.      Comments: mao wrists, mcps, pips no synovitis, no tenderness, full rom, grib strength equal bilaterally  Left knee s/p replacement, no effusion, no warmth  Right knee no effusion, no warmth, full rom    Right side mid  without midline tenderness  No BLE weakness   Lymphadenopathy:     She has no cervical adenopathy.   Neurological: She is alert and oriented to person, place, and time.   Skin: No rash noted. No erythema.   Psychiatric: Mood normal.       No results found for this or any previous visit (from the past 672 hour(s)).    CTA chest 8/24/17  Impression:       Negative for pulmonary embolus, aneurysm or dissection.  Lungs are clear.  No evidence of aortic dissection.  No evidence of aneurysm or retroperitoneal " "hematoma.  Small umbilical hernia containing fat.  Hernia involving the right anterolateral abdominal wall containing fat.    Cspine xr:   The vertebral bodies demonstrate normal height.  The alignment is within normal limits. There is moderate displaced narrowing and spondylosis present at the C4-C5 through the C6-C7 levels. The C1-C2 articulation is within normal limits. No prevertebral soft tissue swelling.    MRI C spine:   Findings: There is straightening of the cervical lordosis.  Degenerative vertebral endplate spurring and disc desiccation present at multiple levels with moderate disc space narrowing from C4-C5 through C6-C7.  Vertebral marrow signal pattern and architecture are normal.  Cervical cord and craniocervical junction are intact.  Paravertebral soft tissues are symmetric and normal in appearance.  C2-C3: Unremarkable.  C3-C4: Unremarkable.  C4-C5: Posterior disc osteophyte complex and bilateral uncovertebral hypertrophy resulting in moderate left greater than right foraminal narrowing.  Indentation of ventral thecal sac with associated anterior cord contact and mild central canal stenosis.  Minimum AP canal diameter is 9.0 mm.  C5-C6: Posterior disc osteophyte complex and asymmetric left greater than right uncovertebral hypertrophy producing marked left and moderate right foraminal narrowing.  Mild indentation of the ventral thecal sac without significant canal stenosis.  C6-C7: Posterior disc osteophyte complex and uncovertebral hypertrophy producing moderate to marked left greater than right foraminal narrowing.  Mild indentation of the ventral thecal sac without significant canal stenosis.  C7-T1: Unremarkable.     6MW Test  Height: 5' 5" (165.1 cm)  Weight: 90.7 kg (200 lb)  BMI (Calculated): 33.4  Predicted Distance: 331.44  Interpretation  Predicted Distance Meters (Calculated): 472.37 meters  SpO2 bjorn was 98%  Distance was 350.52 m  148.5% predicted     PFT:  FEv1, FVC improved  FEV1: " 1.57l( 69.4%), FVC 1.97( 69.0%)  FEV1/FVC 80  TLC: 3.59l( 70.4%)  DLCo: 11.29 ( 47.8%)    pft 3/2020  Mild restriction. (TLC< LLN and > or equal to 70% of predicted).  Moderate reduction in diffusion capacity - unadjusted for hemoglobin. (DLCO > or equal to 40% and < 60% predicted).    Echo 7.21.20  · Normal left ventricular systolic function. The estimated ejection fraction is 65%.  · Normal right ventricular systolic function.  · Normal LV diastolic function.  · The estimated PA systolic pressure is 26 mmHg.    X-ray bilateral knees 05/07/2019  FINDINGS:  There is equivocal mild joint space narrowing involving the medial compartment of the right knee.  No significant degenerative change at the right patellofemoral compartment.  A left total knee arthroplasty is in place.  No hardware failure or loosening.  No periprosthetic fracture.  No joint effusions are suggested.  No acute fracture or dislocation.    X-ray thoracic spine 06/30/2021  FINDINGS:  Slightly more pronounced underlying scoliotic curvature with midthoracic convexity to the right.  Vertebral height and disc spaces appear maintained without acute fracture or subluxation.  Multilevel marginal spurring throughout the T-spine.  Postsurgical changes cholecystectomy.  Pedicles normal in symmetric in appearance at all levels.  Degenerative changes noted throughout the mid lower C-spine with marginal spurring and variable loss of disc height.  Impression:  As above.  Follow-up and or further evaluation as warranted.    X-ray lumbar spine 06/30/2021  FINDINGS:  Vertebral body height well maintained.  Minimal multilevel marginal spurring at the thoracolumbar junction region.  Facet disease identified in the mid and lower L-spine.  Most prominent findings at the L4-5 and L5-S1 levels.  Disc spaces appear adequate at all levels.  There is equivocal appearance minimal anterior subluxation L5 on S1.  Pedicles and SI joints intact.  Status post  cholecystectomy  Impression:  As above.  Follow-up and or further evaluation as warranted.    X-ray chest 06/30/2021  FINDINGS:  Stable exam.  Heart and pulmonary vasculature within normal limits in the aorta tortuous with underlying atherosclerotic calcification.  Lungs symmetrically aerated and clear with sharp CP angles and midline trachea.  Smooth stable elevation right hemidiaphragm.  Mild spondylosis without fracture or subluxation.  Minimal stable underlying scoliotic curvature noted.  Minimal bilateral stable apical pleural thickening.  Status post cholecystectomy.  Impression:  Stable exam without acute infiltrate.     Assessment:       1. Sarcoidosis    2. Cervical radiculopathy    3. Chronic restrictive lung disease    4. Vitamin D deficiency    5. Medication monitoring encounter    6. Hypertension, unspecified type    7. Primary osteoarthritis of right knee        Impression:    Back pain: mid and low, main issue today, seen recently with no xrays, mobic helping somewhat but is out likely djd exacerbation     Sarcoidosis: stopped Imuran 50mg every other day (8/2019-after pulm rec'd) no evidence of activity, remains stable in remission     Chronic restrictive lung disease: stable recently occasional SOB;  sees dr. Casper, last CT of chest 7/2016 stable, PFTs with moderately reduced diffusion--mild restriction, stable; cxr stable --overdue for pulm f/u     High risk Medication Monitoring: now off imuran     Cervical spondylosis with radiation into the left arm: saw Dr. Isabel  s/p HIMANSHU w good result, gabapentin makes her drowsy    Vit d def: started 50K units weekly, level in normal range now 41    Osteoarthritis right knee, left knee total knee arthroplasty  Pain worse with colder weather.  Has been applies compound topical lotion.  Plan:           Stay off imuran no need to add back at this time  Cont weekly vitamin D, last level wnl, 41  F/u with dr. casper ---need to get into see him for yearly eval  pfts     Continue follow-up with Dr. Marsh in Pain Management    Chest x-ray every 12-18 months (last done 06/30/2021)    For knee pain, continue with topical.  Can also try Voltaren/diclofenac topical 3 times daily as needed for pain.  Make sure that current topical does not also contain NSAID. Rx sent to pharmacy.  Ice 10-15 minutes at a time can alternate with heat.  Elevate.     Referral to cardiology sent.  Advised patient to let us know if she does not hear from Cardiology in the next week.  Encouraged patient to follow with Dr. Casper in Pulmonary.    RTC in 6mos     Sarah Beth Shen PA-C  [facility]  Rheumatology       45 minutes of total time spent on the encounter, which includes face to face time and non-face to face time preparing to see the patient (eg, review of tests), Obtaining and/or reviewing separately obtained history, Documenting clinical information in the electronic or other health record, Independently interpreting results (not separately reported) and communicating results to the patient/family/caregiver, or Care coordination (not separately reported).  Patient is new to me.  Reviewed previous notes, imaging, labs.      Disclaimer: This note was prepared using voice recognition system and is likely to have sound alike errors and is not proof read.  Please call me with any questions

## 2022-01-28 ENCOUNTER — DOCUMENTATION ONLY (OUTPATIENT)
Dept: RHEUMATOLOGY | Facility: CLINIC | Age: 62
End: 2022-01-28
Payer: COMMERCIAL

## 2022-01-28 ENCOUNTER — TELEPHONE (OUTPATIENT)
Dept: PAIN MEDICINE | Facility: CLINIC | Age: 62
End: 2022-01-28
Payer: COMMERCIAL

## 2022-01-28 NOTE — PROGRESS NOTES
"PA for diclofenac sodium 1% completed on covermymeds. Approved   Prior Auth;Coverage Start Date:01/01/2022;Coverage End Date:01/28/2023.    Sarah Beth Ewing MA ([place])  Rheumatology Department  "

## 2022-01-28 NOTE — TELEPHONE ENCOUNTER
Reached out to patient to reschedule appointment because the provider will be out of clinic. Apt has been made . All questions answered.     Justin Esquivel  Medical

## 2022-01-31 ENCOUNTER — OFFICE VISIT (OUTPATIENT)
Dept: SURGERY | Facility: CLINIC | Age: 62
End: 2022-01-31
Payer: COMMERCIAL

## 2022-01-31 ENCOUNTER — OFFICE VISIT (OUTPATIENT)
Dept: OPHTHALMOLOGY | Facility: CLINIC | Age: 62
End: 2022-01-31
Payer: COMMERCIAL

## 2022-01-31 VITALS
TEMPERATURE: 98 F | WEIGHT: 204.81 LBS | DIASTOLIC BLOOD PRESSURE: 87 MMHG | BODY MASS INDEX: 35.16 KG/M2 | SYSTOLIC BLOOD PRESSURE: 157 MMHG | HEART RATE: 67 BPM

## 2022-01-31 DIAGNOSIS — Z98.42 CATARACT EXTRACTION STATUS, LEFT: ICD-10-CM

## 2022-01-31 DIAGNOSIS — H35.3122 INTERMEDIATE STAGE NONEXUDATIVE AGE-RELATED MACULAR DEGENERATION OF LEFT EYE: ICD-10-CM

## 2022-01-31 DIAGNOSIS — R19.8 LAXITY OF ABDOMINAL WALL: ICD-10-CM

## 2022-01-31 DIAGNOSIS — K43.9 SPIGELIAN HERNIA: Primary | ICD-10-CM

## 2022-01-31 DIAGNOSIS — H35.342 MACULAR HOLE, LEFT: ICD-10-CM

## 2022-01-31 DIAGNOSIS — H25.11 NS (NUCLEAR SCLEROSIS), RIGHT: Primary | ICD-10-CM

## 2022-01-31 DIAGNOSIS — D86.9 SARCOIDOSIS: ICD-10-CM

## 2022-01-31 PROCEDURE — 99204 OFFICE O/P NEW MOD 45 MIN: CPT | Mod: S$GLB,,, | Performed by: SURGERY

## 2022-01-31 PROCEDURE — 99999 PR PBB SHADOW E&M-EST. PATIENT-LVL II: CPT | Mod: PBBFAC,,, | Performed by: OPHTHALMOLOGY

## 2022-01-31 PROCEDURE — 99214 PR OFFICE/OUTPT VISIT, EST, LEVL IV, 30-39 MIN: ICD-10-PCS | Mod: S$GLB,,, | Performed by: OPHTHALMOLOGY

## 2022-01-31 PROCEDURE — 1159F MED LIST DOCD IN RCRD: CPT | Mod: CPTII,S$GLB,, | Performed by: OPHTHALMOLOGY

## 2022-01-31 PROCEDURE — 99214 OFFICE O/P EST MOD 30 MIN: CPT | Mod: S$GLB,,, | Performed by: OPHTHALMOLOGY

## 2022-01-31 PROCEDURE — 1160F PR REVIEW ALL MEDS BY PRESCRIBER/CLIN PHARMACIST DOCUMENTED: ICD-10-PCS | Mod: CPTII,S$GLB,, | Performed by: SURGERY

## 2022-01-31 PROCEDURE — 99999 PR PBB SHADOW E&M-EST. PATIENT-LVL III: ICD-10-PCS | Mod: PBBFAC,,, | Performed by: SURGERY

## 2022-01-31 PROCEDURE — 1159F PR MEDICATION LIST DOCUMENTED IN MEDICAL RECORD: ICD-10-PCS | Mod: CPTII,S$GLB,, | Performed by: OPHTHALMOLOGY

## 2022-01-31 PROCEDURE — 3008F PR BODY MASS INDEX (BMI) DOCUMENTED: ICD-10-PCS | Mod: CPTII,S$GLB,, | Performed by: SURGERY

## 2022-01-31 PROCEDURE — 1160F PR REVIEW ALL MEDS BY PRESCRIBER/CLIN PHARMACIST DOCUMENTED: ICD-10-PCS | Mod: CPTII,S$GLB,, | Performed by: OPHTHALMOLOGY

## 2022-01-31 PROCEDURE — 3008F BODY MASS INDEX DOCD: CPT | Mod: CPTII,S$GLB,, | Performed by: SURGERY

## 2022-01-31 PROCEDURE — 3079F PR MOST RECENT DIASTOLIC BLOOD PRESSURE 80-89 MM HG: ICD-10-PCS | Mod: CPTII,S$GLB,, | Performed by: SURGERY

## 2022-01-31 PROCEDURE — 1159F PR MEDICATION LIST DOCUMENTED IN MEDICAL RECORD: ICD-10-PCS | Mod: CPTII,S$GLB,, | Performed by: SURGERY

## 2022-01-31 PROCEDURE — 99204 PR OFFICE/OUTPT VISIT, NEW, LEVL IV, 45-59 MIN: ICD-10-PCS | Mod: S$GLB,,, | Performed by: SURGERY

## 2022-01-31 PROCEDURE — 1160F RVW MEDS BY RX/DR IN RCRD: CPT | Mod: CPTII,S$GLB,, | Performed by: SURGERY

## 2022-01-31 PROCEDURE — 1160F RVW MEDS BY RX/DR IN RCRD: CPT | Mod: CPTII,S$GLB,, | Performed by: OPHTHALMOLOGY

## 2022-01-31 PROCEDURE — 3077F SYST BP >= 140 MM HG: CPT | Mod: CPTII,S$GLB,, | Performed by: SURGERY

## 2022-01-31 PROCEDURE — 1159F MED LIST DOCD IN RCRD: CPT | Mod: CPTII,S$GLB,, | Performed by: SURGERY

## 2022-01-31 PROCEDURE — 99999 PR PBB SHADOW E&M-EST. PATIENT-LVL II: ICD-10-PCS | Mod: PBBFAC,,, | Performed by: OPHTHALMOLOGY

## 2022-01-31 PROCEDURE — 99999 PR PBB SHADOW E&M-EST. PATIENT-LVL III: CPT | Mod: PBBFAC,,, | Performed by: SURGERY

## 2022-01-31 PROCEDURE — 3079F DIAST BP 80-89 MM HG: CPT | Mod: CPTII,S$GLB,, | Performed by: SURGERY

## 2022-01-31 PROCEDURE — 3077F PR MOST RECENT SYSTOLIC BLOOD PRESSURE >= 140 MM HG: ICD-10-PCS | Mod: CPTII,S$GLB,, | Performed by: SURGERY

## 2022-01-31 NOTE — PROGRESS NOTES
"HPI     Annual Exam     Comments: Complete Ocular Exam    Patient states VA has decreased at all ranges in OU, patient rarely drives   at night secondary to the decrease in VA.              Comments     1. H/O RETINA DETACHMENT OS  2. LATTICE / COBBLESTONE OU  3. MAC HOLE OS  HIGH MYOPE -9  4. PERIPHERAL PIGMENTATION   5. H/O SARCOID UVEITIS  6. GUTTATA OU  7. PCIOL OS  NSC OD - not able to see at night "at all" unable to drive          Last edited by Aleksandar Crowe MD on 1/31/2022 10:16 AM. (History)            Assessment /Plan     For exam results, see Encounter Report.      ICD-10-CM ICD-9-CM    1. NS (nuclear sclerosis), right  H25.11 366.16 Visually Significant Cataract OD  Patient reports decreased vision consistent with the clinical amount of lenticular opacity, which reaches the level of visual significance and affects activities of daily living including reading and glare. Risks, benefits, and alternatives to cataract surgery were discussed.   The pt expressed a desire to proceed with surgery with the potential for some reasonable degree of visual improvement.    Discussed IOL options and refractive outcomes for this patient.    Phaco right eye,   Block  monofocal IOL did not consider Toric, will shabbir ascan measurements at next visit and discuss then     Will aim for near (-8.00) to match OS  Prescriptions not sent         2. Intermediate stage nonexudative age-related macular degeneration of left eye  H35.3122 362.51 pigmentary changes - follow and consult [facility] for eval    3. Sarcoidosis  D86.9 135 Followed by Rheum    4. Cataract extraction status, left  Z98.42 V45.61 Follow    5. Macular hole, left  H35.342 362.54 H/o Hole OS        RETURN TO CLINIC with Dr Pedersen for Retinal Eval and clear for sx   then Return to clinic for ASCAN pre- op               "

## 2022-01-31 NOTE — PROGRESS NOTES
Patient ID: Janna Sheffield is a 61 y.o. female.      Ventral hernia    Chief Complaint: Consult      HPI:  This is a 61-year-old female who presents for evaluation of a ventral hernia.    The patient states that she has that sensation of something rolling around in her abdomen.  She describes it is a lot like a baby rolling around in the uterus.  This sensation is in the left upper abdomen.    She was seen by her primary care doctor and a CT scan of the abdomen was ordered.    The CT scan was interpreted as having a right lower quadrant spigelian hernia at as surgery evaluation was requested      Review of Systems   Constitutional: Negative for appetite change, chills, fatigue, fever and unexpected weight change.   HENT: Negative for hearing loss and rhinorrhea.    Eyes: Negative for visual disturbance.   Respiratory: Negative for apnea, cough, shortness of breath and wheezing.    Cardiovascular: Negative for chest pain and palpitations.   Gastrointestinal: Negative for abdominal distention, abdominal pain, blood in stool, constipation, diarrhea, nausea and vomiting.        Sensation of something moving within her abdomen   Genitourinary: Negative for dysuria, frequency and urgency.   Musculoskeletal: Negative for arthralgias and neck pain.   Skin: Negative for rash.   Neurological: Negative for seizures, weakness, numbness and headaches.   Hematological: Negative for adenopathy. Does not bruise/bleed easily.   Psychiatric/Behavioral: Negative for hallucinations. The patient is not nervous/anxious.        Current Outpatient Medications   Medication Sig Dispense Refill    diclofenac sodium (VOLTAREN) 1 % Gel Apply 2 g topically 3 (three) times daily as needed (Knee pain). 100 g 3    ergocalciferol (VITAMIN D2) 50,000 unit Cap Take 1 capsule (50,000 Units total) by mouth every 7 days. 12 capsule 4    gabapentin (NEURONTIN) 300 MG capsule Take 1 capsule (300 mg total) by mouth every evening for 3 days, THEN 1  capsule (300 mg total) 2 (two) times daily for 3 days, THEN 1 capsule (300 mg total) 3 (three) times daily for 3 days, THEN 2 capsules (600 mg total) 2 (two) times daily for 3 days, THEN 2 capsules (600 mg total) 3 (three) times daily for 18 days. 138 capsule 0    hydroCHLOROthiazide (HYDRODIURIL) 25 MG tablet Take 1 tablet by mouth once daily 90 tablet 3    multivitamin (THERAGRAN) per tablet Take 1 tablet by mouth Daily.      rosuvastatin (CRESTOR) 40 MG Tab TAKE 1 TABLET BY MOUTH ONCE DAILY (  REPLACES  LIPITOR) 90 tablet 3     No current facility-administered medications for this visit.       Review of patient's allergies indicates:  No Known Allergies    Past Medical History:   Diagnosis Date    Arthritis     Chest pain syndrome 10/23/2014    Hyperlipidemia     Hypertension     Macular degeneration, age related, nonexudative - Left Eye 3/24/2014    Retinal detachment, old, partial 4/14/2014    Sarcoidosis        Past Surgical History:   Procedure Laterality Date    CATARACT EXTRACTION      DILATION AND CURETTAGE OF UTERUS      missed ab x 3    EPIDURAL STEROID INJECTION INTO CERVICAL SPINE N/A 12/21/2021    Procedure: C6/C7 IL HIMANSHU RN IV sedation;  Surgeon: Hadley Marsh MD;  Location[facility];  Service: Pain Management;  Laterality: N/A;    ESOPHAGOGASTRODUODENOSCOPY N/A 2/6/2019    Procedure: ESOPHAGOGASTRODUODENOSCOPY (EGD);  Surgeon: Jairo Vargas III, MD;  Location: [facility];  Service: Endoscopy;  Laterality: N/A;    HYSTERECTOMY  2001    MACARIO - complete    JOINT REPLACEMENT Left     Knee    knee surgery      OOPHORECTOMY      TUBAL LIGATION         Family History   Problem Relation Age of Onset    Cervical cancer Mother     Heart disease Mother     Heart attack Mother     Hypertension Mother     Hyperlipidemia Mother     Hypertension Father     Hyperlipidemia Father     Stroke Father     Stroke Brother     Breast cancer Maternal Aunt     Breast cancer Maternal Cousin      Breast cancer Maternal Cousin     Breast cancer Paternal Cousin     Ovarian cancer Paternal Cousin     Colon cancer Neg Hx     Thrombophilia Neg Hx     Deep vein thrombosis Neg Hx     Pulmonary embolism Neg Hx        Social History     Socioeconomic History    Marital status:    Occupational History     Employer: [employer]   Tobacco Use    Smoking status: Never Smoker    Smokeless tobacco: Never Used   Substance and Sexual Activity    Alcohol use: No    Drug use: No    Sexual activity: Yes     Partners: Male     Comment:        Vitals:    01/31/22 1036   BP: (!) 157/87   Pulse: 67   Temp: 98.2 °F (36.8 °C)       Physical Exam  Constitutional:       Appearance: She is well-developed and well-nourished. She is obese.   HENT:      Head: Normocephalic.   Eyes:      Extraocular Movements: EOM normal.      Pupils: Pupils are equal, round, and reactive to light.   Neck:      Thyroid: No thyromegaly.      Vascular: No JVD.      Trachea: No tracheal deviation.   Cardiovascular:      Rate and Rhythm: Normal rate and regular rhythm.      Heart sounds: Normal heart sounds.   Pulmonary:      Breath sounds: Normal breath sounds. No wheezing.   Abdominal:      General: Bowel sounds are normal. There is no distension.      Palpations: Abdomen is soft. Abdomen is not rigid. There is no hepatosplenomegaly or mass.      Tenderness: There is no abdominal tenderness. There is no guarding or rebound.      Hernia: No hernia is present.      Comments: Obese   Musculoskeletal:         General: No edema. Normal range of motion.   Lymphadenopathy:      Cervical: No cervical adenopathy.   Skin:     General: Skin is warm and dry.      Findings: No erythema or rash.   Neurological:      Mental Status: She is alert and oriented to person, place, and time.   Psychiatric:         Mood and Affect: Mood and affect normal.     CT scan from 2022 and CT scan from 2017 reviewed.  Images should with the patient.   Graft there is laxity of the right lower abdominal wall just above the pelvic girdle.  This is essentially unchanged.  It is not in the expected spot for and a spigelian hernia.  An are no fascial disruptions    Upper endoscopy from 2 years ago was reviewed.    Last colonoscopy was approximately 10 years ago, small polyp and diverticulosis    Assessment & Plan:    no evidence of a hernia that will require any surgical intervention.    Sensation of movement within her abdomen may be related to her intestine is located in the left upper abdomen.    I would not recommend any surgical intervention at this time.    Follow up with surgery as needed    Consideration can be given to screening colonoscopy

## 2022-02-04 ENCOUNTER — OFFICE VISIT (OUTPATIENT)
Dept: PHYSICAL MEDICINE AND REHAB | Facility: CLINIC | Age: 62
End: 2022-02-04
Payer: COMMERCIAL

## 2022-02-04 VITALS
HEART RATE: 71 BPM | DIASTOLIC BLOOD PRESSURE: 104 MMHG | SYSTOLIC BLOOD PRESSURE: 160 MMHG | BODY MASS INDEX: 34.83 KG/M2 | RESPIRATION RATE: 13 BRPM | HEIGHT: 64 IN | WEIGHT: 204 LBS

## 2022-02-04 DIAGNOSIS — R29.898 ARM WEAKNESS: ICD-10-CM

## 2022-02-04 DIAGNOSIS — M79.18 CERVICAL MYOFASCIAL PAIN SYNDROME: Primary | ICD-10-CM

## 2022-02-04 DIAGNOSIS — I10 HYPERTENSION, UNSPECIFIED TYPE: Primary | ICD-10-CM

## 2022-02-04 DIAGNOSIS — G56.03 BILATERAL CARPAL TUNNEL SYNDROME: ICD-10-CM

## 2022-02-04 DIAGNOSIS — M54.12 CERVICAL RADICULOPATHY: ICD-10-CM

## 2022-02-04 PROCEDURE — 3008F BODY MASS INDEX DOCD: CPT | Mod: CPTII,S$GLB,, | Performed by: PHYSICAL MEDICINE & REHABILITATION

## 2022-02-04 PROCEDURE — 1159F PR MEDICATION LIST DOCUMENTED IN MEDICAL RECORD: ICD-10-PCS | Mod: CPTII,S$GLB,, | Performed by: PHYSICAL MEDICINE & REHABILITATION

## 2022-02-04 PROCEDURE — 3008F PR BODY MASS INDEX (BMI) DOCUMENTED: ICD-10-PCS | Mod: CPTII,S$GLB,, | Performed by: PHYSICAL MEDICINE & REHABILITATION

## 2022-02-04 PROCEDURE — 3077F SYST BP >= 140 MM HG: CPT | Mod: CPTII,S$GLB,, | Performed by: PHYSICAL MEDICINE & REHABILITATION

## 2022-02-04 PROCEDURE — 1160F RVW MEDS BY RX/DR IN RCRD: CPT | Mod: CPTII,S$GLB,, | Performed by: PHYSICAL MEDICINE & REHABILITATION

## 2022-02-04 PROCEDURE — 1160F PR REVIEW ALL MEDS BY PRESCRIBER/CLIN PHARMACIST DOCUMENTED: ICD-10-PCS | Mod: CPTII,S$GLB,, | Performed by: PHYSICAL MEDICINE & REHABILITATION

## 2022-02-04 PROCEDURE — 3080F DIAST BP >= 90 MM HG: CPT | Mod: CPTII,S$GLB,, | Performed by: PHYSICAL MEDICINE & REHABILITATION

## 2022-02-04 PROCEDURE — 95911 NRV CNDJ TEST 9-10 STUDIES: CPT | Mod: S$GLB,,, | Performed by: PHYSICAL MEDICINE & REHABILITATION

## 2022-02-04 PROCEDURE — 99999 PR PBB SHADOW E&M-EST. PATIENT-LVL III: ICD-10-PCS | Mod: PBBFAC,,, | Performed by: PHYSICAL MEDICINE & REHABILITATION

## 2022-02-04 PROCEDURE — 95911 PR NERVE CONDUCTION STUDY; 9-10 STUDIES: ICD-10-PCS | Mod: S$GLB,,, | Performed by: PHYSICAL MEDICINE & REHABILITATION

## 2022-02-04 PROCEDURE — 99203 PR OFFICE/OUTPT VISIT, NEW, LEVL III, 30-44 MIN: ICD-10-PCS | Mod: 25,S$GLB,, | Performed by: PHYSICAL MEDICINE & REHABILITATION

## 2022-02-04 PROCEDURE — 1159F MED LIST DOCD IN RCRD: CPT | Mod: CPTII,S$GLB,, | Performed by: PHYSICAL MEDICINE & REHABILITATION

## 2022-02-04 PROCEDURE — 3077F PR MOST RECENT SYSTOLIC BLOOD PRESSURE >= 140 MM HG: ICD-10-PCS | Mod: CPTII,S$GLB,, | Performed by: PHYSICAL MEDICINE & REHABILITATION

## 2022-02-04 PROCEDURE — 95886 PR EMG COMPLETE, W/ NERVE CONDUCTION STUDIES, 5+ MUSCLES: ICD-10-PCS | Mod: S$GLB,,, | Performed by: PHYSICAL MEDICINE & REHABILITATION

## 2022-02-04 PROCEDURE — 99203 OFFICE O/P NEW LOW 30 MIN: CPT | Mod: 25,S$GLB,, | Performed by: PHYSICAL MEDICINE & REHABILITATION

## 2022-02-04 PROCEDURE — 95886 MUSC TEST DONE W/N TEST COMP: CPT | Mod: S$GLB,,, | Performed by: PHYSICAL MEDICINE & REHABILITATION

## 2022-02-04 PROCEDURE — 99999 PR PBB SHADOW E&M-EST. PATIENT-LVL III: CPT | Mod: PBBFAC,,, | Performed by: PHYSICAL MEDICINE & REHABILITATION

## 2022-02-04 PROCEDURE — 3080F PR MOST RECENT DIASTOLIC BLOOD PRESSURE >= 90 MM HG: ICD-10-PCS | Mod: CPTII,S$GLB,, | Performed by: PHYSICAL MEDICINE & REHABILITATION

## 2022-02-04 NOTE — PROGRESS NOTES
[facility]   [address] [place] [address]  Phone: [phone]        Full Name: kenroy sanchez YOB: 1960  Patient ID: [id number]      Visit Date: 2/4/2022 13:07  Age: 61 Years 1 Months Old  Examining Physician: Nica Quijano M.D.  Referring Physician: Dr Marsh  Reason for Referral: uex pain      Chief Complaint   Patient presents with    Neck Pain     Into arms       HPI: This is a 61 y.o.  female being seen in clinic today for evaluation of chronic neck achy pain and tightness with radiation into left shoulder, neck, and arm.  With increased pain she has limited ROM of her arm.  On a few occasions she has some weakness in her arm and hand. Rest and position change provides some relief.     History obtained from patient    Past family, medical, social, and surgical history reviewed in chart    Review of Systems:     General- denies lethargy, weight change, fever, chills  Head/neck- denies swallowing difficulties  ENT- denies hearing changes  Cardiovascular-denies chest pain  Pulmonary- denies shortness of breath +ALLEN  GI- denies constipation or bowel incontinence  - denies bladder incontinence  Skin- denies wounds or rashes  Musculoskeletal- denies weakness, +pain  Neurologic- +numbness and tingling  Psychiatric- denies depressive or psychotic features, denies anxiety  Lymphatic-denies swelling  Endocrine- denies hypoglycemic symptoms/DM history  All other pertinent systems negative     Physical Examination:  General: Well developed, well nourished female, NAD  HEENT:NCAT EOMI bilaterally   Pulmonary:Normal respirations    Spinal Examination: CERVICAL  Active ROM is within normal limits.  Inspection: No deformity of spinal alignment.  Palpation: No vertebral tenderness to percussion.  Very Tight and ttp at left trapezius, rhomboid, splenius capitus, teres minor. Mild ttp at right trapezius, tight band    Spinal Examination: LUMBAR or THORACIC  Active ROM is within normal  limits.  Inspection: No deformity of spinal alignment.      Musculoskeletal Tests:  Phalen: neg  Elbow compression (ulnar): neg  Tinels at wrist: neg    Bilateral Upper and Lower Extremities:  Pulses are 2+ at radial bilaterally.  Shoulder/Elbow/Wrist/Hand ROM wnl except rotator cuff weakness bilaterally-worse on left  Hip/Knee/Ankle ROM   Bilateral Extremities show normal capillary refill.  No signs of cyanosis, rubor, edema, skin changes, or dysvascular changes of appendages.  Nails appear intact.    Neurological Exam:  Cranial Nerves:  II-XII grossly intact    Manual Muscle Testing: (Motor 5=normal)  5/5 strength bilateral upper extremities    No focal atrophy is noted of either upper extremity.    Bilateral Reflexes:  Schulte's response is absent bilaterally.      Sensation: tested to light touch  - intact in arms  Gait: Narrow base and good arm swing.      Entire procedure explained to patient prior to proceeding.  Verbal consent obtained      SNC      Nerve / Sites Rec. Site Onset Lat Peak Lat Amp Segments Distance Velocity     ms ms µV  mm m/s   L Median - Digit II (Antidromic)      Wrist Dig II 3.5 4.2 14.3 Wrist - Dig  40   R Median - Digit II (Antidromic)      Wrist Dig II 3.3 4.2 12.5 Wrist - Dig  43   L Ulnar - Digit V (Antidromic)      Wrist Dig V 3.1 3.7 28.2 Wrist - Dig V 140 45   R Ulnar - Digit V (Antidromic)      Wrist Dig V 2.8 3.6 20.3 Wrist - Dig V 140 50   L Radial - Anatomical snuff box (Forearm)      Forearm Wrist 1.6 2.4 16.2 Forearm - Wrist 100 62   R Radial - Anatomical snuff box (Forearm)      Forearm Wrist 1.9 2.7 18.8 Forearm - Wrist 100 52       MNC      Nerve / Sites Muscle Latency Amplitude Duration Rel Amp Segments Distance Lat Diff Velocity     ms mV ms %  mm ms m/s   L Median - APB      Wrist APB 3.4 7.5 6.5 100 Wrist - APB 80        Elbow APB 7.9 7.1 6.9 94.8 Elbow - Wrist 220 4.4 50   R Median - APB      Wrist APB 3.4 8.4 5.8 100 Wrist - APB 80        Elbow APB 7.8  7.8 6.5 92.3 Elbow - Wrist 220 4.4 50   L Ulnar - ADM      Wrist ADM 2.8 7.8 6.0 100 Wrist - ADM 80        B.Elbow ADM 7.1 7.6 7.3 97.2 B.Elbow - Wrist 230 4.3 53      A.Elbow ADM 8.9 7.5 7.3 98.3 A.Elbow - B.Elbow 100 1.8 56         A.Elbow - Wrist  6.1    R Ulnar - ADM      Wrist ADM 2.8 8.4 6.5 100 Wrist - ADM 80        B.Elbow ADM 7.0 8.3 6.9 98.7 B.Elbow - Wrist 230 4.3 54      A.Elbow ADM 8.9 8.2 7.2 98.5 A.Elbow - B.Elbow 100 1.8 55         A.Elbow - Wrist  6.1        EMG            EMG Summary Table     Spontaneous MUAP Recruitment   Muscle IA Fib PSW Fasc Other Dur. Dur Amp Dur Polys Pattern Effort   L. Deltoid N None None None .   N N N N Max   L. Biceps brachii N None None None .   N N N sl red Max   L. Triceps brachii N None None None .   N N N N Max   L. Pronator teres N None None None .   N N N sl red Max   L. Brachioradialis N None None None .   N N N sl red Max   L. First dorsal interosseous N None None None .   N N N N Max                                    INTERPRETATION  -Bilateral median motor nerve conduction study showed normal latency, amplitude, and conduction velocity  -Bilateral median sensory nerve conduction study showed prolonged peak latency and normal amplitude  -Bilateral ulnar motor nerve conduction study showed normal latency, amplitude, and conduction velocity  -Bilateral ulnar sensory nerve conduction study showed normal peak latency and amplitude  -Bilateral radial sensory nerve conduction study showed normal peak latency and amplitude  -Needle EMG examination performed to above mentioned muscles       IMPRESSION  1. ABNORMAL study  2. There is electrodiagnostic evidence of a mild demyelinating median neuropathy (Carpal tunnel syndrome) across bilateral wrists.  There was a mild chronic radiculopathy of the left C6 nerve root  3. There is clinical evidence of cervical myofascial pain and rotator cuff weakness    PLAN  Discussed in detail for greater than 30 minutes about  diagnosis and treatment plan    1. Follow up with referring provider: Dr. Hadley Marsh  2. Wrist braces provided. Handouts on CTS and myofascial pain provided. Rec referral to PT with focus on cuff strengthening, myofascial release, dry needle, posture, etc  3. This study is good for one year. If symptoms worsen or do not improve, please re-consult.    Nica Quijano M.D.  Physical Medicine and Rehab

## 2022-02-04 NOTE — PATIENT INSTRUCTIONS
"Patient Education       Carpal Tunnel Syndrome   The Basics   Written by the doctors and editors at [facility]   What is carpal tunnel syndrome? -- Carpal tunnel syndrome is a condition that causes pain, numbness, and sometimes weakness in the fingers and hands. It happens when a nerve in the wrist called the "median nerve" gets pinched or squeezed.  The median nerve goes through a tunnel in the wrist. This tunnel is formed by the bones of the wrist and a tough band of tissue called a "ligament" (figure 1). Experts do not know exactly how the nerve can get pinched. But they think it might happen when:  · Tendons that go through the same tunnel get swollen (tendons are bands of tissue that connect muscles to bones)  · Tissues surrounding the tendons harden or get swollen  · People use their hands for work that involves repetitive or forceful movements  The median nerve carries signals about sensation from the hand to the brain. Then it sends signals from the brain to the muscles. In other words, it helps tell the brain what the hand is feeling and makes the muscles of the hand move. The nerve is connected to these parts of the hand:  · Thumb  · Index finger  · Middle finger  · Parts of the ring finger  · Parts of the palm closest to the thumb  Women are more likely than men to get carpal tunnel syndrome. Being overweight probably increases the risk of carpal tunnel syndrome. Certain health conditions also might increase the risk, including diabetes and rheumatoid arthritis. Women who are pregnant are also more likely to get carpal tunnel syndrome, but it usually goes away after the baby is born.  What are the symptoms of carpal tunnel syndrome? -- The symptoms include pain and tingling in the thumb and the index, middle, and ring fingers (figure 1). Symptoms are typically worst at night and can wake you up from sleep. Often the symptoms affect both hands, but one hand might have worse symptoms than the other.  In some " cases, the muscles of the hand, thumb, or fingers can be weak or feel clumsy. In other cases, pain and tingling can extend to the whole hand or even up to the wrist and forearm. Rarely, pain and tingling extends past the elbow to the shoulder.  The symptoms can also flare up when you do things that involve bending and unbending your wrist or raising your arms. Some activities can trigger symptoms in people with carpal tunnel syndrome. But they do not actually cause the condition. Examples include:  · Driving  · Reading  · Typing  · Holding a phone  · Sleeping in certain positions  In many people, symptoms come and go. But some people eventually have symptoms all the time. They can end up having trouble moving their fingers or controlling their .  Is there a test for carpal tunnel syndrome? -- Yes. Electrical tests of the nerves can show if you have carpal tunnel syndrome, but these tests are not always necessary.  Your doctor will probably be able to tell if you have carpal tunnel syndrome by learning about your symptoms and doing an exam. During the exam, they might tap on or press on your wrist, or ask you to hold your hands in ways that are known to make symptoms worse.  Your doctor might also order electrical nerve tests. These tests can confirm that you have carpal tunnel syndrome. They include:  · Nerve conduction studies - Nerve conduction studies can show whether the median nerve is carrying electrical signals the right way. In people with carpal tunnel syndrome, signals can be slow or weak.  · Electromyography - Electromyography, also called EMG, can show whether the muscles in the hand and wrist are responding the right way to electrical signals. This test is most useful in figuring out if symptoms are related to carpal tunnel syndrome or another problem.  Should I see a doctor or nurse? -- Yes. See your doctor or nurse if you have the symptoms described above, and they bother you.  How is carpal  "tunnel syndrome treated? -- Treatments are often combined and can include:  · Wrist splints - Some people feel better if they wear splints at night that keep their hands in a "neutral position." The neutral position is when the wrist is not bent forward or backward and the fingers are curled naturally toward the palm.  Doctors often suggest splints for women who get carpal tunnel syndrome during pregnancy. They usually don't need other treatments, since in most cases, symptoms improve after the baby is born.  · Steroid shots or pills - Steroids are a group of medicines that control inflammation and swelling. To treat carpal tunnel syndrome, doctors sometimes inject steroids into the carpal tunnel. People who do not want to get a shot can take steroids in pill form instead. But the pills are less effective than the shot.  · Other physical treatments - Some people find that yoga helps with their symptoms. There is also weak evidence that something called "ultrasound therapy" might help in some cases. This involves using sound waves to try to treat symptoms. Other treatments such as "nerve gliding" and "carpal bone mobilization" are also sometimes helpful. These treatments are done by a physical or occupational therapist and involve moving the bones in your wrist around in a special way.  · Surgery - Doctors offer surgery to people who have ongoing or severe nerve damage that is causing the symptoms of carpal tunnel syndrome. Surgery for carpal tunnel syndrome involves cutting the ligament that stretches across the wrist to form the tunnel.  Can carpal tunnel syndrome be prevented? -- It's unclear whether there is any way to prevent carpal tunnel syndrome. People sometimes think that the condition happens because they use a computer too much. But studies have shown that computer use is probably not related to carpal tunnel syndrome.  All topics are updated as new evidence becomes available and our peer review process " "is complete.  This topic retrieved from [employer] on: Sep 21, 2021.  Topic 06381 Version 9.0  Release: 29.4.2 - C29.263  © 2021 UpToDate, Inc. and/or its affiliates. All rights reserved.  figure 1: Carpal tunnel syndrome     The carpal tunnel is a tunnel in the wrist that is formed by the bones of the wrist and a tough band of tissue called a "ligament." Carpal tunnel syndrome happens when a nerve that goes through that tunnel, called the "median nerve," gets pinched or squeezed. Carpal tunnel syndrome causes pain and numbness most often in the areas shaded here in blue.  Graphic 44612 Version 2.0    Consumer Information Use and Disclaimer   This information is not specific medical advice and does not replace information you receive from your health care provider. This is only a brief summary of general information. It does NOT include all information about conditions, illnesses, injuries, tests, procedures, treatments, therapies, discharge instructions or life-style choices that may apply to you. You must talk with your health care provider for complete information about your health and treatment options. This information should not be used to decide whether or not to accept your health care provider's advice, instructions or recommendations. Only your health care provider has the knowledge and training to provide advice that is right for you. The use of this information is governed by the [employer] End User License Agreement, available at https://[email or web address]/en/solutions/7-bites/about/augusta.The use of [employer] content is governed by the [employer] Terms of Use. ©2021 UpToDate, Inc. All rights reserved.  Copyright   © 2021 UpToDate, Inc. and/or its affiliates. All rights reserved.  Patient Education       Radiculopathy   The Basics   Written by the doctors and editors at [employer]   What is radiculopathy? -- "Radiculopathy" is the medical term for the pain, weakness, numbness, or tingling that happens when " "nerves coming from the spinal cord get pinched or damaged. Radiculopathy can affect different parts of the body, depending on which nerve or group of nerves is affected. People sometimes refer to radiculopathy as having a "pinched nerve."  Here are 2 common examples of radiculopathy:  · Cervical radiculopathy - People with this type of radiculopathy have pain, weakness, numbness, or tingling down one or both arms. The condition happens when one or more of the nerves that go from the spine to the arm get pinched or damaged.  · Lumbosacral radiculopathy - People with this type of radiculopathy have pain, weakness, numbness, or tingling in the buttocks or down the leg. The condition happens when one or more of the nerves that go from the spine to the foot and leg get pinched or damaged. People sometimes refer to the symptoms of this type of radiculopathy as "sciatica."  What causes radiculopathy? -- Radiculopathy is usually caused by a problem with the back. To understand radiculopathy, it's helpful to first learn a little about the back and spine.  The back is made up of (figure 1):  · Vertebrae - A stack of bones that sit on top of one another like a stack of coins. Each of these bones has a hole in the center. When stacked, the bones form a hollow tube that protects the spinal cord.  · Spinal cord and nerves - The spinal cord is the highway of nerves that connects the brain to the rest of the body. It runs through the hole in the center of the vertebrae. Nerves branch out from the spinal cord and pass in between the vertebrae. From there they connect to the arms, the legs, and the organs. (This is why problems in the back can cause leg pain or bladder problems.)  · Discs - Rubbery discs sit in between each of the vertebrae to add cushion and allow movement. The discs have a tough outer shell and jelly-like center.  · Muscles, tendons, and ligaments - Together the muscles, tendons, and ligaments are called the "soft " "tissues" of the back. These soft tissues support the back and help hold it together.  Radiculopathy can happen when changes in the back cause a nerve to get pinched or damaged. This can happen if:  · The vertebrae form bumps called bone spurs, which press on nearby nerves. (People with a condition called "spinal stenosis" often have this problem.)  · The discs between the vertebrae break open and bulge out, causing them to press on or irritate nearby nerves. (A disc that breaks open and bulges is called a "herniated disc.")   · Other medical conditions, such as diabetes, infection, inflammation, or a tumor injure the nerves near the spinal cord.  Should I see a doctor or nurse? -- If you have new pain, weakness, numbness, or tingling that seems to spread out to your arms or legs from your spine, see your doctor or nurse.  Will I need tests? -- Maybe. Doctors can tell a lot about a person's radiculopathy based on which parts of the body are affected and how. Because of that, you might not need any tests, especially if you have had symptoms only for a short time. Still, if your doctor is concerned about nerve damage, they might order one or more of these tests:  · Imaging tests - Imaging tests, such as X-rays, MRIs, or CT scans, create pictures of the inside of the body. These imaging tests can show problems with the back like those described above.  · Electromyography (also called "EMG" or nerve conduction study) - During this test, a technician or doctor checks how well electrical pulses travel across nerves to the part of your body that has symptoms. The test helps show whether the nerves controlling that body part are working right.  How is radiculopathy treated? -- Many people with radiculopathy do not need formal treatment. In some cases, the radiculopathy goes away as the back and nerves heal. In other cases, people find ways to cope with their symptoms.  When people do get treatment, the treatments can " include:  · Pain medicines that you can get without a prescription (If these do not work, stronger prescription pain medicines are available.)  · Medicines to relax the muscles (called muscle relaxants)  · Avoiding activities that make the pain worse  · Injections of medicines that numb the back or reduce swelling  · Physical therapy to learn special exercises and stretches  · Surgery to repair the problem causing symptoms   All topics are updated as new evidence becomes available and our peer review process is complete.  This topic retrieved from [employer] on: Sep 21, 2021.  Topic 62085 Version 7.0  Release: 29.4.2 - C29.263  © 2021 UpToDate, Inc. and/or its affiliates. All rights reserved.  figure 1: Anatomy of the back     Low back pain can be caused by problems with the muscles, ligaments, discs, bones (vertebrae), or nerves. Often, back pain is caused by strains or sprains involving the muscles or ligaments. These problems cannot always be seen on imaging tests, such as MRI or CT scans.  Graphic 27361 Version 5.0    Consumer Information Use and Disclaimer   This information is not specific medical advice and does not replace information you receive from your health care provider. This is only a brief summary of general information. It does NOT include all information about conditions, illnesses, injuries, tests, procedures, treatments, therapies, discharge instructions or life-style choices that may apply to you. You must talk with your health care provider for complete information about your health and treatment options. This information should not be used to decide whether or not to accept your health care provider's advice, instructions or recommendations. Only your health care provider has the knowledge and training to provide advice that is right for you. The use of this information is governed by the [employer] End User License Agreement, available at  https://www.woltersTXCOMuwer.com/en/solutions/lexicomp/about/augusta.The use of [employer] content is governed by the [employer] Terms of Use. ©2021 UpToDate, Inc. All rights reserved.  Copyright   © 2021 UpToDate, Inc. and/or its affiliates. All rights reserved.      Myofascial Pain Syndrome: Fibrositis  Your pain is caused by a state of chronic muscle tension. This condition is called by various names: myofascial pain, fibrositis and trigger point pain. This can also be due to mechanical stress (such as working at a computer terminal for long periods; or work that requires repetitive motions of the arms or hands) or emotional stress (such as problems on the job or in your personal life). Sometimes there is no obvious cause. The pain can occur in the area of the muscle spasm or at a site distant to it. For example, spasm of a neck muscle can cause headache. Spasm of the muscle near the shoulder blade can cause pain shooting down the arm.  Home Care:  · Try to identify the factors that may be causing your problem and change them:  ¨ If you feel that emotional stress is a cause of your pain, learn methods to deal more effectively with the stress in your life. These may include regular exercise, muscle relaxation techniques, meditation or simply taking time out for yourself. Consult your doctor or go to a local bookstore and review the many books and tapes available on the subject of stress reduction.  ¨ If you feel that physical stress is a cause for your pain, try to modify any poor work habits.  · You may use acetaminophen (Tylenol) or ibuprofen (Motrin, Advil) to control pain, unless another medicine was prescribed. [NOTE: If you have chronic liver or kidney disease or ever had a stomach ulcer or GI bleeding, talk with your doctor before using these medicines.]  · The use of heat to the muscle (hot compress or heating pad) will be helpful to reduce muscle spasm. Some persons get relief with ice packs. Apply an ice pack  (crushed or cubed ice in a plastic bag, wrapped in a towel) for 20 minutes at a time as needed. Use the method that feels best to you.  · Massaging the trigger point and stretching out the muscle are an important parts of prevention and treatment. Trigger point massage can be done by first applying heat to the area to warm and prepare the muscle. Have someone apply steady thumb pressure directly on the knot in the muscle (the most tender point) for 30 seconds. Release the pressure, then massage the surrounding muscle. Repeat the process, applying more pressure to the trigger point each time. Do this up to the limit of pain. With each treatment, the trigger point should become less tender and the pain should decrease. You can apply local pressure to trigger points in the back by lying on the floor with a tennis ball under the trigger point.  Follow Up  with your doctor as advised or if not improving within the next week. It may be necessary for you to receive physical therapy if you do not respond to home treatment alone.  Get Prompt Medical Attention  if any of the following occur:  · If your trigger point is in the chest muscles, observe for pain that becomes more severe, lasts longer, or spreads into your shoulder/arm, neck or back; you develop trouble breathing, sweating, nausea or vomiting in association with chest pain  · If you develop weakness or numbness in an extremity  · If your pain worsens, regardless of its location  © 9267-6789 [employer]. [address] [place] [address]. All rights reserved. This information is not intended as a substitute for professional medical care. Always follow your healthcare professional's instructions.          Trigger Point Injection  The cause of your muscle pain or spasms may be one or more trigger points. Your health care provider may decide to inject the painful spots to relax the muscle. This can help relieve your pain. Relaxing the muscle can  also make movement easier. You may then be able to exercise to strengthen the muscle and help it heal.    What is a trigger point?  A trigger point is a tight, painful knot of muscle fiber. It can form where a muscle is strained or injured. The knot can sometimes be felt under the skin. A trigger point is very tender to the touch. Pain may also spread to other parts of the affected muscle. Muscles around a knee, shoulder blade, or other bones are prone to trigger points. This is because these muscles are more likely to be injured.    About the injections  Any muscle in the body can have one or more trigger points. Several injections may be needed in each trigger point to best relieve pain. These injections may be given in sessions about 2 weeks apart, depending on the preference of your health care provider. In some cases, you may not feel much change in your symptoms until after the third injection.     © 9678-2793 [employer]. [address] [place] [address]. All rights reserved. This information is not intended as a substitute for professional medical care. Always follow your healthcare professional's instructions.            Your Neck Muscles  The muscles in the neck and shoulders need to be strong to hold the neck and head in place. These muscles also help move the neck and shoulders. Your health care provider can recommend exercises to help stretch and strengthen your neck muscles.    © 6776-6749 [employer]. [address] [place] [address]. All rights reserved. This information is not intended as a substitute for professional medical care. Always follow your healthcare professional's instructions.          Neck Problems: Relieving Your Symptoms  The first goal of treatment is to relieve your symptoms. Your health care provider may recommend self-care treatments. These include resting, applying ice and heat, taking medication, and doing exercises. Your health  care provider may also recommend that you see a physical therapist, who can teach you ways to care for and strengthen your neck.    Self-Care Treatments  Pain can end quickly or last awhile. Either way, youll want relief as soon as possible. Your health care provider can tell you which treatments to do at home to help relieve your pain.  · Lying down for a short time takes pressure from the head off the neck.  · Ice and heat can help reduce pain. To bring down swelling, rest an ice pack wrapped in a thin towel on your neck for 15 minutes. To relax sore muscles, apply a warm, wet towel to the area. Or take a warm bath or shower.  · Over-the-counter medications, such as ibuprofen, naproxen, and aspirin, can help reduce pain and swelling. Acetaminophen can help relieve pain. Use these only as directed.  · Exercises can relax muscles and prevent stiffness. To prepare, drape a warm, wet towel around your neck and shoulders for 5 minutes. Remove the towel. Then do any exercises recommended to you by your health care provider.  Physical Therapy  If self-care treatments arent helping relieve neck pain, your health care provider may suggest one or more sessions of physical therapy. Physical therapy is performed by a specialist trained to treat injuries. Your physical therapist (PT) will teach you how to strengthen muscles, improve the spines alignment, and help you move properly. Treatment methods used in physical therapy may include:  · Heat. A special heating pad called a neck pack may be applied to your neck.  · Exercises. Your PT will teach you exercises to help strengthen your neck and improve its range of motion.  · Joint mobilization. The PT gently moves your vertebrae to help restore motion in your neck joints and reduce neck pain.  · Soft tissue mobilization. The PT massages and stretches the muscles in your neck and shoulders.  · Electrical stimulation. Electrical impulses are sent into your neck. This helps  reduce soreness and inflammation.  · Education in body mechanics. The PT shows you ways to position and move your body that protect the neck.  Other Treatments  If physical therapy doesnt relieve your neck pain, your health care provider may suggest other treatments. For example, medications or injections can help relieve pain and swelling. In some cases, surgery may be needed to treat neck problems.  © 7519-4191 [employer]. [address] [place] [address], [place] [address]. All rights reserved. This information is not intended as a substitute for professional medical care. Always follow your healthcare professional's instructions.          Understanding Neck Problems       If you suffer from neck pain, youre not alone. Many people have neck pain at some point in their lives. Problems such as poor posture, injury, and wear and tear can lead to neck pain. Your health care provider will work with you to find the treatment thats best for your neck.  Types of Neck Problems  The following problems can cause pain or injury in your neck:  · Strains and sprains: Strains (stretched or torn muscles) and sprains (stretched or torn ligaments) can cause neck pain. Strains and sprains can occur during an accident, or when you overuse your neck through repetitive motion. They can also cause your muscles and ligaments to become inflamed (swollen and painful).  · Whiplash and other injuries: Whiplash can result when an impact throws your head, forcing your neck too far forward (hyperflexion), then too far backward (hyperextension). When combined, the two motions can cause a painful injury to different parts of your neck, such as muscles, ligaments, or joints. The most common cause of whiplash is a car accident. But it can also happen during a fall or sports injury.  · Weakened disks: A simple action, such as a sneeze or a cough, can cause one of your disks to bulge (herniate). A herniated disk can put pressure on your  nerve and cause pain. Over time, disks can also thin out (degenerate). Flattened disks dont cushion vertebrae well and can cause vertebrae to rub together. Rubbing vertebrae can pinch nerves and cause pain.  · Weakened joints: Aging and injury can cause joints to slowly degenerate. Thinned joints can also cause vertebrae to rub together. This can cause abnormal growths of bone (bone spurs) to form on vertebrae. Bone spurs put pressure on nerves, causing pain.  Common Symptoms  If you have a neck problem, you may have one or more of the following symptoms:  · Muscle tension and spasm: You may not be able to move your neck, arms, or shoulders comfortably if you have muscle tension or stiffness in your neck. If your symptoms arent relieved, you may experience muscle spasms, or knots of contracted tissue (trigger points) in areas of your neck and shoulders.  · Aches and pains: Dull aches in your head or neck, sharp pains, and swelling of the soft tissue of your neck and shoulders are common symptoms. If theres pressure on the nerves in your neck, you may feel pain in your arms or hands (referred pain).  · Numbness or weakness: If you injure the nerves in your neck, you may experience numbness, tingling, or weakness in your shoulders, arms, or hands. These symptoms arise when disks or bone spurs press on the nerves in your neck.  © 9024-1648 [employer], [employer]. [address] [place] [address]. All rights reserved. This information is not intended as a substitute for professional medical care. Always follow your healthcare professional's instructions.          Neck Spasm [No Trauma]    Spasm of the neck muscles can occur after a sudden awkward neck movement. Sleeping with your neck in a crooked position can also cause spasm. Some persons respond to emotional stress by tensing the muscles of their neck, shoulders and upper back. If neck spasm lasts long enough, it can cause headache.  The treatment  described below will usually help the pain to go away in 5-7 days. Pain that continues may require further evaluation or other types of treatment such as physical therapy.  Home Care:  1. Rest and relax the muscles. Use a comfortable pillow that supports the head and keeps the spine in a neutral position. The position of the head should not be tilted forward or backward. A rolled up towel may help for a custom fit.  2. Some persons find relief with heat (hot shower, hot bath or heating pad) and massage, while others prefer cold packs (crushed or cubed ice in a plastic bag, wrapped in a towel). Try both and use the method that feels best for 20 minutes several times a day.  3. You may use acetaminophen (Tylenol) or ibuprofen (Motrin, Advil) to control pain, unless another medicine was prescribed. [NOTE: If you have chronic liver or kidney disease or ever had a stomach ulcer or GI bleeding, talk with your doctor before using these medicines.]  Follow Up  with your doctor or this facility if your symptoms do not show signs of improvement after one week. Physical therapy or further evaluation may be needed.  [NOTE: If x-rays were taken, they will be reviewed by a radiologist. You will be notified of any new findings that may affect your care.]  Return Promptly  or contact your doctor if any of the following occurs:  · Pain becomes worse or spreads into one or both arms  · Weakness or numbness in one or both arms  · Increasing headache with nausea or vomiting  · Fever over 100.4ºF (38.0ºC)  © 4000-1432 [employer]. [address] [place] [address], [place] [address]. All rights reserved. This information is not intended as a substitute for professional medical care. Always follow your healthcare professional's instructions.          Know Your Neck: The Cervical Spine  By learning about the parts of the neck, you can better understand your neck problem. The bones of the neck are called cervical vertebrae, commonly  identified as C1 through C7. Together, they form a bony column called the spine. Vertebrae also protect the spinal cord, a pathway for messages to reach the brain. Surrounding the spine are soft tissues such as muscles, tendons, and nerves.        Flexibility Is Key  For the neck to function normally, it has to be flexible enough to move without discomfort. A healthy neck can move easily in six different directions.    © 7122-8094 [employer]. [address] [place] [address], [place] [address]. All rights reserved. This information is not intended as a substitute for professional medical care. Always follow your healthcare professional's instructions.          Protecting Your Neck: Posture and Body Mechanics  Protecting your neck from injuries and pain involves practicing good posture and body mechanics. This may mean correcting bad habits you have related to the way you hold and move your body. The tips below can help you improve your posture and body mechanics.    What Is Posture and Why Does It Matter?  Posture is the way you hold your body. For many of us, this means hunching over, thrusting the chin forward, and slouching the shoulders. But this kind of poor posture keeps muscles from properly supporting the neck and puts stress on muscles, disks, ligaments, and joints in your neck. As a result, injury and pain can occur.  How Is Your Posture?  Use a full-length mirror to check your posture. To begin, stand normally. Then slowly back up against a wall. Is there space between your head and the wall? Do you slouch your shoulders? Is your chin pointing up or down? All these can cause neck pain and injury.  Improving Your Posture  Follow these steps to improve your posture:  · Pull your shoulders back.  · Think of the ears, shoulders, and hips as a series of dots. Now, adjust your body to connect the dots in a straight line.  · Keep your chin level.  What Are Body Mechanics and Why Do They Matter?  The way you  move and position your body during daily activities is called body mechanics. Good body mechanics help protect the neck. This means learning the right ways to stand, sit, and even sleep. So do whats best for your neck and practice good body mechanics.  Standing   To protect your neck while standing:  · Carry objects close to your body.  · Keep your ears and shoulders in a line while standing or walking.  · To lower yourself, bend at the knees with a straight back. Do this instead of looking down and reaching for objects.  · Work at eye level. Dont reach above your head or tilt your head back.  Sitting   To protect your neck while sitting:  · Set up your workstation so your monitor is at eye level. Also, use a document mao when viewing papers or books.  · Keep your knees at or slightly below the level of your hips.  · Sit up straight, with feet flat on the floor. If your feet dont touch the floor, use a footrest.  · Avoid sitting or driving for long periods. Take frequent breaks.  Sleeping   To protect your neck while sleeping:  · Sleep on your back with a pillow under your knees, or on your side with a pillow between bent knees. This helps align the spine.  · Avoid using pillows that are too high or too low. Instead, use a neck roll or pillow under your neck while you sleep to keep the neck straight.  · Sleep on a mattress that supports you, with a pillow under your neck.  © 3981-3202 [employer]. [address] [place] [address], [place] [address]. All rights reserved. This information is not intended as a substitute for professional medical care. Always follow your healthcare professional's instructions.          Exercises at Your Workstation: Eyes, Neck, and Head     Tired eyes? Stiff neck? A few easy moves can help prevent these kinds of problems. Take a few minutes during your day to do these exercises--right at your desk. They'll loosen up your muscles, keep you more alert, and make a big difference  in how you work and feel.    For your eyes  Eye cup  · Lean forward with your elbows on your desk.  · Cup your hands and place them lightly over your closed eyes. Hold for a minute, while breathing deeply in and out.  · Slowly uncover and open your eyes. Repeat 2 times.  Eye roll  · Close your eyes. Slowly roll your eyeballs clockwise all the way around. Repeat 3 times.  · Now slowly roll them all the way around counterclockwise. Repeat 3 times.  Eye rest  · Every 20 minutes, look away from the computer screen. Focus on an object at least 20 feet away. Stay focused on this object for a full 20 seconds.    For your neck and head  Warm-up  · Drop your head gently to your chest. While breathing in, slowly roll your head up to your left shoulder. While breathing out, slowly roll your head back to center. Repeat to the right.  · Repeat 3 times on each side.  Head tilt  · Sit up straight. Tuck in your chin.  · Slowly tip your head to the left. Return to the center. Then, tip your head to the right.  · Repeat 3 times on each side.    Head turn  · Sit up straight.  · Slowly turn your head and look over your left shoulder. Hold for a few seconds. Go back to the center, then repeat to your right.  · Repeat 3 times on each side.  © 0663-7248 [employer], [employer]. [address] [place] [address], [place] [address]. All rights reserved. This information is not intended as a substitute for professional medical care. Always follow your healthcare professional's instructions.          Reach and Hold Exercise    Do this exercise on your hands and knees. Keep your knees under your hips and your hands under your shoulders. Keep your spine in a neutral position (not arched or sagging). Keep your ears in line with your shoulders. Hold for a few seconds before starting the exercise:  4. Tighten your abdominal muscles and raise one arm straight in front of you, palm down. Hold for 5 seconds, then lower. Repeat 5 times.  5. Do the exercise  again, this time lifting your arm to the side. Repeat 5 times.  6. Do the exercise again, this time lifting your arm backward, palm up. Repeat 5 times.  Switch sides and do each exercise with the other arm.  © 4776-2189 [employer]. [address] [place] [address]. All rights reserved. This information is not intended as a substitute for professional medical care. Always follow your healthcare professional's instructions.        Shoulder and Upper Back Stretch  To start, stand tall with your ears, shoulders, and hips in line. Your feet should be slightly apart, positioned just under your hips. Focus your eyes directly in front of you.  this position for a few seconds before starting your exercise. This helps increase your awareness of proper posture.  Reach overhead and slightly back with both arms. Keep your shoulders and neck aligned and your elbows behind your shoulders:  · With your palms facing the ceiling, turn your fingers inward.  · Take a deep breath. Breathe out, and lower your elbows toward your buttocks. Hold for 5 seconds, then return to starting position.  · Repeat 3 times.    © 8474-7863 [employer]. [address] [place] [address]. All rights reserved. This information is not intended as a substitute for professional medical care. Always follow your healthcare professional's instructions.          Shoulder Clock Exercise  To start, stand tall with your ears, shoulders, and hips in line. Your feet should be slightly apart, positioned just under your hips. Focus your eyes directly in front of you.  this position for a few seconds before starting your exercise. This helps increase your awareness of proper posture.  · Imagine that your right shoulder is the center of a clock. With the outer point of your shoulder, roll it around to slowly trace the outer edge of the clock.  · Move clockwise first, then counterclockwise.  · Repeat 3 times.  Switch shoulders.   © 7508-5989 [employer]. [address] [place] [address]. All rights reserved. This information is not intended as a substitute for professional medical care. Always follow your healthcare professional's instructions.          Shoulder Girdle Stretch     To start, sit in a chair with your feet flat on the floor. Your weight should be slightly forward so that youre balanced evenly on your buttocks. Relax your shoulders and keep your head level. Using a chair with arms may help you keep your balance:  · Place 1 hand on the outside elbow of the other arm.  · Pull the arm across your body. Hold for 30 to 60 seconds. Repeat once.  · Switch sides.    © 2341-6398 [employer]. [address] [place] [address]. All rights reserved. This information is not intended as a substitute for professional medical care. Always follow your healthcare professional's instructions.          Shoulder Exercises      To start, sit in a chair with your feet flat on the floor. Your weight should be slightly forward so that youre balanced evenly on your buttocks. Relax your shoulders and keep your head level. Avoid arching your back or rounding your shoulders. Using a chair with arms may help you keep your balance.  · Raise your arms, elbows bent, to shoulder height.  · Slowly move your forearms together. Hold for 5 seconds.  · Return to starting position. Repeat 5 times.  © 7110-3116 [employer]. [address] [place] [address]. All rights reserved. This information is not intended as a substitute for professional medical care. Always follow your healthcare professional's instructions.        Shoulder Shrug Exercise  To start, sit in a chair with your feet flat on the floor. Shift your weight slightly forward to avoid rounding your back. Relax. Keep your ears, shoulders, and hips aligned:  · Raise both of your shoulders as high as you can, as if you  were trying to touch them to your ears. Keep your head and neck still and relaxed.  · Hold for a count of 10. Release.  · Repeat 5 times.    © 2554-7460 [employer]. [address] [place] [address]. All rights reserved. This information is not intended as a substitute for professional medical care. Always follow your healthcare professional's instructions.          Shoulder Squeeze Exercise     To start, sit in a chair with your feet flat on the floor. Shift your weight slightly forward to avoid rounding your back. Relax. Keep your ears, shoulders, and hips aligned:  · Raise your arms to shoulder height, elbows bent and palms forward.  · Move your arms back, squeezing your shoulder blades together.  · Hold for 10 seconds. Return to starting position.   · Repeat 5 times.     © 8004-1221 [employer]. [address] [place] [address]. All rights reserved. This information is not intended as a substitute for professional medical care. Always follow your healthcare professional's instructions.

## 2022-02-05 ENCOUNTER — LAB VISIT (OUTPATIENT)
Dept: LAB | Facility: HOSPITAL | Age: 62
End: 2022-02-05
Attending: NURSE PRACTITIONER
Payer: COMMERCIAL

## 2022-02-05 DIAGNOSIS — I10 ESSENTIAL HYPERTENSION: ICD-10-CM

## 2022-02-05 DIAGNOSIS — E55.9 VITAMIN D DEFICIENCY: ICD-10-CM

## 2022-02-05 LAB
25(OH)D3+25(OH)D2 SERPL-MCNC: 59 NG/ML (ref 30–96)
TSH SERPL DL<=0.005 MIU/L-ACNC: 0.93 UIU/ML (ref 0.4–4)

## 2022-02-05 PROCEDURE — 36415 COLL VENOUS BLD VENIPUNCTURE: CPT | Performed by: NURSE PRACTITIONER

## 2022-02-05 PROCEDURE — 84443 ASSAY THYROID STIM HORMONE: CPT | Performed by: NURSE PRACTITIONER

## 2022-02-05 PROCEDURE — 82306 VITAMIN D 25 HYDROXY: CPT | Performed by: NURSE PRACTITIONER

## 2022-02-07 ENCOUNTER — DOCUMENTATION ONLY (OUTPATIENT)
Dept: REHABILITATION | Facility: HOSPITAL | Age: 62
End: 2022-02-07
Payer: COMMERCIAL

## 2022-02-07 ENCOUNTER — OFFICE VISIT (OUTPATIENT)
Dept: INTERNAL MEDICINE | Facility: CLINIC | Age: 62
End: 2022-02-07
Payer: COMMERCIAL

## 2022-02-07 ENCOUNTER — OFFICE VISIT (OUTPATIENT)
Dept: NEUROSURGERY | Facility: CLINIC | Age: 62
End: 2022-02-07
Payer: COMMERCIAL

## 2022-02-07 VITALS
HEIGHT: 64 IN | DIASTOLIC BLOOD PRESSURE: 90 MMHG | WEIGHT: 202.81 LBS | SYSTOLIC BLOOD PRESSURE: 140 MMHG | HEART RATE: 68 BPM | RESPIRATION RATE: 18 BRPM | BODY MASS INDEX: 34.62 KG/M2

## 2022-02-07 VITALS
BODY MASS INDEX: 34.63 KG/M2 | SYSTOLIC BLOOD PRESSURE: 126 MMHG | DIASTOLIC BLOOD PRESSURE: 76 MMHG | WEIGHT: 201.75 LBS | RESPIRATION RATE: 18 BRPM | TEMPERATURE: 97 F

## 2022-02-07 DIAGNOSIS — M54.12 CERVICAL RADICULOPATHY: ICD-10-CM

## 2022-02-07 DIAGNOSIS — M79.7 FIBROMYALGIA: ICD-10-CM

## 2022-02-07 DIAGNOSIS — M50.30 DDD (DEGENERATIVE DISC DISEASE), CERVICAL: Primary | ICD-10-CM

## 2022-02-07 DIAGNOSIS — G56.03 BILATERAL CARPAL TUNNEL SYNDROME: ICD-10-CM

## 2022-02-07 DIAGNOSIS — Z12.11 COLON CANCER SCREENING: Primary | ICD-10-CM

## 2022-02-07 DIAGNOSIS — I10 PRIMARY HYPERTENSION: ICD-10-CM

## 2022-02-07 DIAGNOSIS — D86.0 PULMONARY SARCOIDOSIS: ICD-10-CM

## 2022-02-07 DIAGNOSIS — E78.00 PURE HYPERCHOLESTEROLEMIA: ICD-10-CM

## 2022-02-07 DIAGNOSIS — M54.50 LUMBAR BACK PAIN: ICD-10-CM

## 2022-02-07 DIAGNOSIS — E66.01 CLASS 2 SEVERE OBESITY DUE TO EXCESS CALORIES WITH SERIOUS COMORBIDITY AND BODY MASS INDEX (BMI) OF 35.0 TO 35.9 IN ADULT: ICD-10-CM

## 2022-02-07 PROCEDURE — 1160F RVW MEDS BY RX/DR IN RCRD: CPT | Mod: CPTII,S$GLB,, | Performed by: PEDIATRICS

## 2022-02-07 PROCEDURE — 3074F SYST BP LT 130 MM HG: CPT | Mod: CPTII,S$GLB,, | Performed by: PEDIATRICS

## 2022-02-07 PROCEDURE — 99999 PR PBB SHADOW E&M-EST. PATIENT-LVL V: CPT | Mod: PBBFAC,,, | Performed by: NEUROLOGICAL SURGERY

## 2022-02-07 PROCEDURE — 99999 PR PBB SHADOW E&M-EST. PATIENT-LVL III: ICD-10-PCS | Mod: PBBFAC,,, | Performed by: PEDIATRICS

## 2022-02-07 PROCEDURE — 3078F PR MOST RECENT DIASTOLIC BLOOD PRESSURE < 80 MM HG: ICD-10-PCS | Mod: CPTII,S$GLB,, | Performed by: PEDIATRICS

## 2022-02-07 PROCEDURE — 3008F BODY MASS INDEX DOCD: CPT | Mod: CPTII,S$GLB,, | Performed by: PEDIATRICS

## 2022-02-07 PROCEDURE — 99999 PR PBB SHADOW E&M-EST. PATIENT-LVL V: ICD-10-PCS | Mod: PBBFAC,,, | Performed by: NEUROLOGICAL SURGERY

## 2022-02-07 PROCEDURE — 1160F RVW MEDS BY RX/DR IN RCRD: CPT | Mod: CPTII,S$GLB,, | Performed by: NEUROLOGICAL SURGERY

## 2022-02-07 PROCEDURE — 3080F PR MOST RECENT DIASTOLIC BLOOD PRESSURE >= 90 MM HG: ICD-10-PCS | Mod: CPTII,S$GLB,, | Performed by: NEUROLOGICAL SURGERY

## 2022-02-07 PROCEDURE — 3008F BODY MASS INDEX DOCD: CPT | Mod: CPTII,S$GLB,, | Performed by: NEUROLOGICAL SURGERY

## 2022-02-07 PROCEDURE — 3077F SYST BP >= 140 MM HG: CPT | Mod: CPTII,S$GLB,, | Performed by: NEUROLOGICAL SURGERY

## 2022-02-07 PROCEDURE — 99999 PR PBB SHADOW E&M-EST. PATIENT-LVL III: CPT | Mod: PBBFAC,,, | Performed by: PEDIATRICS

## 2022-02-07 PROCEDURE — 3078F DIAST BP <80 MM HG: CPT | Mod: CPTII,S$GLB,, | Performed by: PEDIATRICS

## 2022-02-07 PROCEDURE — 1159F MED LIST DOCD IN RCRD: CPT | Mod: CPTII,S$GLB,, | Performed by: NEUROLOGICAL SURGERY

## 2022-02-07 PROCEDURE — 99204 OFFICE O/P NEW MOD 45 MIN: CPT | Mod: S$GLB,,, | Performed by: NEUROLOGICAL SURGERY

## 2022-02-07 PROCEDURE — 1159F MED LIST DOCD IN RCRD: CPT | Mod: CPTII,S$GLB,, | Performed by: PEDIATRICS

## 2022-02-07 PROCEDURE — 99214 OFFICE O/P EST MOD 30 MIN: CPT | Mod: S$GLB,,, | Performed by: PEDIATRICS

## 2022-02-07 PROCEDURE — 99204 PR OFFICE/OUTPT VISIT, NEW, LEVL IV, 45-59 MIN: ICD-10-PCS | Mod: S$GLB,,, | Performed by: NEUROLOGICAL SURGERY

## 2022-02-07 PROCEDURE — 3008F PR BODY MASS INDEX (BMI) DOCUMENTED: ICD-10-PCS | Mod: CPTII,S$GLB,, | Performed by: NEUROLOGICAL SURGERY

## 2022-02-07 PROCEDURE — 99214 PR OFFICE/OUTPT VISIT, EST, LEVL IV, 30-39 MIN: ICD-10-PCS | Mod: S$GLB,,, | Performed by: PEDIATRICS

## 2022-02-07 PROCEDURE — 1159F PR MEDICATION LIST DOCUMENTED IN MEDICAL RECORD: ICD-10-PCS | Mod: CPTII,S$GLB,, | Performed by: NEUROLOGICAL SURGERY

## 2022-02-07 PROCEDURE — 3080F DIAST BP >= 90 MM HG: CPT | Mod: CPTII,S$GLB,, | Performed by: NEUROLOGICAL SURGERY

## 2022-02-07 PROCEDURE — 3077F PR MOST RECENT SYSTOLIC BLOOD PRESSURE >= 140 MM HG: ICD-10-PCS | Mod: CPTII,S$GLB,, | Performed by: NEUROLOGICAL SURGERY

## 2022-02-07 PROCEDURE — 1160F PR REVIEW ALL MEDS BY PRESCRIBER/CLIN PHARMACIST DOCUMENTED: ICD-10-PCS | Mod: CPTII,S$GLB,, | Performed by: NEUROLOGICAL SURGERY

## 2022-02-07 PROCEDURE — 1160F PR REVIEW ALL MEDS BY PRESCRIBER/CLIN PHARMACIST DOCUMENTED: ICD-10-PCS | Mod: CPTII,S$GLB,, | Performed by: PEDIATRICS

## 2022-02-07 PROCEDURE — 1159F PR MEDICATION LIST DOCUMENTED IN MEDICAL RECORD: ICD-10-PCS | Mod: CPTII,S$GLB,, | Performed by: PEDIATRICS

## 2022-02-07 PROCEDURE — 3008F PR BODY MASS INDEX (BMI) DOCUMENTED: ICD-10-PCS | Mod: CPTII,S$GLB,, | Performed by: PEDIATRICS

## 2022-02-07 PROCEDURE — 3074F PR MOST RECENT SYSTOLIC BLOOD PRESSURE < 130 MM HG: ICD-10-PCS | Mod: CPTII,S$GLB,, | Performed by: PEDIATRICS

## 2022-02-07 NOTE — PROGRESS NOTES
Subjective:      Patient ID: Janna Sheffield is a 61 y.o. female.    Chief Complaint: Cervical Spine Pain (C-spine) (The pt presents today with Lt sided neck pain radiating to Lt shoulder. The pt stated the pain started in October on 2021 after receiving the covid vaccine. The pt stated her pain is worsened when she's cleaning, cooking and dressing and her pain is treated with extra strength Tylenol and gabapentin. The pt stated she was 6/10 pain and denies having any falls)      HPI  Cervical Spine Pain (C-spine) (The pt presents today with Lt sided neck pain radiating to Lt shoulder. The pt stated the pain started in October on 2021 after receiving the covid vaccine. The pt stated her pain is worsened when she's cleaning, cooking and dressing and her pain is treated with extra strength Tylenol and gabapentin. The pt stated she was 6/10 pain and denies having any falls)  She reports the pain goes along the sternocleidomastoid muscle also into the trapezius and into the left deltoid and periscapular area she also has some middle thoracolumbar pain.  She has noticed some generalized weakness in the left hand and arm.  Denies any specific trauma to the neck or car accidents.  She has been taking the Tylenol gabapentin and Voltaren gel without any significant relief she has had 1 epidural steroid injection which she did not feel gave her any relief.  She also went to physical therapy where they were doing some massage therapy but did not do any true cervical traction        Review of Systems   Constitutional: Negative for chills and fever.   HENT: Negative for rhinorrhea and tinnitus.    Eyes: Negative.    Respiratory: Negative.    Cardiovascular: Negative.    Gastrointestinal: Negative.    Endocrine: Negative.    Genitourinary: Negative.    Musculoskeletal: Positive for back pain and myalgias.   Allergic/Immunologic: Negative.    Neurological: Positive for weakness and numbness.   Hematological: Negative.     Psychiatric/Behavioral: Negative.    All other systems reviewed and are negative.          Objective:     [unfilled]  Physical Exam:  Nursing note and vitals reviewed.    Eyes: Conjunctivae are normal.     Psych/Behavior: She is alert. She is oriented to person, place, and time.     Musculoskeletal: Gait is normal.     Neurological:        DTRs: Tricep reflexes are 1+ on the right side and 1+ on the left side. Bicep reflexes are 1+ on the right side and 1+ on the left side. Brachioradialis reflexes are 1+ on the right side and 1+ on the left side. Achilles reflexes are 1+ on the right side and 1+ on the left side.     General    Nursing note and vitals reviewed.  Constitutional: She is oriented to person, place, and time. She is cooperative.       HENT:   Head: Normocephalic and atraumatic.   Eyes: Conjunctivae and lids are normal.   Neck:       Neurological: She is alert and oriented to person, place, and time. She has normal sensation and intact cranial nerves. She displays weakness and abnormal reflex. Coordination normal. GCS eye subscore is 4. GCS verbal subscore is 5. GCS motor subscore is 6.     General Musculoskeletal Exam   Gait: normal     Back (L-Spine & T-Spine) / Neck (C-Spine) Exam     Tenderness   The patient is tender to palpation of the left trapezial and left scapular. Right paramedian tenderness of the Lower T-Spine. Left paramedian tenderness of the Upper C-Spine, Lower C-Spine and Lower T-Spine.     Back (L-Spine & T-Spine) Range of Motion   Lateral bend right: normal   Lateral bend left: normal   Rotation right: normal   Rotation left: normal     Neck (C-Spine) Range of Motion   Right Lateral Bend: abnormal  Left Lateral Bend: abnormal  Right Rotation: abnormal  Left Rotation: abnormal    Spinal Sensation   Right Side Sensation  C-Spine Level: normal   Left Side Sensation  C-Spine Level: normal    Neck (C-Spine) Tests   Spurling's Test   Left:  positive    Other   Spinal Kyphosis:   Absent    Comments:  Decreased range of motion with forward flexion and extension with pain exacerbated by extension backwards of the lumbar spine      Muscle Strength   Right Upper Extremity   Biceps: 5/5   Deltoid:  5/5  Triceps:  5/5  Wrist extension: 5/5   Wrist flexion: 5/5   Finger Flexors:  5/5  Finger Extensors:  5/5  Left Upper Extremity  Biceps: 4/5   Deltoid:  4/5  Triceps:  4/5  Wrist extension: 5/5   Wrist flexion: 5/5   Finger Flexors:  5/5  Finger Extensors:  5/5  Right Lower Extremity   Hip Abduction: 5/5   Hip Flexion: 5/5   Hip Extensors: 5/5  Quadriceps:  5/5   Hamstrin/5   Anterior tibial:  5/5   Gastrocsoleus:  5/5   EHL:  5/5  Left Lower Extremity   Hip Abduction: 5/5   Hip Flexion: 5/5   Hip Extensors: 5/5  Quadriceps:  5/5   Anterior tibial:  5/   Gastrocsoleus:  5/5   EHL:  5/5    Reflexes     Left Side  Biceps:  1+  Triceps:  1+  Brachioradialis:  1+  Achilles:  1+  Left Schulte's Sign:  Absent  Quadriceps:  1+  Post Tibial:  1+    Right Side   Biceps:  1+  Triceps:  1+  Brachioradialis:  1+  Achilles:  1+  Right Schulte's Sign:  absent  Quadriceps:  1+  Post Tibial:  1+    Physical Exam  Vitals and nursing note reviewed.   Constitutional:       General: She is awake.      Appearance: She is overweight.       HENT:      Head: Normocephalic and atraumatic.   Eyes:      General: Lids are normal.      Extraocular Movements: Extraocular movements intact.      Conjunctiva/sclera: Conjunctivae normal.   Neck:     Musculoskeletal:      Cervical back: Rigidity present. Pain with movement, spinous process tenderness and muscular tenderness present. Decreased range of motion.   Neurological:      Mental Status: She is alert and oriented to person, place, and time.      GCS: GCS eye subscore is 4. GCS verbal subscore is 5. GCS motor subscore is 6.      Cranial Nerves: Cranial nerves are intact.      Sensory: Sensation is intact.      Motor: Weakness present.      Coordination: Coordination is  intact.      Deep Tendon Reflexes: Reflexes abnormal.      Reflex Scores:       Tricep reflexes are 1+ on the right side and 1+ on the left side.       Bicep reflexes are 1+ on the right side and 1+ on the left side.       Brachioradialis reflexes are 1+ on the right side and 1+ on the left side.       Achilles reflexes are 1+ on the right side and 1+ on the left side.  Psychiatric:         Behavior: Behavior is cooperative.               FINDINGS:  The cervical cord remains normal in signal and morphology.     The cervical vertebra reveal mild straightening of the normal lordosis.  No intrinsic marrow signal abnormality is seen.     Multilevel cervical degenerative disc disease is present.     C2-C3: Minimal annular bulge.     C3-C4: Mild central annular disc bulge.     C4-C5: Moderate disc degeneration with disc narrowing, desiccation and disc bulging osteophyte with ventral sac impression.  Mild central stenosis.  Uncovertebral joint spurring with moderate right and mild left foraminal stenosis.  AP diameter of the canal approximately 8 mm.     C5-C6: Moderate disc degeneration with disc narrowing, desiccation and disc bulging osteophyte.  Mild ventral sac impression.  Uncovertebral joint spurring with moderate left and mild right foraminal stenosis.     C6-C7: Mild-to-moderate disc degeneration with disc narrowing, desiccation and disc bulging osteophyte with mild ventral sac impression.  AP diameter canal at this level is approximately 8.1 mm.  Uncovertebral joint spurring with mild foraminal stenosis.     C7-T1: Mild disc bulge.     Impression:     Multilevel cervical degenerative disc disease notably at C4-5, C5-6 and C6-7 with central and foraminal stenosis as detailed above.  No acute disc herniation.  Mild interval progression as compared to 2017 MRI.    Imaging:  Patient has multilevel cervical spondylosis disc degeneration primarily at the C4-5 C5-6 C6-7 level with more pronounced neural foraminal  narrowing on the left C5-6 region.  I have personally reviewed the patients neuroimaging. The above description is a summary of pertinant findings, but is not all inclusive. Additional information maybe found in the radiology interpretation. These findings will be discussed with the patient or other revelant members of the care team.     I attempted to review her EMG nerve conduction study but the results are not available at this time  I  reviewed all pertinent imaging regarding this case.  Assessment:   1. Cervical spondylosis with disc degeneration left-sided radiculopathy  2. Lumbar spondylosis disc degeneration with thoracolumbar back pain  3. Possible median neuropathy at the wrists     1. DDD (degenerative disc disease), cervical    2. Cervical radiculopathy    3. Bilateral carpal tunnel syndrome    4. Lumbar back pain      Plan:   1. Cervical spondylosis with left-sided radiculopathy.  I would like for the patient to go back to pain management attempt 1 more epidural steroid injection possibly at the left C5-6 transforaminal region to see if this gives her any significant relief of her pain.  In addition among the center for physical therapy to include cervical traction.  I will see her back in 4 weeks for re-evaluation and reassessment.  We have talked briefly about her about surgery and will discuss this more if she continues to fail conservative management  DDD (degenerative disc disease), cervical  -     Ambulatory referral/consult to Neurosurgery  -     Ambulatory referral/consult to Physical/Occupational Therapy; Future; Expected date: 02/14/2022    Cervical radiculopathy  -     Ambulatory referral/consult to Neurosurgery  -     Ambulatory referral/consult to Physical/Occupational Therapy; Future; Expected date: 02/14/2022    Bilateral carpal tunnel syndrome    Lumbar back pain        More than 45 minutes of the time spent in counseling and coordination of care including discussions etiology of  diagnosis, pathogenesis of diagnosis, prognosis of diagnosis,, diagnostic results, impression and recommendations, diagnostic studies, management, risks and benefits of treatment, instructions of disease self-management, treatment instructions, follow up requirements, patient and family counseling/involvement in care compliance with treatment regimen. All of the patient's and/or family members questions were answered during this discussion.       Disclaimer: This note was prepared using a voice recognition system and is likely to have sound alike errors within the text.     Thank you for the referral   Please call with any questions    Devyn Dover MD  Neurosurgery

## 2022-02-07 NOTE — PATIENT INSTRUCTIONS
Patient Education       Degenerative Disc Disease   About this topic   The spine is made up of bones called vertebrae. These bones are lined up on top of each other. In between the bones there are discs. They act like shock absorbers. The discs have a spongy middle and over time it can dry out. Then, the disc becomes weaker. It may collapse and cause the bones of the spine to become closer together. This is called degenerative disc disease.  When you move, the tissues that are weak may get irritated and cause pain. Sometimes, there is no pain at all when the discs get worn. The compressed disc may cause pressure on the nerves of the spine. This may cause pain, weakness, numbness, or tingling. Only a very small number of people with this problem will need surgery.     What are the causes?   Wear and tear over time  What can make this more likely to happen?   This condition often starts when you are between 20 and 30 years of age. It is more common in the lower back and you are more likely to have problems if you have had a back injury. Other things like being overweight or having a job where you do a lot of lifting and twisting can make you more likely to have this problem. Having others in your family with degenerative disc disease also increases your risk.  What are the main signs?   · Back or neck pain that is worse with movement or when you are in one position for a long time. The pain is better at rest. It may come and go. You may have pain that shoots down into your arms or legs.  · Stiffness  · Numbness or tingling into the arms and legs  · Weakness in the arms and legs  How does the doctor diagnose this health problem?   The doctor will do an exam and feel around your back or neck. Your doctor may have you move your back or neck and push and pull on your arms or legs to test your motion and strength. Your doctor may check the feeling and reflexes in your arms and legs. This is to check for nerve problems.    The doctor may order:  · X-ray ? this may not be ordered right away because 30% of all x-rays show some sort of degeneration, even in those people without problems  · MRI, CT, or bone scan  · Discography ? an x-ray with dye injected into the discs  · Nerve conduction tests  · Test to rule out compression of the nerve. This is an electromyelogram (EMG).  · Lab tests to rule out other causes  How does the doctor treat this health problem?   · Rest from activities that make the problem worse  · Ice  · Heat may be used later but not right away. Heat can make swelling worse.  · Back or neck brace  · Cane, crutches, or walker if you have trouble walking  · Exercises to help your muscles become strong and flexible  · Physical therapy (PT) for treatments to lessen pain and for instruction on exercises to help the problem  · Surgery  What drugs may be needed?   The doctor may order drugs to:  · Help with pain and swelling  The doctor may give you a shot to help with pain and swelling. Talk with your doctor about the risks of this shot.   What problems could happen?   · Long-term back pain  · Loss of feeling or movement in the legs, feet, arms, or hands  · Weight gain, less muscle strength and flexibility, weaker bones  · Need for surgery  · Problem walking and with daily activities  · Infection  · Loss of bowel and bladder function  · Long-term spinal cord injury. This is rare.  What can be done to prevent this health problem?   · Stay active and work out to keep your muscles strong and flexible. Warm up slowly and stretch before you exercise.  · Heated muscles stretch better than cool muscles. Warm up slowly and stretch before you exercise.  · Use good posture.  · Use proper ways to lift and bend:  ? Spread your feet apart so you have a good base of support. Then, bend with your knees when you  something from the ground.  ? When lifting and moving an object, keep your back straight. Keep the object as close to your  body as possible. Do not twist. Instead, move your feet to the direction you are going.  · Take breaks often when seated for long periods of time. Get up and walk around from time to time.  · If you stand for long periods, put one leg up on a small stool for a while. Then, change legs.  · If you sleep on your side, put a pillow in between your knees to keep your back and legs in a good position.  · Keep a healthy weight.  · Avoid smoking.  Where can I learn more?   [employer]  https://www.betterhealth.[place].gov.[place]/health/ConditionsAndTreatments/back-pain-disc-problems   Last Reviewed Date   2020-10-13  Consumer Information Use and Disclaimer   This information is not specific medical advice and does not replace information you receive from your health care provider. This is only a brief summary of general information. It does NOT include all information about conditions, illnesses, injuries, tests, procedures, treatments, therapies, discharge instructions or life-style choices that may apply to you. You must talk with your health care provider for complete information about your health and treatment options. This information should not be used to decide whether or not to accept your health care providers advice, instructions or recommendations. Only your health care provider has the knowledge and training to provide advice that is right for you.  Copyright   Copyright © 2021 [employer] and its affiliates and/or licensors. All rights reserved.

## 2022-02-07 NOTE — PROGRESS NOTES
Outpatient Therapy Discharge Summary   Name: Janna Sheffield  Clinic Number: [id number]     Therapy Diagnosis:        Encounter Diagnoses   Name Primary?    Acute pain of left shoulder      Acute bilateral low back pain without sciatica      Decreased ROM of lumbar spine      Limited range of motion (ROM) of shoulder        Physician: Hadley Marsh MD     Visit Date: 11/30/2021     Physician Orders: PT Eval and Treat   Medical Diagnosis from Referral:   M50.30 (ICD-10-CM) - DDD (degenerative disc disease), cervical   M54.12 (ICD-10-CM) - Cervical radiculopathy   M79.18 (ICD-10-CM) - Myofascial pain      Evaluation Date: 11/23/2021  Authorization Period Expiration: 2/1/22  Plan of Care Certification Period: 2/1/22  Visit # / Visits authorized: 1/ 20 ( +eval)  PTA Visit #: 0/5     Date of Last visit: 11/30/21  Total Visits Received: 2  Cancelled Visits: all remaining  No Show Visits: 0        CMS Impairment/Limitation/Restriction for FOTO na Survey    Therapist reviewed FOTO scores for Janna Sheffield on 2/7/2022.   FOTO documents entered into [employer] - see Media section.    Limitation Score: na%       Discharge reason: Patient has not attended therapy since 11/30/21 due to limited finances.    Plan   This patient is discharged from Physical Therapy    Armida Michael PT

## 2022-02-07 NOTE — PROGRESS NOTES
Subjective:       Patient ID: Janna Sheffield is a 61 y.o. female.    Chief Complaint: Follow-up    Janna Sheffield is a 61 y.o. female who presents to clinic for a follow up    1) HTN: No home B/P monitoring. No HTNive symptoms  2) Hypercholesterol: compliant with statin. No D&E.  3) Fibromyalgia:Gabapentin working(uses prn)  4) GERD: pantoprazole is helping- occ flares. S/P choley.  5) LIPIDS:following D&E, tolerating and compliant with med(s).  6) Sarcoid/fibromyalgia/immunocompromised: seeing pulmonary and rheum  7) Obesity- sedentary, not exercising    LABS REVIEWED AND DISCUSSED WITH PATIENT: VitD, and TSH    Review of Systems   Constitutional: Negative for activity change, appetite change, chills, diaphoresis, fatigue, fever and unexpected weight change.   HENT: Negative for nasal congestion, ear pain, mouth sores, nosebleeds, postnasal drip, rhinorrhea, sneezing and sore throat.    Eyes: Negative for photophobia, pain, discharge, redness and visual disturbance.   Respiratory: Negative for cough, chest tightness, shortness of breath, wheezing and stridor.    Cardiovascular: Negative for chest pain, palpitations and leg swelling.   Gastrointestinal: Negative for constipation, diarrhea, nausea and vomiting. Abdominal pain: subspecialty note reviewed    Genitourinary: Negative for decreased urine volume, difficulty urinating, dysuria, flank pain, frequency, hematuria and urgency.   Musculoskeletal: Positive for arthralgias and neck pain (seeing NS). Negative for back pain, joint swelling and neck stiffness.   Integumentary:  Negative for color change and rash.   Neurological: Negative for dizziness, syncope, speech difficulty, weakness, light-headedness and headaches.   Hematological: Negative for adenopathy. Does not bruise/bleed easily.   Psychiatric/Behavioral: Negative for confusion, decreased concentration, dysphoric mood, hallucinations, sleep disturbance and suicidal ideas. The patient is not  nervous/anxious.    All other systems reviewed and are negative.        Objective:      Physical Exam  Vitals and nursing note reviewed.   Constitutional:       General: She is not in acute distress.     Appearance: She is well-developed.   Neck:      Thyroid: No thyromegaly.      Vascular: No JVD.   Cardiovascular:      Rate and Rhythm: Normal rate and regular rhythm.      Heart sounds: Normal heart sounds. No murmur heard.      Pulmonary:      Effort: Pulmonary effort is normal. No respiratory distress.      Breath sounds: Normal breath sounds. No wheezing or rales.   Abdominal:      General: There is no distension.      Palpations: Abdomen is soft. There is no mass.      Tenderness: There is no abdominal tenderness. There is no guarding.   Musculoskeletal:      Right lower leg: No edema.      Left lower leg: No edema.   Lymphadenopathy:      Cervical: No cervical adenopathy.   Skin:     Capillary Refill: Capillary refill takes less than 2 seconds.      Findings: No rash.   Neurological:      General: No focal deficit present.      Mental Status: She is alert and oriented to person, place, and time.      Cranial Nerves: No cranial nerve deficit.      Coordination: Coordination normal.   Psychiatric:         Mood and Affect: Mood normal.         Behavior: Behavior normal.         Thought Content: Thought content normal.         Judgment: Judgment normal.         Assessment:       Problem List Items Addressed This Visit     HTN (hypertension)    Pure hypercholesterolemia    Pulmonary sarcoidosis    Class 2 obesity in adult    Fibromyalgia    Cervical radiculopathy      Other Visit Diagnoses     Colon cancer screening    -  Primary    Relevant Orders    Case Request Endoscopy: COLONOSCOPY (Completed)          Plan:     Colon cancer screening  -     Case Request Endoscopy: COLONOSCOPY    Fibromyalgia    Primary hypertension    Pure hypercholesterolemia    Pulmonary sarcoidosis    Cervical radiculopathy    Class 2  severe obesity due to excess calories with serious comorbidity and body mass index (BMI) of 35.0 to 35.9 in adult    Get colonoscopy for screening and for LLQ pain. Conitnue subspecialty care. B/P and lipids at goal. Maintain meds, D&E, weight loss. F/u 6 months with labs.  Scribe Attestation:   I, Damon Fitzpatrick, am scribing for, and in the presence of, Dr. Ajit Botello Jr. I performed the above scribed service and the documentation accurately describes the services I performed. I attest to the accuracy of the note.    I, Dr. Ajit Botello Jr, reviewed documentation as scribed above. I personally performed the services described in this documentation.  I agree that the record reflects my personal performance and is accurate and complete. Ajit Botello Jr., MD.  02/07/2022

## 2022-02-08 ENCOUNTER — HOSPITAL ENCOUNTER (OUTPATIENT)
Dept: CARDIOLOGY | Facility: HOSPITAL | Age: 62
Discharge: HOME | End: 2022-02-08
Attending: INTERNAL MEDICINE
Payer: COMMERCIAL

## 2022-02-08 ENCOUNTER — OFFICE VISIT (OUTPATIENT)
Dept: CARDIOLOGY | Facility: CLINIC | Age: 62
End: 2022-02-08
Payer: COMMERCIAL

## 2022-02-08 VITALS
SYSTOLIC BLOOD PRESSURE: 118 MMHG | BODY MASS INDEX: 34.59 KG/M2 | DIASTOLIC BLOOD PRESSURE: 82 MMHG | OXYGEN SATURATION: 98 % | WEIGHT: 201.5 LBS | HEART RATE: 80 BPM

## 2022-02-08 DIAGNOSIS — R06.09 DOE (DYSPNEA ON EXERTION): Primary | ICD-10-CM

## 2022-02-08 DIAGNOSIS — I10 HYPERTENSION, UNSPECIFIED TYPE: ICD-10-CM

## 2022-02-08 DIAGNOSIS — E78.00 PURE HYPERCHOLESTEROLEMIA: ICD-10-CM

## 2022-02-08 DIAGNOSIS — D86.9 SARCOIDOSIS: ICD-10-CM

## 2022-02-08 DIAGNOSIS — R94.31 EKG ABNORMALITIES: ICD-10-CM

## 2022-02-08 PROCEDURE — 3074F SYST BP LT 130 MM HG: CPT | Mod: CPTII,S$GLB,, | Performed by: INTERNAL MEDICINE

## 2022-02-08 PROCEDURE — 99999 PR PBB SHADOW E&M-EST. PATIENT-LVL III: CPT | Mod: PBBFAC,,, | Performed by: INTERNAL MEDICINE

## 2022-02-08 PROCEDURE — 99999 PR PBB SHADOW E&M-EST. PATIENT-LVL III: ICD-10-PCS | Mod: PBBFAC,,, | Performed by: INTERNAL MEDICINE

## 2022-02-08 PROCEDURE — 3074F PR MOST RECENT SYSTOLIC BLOOD PRESSURE < 130 MM HG: ICD-10-PCS | Mod: CPTII,S$GLB,, | Performed by: INTERNAL MEDICINE

## 2022-02-08 PROCEDURE — 1160F PR REVIEW ALL MEDS BY PRESCRIBER/CLIN PHARMACIST DOCUMENTED: ICD-10-PCS | Mod: CPTII,S$GLB,, | Performed by: INTERNAL MEDICINE

## 2022-02-08 PROCEDURE — 3008F BODY MASS INDEX DOCD: CPT | Mod: CPTII,S$GLB,, | Performed by: INTERNAL MEDICINE

## 2022-02-08 PROCEDURE — 3079F PR MOST RECENT DIASTOLIC BLOOD PRESSURE 80-89 MM HG: ICD-10-PCS | Mod: CPTII,S$GLB,, | Performed by: INTERNAL MEDICINE

## 2022-02-08 PROCEDURE — 99215 OFFICE O/P EST HI 40 MIN: CPT | Mod: S$GLB,,, | Performed by: INTERNAL MEDICINE

## 2022-02-08 PROCEDURE — 93010 ELECTROCARDIOGRAM REPORT: CPT | Mod: ,,, | Performed by: INTERNAL MEDICINE

## 2022-02-08 PROCEDURE — 99215 PR OFFICE/OUTPT VISIT, EST, LEVL V, 40-54 MIN: ICD-10-PCS | Mod: S$GLB,,, | Performed by: INTERNAL MEDICINE

## 2022-02-08 PROCEDURE — 93010 EKG 12-LEAD: ICD-10-PCS | Mod: ,,, | Performed by: INTERNAL MEDICINE

## 2022-02-08 PROCEDURE — 3008F PR BODY MASS INDEX (BMI) DOCUMENTED: ICD-10-PCS | Mod: CPTII,S$GLB,, | Performed by: INTERNAL MEDICINE

## 2022-02-08 PROCEDURE — 1160F RVW MEDS BY RX/DR IN RCRD: CPT | Mod: CPTII,S$GLB,, | Performed by: INTERNAL MEDICINE

## 2022-02-08 PROCEDURE — 3079F DIAST BP 80-89 MM HG: CPT | Mod: CPTII,S$GLB,, | Performed by: INTERNAL MEDICINE

## 2022-02-08 PROCEDURE — 1159F MED LIST DOCD IN RCRD: CPT | Mod: CPTII,S$GLB,, | Performed by: INTERNAL MEDICINE

## 2022-02-08 PROCEDURE — 1159F PR MEDICATION LIST DOCUMENTED IN MEDICAL RECORD: ICD-10-PCS | Mod: CPTII,S$GLB,, | Performed by: INTERNAL MEDICINE

## 2022-02-08 PROCEDURE — 93005 ELECTROCARDIOGRAM TRACING: CPT

## 2022-02-08 RX ORDER — SOD SULF/POT CHLORIDE/MAG SULF 1.479 G
12 TABLET ORAL DAILY
Qty: 24 TABLET | Refills: 0 | Status: ON HOLD | OUTPATIENT
Start: 2022-02-08 | End: 2022-03-14 | Stop reason: HOSPADM

## 2022-02-08 NOTE — PROGRESS NOTES
Subjective:   Patient ID:  Janna Sheffield is a 61 y.o. female who presents for follow up of No chief complaint on file.      62yo female, came in for routine f/u. 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  PMHl HTN, HLD.liver sarcoidosis since 1985, h/o Rx of steroid and now chronic on Imuran.   No Dm, Heart attack. Stroke and cancer.  No smoking/drinking.  No exercise  Echo in  normal EF  Exercise ECHO in 2014 normal  Serial ekg NSR.    Interval history  Last see was in . ECHO in  biv function. No regular exercise.  C/o SOB with exertion for 3 years. Occasional dizziness and lightheadedness  No chest pain faint palpitation and leg swelling   Fingers numbness. C/o fatigue and snore  EKG ST possible inferior MI      Past Medical History:   Diagnosis Date    Arthritis     Chest pain syndrome 10/23/2014    Hyperlipidemia     Hypertension     Macular degeneration, age related, nonexudative - Left Eye 3/24/2014    Retinal detachment, old, partial 4/14/2014    Sarcoidosis        Past Surgical History:   Procedure Laterality Date    CATARACT EXTRACTION      DILATION AND CURETTAGE OF UTERUS      missed ab x 3    EPIDURAL STEROID INJECTION INTO CERVICAL SPINE N/A 12/21/2021    Procedure: C6/C7 IL HIMANSHU RN IV sedation;  Surgeon: Hadley Marsh MD;  Location: [facility];  Service: Pain Management;  Laterality: N/A;    ESOPHAGOGASTRODUODENOSCOPY N/A 2/6/2019    Procedure: ESOPHAGOGASTRODUODENOSCOPY (EGD);  Surgeon: Jairo Vargas III, MD;  Location: [facility];  Service: Endoscopy;  Laterality: N/A;    HYSTERECTOMY  2001    MACARIO - complete    JOINT REPLACEMENT Left     Knee    knee surgery      OOPHORECTOMY      TUBAL LIGATION         Social History     Tobacco Use    Smoking status: Never Smoker    Smokeless tobacco: Never Used   Substance Use Topics    Alcohol use: No    Drug use: No       Family History   Problem Relation Age of Onset    Cervical cancer Mother     Heart disease Mother      Heart attack Mother     Hypertension Mother     Hyperlipidemia Mother     Hypertension Father     Hyperlipidemia Father     Stroke Father     Stroke Brother     Breast cancer Maternal Aunt     Breast cancer Maternal Cousin     Breast cancer Maternal Cousin     Breast cancer Paternal Cousin     Ovarian cancer Paternal Cousin     Colon cancer Neg Hx     Thrombophilia Neg Hx     Deep vein thrombosis Neg Hx     Pulmonary embolism Neg Hx          Review of Systems   Constitutional: Negative for decreased appetite, diaphoresis, fever, malaise/fatigue and night sweats.   HENT: Negative for nosebleeds.    Eyes: Negative for blurred vision and double vision.   Cardiovascular: Positive for dyspnea on exertion. Negative for chest pain, claudication, irregular heartbeat, leg swelling, near-syncope, orthopnea, palpitations, paroxysmal nocturnal dyspnea and syncope.   Respiratory: Positive for shortness of breath. Negative for cough, sleep disturbances due to breathing, snoring, sputum production and wheezing.    Endocrine: Negative for cold intolerance and polyuria.   Hematologic/Lymphatic: Does not bruise/bleed easily.   Skin: Negative for rash.   Musculoskeletal: Negative for back pain, falls, joint pain, joint swelling and neck pain.   Gastrointestinal: Negative for abdominal pain, heartburn, nausea and vomiting.   Genitourinary: Negative for dysuria, frequency and hematuria.   Neurological: Negative for difficulty with concentration, dizziness, focal weakness, headaches, light-headedness, numbness, seizures and weakness.   Psychiatric/Behavioral: Negative for depression, memory loss and substance abuse. The patient does not have insomnia.    Allergic/Immunologic: Negative for HIV exposure and hives.       Objective:   Physical Exam  HENT:      Head: Normocephalic.   Eyes:      Pupils: Pupils are equal, round, and reactive to light.   Neck:      Thyroid: No thyromegaly.      Vascular: Normal carotid pulses. No  carotid bruit or JVD.   Cardiovascular:      Rate and Rhythm: Normal rate and regular rhythm.  No extrasystoles are present.     Chest Wall: PMI is not displaced.      Pulses: Normal pulses.      Heart sounds: Normal heart sounds. No murmur heard.  No gallop. No S3 sounds.    Pulmonary:      Effort: No respiratory distress.      Breath sounds: Normal breath sounds. No stridor.   Abdominal:      General: Bowel sounds are normal.      Palpations: Abdomen is soft.      Tenderness: There is no abdominal tenderness. There is no rebound.   Musculoskeletal:         General: Normal range of motion.   Skin:     Findings: No rash.   Neurological:      Mental Status: She is alert and oriented to person, place, and time.   Psychiatric:         Behavior: Behavior normal.         Lab Results   Component Value Date    CHOL 192 08/05/2021    CHOL 191 01/13/2021    CHOL 230 (H) 06/24/2020     Lab Results   Component Value Date    HDL 45 08/05/2021    HDL 50 01/13/2021    HDL 40 06/24/2020     Lab Results   Component Value Date    LDLCALC 133.2 08/05/2021    LDLCALC 125.4 01/13/2021    LDLCALC 173.4 (H) 06/24/2020     Lab Results   Component Value Date    TRIG 69 08/05/2021    TRIG 78 01/13/2021    TRIG 83 06/24/2020     Lab Results   Component Value Date    CHOLHDL 23.4 08/05/2021    CHOLHDL 26.2 01/13/2021    CHOLHDL 17.4 (L) 06/24/2020       Chemistry        Component Value Date/Time     08/05/2021 1202    K 4.2 08/05/2021 1202     08/05/2021 1202    CO2 28 08/05/2021 1202    BUN 10 08/05/2021 1202    CREATININE 0.8 08/05/2021 1202    GLU 89 08/05/2021 1202        Component Value Date/Time    CALCIUM 9.8 08/05/2021 1202    ALKPHOS 81 08/05/2021 1202    AST 24 08/05/2021 1202    ALT 24 08/05/2021 1202    BILITOT 0.4 08/05/2021 1202    ESTGFRAFRICA >60.0 08/05/2021 1202    EGFRNONAA >60.0 08/05/2021 1202          No results found for: LABA1C, HGBA1C  Lab Results   Component Value Date    TSH 0.930 02/05/2022     Lab  Results   Component Value Date    INR 1.1 10/22/2014     Lab Results   Component Value Date    WBC 5.01 06/30/2021    HGB 12.6 06/30/2021    HCT 39.6 06/30/2021    MCV 98 06/30/2021     06/30/2021     BMP  Sodium   Date Value Ref Range Status   08/05/2021 142 136 - 145 mmol/L Final     Potassium   Date Value Ref Range Status   08/05/2021 4.2 3.5 - 5.1 mmol/L Final     Chloride   Date Value Ref Range Status   08/05/2021 102 95 - 110 mmol/L Final     CO2   Date Value Ref Range Status   08/05/2021 28 23 - 29 mmol/L Final     BUN   Date Value Ref Range Status   08/05/2021 10 6 - 20 mg/dL Final     Creatinine   Date Value Ref Range Status   08/05/2021 0.8 0.5 - 1.4 mg/dL Final     Calcium   Date Value Ref Range Status   08/05/2021 9.8 8.7 - 10.5 mg/dL Final     Anion Gap   Date Value Ref Range Status   08/05/2021 12 8 - 16 mmol/L Final     eGFR if    Date Value Ref Range Status   08/05/2021 >60.0 >60 mL/min/1.73 m^2 Final     eGFR if non    Date Value Ref Range Status   08/05/2021 >60.0 >60 mL/min/1.73 m^2 Final     Comment:     Calculation used to obtain the estimated glomerular filtration  rate (eGFR) is the CKD-EPI equation.        BNP  @LABRCNTIP(BNP,BNPTRIAGEBLO)@  @LABRCNTIP(troponini)@  CrCl cannot be calculated (Patient's most recent lab result is older than the maximum 7 days allowed.).  No results found in the last 24 hours.  No results found in the last 24 hours.  No results found in the last 24 hours.    Assessment:      1. ALLEN (dyspnea on exertion)    2. Sarcoidosis    3. Hypertension, unspecified type    4. Pure hypercholesterolemia    5. EKG abnormalities      SOB, female, 60 yo h/o HTN and HLD  ----> Intermediate pretest probability per: 2021 AHA/ACC/ASE/CHEST/SAEM/SCCT/SCMR Guideline for the Evaluation and Diagnosis of Chest Pain: A Report of the American College of Cardiology/American Heart Association Joint Committee on Clinical Practice Guidelines. J Am Wes  Cardiol. 2021 Nov, 78 [phone]) [id number]    Plan:   Exercise stress echo to r/o ischemia etiology  If the stress normal. advise to f/u with PCP to /ro NGA    Continue HCTZ and statin  DM Rx per PCP  Counseled DASH  Check Lipid profile in 6 months  Recommend heart-healthy diet, weight control and regular exercise.  Orlin. Risk modification.   I have reviewed all pertinent labs and cardiac studies independently. Plans and recommendations have been formulated under my direct supervision. All questions answered and patient voiced understanding.   If symptoms persist go to the ED  RTC as needed

## 2022-02-13 ENCOUNTER — PATIENT OUTREACH (OUTPATIENT)
Dept: ADMINISTRATIVE | Facility: OTHER | Age: 62
End: 2022-02-13
Payer: COMMERCIAL

## 2022-02-14 ENCOUNTER — OFFICE VISIT (OUTPATIENT)
Dept: OPHTHALMOLOGY | Facility: CLINIC | Age: 62
End: 2022-02-14
Payer: COMMERCIAL

## 2022-02-14 DIAGNOSIS — H25.11 NS (NUCLEAR SCLEROSIS), RIGHT: ICD-10-CM

## 2022-02-14 DIAGNOSIS — H35.3122 INTERMEDIATE STAGE NONEXUDATIVE AGE-RELATED MACULAR DEGENERATION OF LEFT EYE: Primary | ICD-10-CM

## 2022-02-14 DIAGNOSIS — D86.9 SARCOIDOSIS: ICD-10-CM

## 2022-02-14 DIAGNOSIS — H35.342 LAMELLAR MACULAR HOLE OF LEFT EYE: ICD-10-CM

## 2022-02-14 PROCEDURE — 2023F DILAT RTA XM W/O RTNOPTHY: CPT | Mod: CPTII,S$GLB,, | Performed by: OPHTHALMOLOGY

## 2022-02-14 PROCEDURE — 92134 POSTERIOR SEGMENT OCT RETINA (OCULAR COHERENCE TOMOGRAPHY)-BOTH EYES: ICD-10-PCS | Mod: S$GLB,,, | Performed by: OPHTHALMOLOGY

## 2022-02-14 PROCEDURE — 92134 CPTRZ OPH DX IMG PST SGM RTA: CPT | Mod: S$GLB,,, | Performed by: OPHTHALMOLOGY

## 2022-02-14 PROCEDURE — 99213 PR OFFICE/OUTPT VISIT, EST, LEVL III, 20-29 MIN: ICD-10-PCS | Mod: S$GLB,,, | Performed by: OPHTHALMOLOGY

## 2022-02-14 PROCEDURE — 2023F PR DILATED RETINAL EXAM W/O EVID OF RETINOPATHY: ICD-10-PCS | Mod: CPTII,S$GLB,, | Performed by: OPHTHALMOLOGY

## 2022-02-14 PROCEDURE — 99999 PR PBB SHADOW E&M-EST. PATIENT-LVL III: CPT | Mod: PBBFAC,,, | Performed by: OPHTHALMOLOGY

## 2022-02-14 PROCEDURE — 1160F RVW MEDS BY RX/DR IN RCRD: CPT | Mod: CPTII,S$GLB,, | Performed by: OPHTHALMOLOGY

## 2022-02-14 PROCEDURE — 99999 PR PBB SHADOW E&M-EST. PATIENT-LVL III: ICD-10-PCS | Mod: PBBFAC,,, | Performed by: OPHTHALMOLOGY

## 2022-02-14 PROCEDURE — 99213 OFFICE O/P EST LOW 20 MIN: CPT | Mod: S$GLB,,, | Performed by: OPHTHALMOLOGY

## 2022-02-14 PROCEDURE — 1159F PR MEDICATION LIST DOCUMENTED IN MEDICAL RECORD: ICD-10-PCS | Mod: CPTII,S$GLB,, | Performed by: OPHTHALMOLOGY

## 2022-02-14 PROCEDURE — 1160F PR REVIEW ALL MEDS BY PRESCRIBER/CLIN PHARMACIST DOCUMENTED: ICD-10-PCS | Mod: CPTII,S$GLB,, | Performed by: OPHTHALMOLOGY

## 2022-02-14 PROCEDURE — 1159F MED LIST DOCD IN RCRD: CPT | Mod: CPTII,S$GLB,, | Performed by: OPHTHALMOLOGY

## 2022-02-14 NOTE — PROGRESS NOTES
===============================  Date today is 2/14/2022  Janna Sheffield is a 61 y.o. female  Last visit [facility]: :8/18/2015   Last visit eye dept. Visit date not found    Corrected distance visual acuity was 20/60 in the right eye and 20/400 in the left eye.  Tonometry     Tonometry (Applanation, 10:38 AM)       Right Left    Pressure 12 12              Wearing Rx     Wearing Rx       Sphere Cylinder Axis Add    Right -9.00 +2.50 100 +2.50    Left -9.50 +2.50 080 +2.50    Type: PAL              Manifest Refraction     Manifest Refraction       Sphere Cylinder [place] Dist VA    Right -8.75 +2.00 180 20/60    Left -11.50 +2.00 090 20/70              Not recorded       Chief Complaint   Patient presents with    macular hole    Lattice degeneration, bilateral     RETINAL EVAL PER DR. JIM       Problem List Items Addressed This Visit        Eye/Vision problems    Macular degeneration, age related, nonexudative - Left Eye - Primary    Relevant Orders    Posterior Segment OCT Retina-Both eyes (Completed)      Other Visit Diagnoses     Sarcoidosis        Relevant Orders    Posterior Segment OCT Retina-Both eyes (Completed)    Lamellar macular hole of left eye        NS (nuclear sclerosis), right              ________________  2/14/2022 today    Os shearing lmh stable but 20/50>400 os       .dec va os p 6 months  bc va os  20/80 bc MH  Os CE- still high myope to match od  od ok  No sign of ocular sarcoid  Ns od vacuoles- on deck for [place]  Ok to proceed with surgery     RTC with MGM as scheduled  Instructed to call 24/7 for any worsening of vision or symptoms. Check OU daily.   Gave my office and cell phone number.          ===============================

## 2022-02-15 ENCOUNTER — CLINICAL SUPPORT (OUTPATIENT)
Dept: REHABILITATION | Facility: HOSPITAL | Age: 62
End: 2022-02-15
Attending: NEUROLOGICAL SURGERY
Payer: COMMERCIAL

## 2022-02-15 ENCOUNTER — HOSPITAL ENCOUNTER (OUTPATIENT)
Dept: CARDIOLOGY | Facility: HOSPITAL | Age: 62
Discharge: HOME | End: 2022-02-15
Attending: INTERNAL MEDICINE
Payer: COMMERCIAL

## 2022-02-15 VITALS
WEIGHT: 201 LBS | DIASTOLIC BLOOD PRESSURE: 89 MMHG | HEIGHT: 64 IN | SYSTOLIC BLOOD PRESSURE: 151 MMHG | HEART RATE: 66 BPM | BODY MASS INDEX: 34.31 KG/M2

## 2022-02-15 DIAGNOSIS — M25.612 DECREASED RANGE OF MOTION OF LEFT SHOULDER: ICD-10-CM

## 2022-02-15 DIAGNOSIS — M54.2 PAINFUL CERVICAL ROM: ICD-10-CM

## 2022-02-15 DIAGNOSIS — M54.12 CERVICAL RADICULOPATHY: ICD-10-CM

## 2022-02-15 DIAGNOSIS — Z74.09 DECREASED FUNCTIONAL MOBILITY AND ENDURANCE: ICD-10-CM

## 2022-02-15 DIAGNOSIS — M50.30 DDD (DEGENERATIVE DISC DISEASE), CERVICAL: ICD-10-CM

## 2022-02-15 DIAGNOSIS — R06.09 DOE (DYSPNEA ON EXERTION): ICD-10-CM

## 2022-02-15 PROBLEM — M54.50 ACUTE BILATERAL LOW BACK PAIN WITHOUT SCIATICA: Status: RESOLVED | Noted: 2021-11-23 | Resolved: 2022-02-15

## 2022-02-15 PROBLEM — M25.512 ACUTE PAIN OF LEFT SHOULDER: Status: RESOLVED | Noted: 2021-11-23 | Resolved: 2022-02-15

## 2022-02-15 PROBLEM — M53.86 DECREASED ROM OF LUMBAR SPINE: Status: RESOLVED | Noted: 2021-11-23 | Resolved: 2022-02-15

## 2022-02-15 PROBLEM — M25.619 LIMITED RANGE OF MOTION (ROM) OF SHOULDER: Status: RESOLVED | Noted: 2021-11-23 | Resolved: 2022-02-15

## 2022-02-15 PROCEDURE — 97140 MANUAL THERAPY 1/> REGIONS: CPT

## 2022-02-15 PROCEDURE — 93351 STRESS TTE COMPLETE: CPT | Mod: 26,,, | Performed by: STUDENT IN AN ORGANIZED HEALTH CARE EDUCATION/TRAINING PROGRAM

## 2022-02-15 PROCEDURE — 93351 STRESS ECHO (CUPID ONLY): ICD-10-PCS | Mod: 26,,, | Performed by: STUDENT IN AN ORGANIZED HEALTH CARE EDUCATION/TRAINING PROGRAM

## 2022-02-15 PROCEDURE — 97162 PT EVAL MOD COMPLEX 30 MIN: CPT

## 2022-02-15 PROCEDURE — 97110 THERAPEUTIC EXERCISES: CPT

## 2022-02-15 PROCEDURE — 93351 STRESS TTE COMPLETE: CPT

## 2022-02-15 NOTE — PLAN OF CARE
[facility]  Physical Therapy Initial Evaluation    Name: Janna JOHNSON [facility] Number: [id number]    Therapy Diagnosis:   Encounter Diagnoses   Name Primary?    Cervical radiculopathy     DDD (degenerative disc disease), cervical     Painful cervical ROM     Decreased range of motion of left shoulder     Decreased functional mobility and endurance      Physician: Devyn Dover MD    Physician Orders: PT Eval and Treat   Medical Diagnosis from Referral:   M54.12 (ICD-10-CM) - Cervical radiculopathy   M50.30 (ICD-10-CM) - DDD (degenerative disc disease), cervical   Evaluation Date: 2/15/2022  Authorization Period Expiration: 2/7/23  Plan of Care Expiration: 4/26/22  Visit # / Visits authorized: 1/ 1  Foto: 1/3    Precautions: Standard,HTN, sarcodosis of the liver    Time In: 10:15 am 9 (pt late)  Time Out: 11:08 am  Total Time: 53 minutes      Subjective   Date of onset: Pain began October of last year  History of current condition - Janna reports: Pt had had neck and L shoulder pain on and off but it resolved with an injection in the neck in 2017. Pt states she had no known cause. About 3 months ago she began having low back pain and neck/shoulder pain that has now returned but with no known cause again. Pt is an . Pt has recently had injections again but they didn't help. Pt states she kept up with her HEP of stretching but not her strength work.     Pain:  Current 5/10, worst 5/10, best 5/10   Location: upper shoulder, bicep, and thumb extensor area on the L arm and shoulder  Description: sore ache  Aggravating Factors: work postures, bending over at the sink, dressing, reaching to manage her hygiene or hair  Easing Factors: stretch, pain meds, lay down    Prior Therapy: yes  Social History: lives with spouse and daughter  Occupation: administrative work  Prior Level of Function: independent  Current Level of Function: pain with reaching, sleeping,  and dressing limiting home cooking, work tasks and hygiene issues    Imaging: see chart    Medical History:   Past Medical History:   Diagnosis Date    Arthritis     Chest pain syndrome 10/23/2014    Hyperlipidemia     Hypertension     Macular degeneration, age related, nonexudative - Left Eye 3/24/2014    Retinal detachment, old, partial 4/14/2014    Sarcoidosis        Surgical History:   Janna Sheffield  has a past surgical history that includes Tubal ligation; knee surgery; Cataract extraction; Dilation and curettage of uterus; Oophorectomy; Hysterectomy (2001); Joint replacement (Left); Esophagogastroduodenoscopy (N/A, 2/6/2019); and Epidural steroid injection into cervical spine (N/A, 12/21/2021).    Medications:   Janna has a current medication list which includes the following prescription(s): diclofenac sodium, ergocalciferol, gabapentin, hydrochlorothiazide, multivitamin, rosuvastatin, and sutab.    Allergies:   Review of patient's allergies indicates:  No Known Allergies     Pts goals: have less pain and use less medicine and be able to do things using the arm without difficulty    Objective       CMS Impairment/Limitation/Restriction for FOTO neck/shoulder Survey    Therapist reviewed FOTO scores for Janna Sheffield on 2/15/2022.   FOTO documents entered into [employer] - see Media section.    Limitation Score: 70%         Posture: Pt noted to present with forward head/rounded shoulder posture with B scapula elevated and protracted and anterior tipping. Pt's scapula only comes 1 finger width in the the axilla. Pt tight cervical muscles and levator result in her sitting in upper neck extension so the deep neck flexors need to activate as well as stretch the SCM, scalenes and suboccipitals and levators to restore neck ROM.    Lumbar ROM in degrees  Flexion with feet 4 inches from the wall (7 inches from the floor when reaching as very tight lumbar, calves and hamstrings). If away from the wall, she  shifts posterior so much she doesn't get lumbar flexion very well.  Extension 20    Shoulder ROM:    Active/Passive Joint Range Right Left   Flexion 140 140   ABDuction 140 140   External Rotation 80 60   Internal Rotation Functionally to L1 and across to the L side Functionally to L3 and barely past the pant pocket   Adduction 20 10   Extension 30 30     Pain with motions of flexion, internal rotation, External rotation and adduction    Cervical Spine AROM:   Degrees Pain   Flexion 50 but when placed in neutral she only flexes 20 degrees at best due to tight upper C spine and levator/UT n   Extend 55 pain   R side bend 30 n   L side bend 30 n   R rotation 70 n   L rotation 75 n     Strength:  Muscle (Myotome) Right Left   Cervical flexion 2+ 2+   Shoulder Abduction 4 3+   Elbow Flexors (C5) 5 5   Wrist Extensors (C6) 5 4   Elbow Extensors (C7) 4 4   ER (arm at side) 4 3+   IR (arm at side) 4 4   Serratus Ant 4 4   Supraspinatus 4 4    strength good good   Intrinsic strength Good  good   Hip abduction strength 3/5   Hip extension 2+/5  Hip flexion strength 3+/5  Rectus abdominus strength in forward plank on elbows unable (2/5 at best when in supine)  Side plank unable but in supine obliques 2/5 at best  Deep neck flexor 3 second hold tolerance out of 32 for a normal strong neck    Sensation: Intact to light touch    Special Test:  Herring + Neers  +     Empty Can +     Infraspinatus +     Apprehension -     AC crossover L +        Joint Mobility: upper C spine motion is very limited due to tight muscles discussed and stiffness in the C1/2 and OA joints. PT noted PA glides in the thoracic spine are limited as well as lumbar but mostly reversal of upper C curve and lower curve are challenging due to tight/weak muscles.    Upper Limb Tension Test: radial nerve on L UE irritated    Palpation:  PT noted trigger points in the B UT, levator scap, lats, teres minor, infraspinatus, subscap, pect minor and  lumbar/thoracic/cervical paraspinals and mid traps. Pt scapular are elevated and down rotated and abducted mildly but mostly anterior tipped limiting overhead functional reach.    TREATMENT   Treatment Time In: 10:45 am  Treatment Time Out: 11:08 am  Total Treatment time separate from Evaluation: 23 minutes    Janna received therapeutic exercises to develop strength, endurance, ROM, flexibility, posture and core stabilization for 8 minutes including:    Prone T 2x 10  Prone Y 2x 10  Cervical retraction at the wall 2x 10  Cervical retraction in supine with assistance to chin tuck 2x 10    Janna received the following manual therapy techniques:  Myofacial release and Soft tissue Mobilization of the upper traps, suboccipitals, levators and subscapularis were applied to reduce tension as well as allow for her to activate the deep flexors for 15 minutes, including:      Home Exercises and Patient Education Provided    Education provided:   -Education on condition, HEP, and PT worked with pt on therex initiating tasks to help mobilize tight anterior tissues and tasks to help restore functional neck ROM as PT demonstrated to pt how back to neck tension and weakness are reproducing her complaints.    Written Home Exercises Provided: Patient instructed to cont prior HEP.  Exercises were reviewed and Janna was able to demonstrate them prior to the end of the session.  Janna demonstrated good  understanding of the education provided.     See EMR under Patient Instructions for exercises provided 2/15/2022.    Assessment   Janna is a 61 y.o. female referred to outpatient Physical Therapy with a medical diagnosis of   M54.12 (ICD-10-CM) - Cervical radiculopathy   M50.30 (ICD-10-CM) - DDD (degenerative disc disease), cervical    pt presents with signs and symptoms consistent with diagnosis along with neck ROM loss, lumbar ROM loss, shoulder weakness and decreased functional mobility and endurance. The patient presents with  impairments which include decreased ROM, decreased strength, decreased joint mobility, joint hypermobility , decreased muscle length, impaired balance, postural abnormalities, gait abnormalities and decreased overall function.  These impairments are limiting patient's ability to squat, look up, reach forward or perform home management , work tasks or dressing without pain.     Pt prognosis is Good due to personal factors and co-morbidities listed below.   Pt will benefit from skilled outpatient Physical Therapy to address the deficits stated above and in the chart below, provide pt/family education, and to maximize pt's level of independence.     Plan of care discussed with patient: Yes  Pt's spiritual, cultural and educational needs considered and patient is agreeable to the plan of care and goals as stated below:     Anticipated Barriers for therapy: obesity, multiple joints involved    Medical Necessity is demonstrated by the following  History  Co-morbidities and personal factors that may impact the plan of care Co-morbidities:   difficulty sleeping and level of undertstanding of current condition  Past Medical History:   Diagnosis Date    Arthritis     Chest pain syndrome 10/23/2014    Hyperlipidemia     Hypertension     Macular degeneration, age related, nonexudative - Left Eye 3/24/2014    Retinal detachment, old, partial 4/14/2014    Sarcoidosis        Personal Factors:   coping style  social background  lifestyle     moderate   Examination  Body Structures and Functions, activity limitations and participation restrictions that may impact the plan of care Body Regions:   neck  back  lower extremities  upper extremities  trunk    Body Systems:    ROM  strength  balance  gait  transfers  transitions  motor control    Participation Restrictions:   See current limitations    Activity limitations:   Learning and applying knowledge  watching  listening    General Tasks and Commands  undertaking a single  task  undertaking multiple tasks    Communication  communicating with/receiving spoken language  communicating with/receiving non-verbal language    Mobility  lifting and carrying objects  walking  driving (bike, car, motorcycle)    Self care  washing oneself (bathing, drying, washing hands)  caring for body parts (brushing teeth, shaving, grooming)  toileting  dressing  eating  drinking  looking after one's health    Domestic Life  shopping  cooking  doing house work (cleaning house, washing dishes, laundry)  assisting others    Interactions/Relationships  basic interpersonal interactions  complex interpersonal interactions  formal relationships  family relationships    Life Areas  employment  basic economic transactions    Community and Social Life  community life  recreation and leisure         moderate   Clinical Presentation evolving clinical presentation with changing clinical characteristics moderate   Decision Making/ Complexity Score: moderate     Goals:  Short Term Goals: In 4 weeks   1.Pt to be educated on HEP.  2.Patient to increase GH ROM from 160 degrees of flexion    3.Patient to increase strength to 3+/5 in the mid and lower traps  4.Patient to have pain less than 4/10 or better at all times.  5.Patient to improve score on the FOTO by 10%.  6. Pt to be educated on postural and body mechanics awareness.  7. Deep neck flexor static hold tolerance 10 seconds or better.    Long Term Goals: In 10 weeks 4/26/22  1. Patient to improve score on the FOTO to 49%.  2. Patient to demo increase in UE strength to shoulder external rotation to 4+/5  3. Patient to have decreased pain to 2/10 at all times.  4. Patient to demo increase cervical flexion to 55 and extension to 65 degrees or better  5. Patient to perform daily activities including lifting overhead up to 10 pounds and dead lifting up to 35 # to simulate home maintenance tasks without limitation.      Plan   Plan of care Certification: 2/15/2022 to  4/26/22.    Outpatient Physical Therapy 2 times weekly for 10 weeks to include the following interventions: Cervical/Lumbar Traction, Electrical Stimulation IFC, NMES, Gait Training, Manual Therapy, Moist Heat/ Ice, Neuromuscular Re-ed, Patient Education, Self Care, Therapeutic Activities, Therapeutic Exercise and DN.     Armida Michael, PT, CIDN, SFMA    Thank you for this referral.    These services are reasonable and necessary for the conditions set forth above while under my care.

## 2022-02-16 LAB
AORTIC ROOT ANNULUS: 2.65 CM
AV INDEX (PROSTH): 0.9
AV MEAN GRADIENT: 3 MMHG
AV PEAK GRADIENT: 5 MMHG
AV VALVE AREA: 2.77 CM2
AV VELOCITY RATIO: 0.85
BSA FOR ECHO PROCEDURE: 2.03 M2
CV ECHO LV RWT: 0.61 CM
CV STRESS BASE HR: 63 BPM
DIASTOLIC BLOOD PRESSURE: 89 MMHG
DOP CALC AO PEAK VEL: 1.17 M/S
DOP CALC AO VTI: 27.3 CM
DOP CALC LVOT AREA: 3.1 CM2
DOP CALC LVOT DIAMETER: 1.98 CM
DOP CALC LVOT PEAK VEL: 1 M/S
DOP CALC LVOT STROKE VOLUME: 75.71 CM3
DOP CALC RVOT PEAK VEL: 0.72 M/S
DOP CALC RVOT VTI: 18.9 CM
DOP CALCLVOT PEAK VEL VTI: 24.6 CM
E WAVE DECELERATION TIME: 207.1 MSEC
E/A RATIO: 1.18
E/E' RATIO: 10.94 M/S
ECHO EF ESTIMATED: 64 %
ECHO LV POSTERIOR WALL: 1.19 CM (ref 0.6–1.1)
EJECTION FRACTION: 60 %
FRACTIONAL SHORTENING: 35 % (ref 28–44)
INTERVENTRICULAR SEPTUM: 1.1 CM (ref 0.6–1.1)
IVRT: 117.03 MSEC
LA MAJOR: 4.3 CM
LA MINOR: 3.88 CM
LA WIDTH: 3.57 CM
LEFT ATRIUM SIZE: 3.59 CM
LEFT ATRIUM VOLUME INDEX: 22.7 ML/M2
LEFT ATRIUM VOLUME: 44.44 CM3
LEFT INTERNAL DIMENSION IN SYSTOLE: 2.55 CM (ref 2.1–4)
LEFT VENTRICLE DIASTOLIC VOLUME INDEX: 33.76 ML/M2
LEFT VENTRICLE DIASTOLIC VOLUME: 66.16 ML
LEFT VENTRICLE MASS INDEX: 76 G/M2
LEFT VENTRICLE SYSTOLIC VOLUME INDEX: 12 ML/M2
LEFT VENTRICLE SYSTOLIC VOLUME: 23.53 ML
LEFT VENTRICULAR INTERNAL DIMENSION IN DIASTOLE: 3.91 CM (ref 3.5–6)
LEFT VENTRICULAR MASS: 149.15 G
LV LATERAL E/E' RATIO: 11.63 M/S
LV SEPTAL E/E' RATIO: 10.33 M/S
LVOT MG: 2.13 MMHG
LVOT MV: 0.68 CM/S
MV PEAK A VEL: 0.79 M/S
MV PEAK E VEL: 0.93 M/S
MV STENOSIS PRESSURE HALF TIME: 60.06 MS
MV VALVE AREA P 1/2 METHOD: 3.66 CM2
OHS CV CPX 1 MINUTE RECOVERY HEART RATE: 173 BPM
OHS CV CPX 85 PERCENT MAX PREDICTED HEART RATE MALE: 129
OHS CV CPX ESTIMATED METS: 5
OHS CV CPX MAX PREDICTED HEART RATE: 152
OHS CV CPX PATIENT IS FEMALE: 1
OHS CV CPX PATIENT IS MALE: 0
OHS CV CPX PEAK DIASTOLIC BLOOD PRESSURE: 93 MMHG
OHS CV CPX PEAK HEAR RATE: 176 BPM
OHS CV CPX PEAK RATE PRESSURE PRODUCT: ABNORMAL
OHS CV CPX PEAK SYSTOLIC BLOOD PRESSURE: 216 MMHG
OHS CV CPX PERCENT MAX PREDICTED HEART RATE ACHIEVED: 116
OHS CV CPX RATE PRESSURE PRODUCT PRESENTING: 9513
PISA TR MAX VEL: 2.61 M/S
PULM VEIN S/D RATIO: 1.44
PV MEAN GRADIENT: 1.5 MMHG
PV PEAK D VEL: 0.41 M/S
PV PEAK S VEL: 0.59 M/S
PV PEAK VELOCITY: 0.81 CM/S
RA MAJOR: 3.85 CM
RA PRESSURE: 3 MMHG
RA WIDTH: 3.25 CM
RIGHT VENTRICULAR END-DIASTOLIC DIMENSION: 3.52 CM
SINUS: 2.74 CM
STJ: 2.91 CM
STRESS ANGINA INDEX: 0
STRESS ECHO POST EXERCISE DUR MIN: 6 MINUTES
STRESS ECHO POST EXERCISE DUR SEC: 2 SECONDS
SYSTOLIC BLOOD PRESSURE: 151 MMHG
TDI LATERAL: 0.08 M/S
TDI SEPTAL: 0.09 M/S
TDI: 0.09 M/S
TR MAX PG: 27 MMHG
TRICUSPID ANNULAR PLANE SYSTOLIC EXCURSION: 1.7 CM
TV REST PULMONARY ARTERY PRESSURE: 30 MMHG

## 2022-02-17 ENCOUNTER — TELEPHONE (OUTPATIENT)
Dept: CARDIOLOGY | Facility: CLINIC | Age: 62
End: 2022-02-17
Payer: COMMERCIAL

## 2022-02-17 NOTE — TELEPHONE ENCOUNTER
LVM to let pt know echo showed normal function and mild MR. Stress test normal per Dr saucedo       ----- Message from Wellington Saucedo MD sent at 2/17/2022  9:49 AM CST -----  Echo showed normal function and mild MR.  Stress test normal

## 2022-02-22 ENCOUNTER — CLINICAL SUPPORT (OUTPATIENT)
Dept: REHABILITATION | Facility: HOSPITAL | Age: 62
End: 2022-02-22
Payer: COMMERCIAL

## 2022-02-22 DIAGNOSIS — M25.612 DECREASED RANGE OF MOTION OF LEFT SHOULDER: ICD-10-CM

## 2022-02-22 DIAGNOSIS — Z74.09 DECREASED FUNCTIONAL MOBILITY AND ENDURANCE: ICD-10-CM

## 2022-02-22 DIAGNOSIS — M54.2 PAINFUL CERVICAL ROM: Primary | ICD-10-CM

## 2022-02-22 PROCEDURE — 97140 MANUAL THERAPY 1/> REGIONS: CPT

## 2022-02-22 PROCEDURE — 97110 THERAPEUTIC EXERCISES: CPT

## 2022-02-24 ENCOUNTER — CLINICAL SUPPORT (OUTPATIENT)
Dept: REHABILITATION | Facility: HOSPITAL | Age: 62
End: 2022-02-24
Payer: COMMERCIAL

## 2022-02-24 DIAGNOSIS — Z74.09 DECREASED FUNCTIONAL MOBILITY AND ENDURANCE: ICD-10-CM

## 2022-02-24 DIAGNOSIS — M25.612 DECREASED RANGE OF MOTION OF LEFT SHOULDER: ICD-10-CM

## 2022-02-24 DIAGNOSIS — M54.2 PAINFUL CERVICAL ROM: Primary | ICD-10-CM

## 2022-02-24 PROCEDURE — 97110 THERAPEUTIC EXERCISES: CPT | Mod: CQ

## 2022-02-24 PROCEDURE — 97140 MANUAL THERAPY 1/> REGIONS: CPT | Mod: CQ

## 2022-02-24 NOTE — PROGRESS NOTES
Physical Therapist Assistant Daily Treatment Note     Name: Janna JOHNSON [facility] Number: [id number]    Therapy Diagnosis:   Encounter Diagnoses   Name Primary?    Painful cervical ROM Yes    Decreased range of motion of left shoulder     Decreased functional mobility and endurance      Physician: Devyn Dover MD    Visit Date: 2/24/2022     Physician Orders: PT Eval and Treat   Medical Diagnosis from Referral:   M54.12 (ICD-10-CM) - Cervical radiculopathy   M50.30 (ICD-10-CM) - DDD (degenerative disc disease), cervical   Evaluation Date: 2/15/2022  Authorization Period Expiration: 2/7/23  Plan of Care Expiration: 4/26/22  Visit # / Visits authorized: 2/20  Foto: 1/3    PTA VISIT 1/5    Precautions: Standard,HTN, sarcodosis of the liver    Time In: 3:15  Time Out: 4:08  Total Time: 53 minutes    SUBJECTIVE     Today, pt reports: she is less sore in her shoulders and neck  She was compliant with home exercise program.  Response to previous treatment: less sore  Functional change: none yet    Pre-Treatment Pain: 5/10  Post-Treatment Pain: 4/10  Location: shoulders, neck  TREATMENT     Janna received therapeutic exercises to develop strength, endurance, ROM, flexibility, posture and core stabilization for 38 minutes including:    Cervical retraction 2x 10 supine  Cervical retractin with chin tuck 2x10 with manual feed hayden  Supine nodding x20  Supine rotation w/chin tuck x20  Prone shoulder extension 2x 10 per arm  Prone shoulder horizontal abduction 2x 10 with scapular manual feed back to teach posterior tipping and upward rotation   Prone W 2x 10 with manual assistance to direct her scapular mechanics and try to reduce levator function  Reaching for upward scapular rotation with shoulder abduction 2x 8 per arm  Seated lumbar flexion   Upper trap stretches    Janna received the following manual therapy techniques x 15 min    Manual traction, Myofacial release and Soft tissue Mobilization were applied to  "the: L infraspinatus, supraspinatus, upper trap, rhomboids and B levator scapulae .  Scapular mobs to increase depression, upward rotation and posterior tipping       Home Exercises Provided and Patient Education Provided      Education/Self-Care provided: (during there)     Patient educated on the importance of improved core and upper and lower extremity strength in order to improve alignment of the spine and upper and lower extremities with static positions and dynamic movement.    Patient educated on the importance of strong core and lower extremity musculature in order to improve both static and dynamic balance, improve gait mechanics, reduce fall risk and improve household and community mobility.       Written Home Exercises Provided: Patient instructed to cont prior HEP.  Exercises were reviewed and Janna was able to demonstrate them prior to the end of the session.  Janna demonstrated fair  understanding of the education provided.     See EMR under Patient Instructions for exercises provided prior visit.    ASSESSMENT   Janna presented with complaints of left neck and upper trap pain . She was tender to moderate depth of palpation particularly on the left side.  Continued working on improving scapular mobility with proper muscle engagement with her activities. Moderate verbal and tactile cueing required today . Pt tolerated exercises well with a positive response however she did complain of some left "hip pain" which was resolved with seated forward trunk flexion stretches.     Janna Is progressing well towards her goals.   Pt prognosis is Good.     Pt will continue to benefit from skilled outpatient physical therapy to address the deficits listed in the problem list box on initial evaluation, provide pt/family education and to maximize pt's level of independence in the home and community environment.     Pt's spiritual, cultural and educational needs considered and pt agreeable to plan of care and " goals.     Anticipated barriers to physical therapy: obesity, multiple joints involved    Goals:   Short Term Goals: In 4 weeks   1.Pt to be educated on HEP.  2.Patient to increase GH ROM from 160 degrees of flexion    3.Patient to increase strength to 3+/5 in the mid and lower traps  4.Patient to have pain less than 4/10 or better at all times.  5.Patient to improve score on the FOTO by 10%.  6. Pt to be educated on postural and body mechanics awareness.  7. Deep neck flexor static hold tolerance 10 seconds or better.     Long Term Goals: In 10 weeks 4/26/22  1. Patient to improve score on the FOTO to 49%.  2. Patient to demo increase in UE strength to shoulder external rotation to 4+/5  3. Patient to have decreased pain to 2/10 at all times.  4. Patient to demo increase cervical flexion to 55 and extension to 65 degrees or better  5. Patient to perform daily activities including lifting overhead up to 10 pounds and dead lifting up to 35 # to simulate home maintenance tasks without limitation.    PLAN   Plan of care Certification: 2/15/2022 to 4/26/22.     Outpatient Physical Therapy 2 times weekly for 10 weeks to include the following interventions: Cervical/Lumbar Traction, Electrical Stimulation IFC, NMES, Gait Training, Manual Therapy, Moist Heat/ Ice, Neuromuscular Re-ed, Patient Education, Self Care, Therapeutic Activities, Therapeutic Exercise and DN. Continue Plan of Care (POC) and progress per patient tolerance.    Cheyenne Sylvester PTA,

## 2022-02-25 ENCOUNTER — TELEPHONE (OUTPATIENT)
Dept: CARDIOLOGY | Facility: CLINIC | Age: 62
End: 2022-02-25
Payer: COMMERCIAL

## 2022-02-25 NOTE — TELEPHONE ENCOUNTER
Pt contacted about results, pt verbalized understanding                ----- Message from Jh Fontenot sent at 2/25/2022  9:55 AM CST -----  Contact: PT  Type:  Patient Returning Call    Who Called:PT  Who Left Message for Patient: Domitila   Does the patient know what this is regarding?:Results  Would the patient rather a call back or a response via [facility]? Call back  Best Call Back Number: [phone]  Additional Information:         Echo showed normal function and mild MR.  Stress test normal

## 2022-03-01 ENCOUNTER — OFFICE VISIT (OUTPATIENT)
Dept: OPHTHALMOLOGY | Facility: CLINIC | Age: 62
End: 2022-03-01
Payer: COMMERCIAL

## 2022-03-01 DIAGNOSIS — H35.342 LAMELLAR MACULAR HOLE OF LEFT EYE: ICD-10-CM

## 2022-03-01 DIAGNOSIS — Z98.42 CATARACT EXTRACTION STATUS, LEFT: ICD-10-CM

## 2022-03-01 DIAGNOSIS — H25.11 NS (NUCLEAR SCLEROSIS), RIGHT: Primary | ICD-10-CM

## 2022-03-01 DIAGNOSIS — H35.3122 INTERMEDIATE STAGE NONEXUDATIVE AGE-RELATED MACULAR DEGENERATION OF LEFT EYE: ICD-10-CM

## 2022-03-01 PROCEDURE — 1159F PR MEDICATION LIST DOCUMENTED IN MEDICAL RECORD: ICD-10-PCS | Mod: CPTII,S$GLB,, | Performed by: OPHTHALMOLOGY

## 2022-03-01 PROCEDURE — 92136 IOL MASTER - OD - RIGHT EYE: ICD-10-PCS | Mod: RT,S$GLB,, | Performed by: OPHTHALMOLOGY

## 2022-03-01 PROCEDURE — 99999 PR PBB SHADOW E&M-EST. PATIENT-LVL II: CPT | Mod: PBBFAC,,, | Performed by: OPHTHALMOLOGY

## 2022-03-01 PROCEDURE — 99999 PR PBB SHADOW E&M-EST. PATIENT-LVL II: ICD-10-PCS | Mod: PBBFAC,,, | Performed by: OPHTHALMOLOGY

## 2022-03-01 PROCEDURE — 99214 PR OFFICE/OUTPT VISIT, EST, LEVL IV, 30-39 MIN: ICD-10-PCS | Mod: S$GLB,,, | Performed by: OPHTHALMOLOGY

## 2022-03-01 PROCEDURE — 1160F RVW MEDS BY RX/DR IN RCRD: CPT | Mod: CPTII,S$GLB,, | Performed by: OPHTHALMOLOGY

## 2022-03-01 PROCEDURE — 1159F MED LIST DOCD IN RCRD: CPT | Mod: CPTII,S$GLB,, | Performed by: OPHTHALMOLOGY

## 2022-03-01 PROCEDURE — 1160F PR REVIEW ALL MEDS BY PRESCRIBER/CLIN PHARMACIST DOCUMENTED: ICD-10-PCS | Mod: CPTII,S$GLB,, | Performed by: OPHTHALMOLOGY

## 2022-03-01 PROCEDURE — 92136 OPHTHALMIC BIOMETRY: CPT | Mod: RT,S$GLB,, | Performed by: OPHTHALMOLOGY

## 2022-03-01 PROCEDURE — 99214 OFFICE O/P EST MOD 30 MIN: CPT | Mod: S$GLB,,, | Performed by: OPHTHALMOLOGY

## 2022-03-01 RX ORDER — DIFLUPREDNATE OPHTHALMIC 0.5 MG/ML
1 EMULSION OPHTHALMIC 4 TIMES DAILY
Qty: 5 ML | Refills: 1 | Status: SHIPPED | OUTPATIENT
Start: 2022-03-01 | End: 2022-05-02 | Stop reason: SDUPTHER

## 2022-03-01 NOTE — PROGRESS NOTES
HPI     Cataract     Comments: Ref by [facility]    Patient was cleared by Dr Pedersen from a retina stand point to proceed with   surgery.  Patient states VA has decreased in OS for several months at all   ranges, images are cloudy and dull along with glare sensitivity and rarely   drives at night.              Comments       1. H/O RETINA DETACHMENT OS   2. LATTICE / COBBLESTONE OU   3. MAC HOLE OS   HIGH MYOPE -9   4. PERIPHERAL PIGMENTATION   5. H/O SARCOID UVEITIS   6. GUTTATA OU   7. PCIOL OS   NSC OD            Last edited by Luz Underwood, Patient Care Assistant on 3/1/2022 11:32   AM. (History)            Assessment /Plan     For exam results, see Encounter Report.      ICD-10-CM ICD-9-CM    1. NS (nuclear sclerosis), right  H25.11 366.16 Visually Significant Cataract OD  Patient reports decreased vision consistent with the clinical amount of lenticular opacity, which reaches the level of visual significance and affects activities of daily living including reading and glare. Risks, benefits, and alternatives to cataract surgery were discussed.   The pt expressed a desire to proceed with surgery with the potential for some reasonable degree of visual improvement.    Discussed IOL options and refractive outcomes for this patient.    Phaco right eye, +22.5 WF   With kenalog injection  Block  monofocal IOL.  Will aim for near  Prescriptions sent for preoperative medications  Durezol QID OD    Explained that patient may need glasses after surgery.  Discussed that vision may be limited by astigmatism/distance/retina. Patient understands she will need glasses for distance after surgery.    Referral to [facility] for Ophthalmic surgery    **denies use of asa or blood thinners**   2. Cataract extraction status, left  Z98.42 V45.61 Stable, monitor   3. Intermediate stage nonexudative age-related macular degeneration of left eye  H35.0317 362.51 Received retina clearance from [facility] to proceed with CE at this  time.     4. Lamellar macular hole of left eye  H35.342 362.54 See above.

## 2022-03-07 ENCOUNTER — OFFICE VISIT (OUTPATIENT)
Dept: NEUROSURGERY | Facility: CLINIC | Age: 62
End: 2022-03-07
Payer: COMMERCIAL

## 2022-03-07 VITALS
HEART RATE: 83 BPM | SYSTOLIC BLOOD PRESSURE: 148 MMHG | DIASTOLIC BLOOD PRESSURE: 93 MMHG | RESPIRATION RATE: 16 BRPM | HEIGHT: 64 IN | BODY MASS INDEX: 34.33 KG/M2 | WEIGHT: 201.06 LBS

## 2022-03-07 DIAGNOSIS — M54.12 CERVICAL RADICULOPATHY: ICD-10-CM

## 2022-03-07 DIAGNOSIS — G56.03 BILATERAL CARPAL TUNNEL SYNDROME: ICD-10-CM

## 2022-03-07 DIAGNOSIS — M47.22 OSTEOARTHRITIS OF SPINE WITH RADICULOPATHY, CERVICAL REGION: Primary | ICD-10-CM

## 2022-03-07 DIAGNOSIS — M48.02 DEGENERATIVE CERVICAL SPINAL STENOSIS: ICD-10-CM

## 2022-03-07 PROCEDURE — 1160F PR REVIEW ALL MEDS BY PRESCRIBER/CLIN PHARMACIST DOCUMENTED: ICD-10-PCS | Mod: CPTII,S$GLB,, | Performed by: NEUROLOGICAL SURGERY

## 2022-03-07 PROCEDURE — 1160F RVW MEDS BY RX/DR IN RCRD: CPT | Mod: CPTII,S$GLB,, | Performed by: NEUROLOGICAL SURGERY

## 2022-03-07 PROCEDURE — 3080F DIAST BP >= 90 MM HG: CPT | Mod: CPTII,S$GLB,, | Performed by: NEUROLOGICAL SURGERY

## 2022-03-07 PROCEDURE — 99213 OFFICE O/P EST LOW 20 MIN: CPT | Mod: S$GLB,,, | Performed by: NEUROLOGICAL SURGERY

## 2022-03-07 PROCEDURE — 3008F PR BODY MASS INDEX (BMI) DOCUMENTED: ICD-10-PCS | Mod: CPTII,S$GLB,, | Performed by: NEUROLOGICAL SURGERY

## 2022-03-07 PROCEDURE — 99999 PR PBB SHADOW E&M-EST. PATIENT-LVL III: ICD-10-PCS | Mod: PBBFAC,,, | Performed by: NEUROLOGICAL SURGERY

## 2022-03-07 PROCEDURE — 99213 PR OFFICE/OUTPT VISIT, EST, LEVL III, 20-29 MIN: ICD-10-PCS | Mod: S$GLB,,, | Performed by: NEUROLOGICAL SURGERY

## 2022-03-07 PROCEDURE — 99999 PR PBB SHADOW E&M-EST. PATIENT-LVL III: CPT | Mod: PBBFAC,,, | Performed by: NEUROLOGICAL SURGERY

## 2022-03-07 PROCEDURE — 1159F PR MEDICATION LIST DOCUMENTED IN MEDICAL RECORD: ICD-10-PCS | Mod: CPTII,S$GLB,, | Performed by: NEUROLOGICAL SURGERY

## 2022-03-07 PROCEDURE — 3008F BODY MASS INDEX DOCD: CPT | Mod: CPTII,S$GLB,, | Performed by: NEUROLOGICAL SURGERY

## 2022-03-07 PROCEDURE — 1159F MED LIST DOCD IN RCRD: CPT | Mod: CPTII,S$GLB,, | Performed by: NEUROLOGICAL SURGERY

## 2022-03-07 PROCEDURE — 3077F SYST BP >= 140 MM HG: CPT | Mod: CPTII,S$GLB,, | Performed by: NEUROLOGICAL SURGERY

## 2022-03-07 PROCEDURE — 3080F PR MOST RECENT DIASTOLIC BLOOD PRESSURE >= 90 MM HG: ICD-10-PCS | Mod: CPTII,S$GLB,, | Performed by: NEUROLOGICAL SURGERY

## 2022-03-07 PROCEDURE — 3077F PR MOST RECENT SYSTOLIC BLOOD PRESSURE >= 140 MM HG: ICD-10-PCS | Mod: CPTII,S$GLB,, | Performed by: NEUROLOGICAL SURGERY

## 2022-03-07 NOTE — PROGRESS NOTES
CHIEF COMPLAINT:  Follow-up (Pt presents today for f/u after inj/PT w/ a 6/10 pain. Pt stated PT has been helping relieve her pain and she only received one inj so far which didn't help as much. Pt c/o aching pain in right arm and upper back b/w right shoulder blade, also she have been experiencing an ongoing /sharp pain down her left thigh. )         HPI:  Janna Sheffield is a 61 y.o. female here today for follow-up for her neck pain.  She went for a epidural steroid injection she reports that this did not give her much relief at all.  She has been undergoing cervical physical therapy which she does report has helped with the neck pain and some the arm symptoms that she was experiencing    Review of patient's allergies indicates:  No Known Allergies    Past Medical History:   Diagnosis Date    Arthritis     Chest pain syndrome 10/23/2014    Hyperlipidemia     Hypertension     Macular degeneration, age related, nonexudative - Left Eye 3/24/2014    Retinal detachment, old, partial 4/14/2014    Sarcoidosis      Past Surgical History:   Procedure Laterality Date    CATARACT EXTRACTION      DILATION AND CURETTAGE OF UTERUS      missed ab x 3    EPIDURAL STEROID INJECTION INTO CERVICAL SPINE N/A 12/21/2021    Procedure: C6/C7 IL HIMANSHU RN IV sedation;  Surgeon: Hadley Marsh MD;  Location: [facility];  Service: Pain Management;  Laterality: N/A;    ESOPHAGOGASTRODUODENOSCOPY N/A 2/6/2019    Procedure: ESOPHAGOGASTRODUODENOSCOPY (EGD);  Surgeon: Jiaro Vargas III, MD;  Location: [facility];  Service: Endoscopy;  Laterality: N/A;    HYSTERECTOMY  2001    MACARIO - complete    JOINT REPLACEMENT Left     Knee    knee surgery      OOPHORECTOMY      TUBAL LIGATION       Family History   Problem Relation Age of Onset    Cervical cancer Mother     Heart disease Mother     Heart attack Mother     Hypertension Mother     Hyperlipidemia Mother     Hypertension Father     Hyperlipidemia Father     Stroke  "Father     Stroke Brother     Breast cancer Maternal Aunt     Breast cancer Maternal Cousin     Breast cancer Maternal Cousin     Breast cancer Paternal Cousin     Ovarian cancer Paternal Cousin     Colon cancer Neg Hx     Thrombophilia Neg Hx     Deep vein thrombosis Neg Hx     Pulmonary embolism Neg Hx      Social History     Tobacco Use    Smoking status: Never Smoker    Smokeless tobacco: Never Used   Substance Use Topics    Alcohol use: No    Drug use: No      Assessment    Review of Systems:  Review of Systems   Constitutional: Negative for activity change, appetite change and chills.   HENT: Negative for hearing loss, sore throat and tinnitus.    Eyes: Negative for pain, discharge and itching.   Cardiovascular: Negative for chest pain.   Gastrointestinal: Negative for abdominal pain.   Endocrine: Negative for cold intolerance and heat intolerance.   Genitourinary: Negative for difficulty urinating and dysuria.   Musculoskeletal: Positive for back pain and gait problem.   Allergic/Immunologic: Negative for environmental allergies.   Neurological: Negative for dizziness, tremors, light-headedness and headaches.   Hematological: Negative for adenopathy.   Psychiatric/Behavioral: Negative for agitation, behavioral problems and confusion.     OBJECTIVE:     Vital Signs (Most Recent)  BP (!) 148/93   Pulse 83   Resp 16   Ht 5' 4" (1.626 m)   Wt 91.2 kg (201 lb 1 oz)   BMI 34.51 kg/m²     Physical Exam:  Nursing note and vitals reviewed  Gen:Oriented to person, place, and time.             Appears stated age   Head:Normocephalic and atraumatic.  Nose: Nose normal.    Eyes: EOM are normal. Pupils are equal, round, and reactive to light.   Neck: Neck supple. No masses or lesions palpated still has decreased range of motion with lateral bending as well as positive Spurling sign  Spine: no deformities no scoliosis noted  Cardiovascular: Intact distal pulses.    Abdominal: Soft.   Neurological: Alert " and oriented to person, place, and time.  No cranial nerve deficit.  Coordination normal. Normal muscle tone upper extremity weakness still present with mild deltoid biceps brachioradialis weakness as well as decreased  strength  Psychiatric: Normal mood and affect. Behavior is normal.    Physical Exam  Ortho/SPM Exam  Neurosurgery Physical Exam               Imaging Reviewed   We reviewed her cervical imaging she has got multilevel cervical spondylosis disc degeneration most prominent at C4-5 C5-6 to lesser degree C6-7  I have personally reviewed the patients neuroimaging. The above description is a summary of pertinant findings, but is not all inclusive. Additional information maybe found in the radiology interpretation. These findings will be discussed with the patient or other revelant members of the care team.     ASSESSMENT/PLAN:      1. Osteoarthritis of spine with radiculopathy, cervical region    2. Cervical radiculopathy    3. Bilateral carpal tunnel syndrome    4. Degenerative cervical spinal stenosis        Osteoarthritis of spine with radiculopathy, cervical region    Cervical radiculopathy    Bilateral carpal tunnel syndrome    Degenerative cervical spinal stenosis      Cervical spondylosis with disc degeneration and mild radiculopathy patient seems to be responding to physical therapy will see her back in another 6 weeks for re-evaluation.  I have informed her that given the severity of her spondylosis and stenosis of imaging she may require surgical intervention

## 2022-03-10 ENCOUNTER — TELEPHONE (OUTPATIENT)
Dept: REHABILITATION | Facility: HOSPITAL | Age: 62
End: 2022-03-10
Payer: COMMERCIAL

## 2022-03-10 NOTE — TELEPHONE ENCOUNTER
PT called pt to put on the schedule as her Dr wanted her to continue PT but she is not on the schedule until 3/22. Otherwise, we will have to D/C for poor attendance. Our number was provided to add appt.

## 2022-03-14 ENCOUNTER — ANESTHESIA (OUTPATIENT)
Dept: ENDOSCOPY | Facility: HOSPITAL | Age: 62
End: 2022-03-14
Payer: COMMERCIAL

## 2022-03-14 ENCOUNTER — ANESTHESIA EVENT (OUTPATIENT)
Dept: ENDOSCOPY | Facility: HOSPITAL | Age: 62
End: 2022-03-14
Payer: COMMERCIAL

## 2022-03-14 ENCOUNTER — HOSPITAL ENCOUNTER (OUTPATIENT)
Facility: HOSPITAL | Age: 62
Discharge: HOME | End: 2022-03-14
Attending: INTERNAL MEDICINE | Admitting: INTERNAL MEDICINE
Payer: COMMERCIAL

## 2022-03-14 DIAGNOSIS — Z12.11 ENCOUNTER FOR SCREENING COLONOSCOPY: Primary | ICD-10-CM

## 2022-03-14 PROCEDURE — 27201012 HC FORCEPS, HOT/COLD, DISP: Performed by: INTERNAL MEDICINE

## 2022-03-14 PROCEDURE — 25000003 PHARM REV CODE 250: Performed by: NURSE ANESTHETIST, CERTIFIED REGISTERED

## 2022-03-14 PROCEDURE — 88305 TISSUE EXAM BY PATHOLOGIST: CPT | Performed by: PATHOLOGY

## 2022-03-14 PROCEDURE — 27201089 HC SNARE, DISP (ANY): Performed by: INTERNAL MEDICINE

## 2022-03-14 PROCEDURE — 88305 TISSUE EXAM BY PATHOLOGIST: ICD-10-PCS | Mod: 26,,, | Performed by: PATHOLOGY

## 2022-03-14 PROCEDURE — 45385 PR COLONOSCOPY,REMV LESN,SNARE: ICD-10-PCS | Mod: 33,,, | Performed by: INTERNAL MEDICINE

## 2022-03-14 PROCEDURE — 37000008 HC ANESTHESIA 1ST 15 MINUTES: Performed by: INTERNAL MEDICINE

## 2022-03-14 PROCEDURE — 45380 PR COLONOSCOPY,BIOPSY: ICD-10-PCS | Mod: 59,,, | Performed by: INTERNAL MEDICINE

## 2022-03-14 PROCEDURE — 63600175 PHARM REV CODE 636 W HCPCS: Performed by: NURSE ANESTHETIST, CERTIFIED REGISTERED

## 2022-03-14 PROCEDURE — 88305 TISSUE EXAM BY PATHOLOGIST: CPT | Mod: 26,,, | Performed by: PATHOLOGY

## 2022-03-14 PROCEDURE — 45385 COLONOSCOPY W/LESION REMOVAL: CPT | Performed by: INTERNAL MEDICINE

## 2022-03-14 PROCEDURE — 45380 COLONOSCOPY AND BIOPSY: CPT | Performed by: INTERNAL MEDICINE

## 2022-03-14 PROCEDURE — 45385 COLONOSCOPY W/LESION REMOVAL: CPT | Mod: 33,,, | Performed by: INTERNAL MEDICINE

## 2022-03-14 PROCEDURE — 37000009 HC ANESTHESIA EA ADD 15 MINS: Performed by: INTERNAL MEDICINE

## 2022-03-14 PROCEDURE — 45380 COLONOSCOPY AND BIOPSY: CPT | Mod: 59,,, | Performed by: INTERNAL MEDICINE

## 2022-03-14 RX ORDER — PROPOFOL 10 MG/ML
VIAL (ML) INTRAVENOUS
Status: DISCONTINUED | OUTPATIENT
Start: 2022-03-14 | End: 2022-03-14

## 2022-03-14 RX ORDER — LIDOCAINE HYDROCHLORIDE 10 MG/ML
INJECTION, SOLUTION EPIDURAL; INFILTRATION; INTRACAUDAL; PERINEURAL
Status: DISCONTINUED | OUTPATIENT
Start: 2022-03-14 | End: 2022-03-14

## 2022-03-14 RX ORDER — SODIUM CHLORIDE, SODIUM LACTATE, POTASSIUM CHLORIDE, CALCIUM CHLORIDE 600; 310; 30; 20 MG/100ML; MG/100ML; MG/100ML; MG/100ML
INJECTION, SOLUTION INTRAVENOUS CONTINUOUS PRN
Status: DISCONTINUED | OUTPATIENT
Start: 2022-03-14 | End: 2022-03-14

## 2022-03-14 RX ORDER — SODIUM CHLORIDE, SODIUM LACTATE, POTASSIUM CHLORIDE, CALCIUM CHLORIDE 600; 310; 30; 20 MG/100ML; MG/100ML; MG/100ML; MG/100ML
INJECTION, SOLUTION INTRAVENOUS CONTINUOUS
Status: DISCONTINUED | OUTPATIENT
Start: 2022-03-14 | End: 2022-03-14 | Stop reason: HOSPADM

## 2022-03-14 RX ADMIN — PROPOFOL 20 MG: 10 INJECTION, EMULSION INTRAVENOUS at 12:03

## 2022-03-14 RX ADMIN — LIDOCAINE HYDROCHLORIDE 50 MG: 10 INJECTION, SOLUTION EPIDURAL; INFILTRATION; INTRACAUDAL; PERINEURAL at 12:03

## 2022-03-14 RX ADMIN — PROPOFOL 20 MG: 10 INJECTION, EMULSION INTRAVENOUS at 01:03

## 2022-03-14 RX ADMIN — SODIUM CHLORIDE, SODIUM LACTATE, POTASSIUM CHLORIDE, AND CALCIUM CHLORIDE: .6; .31; .03; .02 INJECTION, SOLUTION INTRAVENOUS at 12:03

## 2022-03-14 RX ADMIN — PROPOFOL 100 MG: 10 INJECTION, EMULSION INTRAVENOUS at 12:03

## 2022-03-14 NOTE — TRANSFER OF CARE
"Anesthesia Transfer of Care Note    Patient: Janna Sheffield    Procedure(s) Performed: Procedure(s) (LRB):  COLONOSCOPY (N/A)    Patient location: PACU    Anesthesia Type: MAC    Transport from OR: Transported from OR on room air with adequate spontaneous ventilation    Post pain: adequate analgesia    Post assessment: no apparent anesthetic complications and tolerated procedure well    Post vital signs: stable    Level of consciousness: sedated    Nausea/Vomiting: no nausea/vomiting    Complications: none    Transfer of care protocol was followed      Last vitals:   Visit Vitals  /86 (BP Location: Left arm, Patient Position: Sitting)   Pulse 67   Temp 36.6 °C (97.9 °F)   Resp 19   Ht 5' 5" (1.651 m)   Wt 91.2 kg (201 lb)   SpO2 98%   Breastfeeding No   BMI 33.45 kg/m²     "

## 2022-03-14 NOTE — PROVATION PATIENT INSTRUCTIONS
Discharge Summary/Instructions after an Endoscopic Procedure  Patient Name: Janna Sheffield  Patient MRN: [id number]  Patient YOB: 1960 Monday, March 14, 2022 Christi Medrano MD  Dear patient,  As a result of recent federal legislation (The Federal Cures Act), you may   receive lab or pathology results from your procedure in your [facility]   account before your physician is able to contact you. Your physician or   their representative will relay the results to you with their   recommendations at their soonest availability.  Thank you,  RESTRICTIONS:  During your procedure today, you received medications for sedation.  These   medications may affect your judgment, balance and coordination.  Therefore,   for 24 hours, you have the following restrictions:   - DO NOT drive a car, operate machinery, make legal/financial decisions,   sign important papers or drink alcohol.    ACTIVITY:  Today: no heavy lifting, straining or running due to procedural   sedation/anesthesia.  The following day: return to full activity including work.  DIET:  Eat and drink normally unless instructed otherwise.     TREATMENT FOR COMMON SIDE EFFECTS:  - Mild abdominal pain, nausea, belching, bloating or excessive gas:  rest,   eat lightly and use a heating pad.  - Sore Throat: treat with throat lozenges and/or gargle with warm salt   water.  - Because air was used during the procedure, expelling large amounts of air   from your rectum or belching is normal.  - If a bowel prep was taken, you may not have a bowel movement for 1-3 days.    This is normal.  SYMPTOMS TO WATCH FOR AND REPORT TO YOUR PHYSICIAN:  1. Abdominal pain or bloating, other than gas cramps.  2. Chest pain.  3. Back pain.  4. Signs of infection such as: chills or fever occurring within 24 hours   after the procedure.  5. Rectal bleeding, which would show as bright red, maroon, or black stools.   (A tablespoon of blood from the rectum is not serious, especially if    hemorrhoids are present.)  6. Vomiting.  7. Weakness or dizziness.  GO DIRECTLY TO THE NEAREST EMERGENCY ROOM IF YOU HAVE ANY OF THE FOLLOWING:      Difficulty breathing              Chills and/or fever over 101 F   Persistent vomiting and/or vomiting blood   Severe abdominal pain   Severe chest pain   Black, tarry stools   Bleeding- more than one tablespoon   Any other symptom or condition that you feel may need urgent attention  Your doctor recommends these additional instructions:  If any biopsies were taken, your doctors clinic will contact you in 1 to 2   weeks with any results.  - Discharge patient to home (with escort).   - Resume previous diet.   - Continue present medications.   - Await pathology results.   - Repeat colonoscopy in 5 years for surveillance based on pathology results.     - Return to primary care physician.  For questions, problems or results please call your physician Christi Medrano MD at Work:  [phone]  If you have any questions about the above instructions, call the GI   department at [phone] or call the endoscopy unit at [phone]   from 7am until 3 pm.  [facility] [place], EMERGENCY ROOM PHONE NUMBER:   [phone]  IF A COMPLICATION OR EMERGENCY SITUATION ARISES AND YOU ARE UNABLE TO REACH   YOUR PHYSICIAN - GO DIRECTLY TO THE EMERGENCY ROOM.  I have read or have had read to me these discharge instructions for my   procedure and have received a written copy.  I understand these   instructions and will follow-up with my physician if I have any questions.     __________________________________       _____________________________________  Nurse Signature                                          Patient/Designated   Responsible Party Signature  MD Christi De Jesus MD  3/14/2022 1:13:04 PM  This report has been verified and signed electronically.  Dear patient,  As a result of recent federal legislation (The Federal Cures Act), you may    receive lab or pathology results from your procedure in your [facility]   account before your physician is able to contact you. Your physician or   their representative will relay the results to you with their   recommendations at their soonest availability.  Thank you,  PROVATION

## 2022-03-14 NOTE — H&P
PRE PROCEDURE H&P    Patient Name: Janna Sheffield  MRN: [id number]  : 1960  Date of Procedure:  3/14/2022  Referring Physician: RUDY Botello Jr., MD  Primary Physician: HEIDE Botello Jr, MD  Procedure Physician: Christi Medrano MD       Planned Procedure: Colonoscopy  Diagnosis: screening for colon cancer     Chief Complaint: Same as above    HPI: Patient is an 61 y.o. female is here for the above. Last colonoscopy in . She is unsure if she had polyps. Outside record scanned but cannot be reviewed today.     Last colonoscopy:   Family history: none  Anticoagulation: none    Past Medical History:   Past Medical History:   Diagnosis Date    Arthritis     Chest pain syndrome 10/23/2014    Hyperlipidemia     Hypertension     Macular degeneration, age related, nonexudative - Left Eye 3/24/2014    Retinal detachment, old, partial 2014    Sarcoidosis         Past Surgical History:  Past Surgical History:   Procedure Laterality Date    CATARACT EXTRACTION      DILATION AND CURETTAGE OF UTERUS      missed ab x 3    EPIDURAL STEROID INJECTION INTO CERVICAL SPINE N/A 2021    Procedure: C6/C7 IL HIMANSHU RN IV sedation;  Surgeon: Hadley Marsh MD;  Location: [facility];  Service: Pain Management;  Laterality: N/A;    ESOPHAGOGASTRODUODENOSCOPY N/A 2019    Procedure: ESOPHAGOGASTRODUODENOSCOPY (EGD);  Surgeon: Jairo Vargas III, MD;  Location: [facility];  Service: Endoscopy;  Laterality: N/A;    HYSTERECTOMY      MACARIO - complete    JOINT REPLACEMENT Left     Knee    knee surgery      OOPHORECTOMY      TUBAL LIGATION          Home Medications:  Prior to Admission medications    Medication Sig Start Date End Date Taking? Authorizing Provider   diclofenac sodium (VOLTAREN) 1 % Gel Apply 2 g topically 3 (three) times daily as needed (Knee pain). 22  Yes Sarah Beth Shen PA-C   ergocalciferol (VITAMIN D2) 50,000 unit Cap Take 1 capsule (50,000 Units total) by mouth  every 7 days. 1/20/22  Yes RUDY Botello Jr., MD   gabapentin (NEURONTIN) 300 MG capsule Take 1 capsule (300 mg total) by mouth every evening for 3 days, THEN 1 capsule (300 mg total) 2 (two) times daily for 3 days, THEN 1 capsule (300 mg total) 3 (three) times daily for 3 days, THEN 2 capsules (600 mg total) 2 (two) times daily for 3 days, THEN 2 capsules (600 mg total) 3 (three) times daily for 18 days. 1/18/22 2/17/22 Yes Hadley Marsh MD   hydroCHLOROthiazide (HYDRODIURIL) 25 MG tablet Take 1 tablet by mouth once daily 10/24/21  Yes RUDY Botello Jr., MD   multivitamin (THERAGRAN) per tablet Take 1 tablet by mouth Daily.   Yes Historical Provider   rosuvastatin (CRESTOR) 40 MG Tab TAKE 1 TABLET BY MOUTH ONCE DAILY (  REPLACES  LIPITOR) 8/5/21  Yes Tuyet Mead NP   difluprednate (DUREZOL) 0.05 % Drop ophthalmic solution Place 1 drop into the right eye 4 (four) times daily. 3/1/22 4/1/22  Aleksandar Crowe MD   sod sulf-pot chloride-mag sulf (SUTAB) 1.479-0.188- 0.225 gram tablet Take 12 tablets by mouth once daily. Take according to package instructions with indicated amount of water. 2/8/22   Rufino Camp PA-C        Allergies:  Review of patient's allergies indicates:  No Known Allergies     Social History:   Social History     Socioeconomic History    Marital status:    Occupational History     Employer: [employer]   Tobacco Use    Smoking status: Never Smoker    Smokeless tobacco: Never Used   Substance and Sexual Activity    Alcohol use: No    Drug use: No    Sexual activity: Yes     Partners: Male     Comment:        Family History:  Family History   Problem Relation Age of Onset    Cervical cancer Mother     Heart disease Mother     Heart attack Mother     Hypertension Mother     Hyperlipidemia Mother     Hypertension Father     Hyperlipidemia Father     Stroke Father     Stroke Brother     Breast cancer Maternal Aunt     Breast cancer Maternal  "Cousin     Breast cancer Maternal Cousin     Breast cancer Paternal Cousin     Ovarian cancer Paternal Cousin     Colon cancer Neg Hx     Thrombophilia Neg Hx     Deep vein thrombosis Neg Hx     Pulmonary embolism Neg Hx        ROS: No acute cardiac events, no acute respiratory complaints.     Physical Exam (all patients):    /86 (BP Location: Left arm, Patient Position: Sitting)   Pulse 67   Temp 97.9 °F (36.6 °C)   Resp 19   Ht 5' 5" (1.651 m)   Wt 91.2 kg (201 lb)   SpO2 98%   Breastfeeding No   BMI 33.45 kg/m²   Lungs: Clear to auscultation bilaterally, respirations unlabored  Heart: Regular rate and rhythm, S1 and S2 normal, no obvious murmurs  Abdomen:         Soft, non-tender, bowel sounds normal, no masses, no organomegaly    Lab Results   Component Value Date    WBC 5.01 06/30/2021    MCV 98 06/30/2021    RDW 13.4 06/30/2021     06/30/2021    INR 1.1 10/22/2014    GLU 89 08/05/2021    BUN 10 08/05/2021     08/05/2021    K 4.2 08/05/2021     08/05/2021        SEDATION PLAN: per anesthesia      History reviewed, vital signs satisfactory, cardiopulmonary status satisfactory, sedation options, risks and plans have been discussed with the patient  All their questions were answered and the patient agrees to the sedation procedures as planned and the patient is deemed an appropriate candidate for the sedation as planned.    The risks, benefits and alternatives of the procedure were discussed with the patient in detail. This discussion was had in the presence of her  and endoscopy staff. The risks include, risks of adverse reaction to sedation requiring the use of reversal agents, bleeding requiring blood transfusion, perforation requiring surgical intervention and technical failure. Other risks include aspiration leading to respiratory distress and respiratory failure resulting in endotracheal intubation and mechanical ventilation including death. If anesthesia is " being utilized for this procedure, it is up to the anesthesiologist to determine airway safety including elective endotracheal intubation. Questions were answered, they agree to proceed. There was no language barriers.      Procedure explained to patient, informed consent obtained and placed in chart.    Christi Medrano  3/14/2022  12:41 PM

## 2022-03-14 NOTE — ANESTHESIA POSTPROCEDURE EVALUATION
Anesthesia Post Evaluation    Patient: Janna Sheffield    Procedure(s) Performed: Procedure(s) (LRB):  COLONOSCOPY (N/A)    Final Anesthesia Type: MAC      Patient location during evaluation: PACU  Patient participation: No - Unable to Participate, Sedation  Level of consciousness: sedated  Post-procedure vital signs: reviewed and stable  Pain management: adequate  Airway patency: patent    PONV status at discharge: No PONV  Anesthetic complications: no      Cardiovascular status: blood pressure returned to baseline and hemodynamically stable  Respiratory status: unassisted and spontaneous ventilation  Hydration status: euvolemic  Follow-up not needed.          No case tracking events are documented in the log.      Pain/Ben Score: No data recorded

## 2022-03-14 NOTE — ANESTHESIA PREPROCEDURE EVALUATION
03/14/2022  Janna Sheffield is a 61 y.o., female.      Pre-op Assessment    I have reviewed the Patient Summary Reports.     I have reviewed the Nursing Notes. I have reviewed the NPO Status.   I have reviewed the Medications.     Review of Systems  Anesthesia Hx:  No problems with previous Anesthesia  Denies Family Hx of Anesthesia complications.   Denies Personal Hx of Anesthesia complications.   Social:  No Alcohol Use, Non-Smoker    Hematology/Oncology:  Hematology Normal   Oncology Normal     EENT/Dental:   Macular degeneration   Cardiovascular:   Hypertension Denies MI.   Denies CABG/stent.      hyperlipidemia ALLEN ECG has been reviewed. ekg 2/2022:  Normal sinus rhythm   Low voltage QRS   Inferior infarct ,age undetermined   Cannot rule out Anterior infarct ,age undetermined   Abnormal ECG   When compared with ECG of 22-APR-2019 16:52,   Inferior infarct is now Present   Pulmonary:   Denies COPD.  Denies Asthma. Shortness of breath  Denies Sleep Apnea. Sarcoidosis  Chronic restrictive lung disease   Renal/:  Renal/ Normal     Hepatic/GI:   Bowel Prep. Denies GERD. Denies Liver Disease.  Denies Hepatitis.    Musculoskeletal:   Arthritis  Osteoarthritis of spine with radiculopathy, cervical region  Cervical radiculopathy     Neurological:   Denies CVA. Denies Seizures. Fibromyalgia   Endocrine:  Obesity / BMI > 30      Physical Exam    Airway:  Mallampati: II   Mouth Opening: Normal    Dental:  Intact    Chest/Lungs:  Clear to auscultation, Normal Respiratory Rate    Heart:  Rate: Normal  Rhythm: Regular Rhythm        Anesthesia Plan  Type of Anesthesia, risks & benefits discussed:    Anesthesia Type: MAC  Intra-op Monitoring Plan: Standard ASA Monitors  Induction:  IV  Informed Consent: Informed consent signed with the Patient and all parties understand the risks and agree with anesthesia plan.   All questions answered.   ASA Score: 2  Day of Surgery Review of History & Physical: H&P Update referred to the surgeon/provider.    Ready For Surgery From Anesthesia Perspective.     .

## 2022-03-14 NOTE — PLAN OF CARE
DR ALFREDO AT BEDSIDE TO SPEAK TO PT. REGARDING RESULTS.  VSS, NO GI BLEEDING, NO ABD. PAIN, NO N/V. PT. DISCHARGED FROM UNIT.

## 2022-03-15 VITALS
HEART RATE: 71 BPM | OXYGEN SATURATION: 97 % | SYSTOLIC BLOOD PRESSURE: 132 MMHG | BODY MASS INDEX: 33.49 KG/M2 | TEMPERATURE: 98 F | DIASTOLIC BLOOD PRESSURE: 95 MMHG | WEIGHT: 201 LBS | HEIGHT: 65 IN | RESPIRATION RATE: 20 BRPM

## 2022-03-16 ENCOUNTER — TELEPHONE (OUTPATIENT)
Dept: ADMINISTRATIVE | Facility: OTHER | Age: 62
End: 2022-03-16
Payer: COMMERCIAL

## 2022-03-16 ENCOUNTER — DOCUMENTATION ONLY (OUTPATIENT)
Dept: OPHTHALMOLOGY | Facility: CLINIC | Age: 62
End: 2022-03-16
Payer: COMMERCIAL

## 2022-03-16 ENCOUNTER — OFFICE VISIT (OUTPATIENT)
Dept: PAIN MEDICINE | Facility: CLINIC | Age: 62
End: 2022-03-16
Payer: COMMERCIAL

## 2022-03-16 VITALS
BODY MASS INDEX: 33.11 KG/M2 | HEART RATE: 86 BPM | HEIGHT: 65 IN | WEIGHT: 198.75 LBS | SYSTOLIC BLOOD PRESSURE: 128 MMHG | DIASTOLIC BLOOD PRESSURE: 89 MMHG

## 2022-03-16 DIAGNOSIS — M54.12 LEFT CERVICAL RADICULOPATHY: Primary | ICD-10-CM

## 2022-03-16 DIAGNOSIS — M79.18 MYOFASCIAL PAIN: ICD-10-CM

## 2022-03-16 DIAGNOSIS — M79.601 ARM PAIN, DIFFUSE, RIGHT: ICD-10-CM

## 2022-03-16 DIAGNOSIS — M50.30 DDD (DEGENERATIVE DISC DISEASE), CERVICAL: ICD-10-CM

## 2022-03-16 DIAGNOSIS — R52 ACUTE PAIN: ICD-10-CM

## 2022-03-16 PROCEDURE — 96372 THER/PROPH/DIAG INJ SC/IM: CPT | Mod: S$GLB,,, | Performed by: PHYSICIAN ASSISTANT

## 2022-03-16 PROCEDURE — 99999 PR PBB SHADOW E&M-EST. PATIENT-LVL IV: CPT | Mod: PBBFAC,,, | Performed by: PHYSICIAN ASSISTANT

## 2022-03-16 PROCEDURE — 3079F DIAST BP 80-89 MM HG: CPT | Mod: CPTII,S$GLB,, | Performed by: PHYSICIAN ASSISTANT

## 2022-03-16 PROCEDURE — 3074F SYST BP LT 130 MM HG: CPT | Mod: CPTII,S$GLB,, | Performed by: PHYSICIAN ASSISTANT

## 2022-03-16 PROCEDURE — 3008F PR BODY MASS INDEX (BMI) DOCUMENTED: ICD-10-PCS | Mod: CPTII,S$GLB,, | Performed by: PHYSICIAN ASSISTANT

## 2022-03-16 PROCEDURE — 1159F MED LIST DOCD IN RCRD: CPT | Mod: CPTII,S$GLB,, | Performed by: PHYSICIAN ASSISTANT

## 2022-03-16 PROCEDURE — 1160F PR REVIEW ALL MEDS BY PRESCRIBER/CLIN PHARMACIST DOCUMENTED: ICD-10-PCS | Mod: CPTII,S$GLB,, | Performed by: PHYSICIAN ASSISTANT

## 2022-03-16 PROCEDURE — 1160F RVW MEDS BY RX/DR IN RCRD: CPT | Mod: CPTII,S$GLB,, | Performed by: PHYSICIAN ASSISTANT

## 2022-03-16 PROCEDURE — 3079F PR MOST RECENT DIASTOLIC BLOOD PRESSURE 80-89 MM HG: ICD-10-PCS | Mod: CPTII,S$GLB,, | Performed by: PHYSICIAN ASSISTANT

## 2022-03-16 PROCEDURE — 96372 PR INJECTION,THERAP/PROPH/DIAG2ST, IM OR SUBCUT: ICD-10-PCS | Mod: S$GLB,,, | Performed by: PHYSICIAN ASSISTANT

## 2022-03-16 PROCEDURE — 3074F PR MOST RECENT SYSTOLIC BLOOD PRESSURE < 130 MM HG: ICD-10-PCS | Mod: CPTII,S$GLB,, | Performed by: PHYSICIAN ASSISTANT

## 2022-03-16 PROCEDURE — 99214 PR OFFICE/OUTPT VISIT, EST, LEVL IV, 30-39 MIN: ICD-10-PCS | Mod: 25,S$GLB,, | Performed by: PHYSICIAN ASSISTANT

## 2022-03-16 PROCEDURE — 99999 PR PBB SHADOW E&M-EST. PATIENT-LVL IV: ICD-10-PCS | Mod: PBBFAC,,, | Performed by: PHYSICIAN ASSISTANT

## 2022-03-16 PROCEDURE — 3008F BODY MASS INDEX DOCD: CPT | Mod: CPTII,S$GLB,, | Performed by: PHYSICIAN ASSISTANT

## 2022-03-16 PROCEDURE — 99214 OFFICE O/P EST MOD 30 MIN: CPT | Mod: 25,S$GLB,, | Performed by: PHYSICIAN ASSISTANT

## 2022-03-16 PROCEDURE — 1159F PR MEDICATION LIST DOCUMENTED IN MEDICAL RECORD: ICD-10-PCS | Mod: CPTII,S$GLB,, | Performed by: PHYSICIAN ASSISTANT

## 2022-03-16 RX ORDER — KETOROLAC TROMETHAMINE 30 MG/ML
30 INJECTION, SOLUTION INTRAMUSCULAR; INTRAVENOUS
Status: COMPLETED | OUTPATIENT
Start: 2022-03-16 | End: 2022-03-16

## 2022-03-16 RX ORDER — METHYLPREDNISOLONE ACETATE 40 MG/ML
40 INJECTION, SUSPENSION INTRA-ARTICULAR; INTRALESIONAL; INTRAMUSCULAR; SOFT TISSUE
Status: COMPLETED | OUTPATIENT
Start: 2022-03-16 | End: 2022-03-16

## 2022-03-16 RX ADMIN — METHYLPREDNISOLONE ACETATE 40 MG: 40 INJECTION, SUSPENSION INTRA-ARTICULAR; INTRALESIONAL; INTRAMUSCULAR; SOFT TISSUE at 12:03

## 2022-03-16 RX ADMIN — KETOROLAC TROMETHAMINE 30 MG: 30 INJECTION, SOLUTION INTRAMUSCULAR; INTRAVENOUS at 12:03

## 2022-03-16 NOTE — PROGRESS NOTES
Short Stay Record    CC: Blurry Vision     Impression: Visually significant cataract with reasonable expectation for visual improvement Right Eye      Glare Testing (Room Lighting)     Medium   Right 20/400   Left      External Exam     Right Left   External Normal Normal      Right Left   Lids/Lashes Normal Normal   Conjunctiva/Sclera White and quiet White and quiet   Cornea 2+ Guttata 2+ Guttata   Anterior Chamber Deep and quiet Deep and quiet   Iris Round and reactive Round and reactive   Lens 2+ Nuclear sclerosis, 1+ Cortical cataract, Vacuoles Posterior chamber intraocular lens   Vitreous Normal Normal     Wearing Rx     Sphere Cylinder Axis Add   Right -9.00 +2.50 100 +2.50   Left -9.50 +2.50 080 +2.50     Manifest Refraction     Sphere Cylinder [place]   Right -8.75 +2.50 100 20/60   Left -11.50 +2.50 090 20/70         Planned Procedure:     Phaco right eye, +22.5 WF   With kenalog injection  Block  monofocal IOL.  Will aim for near  Prescriptions sent for preoperative medications  Durezol QID OD          Lens Selection OD verified _____           Previous IOL OS _____    Patient cleared for ophthalmic surgery.     Discharge Summary:    Admitting Diagnosis: Nuclear sclerotic cataract /  OD    Discharge Diagnosis: Pseudophakia OD    Procedure: Phacoemulsification of cataract with intraocular lens implant OD    Complications: None  Discharge Condition: Stable    Discharge instructions: Follow post-operative discharge instruction sheet given by provider.    Follow-up: Return to clinic next day or as scheduled.       HPI:  Janna Sheffield is a 61 y.o. female who presents for evaluation prior to ophthalmic surgery, left eye. Patient reports of blurred vision at distance and near with and without correction. Patient reports of glare at night reducing function and safety, and complains of needed additional light to read.     Past Medical History:   Diagnosis Date    Arthritis     Chest pain syndrome  10/23/2014    Hyperlipidemia     Hypertension     Macular degeneration, age related, nonexudative - Left Eye 3/24/2014    Retinal detachment, old, partial 4/14/2014    Sarcoidosis      Past Surgical History:   Procedure Laterality Date    CATARACT EXTRACTION      COLONOSCOPY N/A 3/14/2022    Procedure: COLONOSCOPY;  Surgeon: Christi Medrano MD;  Location: [facility];  Service: Endoscopy;  Laterality: N/A;    DILATION AND CURETTAGE OF UTERUS      missed ab x 3    EPIDURAL STEROID INJECTION INTO CERVICAL SPINE N/A 12/21/2021    Procedure: C6/C7 IL HIMANSHU RN IV sedation;  Surgeon: Hadley Marsh MD;  Location: [facility];  Service: Pain Management;  Laterality: N/A;    ESOPHAGOGASTRODUODENOSCOPY N/A 2/6/2019    Procedure: ESOPHAGOGASTRODUODENOSCOPY (EGD);  Surgeon: Jairo Vargas III, MD;  Location: [facility];  Service: Endoscopy;  Laterality: N/A;    HYSTERECTOMY  2001    MACARIO - complete    JOINT REPLACEMENT Left     Knee    knee surgery      OOPHORECTOMY      TUBAL LIGATION             Review of patient's allergies indicates:  No Known Allergies      Current Outpatient Medications:     diclofenac sodium (VOLTAREN) 1 % Gel, Apply 2 g topically 3 (three) times daily as needed (Knee pain)., Disp: 100 g, Rfl: 3    difluprednate (DUREZOL) 0.05 % Drop ophthalmic solution, Place 1 drop into the right eye 4 (four) times daily., Disp: 5 mL, Rfl: 1    ergocalciferol (VITAMIN D2) 50,000 unit Cap, Take 1 capsule (50,000 Units total) by mouth every 7 days., Disp: 12 capsule, Rfl: 4    gabapentin (NEURONTIN) 300 MG capsule, Take 1 capsule (300 mg total) by mouth every evening for 3 days, THEN 1 capsule (300 mg total) 2 (two) times daily for 3 days, THEN 1 capsule (300 mg total) 3 (three) times daily for 3 days, THEN 2 capsules (600 mg total) 2 (two) times daily for 3 days, THEN 2 capsules (600 mg total) 3 (three) times daily for 18 days. (Patient taking differently: Take 1 capsule (300 mg total) BID), Disp:  138 capsule, Rfl: 0    hydroCHLOROthiazide (HYDRODIURIL) 25 MG tablet, Take 1 tablet by mouth once daily, Disp: 90 tablet, Rfl: 3    multivitamin (THERAGRAN) per tablet, Take 1 tablet by mouth Daily., Disp: , Rfl:     rosuvastatin (CRESTOR) 40 MG Tab, TAKE 1 TABLET BY MOUTH ONCE DAILY (  REPLACES  LIPITOR), Disp: 90 tablet, Rfl: 3    Current Facility-Administered Medications:     ketorolac injection 30 mg, 30 mg, Intramuscular, 1 time in Clinic/HOD, Joi Whiting PA-C    methylPREDNISolone acetate injection 40 mg, 40 mg, Intramuscular, 1 time in Clinic/HOD, Joi Whiting PA-C    Review of Systems:  A comprehensive review of systems was negative.    Physical Exam:  General Appearance:    A&Ox3, no distress, appears stated age   Head:    Normocephalic, without obvious abnormality, atraumatic   Eyes:    PERRL, EOM's intact   Back:     Symmetric, no curvature   Lungs:     Respirations unlabored   Chest Wall:    No tenderness or deformity    Heart:  Abdomen:  Extremities:  Skin:    S1 and S2 present    Soft, non-tender    Extremities normal, atraumatic    Skin color, texture, turgor normal

## 2022-03-16 NOTE — PROGRESS NOTES
Established Patient Chronic Pain Note     Referring Physician: self    PCP: HEIDE Botello Jr, MD    Chief Complaint:   Chief Complaint   Patient presents with    Arm Pain     Right    Wrist Pain     Right        SUBJECTIVE:  Interval History (3/16/2022):  Janna Sheffield presents today for follow-up visit.  Patient was last seen on 1/18/2022. At that visit, the plan was to complete BUE EMG/NCS and see Neurosurgery. She saw Dr. Dover recently who referred her back to us for left cervical TF HIMANSHU.  She is here today under the impression that she was seen in primary care for a sinus infection and also for evaluation of right arm pain, which she is wearing an arm brace for.  She has continued to have left cervical radiculopathy, but she is also now having this right forearm pain.  She does feel that it occasionally radiates from her neck all the way down her arm, but the most bothersome area is localized in lateral forearm.  Patient reports pain as 10/10 today.    Interval history 01/18/2022  Patient presents status post C6-7 interlaminar epidural steroid injection with left paramedian approach 12/21/2021.  Patient reports no discernible relief following her cervical C6-7 epidural steroid injection.  Today she again reports neck pain which radiates into the trapezius distribution, today on the left in C5-6 distribution and down the left upper extremity to the cubital fossa.  Patient reports she was unclear on gabapentin titration and discontinued this medication following her injection.  Patient continues to report significant weakness in the upper extremities requiring help in activities of daily living such as brushing her hair reporting on her clothes.    HPI 11/16/2021  Janna Sheffield is a 61 y.o. female with past medical history significant for fibromyalgia, history of left total knee arthroplasty, hypertension, hypercholesterolemia, pulmonary sarcoidosis on immunosuppression, chronic restrictive lung  disease who presents to the clinic for the evaluation of neck and upper extremity pain.  Today patient reports pain which has been longstanding over several years with particularly exacerbated over the last 5 months.  Today patient reports pain is constant and is rated as a 9/10.  Patient reports pain in bilateral cervical paraspinous musculature which radiates down bilateral upper extremities in C7-8 distribution to the hand.  Pain is described as cramping with numbness and tingling in bilateral hands.  Pain is exacerbated with cervical flexion and extension and reaching and pulling motions with bilateral upper extremities.  Patient does report associated weakness in bilateral upper extremities.  Pain has been improved with prior cervical transforaminal epidural steroid injection and gabapentin medication.  Patient denies any lower extremity weakness, gait ataxia or imbalance.  Patient reports significant motor weakness and loss of sensations.  Patient denies night fever/night sweats, urinary incontinence, bowel incontinence and significant weight loss.  Of note patient last saw Dr. Isabel October 2017 following cervical TF epidural steroid injection with significant relief and continuation of gabapentin.      Pain Disability Index Review:  Last 3 PDI Scores 1/18/2022 11/16/2021 10/11/2017   Pain Disability Index (PDI) 51 61 49       Non-Pharmacologic Treatments:  Physical Therapy/Home Exercise: no  Ice/Heat:yes  TENS: no  Acupuncture: no  Massage: no  Chiropractic: no    Other: no      Pain Medications:  - Adjuvant Medications: Neurontin (Gabapentin) and Tylenol (Acetaminophen)    Pain Procedures:   -12/21/2021:  C6-7 interlaminar epidural steroid injection with left paramedian approach with limited pain relief   -09/28/2017:  Left-sided C6-7 TF HIMANSHU - Dr. Isabel with significant pain relief           Review of Systems:   GENERAL:  No weight loss, malaise or fevers.  HEENT:   No recent changes in vision or  "hearing  NECK:  Negative for lumps, no difficulty with swallowing.  RESPIRATORY:  Negative for cough, wheezing or shortness of breath, patient denies any recent URI.  CARDIOVASCULAR:  Negative for chest pain, leg swelling or palpitations.  GI:  Negative for abdominal discomfort, blood in stools or black stools or change in bowel habits.  MUSCULOSKELETAL:  See HPI.  SKIN:  Negative for lesions, rash, and itching.  PSYCH:  No mood disorder or recent psychosocial stressors.   HEMATOLOGY/LYMPHOLOGY:  Negative for prolonged bleeding, bruising easily or swollen nodes.    NEURO:   No history of headaches, syncope, paralysis, seizures or tremors.  All other reviewed and negative other than HPI.    OBJECTIVE:    Physical Exam:  Vitals:    03/16/22 1208   BP: 128/89   Pulse: 86   Weight: 90.2 kg (198 lb 11.9 oz)   Height: 5' 5" (1.651 m)   PainSc: 10-Worst pain ever   PainLoc: Arm    Body mass index is 33.07 kg/m².   (reviewed on 3/16/2022)    GENERAL: Well appearing, in no acute distress, alert and oriented x3.  PSYCH:  Mood and affect appropriate.  SKIN: Skin color, texture, turgor normal, no rashes or lesions.  HEAD/FACE:  Normocephalic, atraumatic. Cranial nerves grossly intact.    NECK: pain to palpation over the cervical paraspinous muscles. Spurling Negative.  pain with neck flexion, extension, or lateral flexion.   Normaltesting biceps, triceps and brachioradialis bilaterally.    NegativeHoffmann's bilaterally.    5/5 strength testing deltoid, biceps, triceps, wrist extensor, wrist flexor and ulnar intrinsics bilaterally.  Wearing right arm brace.  Normal  strength bilaterally    PULM: No evidence of respiratory difficulty, symmetric chest rise.  GI:  Soft and non-tender.    NEURO: BUE & BLE extremity coordination and muscle stretch reflexes are physiologic and symmetric. No loss of sensation is noted.  GAIT: normal.      Imaging (Reviewed on 3/16/2022):    2/4/22 BUE EMG/NCS  1. ABNORMAL study  2. There is " electrodiagnostic evidence of a mild demyelinating median neuropathy (Carpal tunnel syndrome) across bilateral wrists.  There was a mild chronic radiculopathy of the left C6 nerve root  3. There is clinical evidence of cervical myofascial pain and rotator cuff weakness    MRI cervical spine 11/16/2021  FINDINGS:  The cervical cord remains normal in signal and morphology.     The cervical vertebra reveal mild straightening of the normal lordosis.  No intrinsic marrow signal abnormality is seen.     Multilevel cervical degenerative disc disease is present.     C2-C3: Minimal annular bulge.     C3-C4: Mild central annular disc bulge.     C4-C5: Moderate disc degeneration with disc narrowing, desiccation and disc bulging osteophyte with ventral sac impression.  Mild central stenosis.  Uncovertebral joint spurring with moderate right and mild left foraminal stenosis.  AP diameter of the canal approximately 8 mm.     C5-C6: Moderate disc degeneration with disc narrowing, desiccation and disc bulging osteophyte.  Mild ventral sac impression.  Uncovertebral joint spurring with moderate left and mild right foraminal stenosis.     C6-C7: Mild-to-moderate disc degeneration with disc narrowing, desiccation and disc bulging osteophyte with mild ventral sac impression.  AP diameter canal at this level is approximately 8.1 mm.  Uncovertebral joint spurring with mild foraminal stenosis.     C7-T1: Mild disc bulge.    09/14/17  MRI Cervical Spine Without Contrast    Findings: There is straightening of the cervical lordosis.  Degenerative vertebral endplate spurring and disc desiccation present at multiple levels with moderate disc space narrowing from C4-C5 through C6-C7.  Vertebral marrow signal pattern and architecture are normal.  Cervical cord and craniocervical junction are intact.  Paravertebral soft tissues are symmetric and normal in appearance.    C2-C3: Unremarkable.    C3-C4: Unremarkable.    C4-C5: Posterior disc  osteophyte complex and bilateral uncovertebral hypertrophy resulting in moderate left greater than right foraminal narrowing.  Indentation of ventral thecal sac with associated anterior cord contact and mild central canal stenosis.  Minimum AP canal diameter is 9.0 mm.    C5-C6: Posterior disc osteophyte complex and asymmetric left greater than right uncovertebral hypertrophy producing marked left and moderate right foraminal narrowing.  Mild indentation of the ventral thecal sac without significant canal stenosis.    C6-C7: Posterior disc osteophyte complex and uncovertebral hypertrophy producing moderate to marked left greater than right foraminal narrowing.  Mild indentation of the ventral thecal sac without significant canal stenosis.    C7-T1: Unremarkable.    06/30/21    X-Ray Lumbar Spine AP And Lateral  FINDINGS:  Vertebral body height well maintained.  Minimal multilevel marginal spurring at the thoracolumbar junction region.  Facet disease identified in the mid and lower L-spine.  Most prominent findings at the L4-5 and L5-S1 levels.  Disc spaces appear adequate at all levels.  There is equivocal appearance minimal anterior subluxation L5 on S1.  Pedicles and SI joints intact.  Status post cholecystectomy      08/31/17  X-Ray Cervical Spine AP And Lateral  Findings: The vertebral bodies demonstrate normal height.  The alignment is within normal limits. There is moderate displaced narrowing and spondylosis present at the C4-C5 through the C6-C7 levels. The C1-C2 articulation is within normal limits. No prevertebral soft tissue swelling.      ASSESSMENT: 61 y.o. year old female with neck and upper extremity pain, consistent with     1. Left cervical radiculopathy  Procedure Order to Pain Management   2. DDD (degenerative disc disease), cervical     3. Myofascial pain     4. Acute pain  ketorolac injection 30 mg    methylPREDNISolone acetate injection 40 mg   5. Arm pain, right -acute         PLAN:   1.  Interventional:   - Procedure note: An IM injection of (ketolorac 30mg/1mL) + (methylPREDNISolone acetate 40 mg) was administered during clinic visit - for acute right arm pain, chronic left cervical radicular pain, and potentially also help with sinus symptoms.  This was well tolerated. [If this doesn't also help sinus issues, patient instructed to discuss with PCP.]   - Patient was referred back to our clinic by Neurosurgery for repeat cervical TF HIMANSHU, but unfortunately, our clinic no longer performs this specific procedure. Will send D2P to [place] for left C6/7 TF HIMANSHU with Dr. Kramer. Patient is not taking prescription blood thinners or ASA.    -12/21/2021:  C6-7 interlaminar epidural steroid injection with left paramedian approach with limited pain relief   -09/28/2017:  Left-sided C6-7 TF HIMANSHU - Dr. Isabel with significant pain relief     2. Pharmacologic:   - Increase gabapentin 600mg BID to TID (following original titration schedule).   - Anticoagulation use: None.     3. Rehabilitative: Encouraged regular exercise. Patient discharged from PT due to poor attendance.     4. Diagnostic: BUE EMG/NCS reviewed.     5. Consult/ Referral:   - Previously referred to Neurosurgery. Saw Dr. Dover who recommended cervical TF HIMANSHU.  - Patient to follow-up with PCP regarding sinus symptoms.   - Consider Orthopedics referral if right arm pain continues, currently wearing wrist brace.     6. Follow up: 4 weeks post-injection - with Dr. Marsh    - This condition does not require this patient to take time off of work, and the primary goal of our Pain Management services is to improve the patient's functional capacity.   - I discussed the risks, benefits, and alternatives to potential treatment options. All questions and concerns were fully addressed today in clinic.           Disclaimer:  This note was prepared using voice recognition system and is likely to have sound alike errors that may have been overlooked even after  proof reading.  Please call me with any questions.

## 2022-03-17 ENCOUNTER — TELEPHONE (OUTPATIENT)
Dept: PAIN MEDICINE | Facility: CLINIC | Age: 62
End: 2022-03-17
Payer: COMMERCIAL

## 2022-03-17 ENCOUNTER — TELEPHONE (OUTPATIENT)
Dept: PAIN MEDICINE | Facility: OTHER | Age: 62
End: 2022-03-17
Payer: COMMERCIAL

## 2022-03-17 NOTE — TELEPHONE ENCOUNTER
----- Message from Rosalie Decker sent at 3/17/2022 10:17 AM CDT -----  Regarding: Returning call  Contact: ASHLYN MONTALVO [[id number]]  Type: Patient Returning Call        Who Called:ASHLYN MONTALVO [[id number]]        Who Left Message for Patient:Cate Triana LPN        Does the patient know what this is regarding? Yes         Best Call Back Number:[phone]        Additional Information:

## 2022-03-21 ENCOUNTER — PATIENT OUTREACH (OUTPATIENT)
Dept: ADMINISTRATIVE | Facility: OTHER | Age: 62
End: 2022-03-21
Payer: COMMERCIAL

## 2022-03-22 ENCOUNTER — CLINICAL SUPPORT (OUTPATIENT)
Dept: REHABILITATION | Facility: HOSPITAL | Age: 62
End: 2022-03-22
Payer: COMMERCIAL

## 2022-03-22 DIAGNOSIS — M25.612 DECREASED RANGE OF MOTION OF LEFT SHOULDER: ICD-10-CM

## 2022-03-22 DIAGNOSIS — Z74.09 DECREASED FUNCTIONAL MOBILITY AND ENDURANCE: ICD-10-CM

## 2022-03-22 DIAGNOSIS — M54.2 PAINFUL CERVICAL ROM: Primary | ICD-10-CM

## 2022-03-22 PROCEDURE — 97140 MANUAL THERAPY 1/> REGIONS: CPT

## 2022-03-22 PROCEDURE — 97110 THERAPEUTIC EXERCISES: CPT

## 2022-03-22 NOTE — PROGRESS NOTES
Physical Therapist Daily Treatment Note and Progress Note     Name: Janna JOHNSON [facility] Number: [id number]    Therapy Diagnosis:   Encounter Diagnoses   Name Primary?    Painful cervical ROM Yes    Decreased range of motion of left shoulder     Decreased functional mobility and endurance      Physician: Devyn Dover MD    Visit Date: 3/22/2022     Physician Orders: PT Eval and Treat   Medical Diagnosis from Referral:   M54.12 (ICD-10-CM) - Cervical radiculopathy   M50.30 (ICD-10-CM) - DDD (degenerative disc disease), cervical   Evaluation Date: 2/15/2022  Authorization Period Expiration: 2/7/23  Plan of Care Expiration: extended to 5/31/22  Visit # / Visits authorized: 3/20  Foto: 1/3    PTA VISIT 0/5    Precautions: Standard,HTN, sarcodosis of the liver    Time In: 2:30  Time Out: 3:15  Total Time: 45 minutes    SUBJECTIVE     Today, pt reports: she had a cortisone shot last Wednesday. PT discussed the poor consistency and how we can't get any relief for her if she is not attending.      She was not compliant with home exercise program.  Response to previous treatment: less sore  Functional change: none yet    Pre-Treatment Pain: 9/10  Post-Treatment Pain: 4/10  Location: R scapula to posterior/anterior hand and axillary region, R neck      OBJECTIVE     Posture: Pt noted to present with forward head/rounded shoulder posture with B scapula elevated and protracted and anterior tipping. Pt tight cervical muscles and levator result in her sitting in upper neck extension so the deep neck flexors need to activate as well as stretch the SCM, scalenes and suboccipitals and levators to restore neck ROM.  R side sleeping is not tolerated.     Lumbar ROM in degrees  Flexion with feet 4 inches from the wall 14 inches from reaching the floor  Extension        20     Shoulder ROM:     Active/Passive Joint Range Right Left   Flexion 150 150   ABDuction 150 150   External Rotation 80 80   Internal Rotation  Functionally to T10 Functionally to L1   Adduction 15 20   Extension 30 30         Cervical Spine AROM:    Degrees Pain   Flexion 35 n   Extend 55 Pain R scalene area   R side bend 30 n   L side bend 35 y pulls on R   R rotation 75 y   L rotation 75 n      Strength:  Muscle (Myotome) Right Left   Cervical flexion 2+ 2+   Shoulder Abduction 4 3+   Elbow Flexors (C5) 5 5   Wrist Extensors (C6) 5 4   Elbow Extensors (C7) 4 4   ER (arm at side) 3+ 3+   IR (arm at side) 4 4   Serratus Ant 4 4   Supraspinatus 4 4   Thumb extension 3 5   Intrinsic strength 4 5     Deep neck flexor 5 second hold tolerance out of 32 for a normal strong neck     Sensation: Intact to light touch     Special Test:              Radial nerve glides and ulnar negative but median tight on the R side                                      AC crossover L +                               Joint Mobility: upper C spine motion is very limited due to tight muscles discussed and stiffness in the C1/2 and OA joints.   Upper Limb Tension Test: general UE pain noted in the radial, medial and ulnar nerves but nerve tension was negative except tight with median nerve motion.     Palpation:  PT noted trigger points in the suboccipitals, B UT, levator scap, lats, teres minor, infraspinatus, subscap, pect minor and tight quadratus lumbar and erectors. Pt scapular are elevated and down rotated with her tending to activate the R upper trap frequently with guarding.       TREATMENT     Janna received therapeutic exercises to develop strength, endurance, ROM, flexibility, posture and core stabilization for 8 minutes including:    Cervical retraction 1x 50 supine  Cervical retractin with chin tuck 1x50 with manual feed back   median nerve glides 1x 30    Janna received the following manual therapy techniques x 30 min    Manual traction, Myofacial release and Soft tissue Mobilization were applied to the suboccipitals, erectors of the neck, Upper trap and scalenes. PT used  traction repeatedly to help increase upper cervical retraction and deep neck flexor ability to activate.      Home Exercises Provided and Patient Education Provided      Education/Self-Care provided: (during there)     Patient educated on the importance of improved core and upper and lower extremity strength in order to improve alignment of the spine and upper and lower extremities with static positions and dynamic movement.    Patient educated on the importance of strong core and lower extremity musculature in order to improve both static and dynamic balance, improve gait mechanics, reduce fall risk and improve household and community mobility.    ERGOnomic changes suggested and discussed for pain relief and to reduce repeated injury.      Written Home Exercises Provided: Patient instructed to cont prior HEP.  Exercises were reviewed and Janna was able to demonstrate them prior to the end of the session.  Janna demonstrated fair  understanding of the education provided.     See EMR under Patient Instructions for exercises provided prior visit.    ASSESSMENT   Janna presented with complaints of right shoulder and neck pain.   Pt has attended since November about 5 visits when she is suppose to be seen 2x a week for 10 weeks. We have evaluated her 2 times due to poor attendance and her rate of return is 2-3 visits in a row then she doesn't return. Pt is not doing her HEP except one exercise on a rare basis and has not made the suggestions on adjusting her monitor or chair as we suggested on prior visits so again, progress has been very poor as compliance has been very poor. Pt states she has not been able to see us as we don't have enough appt available for her between the two therapists assigned so starting to day, she will see any therapist available any day but Wed and at times 10 or 4 at her request. Pt got pain relief from the session from 9/10 to 4/10 and radicular pain began to centralize well with her  supine chin tucks with some manual assistance. Supine was more tolerable than seated or standing so Pt advised to work on her HEP supine 50 reps x 3-5 x a day. Pt educated again in ergonomic changes and postures to resume to assist in reducing tasks that are creating her pain.  Pt tolerated exercises well with a positive response as pain centralized and reduced in severity.     Janna Is progressing well towards her goals.   Pt prognosis is Good.     Pt will continue to benefit from skilled outpatient physical therapy to address the deficits listed in the problem list box on initial evaluation, provide pt/family education and to maximize pt's level of independence in the home and community environment.     Pt's spiritual, cultural and educational needs considered and pt agreeable to plan of care and goals.     Anticipated barriers to physical therapy: obesity, multiple joints involved    Goals:   Short Term Goals: In 4 weeks   1.Pt to be educated on HEP.  2.Patient to increase GH ROM from 160 degrees of flexion. progressing  3.Patient to increase strength to 3+/5 in the mid and lower traps. Not progressing  4.Patient to have pain less than 4/10 or better at all times. Progressing today  5.Patient to improve score on the FOTO by 10%.  6. Pt to be educated on postural and body mechanics awareness. Not met  7. Deep neck flexor static hold tolerance 10 seconds or better.     Long Term Goals: In 10 weeks extended to 5/31/22  1. Patient to improve score on the FOTO to 49%.  2. Patient to demo increase in UE strength to shoulder external rotation to 4+/5  3. Patient to have decreased pain to 2/10 at all times.  4. Patient to demo increase cervical flexion to 55 and extension to 65 degrees or better  5. Patient to perform daily activities including lifting overhead up to 10 pounds and dead lifting up to 35 # to simulate home maintenance tasks without limitation.  6. Increase cervical deep neck flexor static hold to 25  seconds or better    PLAN   Plan of care Certification: 2/15/2022 to 5/31/22.     Outpatient Physical Therapy 2 times weekly for 10 weeks effective week of 3/22/22 to include the following interventions: Cervical/Lumbar Traction, Electrical Stimulation IFC, NMES, Gait Training, Manual Therapy, Moist Heat/ Ice, Neuromuscular Re-ed, Patient Education, Self Care, Therapeutic Activities, Therapeutic Exercise and DN. Continue Plan of Care (POC) and progress per patient tolerance.    Armida Michael, PT,

## 2022-03-22 NOTE — PROGRESS NOTES
Health Maintenance Due   Topic Date Due    HIV Screening  Never done    Mammogram  03/11/2022     Updates were requested from care everywhere.  Chart was reviewed for overdue Proactive [facility] Encounters (KESHIA) topics (CRS, Breast Cancer Screening, Eye exam)  Health Maintenance has been updated.  LINKS immunization registry triggered.  Immunizations were reconciled.

## 2022-03-24 ENCOUNTER — OFFICE VISIT (OUTPATIENT)
Dept: OPHTHALMOLOGY | Facility: CLINIC | Age: 62
End: 2022-03-24
Payer: COMMERCIAL

## 2022-03-24 ENCOUNTER — OUTSIDE PLACE OF SERVICE (OUTPATIENT)
Dept: OPHTHALMOLOGY | Facility: CLINIC | Age: 62
End: 2022-03-24

## 2022-03-24 DIAGNOSIS — Z98.890 POST-OPERATIVE STATE: Primary | ICD-10-CM

## 2022-03-24 DIAGNOSIS — Z98.41 CATARACT EXTRACTION STATUS, RIGHT: ICD-10-CM

## 2022-03-24 PROCEDURE — 66984 PR REMOVAL, CATARACT, W/INSRT INTRAOC LENS, W/O ENDO CYCLO: ICD-10-PCS | Mod: RT,,, | Performed by: OPHTHALMOLOGY

## 2022-03-24 PROCEDURE — 1159F PR MEDICATION LIST DOCUMENTED IN MEDICAL RECORD: ICD-10-PCS | Mod: CPTII,S$GLB,, | Performed by: OPHTHALMOLOGY

## 2022-03-24 PROCEDURE — 1159F MED LIST DOCD IN RCRD: CPT | Mod: CPTII,S$GLB,, | Performed by: OPHTHALMOLOGY

## 2022-03-24 PROCEDURE — 99999 PR PBB SHADOW E&M-EST. PATIENT-LVL II: ICD-10-PCS | Mod: PBBFAC,,, | Performed by: OPHTHALMOLOGY

## 2022-03-24 PROCEDURE — 1160F RVW MEDS BY RX/DR IN RCRD: CPT | Mod: CPTII,S$GLB,, | Performed by: OPHTHALMOLOGY

## 2022-03-24 PROCEDURE — 99999 PR PBB SHADOW E&M-EST. PATIENT-LVL II: CPT | Mod: PBBFAC,,, | Performed by: OPHTHALMOLOGY

## 2022-03-24 PROCEDURE — 66984 XCAPSL CTRC RMVL W/O ECP: CPT | Mod: RT,,, | Performed by: OPHTHALMOLOGY

## 2022-03-24 PROCEDURE — 99024 POSTOP FOLLOW-UP VISIT: CPT | Mod: S$GLB,,, | Performed by: OPHTHALMOLOGY

## 2022-03-24 PROCEDURE — 99024 PR POST-OP FOLLOW-UP VISIT: ICD-10-PCS | Mod: S$GLB,,, | Performed by: OPHTHALMOLOGY

## 2022-03-24 PROCEDURE — 1160F PR REVIEW ALL MEDS BY PRESCRIBER/CLIN PHARMACIST DOCUMENTED: ICD-10-PCS | Mod: CPTII,S$GLB,, | Performed by: OPHTHALMOLOGY

## 2022-03-24 NOTE — PROGRESS NOTES
[facility] Patient       1. H/O RETINA DETACHMENT OS   2. LATTICE / COBBLESTONE OU   3. MAC HOLE OS   HIGH MYOPE -9   4. PERIPHERAL PIGMENTATION   5. H/O SARCOID UVEITIS   6. GUTTATA OU   7. PCIOL OS   PCIOL OD W/ Kenalog 3/24/2022    DUREZOL OD      Assessment /Plan     For exam results, see Encounter Report.      ICD-10-CM ICD-9-CM    1. Post-operative state  Z98.890 V45.89    2. Cataract extraction status, right  Z98.41 V45.61      PO Day 1 S/P Phaco/IOL  right eye  Doing well except for subconj heme OD - will patch tonight.    Use durezol QID    Reinstructed in importance of absolute compliance with Post-OP instructions including medications, shield at bedtime, and limitation of activities. Follow up appointments in approximately one and six weeks or call immediately for increased pain, redness or vision loss.

## 2022-03-28 ENCOUNTER — TELEPHONE (OUTPATIENT)
Dept: OPHTHALMOLOGY | Facility: CLINIC | Age: 62
End: 2022-03-28
Payer: COMMERCIAL

## 2022-03-28 NOTE — TELEPHONE ENCOUNTER
Spoke with patient regarding Sub Conj Heme, let her know that it will take several days for the bloody look to go away and as long as theres no pain nor va changes that things will heal normally. Patient can use warm compresses BID if desired - using Art tears for FB sensation when blinking      ----- Message from Chandni Crooks sent at 3/28/2022  8:18 AM CDT -----  Contact: self  Type:  Needs Medical Advice    Who Called: Janna Sheffield   Symptoms (please be specific):  Eyes are red    How long has patient had these symptoms: since her surgery   Pharmacy name and phone #:    [id number] - [place], [place] - [address]  [address]  [place] [address]  Phone: [phone] Fax: [phone]  Would the patient rather a call back or a response via [facility]? Call back   Best Call Back Number:  [phone]   Additional Information: patient would like to know what she can do about he eye redness.

## 2022-03-29 LAB
FINAL PATHOLOGIC DIAGNOSIS: NORMAL
GROSS: NORMAL
Lab: NORMAL

## 2022-04-01 ENCOUNTER — HOSPITAL ENCOUNTER (OUTPATIENT)
Dept: RADIOLOGY | Facility: HOSPITAL | Age: 62
Discharge: HOME | End: 2022-04-01
Attending: PEDIATRICS
Payer: COMMERCIAL

## 2022-04-01 ENCOUNTER — OFFICE VISIT (OUTPATIENT)
Dept: OPHTHALMOLOGY | Facility: CLINIC | Age: 62
End: 2022-04-01
Payer: COMMERCIAL

## 2022-04-01 DIAGNOSIS — Z98.890 POST-OPERATIVE STATE: Primary | ICD-10-CM

## 2022-04-01 DIAGNOSIS — Z98.41 CATARACT EXTRACTION STATUS, RIGHT: ICD-10-CM

## 2022-04-01 DIAGNOSIS — Z12.31 ENCOUNTER FOR SCREENING MAMMOGRAM FOR BREAST CANCER: ICD-10-CM

## 2022-04-01 PROCEDURE — 77067 SCR MAMMO BI INCL CAD: CPT | Mod: TC

## 2022-04-01 PROCEDURE — 99024 PR POST-OP FOLLOW-UP VISIT: ICD-10-PCS | Mod: S$GLB,,, | Performed by: OPHTHALMOLOGY

## 2022-04-01 PROCEDURE — 1159F PR MEDICATION LIST DOCUMENTED IN MEDICAL RECORD: ICD-10-PCS | Mod: CPTII,S$GLB,, | Performed by: OPHTHALMOLOGY

## 2022-04-01 PROCEDURE — 77067 PR MAMMO, CAD, SCREENING, BILAT: ICD-10-PCS | Mod: 26,,, | Performed by: RADIOLOGY

## 2022-04-01 PROCEDURE — 77067 SCR MAMMO BI INCL CAD: CPT | Mod: 26,,, | Performed by: RADIOLOGY

## 2022-04-01 PROCEDURE — 77063 MAMMO DIGITAL SCREENING BILAT WITH TOMO: ICD-10-PCS | Mod: 26,,, | Performed by: RADIOLOGY

## 2022-04-01 PROCEDURE — 77063 BREAST TOMOSYNTHESIS BI: CPT | Mod: 26,,, | Performed by: RADIOLOGY

## 2022-04-01 PROCEDURE — 99999 PR PBB SHADOW E&M-EST. PATIENT-LVL II: CPT | Mod: PBBFAC,,, | Performed by: OPHTHALMOLOGY

## 2022-04-01 PROCEDURE — 1159F MED LIST DOCD IN RCRD: CPT | Mod: CPTII,S$GLB,, | Performed by: OPHTHALMOLOGY

## 2022-04-01 PROCEDURE — 99024 POSTOP FOLLOW-UP VISIT: CPT | Mod: S$GLB,,, | Performed by: OPHTHALMOLOGY

## 2022-04-01 PROCEDURE — 99999 PR PBB SHADOW E&M-EST. PATIENT-LVL II: ICD-10-PCS | Mod: PBBFAC,,, | Performed by: OPHTHALMOLOGY

## 2022-04-01 NOTE — LETTER
April 1, 2022      [facility]  [address]  [place] [address]  Phone: [phone]  Fax: [phone]       Patient: Janna Sheffield   YOB: 1960  Date of Visit: 04/01/2022    To Whom It May Concern:    Kehinde Sheffield  was at [facility] on 04/01/2022. The patient may return to work/school on 4/11/22 with no restrictions. If you have any questions or concerns, or if I can be of further assistance, please do not hesitate to contact me.    Sincerely,

## 2022-04-01 NOTE — PROGRESS NOTES
HPI     Post-op Evaluation     Comments: PCIOL OD W/ Kenalog 3/24/2022 (set for NVA) / CDE: 11.67/   SN60WF 22.5  Patient states OD is still red and itching but no pain or severe   irritation.              Comments     1. H/O RETINA DETACHMENT OS   2. LATTICE / COBBLESTONE OU   3. MAC HOLE OS   HIGH MYOPE -9   4. PERIPHERAL PIGMENTATION   5. H/O SARCOID UVEITIS   6. GUTTATA OU   7. PCIOL OS   PCIOL OD W/ Kenalog 3/24/2022 (set for NVA) / CDE: 11.67/ SN60WF 22.5    DUREZOL OD          Last edited by Luz Underwood, Patient Care Assistant on 4/1/2022 10:07   AM. (History)            Assessment /Plan     For exam results, see Encounter Report.      ICD-10-CM ICD-9-CM    1. Post-operative state  Z98.890 V45.89    2. Cataract extraction status, right  Z98.41 V45.61        PO Week 1 S/P Phaco/ IOL right eye:   Doing well with no evidence of infection or abnormal inflammation.       Taper Durezol weekly per instruction sheet.  Resume normal activitites and d/c eye shield.  OTC reading glasses can be used until evaluated for final MR.  D/c Shield at night      RTC 5 weeks or PRN pain, redness, vision loss, or other concerns         Durezol QID x 7 days, TID x 7 days, BID x 7 days, qd x 7 days then stop

## 2022-04-04 ENCOUNTER — CLINICAL SUPPORT (OUTPATIENT)
Dept: REHABILITATION | Facility: HOSPITAL | Age: 62
End: 2022-04-04
Payer: COMMERCIAL

## 2022-04-04 DIAGNOSIS — Z74.09 DECREASED FUNCTIONAL MOBILITY AND ENDURANCE: ICD-10-CM

## 2022-04-04 DIAGNOSIS — M25.612 DECREASED RANGE OF MOTION OF LEFT SHOULDER: ICD-10-CM

## 2022-04-04 DIAGNOSIS — M54.2 PAINFUL CERVICAL ROM: Primary | ICD-10-CM

## 2022-04-04 PROCEDURE — 97110 THERAPEUTIC EXERCISES: CPT | Mod: CQ

## 2022-04-04 PROCEDURE — 97140 MANUAL THERAPY 1/> REGIONS: CPT | Mod: CQ

## 2022-04-04 NOTE — PROGRESS NOTES
Physical Therapist Assistant Daily Treatment Note     Name: Janna JOHNSON [facility] Number: [id number]    Therapy Diagnosis:   Encounter Diagnoses   Name Primary?    Painful cervical ROM Yes    Decreased range of motion of left shoulder     Decreased functional mobility and endurance      Physician: Devyn Dover MD    Visit Date: 4/4/2022     Physician Orders: PT Eval and Treat   Medical Diagnosis from Referral:   M54.12 (ICD-10-CM) - Cervical radiculopathy   M50.30 (ICD-10-CM) - DDD (degenerative disc disease), cervical   Evaluation Date: 2/15/2022  Authorization Period Expiration: 2/7/23  Plan of Care Expiration: extended to 5/31/22  Visit # / Visits authorized: 4/20  Foto: 1/3    PTA VISIT 1/5    Precautions: Standard,HTN, sarcodosis of the liver    Time In: 4:03  Time Out: 4:50  Total Time: 47 minutes    SUBJECTIVE     Today, pt reports: moderate neck and right arm pain   She was not compliant with home exercise program.  Response to previous treatment: no issues  Functional change: none yet    Pre-Treatment Pain: 7/10  Post-Treatment Pain: 4/10  Location: neck, right upper traps, right arm to elbow      OBJECTIVE       TREATMENT     Janna received therapeutic exercises to develop strength, endurance, ROM, flexibility, posture and core stabilization for 32 minutes including:  Supine chin tucks x20  Supine cervical noodle nodding x20  Supine chin tucks w/retraction 2x15  R median nerve glides   Prone I's 2x10  Prone T's 2x10  Prone W's 2x10    Janna received the following manual therapy techniques x 15 min    Manual traction, Myofacial release and Soft tissue Mobilization were applied to the suboccipitals, erectors of the neck, Upper trap and scalenes. Cervical distraction to help increase upper cervical retraction and deep neck flexor ability to activate.      Home Exercises Provided and Patient Education Provided      Education/Self-Care provided: (during there)     Patient educated on the  importance of improved core and upper and lower extremity strength in order to improve alignment of the spine and upper and lower extremities with static positions and dynamic movement.    Patient educated on the importance of strong core and lower extremity musculature in order to improve both static and dynamic balance, improve gait mechanics, reduce fall risk and improve household and community mobility.       Written Home Exercises Provided: Patient instructed to cont prior HEP.  Exercises were reviewed and Janna was able to demonstrate them prior to the end of the session.  Janna demonstrated fair  understanding of the education provided.     See EMR under Patient Instructions for exercises provided prior visit.    ASSESSMENT   Janna presented with complaints of neck , right upper trap, right upper arm pain. STM performed to decrease muscle tension and trigger points for improved upper quadrant mobility. Manual cues required for cervical retraction for improved activation. Janna demonstrated a positive response to today's session as evidenced with her activity tolerance and reported pain relief.   Janna Is progressing well towards her goals.   Pt prognosis is Good.     Pt will continue to benefit from skilled outpatient physical therapy to address the deficits listed in the problem list box on initial evaluation, provide pt/family education and to maximize pt's level of independence in the home and community environment.     Pt's spiritual, cultural and educational needs considered and pt agreeable to plan of care and goals.     Anticipated barriers to physical therapy: obesity, multiple joints involved    Goals:   Short Term Goals: In 4 weeks   1.Pt to be educated on HEP.  2.Patient to increase GH ROM from 160 degrees of flexion. progressing  3.Patient to increase strength to 3+/5 in the mid and lower traps. Not progressing  4.Patient to have pain less than 4/10 or better at all times. Progressing  today  5.Patient to improve score on the FOTO by 10%.  6. Pt to be educated on postural and body mechanics awareness. Not met  7. Deep neck flexor static hold tolerance 10 seconds or better.     Long Term Goals: In 10 weeks extended to 5/31/22  1. Patient to improve score on the FOTO to 49%.  2. Patient to demo increase in UE strength to shoulder external rotation to 4+/5  3. Patient to have decreased pain to 2/10 at all times.  4. Patient to demo increase cervical flexion to 55 and extension to 65 degrees or better  5. Patient to perform daily activities including lifting overhead up to 10 pounds and dead lifting up to 35 # to simulate home maintenance tasks without limitation.  6. Increase cervical deep neck flexor static hold to 25 seconds or better    PLAN   Plan of care Certification: 2/15/2022 to 5/31/22.     Outpatient Physical Therapy 2 times weekly for 10 weeks effective week of 3/22/22 to include the following interventions: Cervical/Lumbar Traction, Electrical Stimulation IFC, NMES, Gait Training, Manual Therapy, Moist Heat/ Ice, Neuromuscular Re-ed, Patient Education, Self Care, Therapeutic Activities, Therapeutic Exercise and DN. Continue Plan of Care (POC) and progress per patient tolerance.    Cheyenne Sylvester PTA,

## 2022-04-07 NOTE — PROGRESS NOTES
Physical Therapist Assistant Daily Treatment Note     Name: Janna Sheffield  Clinic Number: [id number]    Therapy Diagnosis:   Encounter Diagnoses   Name Primary?    Painful cervical ROM Yes    Decreased range of motion of left shoulder     Decreased functional mobility and endurance      Physician: Devyn Dover MD    Visit Date: 4/8/2022     Physician Orders: PT Eval and Treat   Medical Diagnosis from Referral:   M54.12 (ICD-10-CM) - Cervical radiculopathy   M50.30 (ICD-10-CM) - DDD (degenerative disc disease), cervical   Evaluation Date: 2/15/2022  Authorization Period Expiration: 2/7/23  Plan of Care Expiration: extended to 5/31/22  Visit # / Visits authorized: 5/20  Foto: 2/3         CMS Impairment/Limitation/Restriction for FOTO neck/shoulder Survey     Therapist reviewed FOTO scores for Janna Sheffield on 4/8/2022.   FOTO documents entered into [employer] - see Media section.     Limitation Score: 50%       PTA VISIT 2/5    Precautions: Standard,HTN, sarcodosis of the liver    Time In: 9:45  Time Out: 10:40  Total Time: 40 minutes    SUBJECTIVE     Today, pt reports: no significant complaints of pain. She has a busy weekend and hopes her arm doesn't start bothering her.   She was not compliant with home exercise program.  Response to previous treatment: improvement in pain  Functional change: none yet    Pre-Treatment Pain: 0/10   Post-Treatment Pain: 0/10  Location: neck, right upper traps, right arm to elbow      OBJECTIVE       TREATMENT     Janna received therapeutic exercises to develop strength, endurance, ROM, flexibility, posture and core stabilization for 30 minutes including:  Supine chin tucks x20  Supine cervical noodle nodding x20  Supine chin tucks w/retraction 2x15  Prone I's 2x10  Prone T's 2x10  Prone W's 2x10  Supine L hip flexor stretch off edge of mat 2x10  R median nerve glides   Arsenio curronna 2x10    Janna received the following manual therapy techniques x 10 min    Manual  traction, Myofacial release and Soft tissue Mobilization were applied to the suboccipitals, erectors of the neck, Upper trap and scalenes. Cervical distraction to help increase upper cervical retraction and deep neck flexor ability to activate.    Moist heat pack to left hip x 10 minutes at end of session.      Home Exercises Provided and Patient Education Provided      Education/Self-Care provided: (during there)     Patient educated on the importance of improved core and upper and lower extremity strength in order to improve alignment of the spine and upper and lower extremities with static positions and dynamic movement.    Patient educated on the importance of strong core and lower extremity musculature in order to improve both static and dynamic balance, improve gait mechanics, reduce fall risk and improve household and community mobility.       Written Home Exercises Provided: Patient instructed to cont prior HEP.  Exercises were reviewed and Janna was able to demonstrate them prior to the end of the session.  Janna demonstrated fair  understanding of the education provided.     See EMR under Patient Instructions for exercises provided prior visit.    ASSESSMENT   Janna presented with no significant today. Continued STM to decrease tension in upper traps and cervical paraspinals with increased tenderness noted. Patient continued cervical and scapular strengthening exercises with increased fatigue noted and left hip pain began in prone position. Performed hip flexor stretches to address hip tightness. She reports increased functional mobility with improvement in FOTO score since evaluation.    Janna Is progressing well towards her goals.   Pt prognosis is Good.     Pt will continue to benefit from skilled outpatient physical therapy to address the deficits listed in the problem list box on initial evaluation, provide pt/family education and to maximize pt's level of independence in the home and community  environment.     Pt's spiritual, cultural and educational needs considered and pt agreeable to plan of care and goals.     Anticipated barriers to physical therapy: obesity, multiple joints involved    Goals:   Short Term Goals: In 4 weeks   1.Pt to be educated on HEP.  2.Patient to increase GH ROM from 160 degrees of flexion. progressing  3.Patient to increase strength to 3+/5 in the mid and lower traps. Not progressing  4.Patient to have pain less than 4/10 or better at all times. Progressing today  5.Patient to improve score on the FOTO by 10%.  6. Pt to be educated on postural and body mechanics awareness. Not met  7. Deep neck flexor static hold tolerance 10 seconds or better.     Long Term Goals: In 10 weeks extended to 5/31/22  1. Patient to improve score on the FOTO to 49%.  2. Patient to demo increase in UE strength to shoulder external rotation to 4+/5  3. Patient to have decreased pain to 2/10 at all times.  4. Patient to demo increase cervical flexion to 55 and extension to 65 degrees or better  5. Patient to perform daily activities including lifting overhead up to 10 pounds and dead lifting up to 35 # to simulate home maintenance tasks without limitation.  6. Increase cervical deep neck flexor static hold to 25 seconds or better    PLAN   Plan of care Certification: 2/15/2022 to 5/31/22.     Outpatient Physical Therapy 2 times weekly for 10 weeks effective week of 3/22/22 to include the following interventions: Cervical/Lumbar Traction, Electrical Stimulation IFC, NMES, Gait Training, Manual Therapy, Moist Heat/ Ice, Neuromuscular Re-ed, Patient Education, Self Care, Therapeutic Activities, Therapeutic Exercise and DN. Continue Plan of Care (POC) and progress per patient tolerance.    Yane Garrison PTA,

## 2022-04-08 ENCOUNTER — DOCUMENTATION ONLY (OUTPATIENT)
Dept: REHABILITATION | Facility: HOSPITAL | Age: 62
End: 2022-04-08
Payer: COMMERCIAL

## 2022-04-08 ENCOUNTER — CLINICAL SUPPORT (OUTPATIENT)
Dept: REHABILITATION | Facility: HOSPITAL | Age: 62
End: 2022-04-08
Payer: COMMERCIAL

## 2022-04-08 DIAGNOSIS — Z74.09 DECREASED FUNCTIONAL MOBILITY AND ENDURANCE: ICD-10-CM

## 2022-04-08 DIAGNOSIS — M25.612 DECREASED RANGE OF MOTION OF LEFT SHOULDER: ICD-10-CM

## 2022-04-08 DIAGNOSIS — M54.2 PAINFUL CERVICAL ROM: Primary | ICD-10-CM

## 2022-04-08 PROCEDURE — 97110 THERAPEUTIC EXERCISES: CPT | Mod: CQ

## 2022-04-08 PROCEDURE — 97140 MANUAL THERAPY 1/> REGIONS: CPT | Mod: CQ

## 2022-04-08 NOTE — PROGRESS NOTES
PT/PTA met face to face to discuss pt's treatment plan and progress towards established goals. Pt will be seen by a physical therapist minimally every 6th visit or every 30 days.    Yane Garrison PTA

## 2022-04-11 ENCOUNTER — CLINICAL SUPPORT (OUTPATIENT)
Dept: REHABILITATION | Facility: HOSPITAL | Age: 62
End: 2022-04-11
Payer: COMMERCIAL

## 2022-04-11 DIAGNOSIS — M54.2 PAINFUL CERVICAL ROM: Primary | ICD-10-CM

## 2022-04-11 DIAGNOSIS — M25.612 DECREASED RANGE OF MOTION OF LEFT SHOULDER: ICD-10-CM

## 2022-04-11 DIAGNOSIS — Z74.09 DECREASED FUNCTIONAL MOBILITY AND ENDURANCE: ICD-10-CM

## 2022-04-11 PROCEDURE — 97110 THERAPEUTIC EXERCISES: CPT | Mod: CQ

## 2022-04-11 PROCEDURE — 97140 MANUAL THERAPY 1/> REGIONS: CPT | Mod: CQ

## 2022-04-11 NOTE — PROGRESS NOTES
Physical Therapist Assistant Daily Treatment Note     Name: Janna JOHNSON [facility] Number: [id number]    Therapy Diagnosis:   Encounter Diagnoses   Name Primary?    Painful cervical ROM Yes    Decreased range of motion of left shoulder     Decreased functional mobility and endurance      Physician: Devyn Dover MD    Visit Date: 4/11/2022     Physician Orders: PT Eval and Treat   Medical Diagnosis from Referral:   M54.12 (ICD-10-CM) - Cervical radiculopathy   M50.30 (ICD-10-CM) - DDD (degenerative disc disease), cervical   Evaluation Date: 2/15/2022  Authorization Period Expiration: 2/7/23  Plan of Care Expiration: extended to 5/31/22  Visit # / Visits authorized: 6/20  Foto: 2/3    PTA VISIT 3/5    Precautions: Standard,HTN, sarcodosis of the liver    Time In: 10:40  Time Out: 11:30  Total Time: 38 minutes    SUBJECTIVE     Today, pt reports: her neck and arm did well over the weekend without any pain.   She was not compliant with home exercise program.  Response to previous treatment: improvement in pain   Functional change: none yet    Pre-Treatment Pain: 0/10   Post-Treatment Pain: 0/10  Location: neck, right upper traps, right arm to elbow      OBJECTIVE       TREATMENT     Janna received therapeutic exercises to develop strength, endurance, ROM, flexibility, posture and core stabilization for 30 minutes including:  UBE 4' backwards for posture  Supine chin tucks x20  Supine chin tucks w/retraction 2x15  Prone I's 2x10  Prone W's 2x10  Seated horizontal abduction RTB 2x10   Bilateral shoulder external rotation RTB 2x10  R median nerve glides   Arsenio curronna x10    Janna received the following manual therapy techniques x 8 min    Manual traction, Myofacial release and Soft tissue Mobilization were applied to the suboccipitals, erectors of the neck, Upper trap and scalenes. Cervical distraction to help increase upper cervical retraction and deep neck flexor ability to activate.    Moist heat pack  to right shoulder x 10 minutes at end of session.      Home Exercises Provided and Patient Education Provided      Education/Self-Care provided: (during there)     Patient educated on the importance of improved core and upper and lower extremity strength in order to improve alignment of the spine and upper and lower extremities with static positions and dynamic movement.    Patient educated on the importance of strong core and lower extremity musculature in order to improve both static and dynamic balance, improve gait mechanics, reduce fall risk and improve household and community mobility.       Written Home Exercises Provided: Patient instructed to cont prior HEP.  Exercises were reviewed and Janna was able to demonstrate them prior to the end of the session.  Janna demonstrated fair  understanding of the education provided.     See EMR under Patient Instructions for exercises provided prior visit.    ASSESSMENT   Janna presented to therapy with no complaints of pain but increased muscle tension noted in right cervical paraspinals. Continued STM to decrease tension with good tolerance. Patient continued cervical strengthening with additional postural stabilization exercises which were challenging due to scapular weakness. Discussed taking picture while sitting at work place to make any postural adjustments needs for improved posture at work.    Janna Is progressing well towards her goals.   Pt prognosis is Good.     Pt will continue to benefit from skilled outpatient physical therapy to address the deficits listed in the problem list box on initial evaluation, provide pt/family education and to maximize pt's level of independence in the home and community environment.     Pt's spiritual, cultural and educational needs considered and pt agreeable to plan of care and goals.     Anticipated barriers to physical therapy: obesity, multiple joints involved    Goals:   Short Term Goals: In 4 weeks   1.Pt to be  educated on HEP.  2.Patient to increase GH ROM from 160 degrees of flexion. progressing  3.Patient to increase strength to 3+/5 in the mid and lower traps. Not progressing  4.Patient to have pain less than 4/10 or better at all times. Progressing today  5.Patient to improve score on the FOTO by 10%.  6. Pt to be educated on postural and body mechanics awareness. Not met  7. Deep neck flexor static hold tolerance 10 seconds or better.     Long Term Goals: In 10 weeks extended to 5/31/22  1. Patient to improve score on the FOTO to 49%.  2. Patient to demo increase in UE strength to shoulder external rotation to 4+/5  3. Patient to have decreased pain to 2/10 at all times.  4. Patient to demo increase cervical flexion to 55 and extension to 65 degrees or better  5. Patient to perform daily activities including lifting overhead up to 10 pounds and dead lifting up to 35 # to simulate home maintenance tasks without limitation.  6. Increase cervical deep neck flexor static hold to 25 seconds or better    PLAN   Plan of care Certification: 2/15/2022 to 5/31/22.     Outpatient Physical Therapy 2 times weekly for 10 weeks effective week of 3/22/22 to include the following interventions: Cervical/Lumbar Traction, Electrical Stimulation IFC, NMES, Gait Training, Manual Therapy, Moist Heat/ Ice, Neuromuscular Re-ed, Patient Education, Self Care, Therapeutic Activities, Therapeutic Exercise and DN. Continue Plan of Care (POC) and progress per patient tolerance.    Yane Garrison PTA,

## 2022-04-14 ENCOUNTER — CLINICAL SUPPORT (OUTPATIENT)
Dept: REHABILITATION | Facility: HOSPITAL | Age: 62
End: 2022-04-14
Payer: COMMERCIAL

## 2022-04-14 DIAGNOSIS — Z74.09 DECREASED FUNCTIONAL MOBILITY AND ENDURANCE: ICD-10-CM

## 2022-04-14 DIAGNOSIS — M25.612 DECREASED RANGE OF MOTION OF LEFT SHOULDER: ICD-10-CM

## 2022-04-14 DIAGNOSIS — M54.2 PAINFUL CERVICAL ROM: Primary | ICD-10-CM

## 2022-04-14 PROCEDURE — 97110 THERAPEUTIC EXERCISES: CPT | Mod: CQ

## 2022-04-14 PROCEDURE — 97140 MANUAL THERAPY 1/> REGIONS: CPT | Mod: CQ

## 2022-04-14 NOTE — PROGRESS NOTES
Physical Therapist Assistant Daily Treatment Note     Name: Janna JOHNSON [facility] Number: [id number]    Therapy Diagnosis:   Encounter Diagnoses   Name Primary?    Painful cervical ROM Yes    Decreased range of motion of left shoulder     Decreased functional mobility and endurance      Physician: Devyn Dover MD    Visit Date: 4/14/2022     Physician Orders: PT Eval and Treat   Medical Diagnosis from Referral:   M54.12 (ICD-10-CM) - Cervical radiculopathy   M50.30 (ICD-10-CM) - DDD (degenerative disc disease), cervical   Evaluation Date: 2/15/2022  Authorization Period Expiration: 2/7/23  Plan of Care Expiration: extended to 5/31/22  Visit # / Visits authorized: 7/20  Foto: 2/3    PTA VISIT 4/5    Precautions: Standard,HTN, sarcodosis of the liver    Time In: 10:41  Time Out: 11:31  Total Time: 40 minutes    SUBJECTIVE     Today, pt reports: she has very mild pain in neck and right upper trap. Injection for neck soon   She was not compliant with home exercise program.  Response to previous treatment: improvement in pain   Functional change: none yet    Pre-Treatment Pain: 0/10   Post-Treatment Pain: 0/10  Location: neck, right upper traps, right arm to elbow      OBJECTIVE       TREATMENT     Janna received therapeutic exercises to develop strength, endurance, ROM, flexibility, posture and core stabilization for 30 minutes including:    UBE 6' backwards for posture  Supine chin tucks x20  Supine chin tucks w/retraction 2x15  Open books 10x  Prone I's 2x10  Prone W's 2x10  Seated horizontal abduction RTB 2x10   Bilateral shoulder external rotation (with cookie sheet) RTB 2x10  R median nerve glides     Janna received the following manual therapy techniques x 8 min    Manual traction, Myofacial release and Soft tissue Mobilization were applied to the suboccipitals, erectors of the neck, Upper trap and scalenes. Cervical distraction to help increase upper cervical retraction and deep neck flexor ability  to activate.    Moist heat pack to right shoulder x 10 minutes at end of session.      Home Exercises Provided and Patient Education Provided      Education/Self-Care provided: (during there)     Patient educated on the importance of improved core and upper and lower extremity strength in order to improve alignment of the spine and upper and lower extremities with static positions and dynamic movement.    Patient educated on the importance of strong core and lower extremity musculature in order to improve both static and dynamic balance, improve gait mechanics, reduce fall risk and improve household and community mobility.       Written Home Exercises Provided: Patient instructed to cont prior HEP.  Exercises were reviewed and Janna was able to demonstrate them prior to the end of the session.  Janna demonstrated fair  understanding of the education provided.     See EMR under Patient Instructions for exercises provided prior visit.    ASSESSMENT   Janna presented to therapy with decreased pain but continued tension in right upper traps. She progressed with postural strengthening exercises without any adverse symptoms noted. She will benefit from continued skilled therapy to improve scapular stability    Janna Is progressing well towards her goals.   Pt prognosis is Good.     Pt will continue to benefit from skilled outpatient physical therapy to address the deficits listed in the problem list box on initial evaluation, provide pt/family education and to maximize pt's level of independence in the home and community environment.     Pt's spiritual, cultural and educational needs considered and pt agreeable to plan of care and goals.     Anticipated barriers to physical therapy: obesity, multiple joints involved    Goals:   Short Term Goals: In 4 weeks   1.Pt to be educated on HEP.  2.Patient to increase GH ROM from 160 degrees of flexion. progressing  3.Patient to increase strength to 3+/5 in the mid and lower  traps. Not progressing  4.Patient to have pain less than 4/10 or better at all times. Progressing today  5.Patient to improve score on the FOTO by 10%.  6. Pt to be educated on postural and body mechanics awareness. Not met  7. Deep neck flexor static hold tolerance 10 seconds or better.     Long Term Goals: In 10 weeks extended to 5/31/22  1. Patient to improve score on the FOTO to 49%.  2. Patient to demo increase in UE strength to shoulder external rotation to 4+/5  3. Patient to have decreased pain to 2/10 at all times.  4. Patient to demo increase cervical flexion to 55 and extension to 65 degrees or better  5. Patient to perform daily activities including lifting overhead up to 10 pounds and dead lifting up to 35 # to simulate home maintenance tasks without limitation.  6. Increase cervical deep neck flexor static hold to 25 seconds or better    PLAN   Plan of care Certification: 2/15/2022 to 5/31/22.     Outpatient Physical Therapy 2 times weekly for 10 weeks effective week of 3/22/22 to include the following interventions: Cervical/Lumbar Traction, Electrical Stimulation IFC, NMES, Gait Training, Manual Therapy, Moist Heat/ Ice, Neuromuscular Re-ed, Patient Education, Self Care, Therapeutic Activities, Therapeutic Exercise and DN. Continue Plan of Care (POC) and progress per patient tolerance.    Yane Garrison PTA,

## 2022-04-18 ENCOUNTER — HOSPITAL ENCOUNTER (OUTPATIENT)
Facility: OTHER | Age: 62
Discharge: HOME | End: 2022-04-18
Attending: ANESTHESIOLOGY | Admitting: ANESTHESIOLOGY
Payer: COMMERCIAL

## 2022-04-18 VITALS
BODY MASS INDEX: 31.55 KG/M2 | HEIGHT: 67 IN | SYSTOLIC BLOOD PRESSURE: 127 MMHG | OXYGEN SATURATION: 95 % | TEMPERATURE: 98 F | WEIGHT: 201 LBS | RESPIRATION RATE: 16 BRPM | DIASTOLIC BLOOD PRESSURE: 83 MMHG | HEART RATE: 63 BPM

## 2022-04-18 DIAGNOSIS — M54.12 CERVICAL RADICULOPATHY: Primary | ICD-10-CM

## 2022-04-18 DIAGNOSIS — G89.29 CHRONIC PAIN: ICD-10-CM

## 2022-04-18 DIAGNOSIS — M48.02 DEGENERATIVE CERVICAL SPINAL STENOSIS: ICD-10-CM

## 2022-04-18 PROCEDURE — 63600175 PHARM REV CODE 636 W HCPCS: Performed by: ANESTHESIOLOGY

## 2022-04-18 PROCEDURE — 25000003 PHARM REV CODE 250: Performed by: ANESTHESIOLOGY

## 2022-04-18 PROCEDURE — 25500020 PHARM REV CODE 255: Performed by: ANESTHESIOLOGY

## 2022-04-18 PROCEDURE — 64479 NJX AA&/STRD TFRM EPI C/T 1: CPT | Mod: RT | Performed by: ANESTHESIOLOGY

## 2022-04-18 PROCEDURE — 64479 PR INJECT ANES/STEROID FORAMEN CERV/THORACIC W IMG GUIDE ,1 LEVEL: ICD-10-PCS | Mod: RT,,, | Performed by: ANESTHESIOLOGY

## 2022-04-18 PROCEDURE — 64479 NJX AA&/STRD TFRM EPI C/T 1: CPT | Mod: RT,,, | Performed by: ANESTHESIOLOGY

## 2022-04-18 RX ORDER — MIDAZOLAM HYDROCHLORIDE 1 MG/ML
INJECTION INTRAMUSCULAR; INTRAVENOUS
Status: DISCONTINUED | OUTPATIENT
Start: 2022-04-18 | End: 2022-04-18 | Stop reason: HOSPADM

## 2022-04-18 RX ORDER — LIDOCAINE HYDROCHLORIDE 20 MG/ML
INJECTION, SOLUTION INFILTRATION; PERINEURAL
Status: DISCONTINUED | OUTPATIENT
Start: 2022-04-18 | End: 2022-04-18 | Stop reason: HOSPADM

## 2022-04-18 RX ORDER — FENTANYL CITRATE 50 UG/ML
INJECTION, SOLUTION INTRAMUSCULAR; INTRAVENOUS
Status: DISCONTINUED | OUTPATIENT
Start: 2022-04-18 | End: 2022-04-18 | Stop reason: HOSPADM

## 2022-04-18 RX ORDER — LIDOCAINE HYDROCHLORIDE 10 MG/ML
INJECTION, SOLUTION EPIDURAL; INFILTRATION; INTRACAUDAL; PERINEURAL
Status: DISCONTINUED | OUTPATIENT
Start: 2022-04-18 | End: 2022-04-18 | Stop reason: HOSPADM

## 2022-04-18 RX ORDER — DEXAMETHASONE SODIUM PHOSPHATE 10 MG/ML
INJECTION INTRAMUSCULAR; INTRAVENOUS
Status: DISCONTINUED | OUTPATIENT
Start: 2022-04-18 | End: 2022-04-18 | Stop reason: HOSPADM

## 2022-04-18 RX ORDER — SODIUM CHLORIDE 9 MG/ML
500 INJECTION, SOLUTION INTRAVENOUS CONTINUOUS
Status: DISCONTINUED | OUTPATIENT
Start: 2022-04-18 | End: 2022-04-18 | Stop reason: HOSPADM

## 2022-04-18 NOTE — DISCHARGE SUMMARY
Discharge Note  Short Stay      SUMMARY     Admit Date: 4/18/2022    Attending Physician: Viraj Kramer      Discharge Physician: Viraj Kramer      Discharge Date: 4/18/2022 11:25 AM    Procedure(s) (LRB):  INJECTION, STEROID, EPIDURAL, TRANSFORAMINAL APPROACH, RIGHT C6-C7 (Right)    Final Diagnosis: Right cervical radiculopathy [M54.12]    Disposition: Home or self care    Patient Instructions:   Current Discharge Medication List      CONTINUE these medications which have NOT CHANGED    Details   diclofenac sodium (VOLTAREN) 1 % Gel Apply 2 g topically 3 (three) times daily as needed (Knee pain).  Qty: 100 g, Refills: 3    Associated Diagnoses: Primary osteoarthritis of right knee      ergocalciferol (VITAMIN D2) 50,000 unit Cap Take 1 capsule (50,000 Units total) by mouth every 7 days.  Qty: 12 capsule, Refills: 4    Comments: Please consider 90 day supplies to promote better adherence      gabapentin (NEURONTIN) 300 MG capsule Take 1 capsule (300 mg total) by mouth every evening for 3 days, THEN 1 capsule (300 mg total) 2 (two) times daily for 3 days, THEN 1 capsule (300 mg total) 3 (three) times daily for 3 days, THEN 2 capsules (600 mg total) 2 (two) times daily for 3 days, THEN 2 capsules (600 mg total) 3 (three) times daily for 18 days.  Qty: 138 capsule, Refills: 0      hydroCHLOROthiazide (HYDRODIURIL) 25 MG tablet Take 1 tablet by mouth once daily  Qty: 90 tablet, Refills: 3    Associated Diagnoses: Essential hypertension      multivitamin (THERAGRAN) per tablet Take 1 tablet by mouth Daily.      rosuvastatin (CRESTOR) 40 MG Tab TAKE 1 TABLET BY MOUTH ONCE DAILY (  REPLACES  LIPITOR)  Qty: 90 tablet, Refills: 3                 Discharge Diagnosis: right cervical radiculopathy [M54.12]  Condition on Discharge: Stable with no complications to procedure   Diet on Discharge: Same as before.  Activity: as per instruction sheet.  Discharge to: Home with a responsible adult.  Follow up: 2-4 weeks       Please call  my office or pager at [phone] if experienced any weakness or loss of sensation, fever > 101.5, pain uncontrolled with oral medications, persistent nausea/vomiting/or diarrhea, redness or drainage from the incisions, or any other worrisome concerns. If physician on call was not reached or could not communicate with our office for any reason please go to the nearest emergency department

## 2022-04-18 NOTE — DISCHARGE INSTRUCTIONS
Thank you for allowing us to care for you today. You may receive a survey about the care we provided. Your feedback is valuable and helps us provide excellent care throughout the community.     Home Care Instructions for Pain Management:    1. DIET:   You may resume your normal diet today.   2. BATHING:   You may shower with luke warm water. No tub baths or anything that will soak injection sites under water for the next 24 hours.  3. DRESSING:   You may remove your bandage today.   4. ACTIVITY LEVEL:   You may resume your normal activities 24 hrs after your procedure. Nothing strenuous today.  5. MEDICATIONS:   You may resume your normal medications today. To restart blood thinners, ask your doctor.  6. DRIVING    If you have received any sedatives by mouth today, you may not drive for 12 hours.    If you have received any sedation through your IV, you may not drive for 24 hrs.   7. SPECIAL INSTRUCTIONS:   No heat to the injection site for 24 hrs including, hot bath or shower, heating pad, moist heat, or hot tubs.    Use ice pack to injection site for any pain or discomfort.  Apply ice packs for 20 minute intervals as needed.    IF you have diabetes, be sure to monitor your blood sugar more closely. IF your injection contained steroids your blood sugar levels may become higher than normal.    If you are still having pain upon discharge:  Your pain may improve over the next 48 hours. The anesthetic (numbing medication) works immediately to 48 hours. IF your injection contained a steroid (anti-inflammatory medication), it takes approximately 3 days to start feeling relief and 7-10 days to see your greatest results from the medication. It is possible you may need subsequent injections. This would be discussed at your follow up appointment with pain management or your referring doctor.    Please call the PAIN MANAGEMENT office at [phone] or ON CALL pager at [phone] if you experienced any:   -Weakness or  loss of sensation  -Fever > 101.5  -Pain uncontrolled with oral medications   -Persistent nausea, vomiting, or diarrhea  -Redness or drainage from the injection sites, or any other worrisome concerns.   If physician on call was not reached or could not communicate with our office for any reason please go to the nearest emergency department.     Adult Procedural Sedation Instructions    Recovery After Procedural Sedation (Adult)  You have been given medicine by vein to make you sleep during your surgery. This may have included both a pain medicine and sleeping medicine. Most of the effects have worn off. But you may still have some drowsiness for the next 6 to 8 hours.  Home care  Follow these guidelines when you get home:  For the next 8 hours, you should be watched by a responsible adult. This person should make sure your condition is not getting worse.  Don't drink any alcohol for the next 24 hours.  Don't drive, operate dangerous machinery, or make important business or personal decisions during the next 24 hours.  Note: Your healthcare provider may tell you not to take any medicine by mouth for pain or sleep in the next 4 hours. These medicines may react with the medicines you were given in the hospital. This could cause a much stronger response than usual.  Follow-up care  Follow up with your healthcare provider if you are not alert and back to your usual level of activity within 12 hours.  When to seek medical advice  Call your healthcare provider right away if any of these occur:  Drowsiness gets worse  Weakness or dizziness gets worse  Repeated vomiting  You can't be awakened   Date Last Reviewed: 10/18/2016  © 0122-2929 [employer]. [address] [place] [address], [place] [address]. All rights reserved. This information is not intended as a substitute for professional medical care. Always follow your healthcare professional's instructions.

## 2022-04-18 NOTE — OP NOTE
Cervical Transforaminal Epidural Steroid Injection under Fluoroscopic Guidance    The procedure, risks, benefits, and options were discussed with the patient. There are no contraindications to the procedure. The patent expressed understanding and agreed to the procedure. Informed written consent was obtained prior to the start of the procedure and can be found in the patient's chart.    PATIENT NAME: Janna Sheffield   MRN: [id number]     DATE OF PROCEDURE: 04/18/2022    PROCEDURE: Right  C6/7 Cervical Transforaminal Epidural Steroid Injection under Fluoroscopic Guidance    PRE-OP DIAGNOSIS: Left cervical radiculopathy [M54.12] Cervical radiculopathy [M54.12]    POST-OP DIAGNOSIS: Same    PHYSICIAN: Viraj Kramer MD    ASSISTANTS: Sonya Rogers DO  [facility] Pain Fellow, PGY-5      MEDICATIONS INJECTED: Preservative-free Decadron 10mg with 1cc of Lidocaine 1% MPF     LOCAL ANESTHETIC INJECTED: Xylocaine 2%     SEDATION:   Versed 2mg and Fentanyl 25mcg                                                                                                                                                                                     Conscious sedation ordered by M.D. Patient re-evaluation prior to administration of conscious sedation. No changes noted in patient's status from initial evaluation. The patient's vital signs were monitored by RN and patient remained hemodynamically stable throughout the procedure.    Event Time In   Sedation Start 1119   Sedation End 1123       ESTIMATED BLOOD LOSS: None    COMPLICATIONS: None    TECHNIQUE: Time-out was performed to identify the patient and procedure to be performed. With the patient laying in the oblique position, the surgical area was prepped and draped in the usual sterile fashion using ChloraPrep and a fenestrated drape. The levels were determined under fluoroscopy guidance. Skin anesthesia was achieved by injecting Lidocaine 2% over the injection sites. The transforaminal  spaces were then approached with a 25 gauge, 3.5 inch spinal quinke needle that was introduced under fluoroscopic guidance with AP, lateral and/or contralateral oblique imaging. Once the needle tip was in the area of the transforaminal space, and there was no blood, CSF or paraesthesias, contrast dye Omnipaque (300mg/ml) was injected to confirm placement and there was no vascular runoff. Fluoroscopic imaging in the AP and lateral views revealed a clear outline of the spinal nerve with proximal spread of agent through the neural foramen into the epidural space. Then 2 mL of the medication mixture listed above was injected slowly at each site. Displacement of the radio opaque contrast after injection of the medication confirmed that the medication went into the area of the transforaminal spaces. The needles were removed and bleeding was nil. A sterile dressing was applied. No specimens collected. The patient tolerated the procedure well.   PAIN BEFORE THE PROCEDURE: 5-10/10    PAIN AFTER THE PROCEDURE: 7/10     The patient was monitored after the procedure in the recovery area. They were given post-procedure and discharge instructions to follow at home. The patient was discharged in a stable condition.    Viraj Kramer MD

## 2022-04-18 NOTE — PLAN OF CARE
Muscle Strain in the Extremities  A muscle strain is a stretching and tearing of muscle fibers. This causes pain, especially when you move that muscle. There may also be some swelling and bruising.  Home care  · Keep the hurt area raised to reduce pain and swelling. This is especially important during the first 48 hours.  · Apply an ice pack over the injured area for 15 to 20 minutes every 3 to 6 hours. You should do this for the first 24 to 48 hours. You can make an ice pack by filling a plastic bag that seals at the top with ice cubes and then wrapping it with a thin towel. Be careful not to injure your skin with the ice treatments. Ice should never be applied directly to skin. Continue the use of ice packs for relief of pain and swelling as needed. After 48 hours, apply heat (warm shower or warm bath) for 15 to 20 minutes several times a day, or alternate ice and heat.  · You may use over-the-counter pain medicine to control pain, unless another medicine was prescribed. If you have chronic liver or kidney disease or ever had a stomach ulcer or GI bleeding, talk with your healthcare provider before using these medicines.  · For leg strains: If crutches have been recommended, don’t put full weight on the hurt leg until you can do so without pain. You can return to sports when you are able to hop and run on the injured leg without pain.  Follow-up care  Follow up with your healthcare provider, or as advised.  When to seek medical advice  Call your healthcare provider right away if any of these occur:  · The toes of the injured leg become swollen, cold, blue, numb, or tingly  · Pain or swelling increases  © 7940-5438 [employer]. [address] [place] [address], [place] [address]. All rights reserved. This information is not intended as a substitute for professional medical care. Always follow your healthcare professional's instructions.         Pt tolerated procedure well. Pt very sleepy after procedure.  Pt reports 4/10 pain after procedure. Assisted pt up for first time. Steady on feet. All discharge instructions given.

## 2022-05-02 ENCOUNTER — OFFICE VISIT (OUTPATIENT)
Dept: OPHTHALMOLOGY | Facility: CLINIC | Age: 62
End: 2022-05-02
Payer: COMMERCIAL

## 2022-05-02 ENCOUNTER — PATIENT OUTREACH (OUTPATIENT)
Dept: ADMINISTRATIVE | Facility: OTHER | Age: 62
End: 2022-05-02
Payer: COMMERCIAL

## 2022-05-02 DIAGNOSIS — Z98.41 CATARACT EXTRACTION STATUS, RIGHT: ICD-10-CM

## 2022-05-02 DIAGNOSIS — D86.9 SARCOIDOSIS: ICD-10-CM

## 2022-05-02 DIAGNOSIS — H25.11 NS (NUCLEAR SCLEROSIS), RIGHT: ICD-10-CM

## 2022-05-02 DIAGNOSIS — Z98.890 POST-OPERATIVE STATE: Primary | ICD-10-CM

## 2022-05-02 PROCEDURE — 99999 PR PBB SHADOW E&M-EST. PATIENT-LVL II: CPT | Mod: PBBFAC,,, | Performed by: OPHTHALMOLOGY

## 2022-05-02 PROCEDURE — 99999 PR PBB SHADOW E&M-EST. PATIENT-LVL II: ICD-10-PCS | Mod: PBBFAC,,, | Performed by: OPHTHALMOLOGY

## 2022-05-02 PROCEDURE — 99024 POSTOP FOLLOW-UP VISIT: CPT | Mod: S$GLB,,, | Performed by: OPHTHALMOLOGY

## 2022-05-02 PROCEDURE — 1159F PR MEDICATION LIST DOCUMENTED IN MEDICAL RECORD: ICD-10-PCS | Mod: CPTII,S$GLB,, | Performed by: OPHTHALMOLOGY

## 2022-05-02 PROCEDURE — 1159F MED LIST DOCD IN RCRD: CPT | Mod: CPTII,S$GLB,, | Performed by: OPHTHALMOLOGY

## 2022-05-02 PROCEDURE — 99024 PR POST-OP FOLLOW-UP VISIT: ICD-10-PCS | Mod: S$GLB,,, | Performed by: OPHTHALMOLOGY

## 2022-05-02 RX ORDER — DIFLUPREDNATE OPHTHALMIC 0.5 MG/ML
1 EMULSION OPHTHALMIC 4 TIMES DAILY
Qty: 5 ML | Refills: 1 | Status: SHIPPED | OUTPATIENT
Start: 2022-05-02 | End: 2022-06-02

## 2022-05-02 NOTE — PROGRESS NOTES
HPI     Post-op Evaluation     Comments: 1M s/p IOL OD (near)              Comments     Patient states that [facility] at near with OD doing OK - d/c'd Steroid drops and   art tears yesterday - occas tenderness OD      [facility] Patient       1. H/O RETINA DETACHMENT OS   2. LATTICE / COBBLESTONE OU   3. MAC HOLE OS   HIGH MYOPE -9   4. PERIPHERAL PIGMENTATION   5. H/O SARCOID UVEITIS   6. GUTTATA OU   7. PCIOL OS   PCIOL OD W/ Kenalog 3/24/2022 (set for [facility]) / CDE: 11.67/ SN60WF 22.5            Last edited by Samanta Blanchard MA on 5/2/2022 10:35 AM. (History)            Assessment /Plan     For exam results, see Encounter Report.      ICD-10-CM ICD-9-CM    1. Post-operative state  Z98.890 V45.89    2. Cataract extraction status, right  Z98.41 V45.61    3. Sarcoidosis  D86.9 135        Iritis still present today - resume Durezol QID OD until next visit   IOP controlled      RETURN TO CLINIC 4-6 weeks

## 2022-05-03 NOTE — PROGRESS NOTES
Health Maintenance Due   Topic Date Due    HIV Screening  Never done    COVID-19 Vaccine (4 - Booster for Pfizer series) 02/18/2022     Updates were requested from care everywhere.  Chart was reviewed for overdue Proactive [facility] Encounters ([facility]) topics (CRS, Breast Cancer Screening, Eye exam)  Health Maintenance has been updated.  LINKS immunization registry triggered.  Immunizations were reconciled.

## 2022-05-16 ENCOUNTER — TELEPHONE (OUTPATIENT)
Dept: PAIN MEDICINE | Facility: CLINIC | Age: 62
End: 2022-05-16
Payer: COMMERCIAL

## 2022-05-17 ENCOUNTER — OFFICE VISIT (OUTPATIENT)
Dept: PAIN MEDICINE | Facility: CLINIC | Age: 62
End: 2022-05-17
Payer: COMMERCIAL

## 2022-05-17 ENCOUNTER — TELEPHONE (OUTPATIENT)
Dept: PAIN MEDICINE | Facility: CLINIC | Age: 62
End: 2022-05-17

## 2022-05-17 VITALS
BODY MASS INDEX: 31.87 KG/M2 | DIASTOLIC BLOOD PRESSURE: 80 MMHG | HEART RATE: 70 BPM | SYSTOLIC BLOOD PRESSURE: 132 MMHG | WEIGHT: 203.06 LBS | HEIGHT: 67 IN

## 2022-05-17 DIAGNOSIS — M48.02 DEGENERATIVE CERVICAL SPINAL STENOSIS: ICD-10-CM

## 2022-05-17 DIAGNOSIS — M54.12 RIGHT CERVICAL RADICULOPATHY: ICD-10-CM

## 2022-05-17 DIAGNOSIS — M54.12 CERVICAL RADICULAR PAIN: Primary | ICD-10-CM

## 2022-05-17 PROCEDURE — 3079F PR MOST RECENT DIASTOLIC BLOOD PRESSURE 80-89 MM HG: ICD-10-PCS | Mod: CPTII,S$GLB,, | Performed by: PHYSICIAN ASSISTANT

## 2022-05-17 PROCEDURE — 3075F PR MOST RECENT SYSTOLIC BLOOD PRESS GE 130-139MM HG: ICD-10-PCS | Mod: CPTII,S$GLB,, | Performed by: PHYSICIAN ASSISTANT

## 2022-05-17 PROCEDURE — 99214 OFFICE O/P EST MOD 30 MIN: CPT | Mod: S$GLB,,, | Performed by: PHYSICIAN ASSISTANT

## 2022-05-17 PROCEDURE — 1160F PR REVIEW ALL MEDS BY PRESCRIBER/CLIN PHARMACIST DOCUMENTED: ICD-10-PCS | Mod: CPTII,S$GLB,, | Performed by: PHYSICIAN ASSISTANT

## 2022-05-17 PROCEDURE — 99214 PR OFFICE/OUTPT VISIT, EST, LEVL IV, 30-39 MIN: ICD-10-PCS | Mod: S$GLB,,, | Performed by: PHYSICIAN ASSISTANT

## 2022-05-17 PROCEDURE — 99999 PR PBB SHADOW E&M-EST. PATIENT-LVL III: CPT | Mod: PBBFAC,,, | Performed by: PHYSICIAN ASSISTANT

## 2022-05-17 PROCEDURE — 3008F PR BODY MASS INDEX (BMI) DOCUMENTED: ICD-10-PCS | Mod: CPTII,S$GLB,, | Performed by: PHYSICIAN ASSISTANT

## 2022-05-17 PROCEDURE — 99999 PR PBB SHADOW E&M-EST. PATIENT-LVL III: ICD-10-PCS | Mod: PBBFAC,,, | Performed by: PHYSICIAN ASSISTANT

## 2022-05-17 PROCEDURE — 3008F BODY MASS INDEX DOCD: CPT | Mod: CPTII,S$GLB,, | Performed by: PHYSICIAN ASSISTANT

## 2022-05-17 PROCEDURE — 1160F RVW MEDS BY RX/DR IN RCRD: CPT | Mod: CPTII,S$GLB,, | Performed by: PHYSICIAN ASSISTANT

## 2022-05-17 PROCEDURE — 1159F MED LIST DOCD IN RCRD: CPT | Mod: CPTII,S$GLB,, | Performed by: PHYSICIAN ASSISTANT

## 2022-05-17 PROCEDURE — 3075F SYST BP GE 130 - 139MM HG: CPT | Mod: CPTII,S$GLB,, | Performed by: PHYSICIAN ASSISTANT

## 2022-05-17 PROCEDURE — 1159F PR MEDICATION LIST DOCUMENTED IN MEDICAL RECORD: ICD-10-PCS | Mod: CPTII,S$GLB,, | Performed by: PHYSICIAN ASSISTANT

## 2022-05-17 PROCEDURE — 3079F DIAST BP 80-89 MM HG: CPT | Mod: CPTII,S$GLB,, | Performed by: PHYSICIAN ASSISTANT

## 2022-05-17 RX ORDER — GABAPENTIN 300 MG/1
CAPSULE ORAL
Qty: 90 CAPSULE | Refills: 3 | Status: SHIPPED | OUTPATIENT
Start: 2022-05-17 | End: 2023-06-15

## 2022-05-17 NOTE — PROGRESS NOTES
Established Patient Chronic Pain Note     Referring Physician: self    PCP: HEIDE Botello Jr, MD    Chief Complaint:   Chief Complaint   Patient presents with    Left cervical radiculopathy        SUBJECTIVE:    Interval History (5/17/2022): Janna Sheffield presents today for follow-up visit.  she underwent right C6/7 TF HIMANSHU with Dr. Kramer on 4/18/22.  The patient reports that she is/was better following the procedure.  she reports at least 80% pain relief.  The changes lasted 4 weeks so far.  The changes have continued through this visit.  Patient reports pain as 3/10 today.  She is feeling much better overall and felt pain relief the day of the procedure.  She continues to take gabapentin 300 mg in the morning and 600 mg at night with pain relief, and she is enquiring about correct dosage today as she was previously prescribed 600 TID.    Interval History (3/16/2022):  Janna Sheffield presents today for follow-up visit.  Patient was last seen on 1/18/2022. At that visit, the plan was to complete BUE EMG/NCS and see Neurosurgery. She saw Dr. Dover recently who referred her back to us for left cervical TF HIMANSHU.  She is here today under the impression that she was seen in primary care for a sinus infection and also for evaluation of right arm pain, which she is wearing an arm brace for.  She has continued to have left cervical radiculopathy, but she is also now having this right forearm pain.  She does feel that it occasionally radiates from her neck all the way down her arm, but the most bothersome area is localized in lateral forearm.  Patient reports pain as 10/10 today.    Interval history 01/18/2022  Patient presents status post C6-7 interlaminar epidural steroid injection with left paramedian approach 12/21/2021.  Patient reports no discernible relief following her cervical C6-7 epidural steroid injection.  Today she again reports neck pain which radiates into the trapezius distribution, today on the  left in C5-6 distribution and down the left upper extremity to the cubital fossa.  Patient reports she was unclear on gabapentin titration and discontinued this medication following her injection.  Patient continues to report significant weakness in the upper extremities requiring help in activities of daily living such as brushing her hair reporting on her clothes.    HPI 11/16/2021  Janna Sheffield is a 61 y.o. female with past medical history significant for fibromyalgia, history of left total knee arthroplasty, hypertension, hypercholesterolemia, pulmonary sarcoidosis on immunosuppression, chronic restrictive lung disease who presents to the clinic for the evaluation of neck and upper extremity pain.  Today patient reports pain which has been longstanding over several years with particularly exacerbated over the last 5 months.  Today patient reports pain is constant and is rated as a 9/10.  Patient reports pain in bilateral cervical paraspinous musculature which radiates down bilateral upper extremities in C7-8 distribution to the hand.  Pain is described as cramping with numbness and tingling in bilateral hands.  Pain is exacerbated with cervical flexion and extension and reaching and pulling motions with bilateral upper extremities.  Patient does report associated weakness in bilateral upper extremities.  Pain has been improved with prior cervical transforaminal epidural steroid injection and gabapentin medication.  Patient denies any lower extremity weakness, gait ataxia or imbalance.  Patient reports significant motor weakness and loss of sensations.  Patient denies night fever/night sweats, urinary incontinence, bowel incontinence and significant weight loss.  Of note patient last saw Dr. Isabel October 2017 following cervical TF epidural steroid injection with significant relief and continuation of gabapentin.      Pain Disability Index Review:  Last 3 PDI Scores 5/17/2022 1/18/2022 11/16/2021   Pain  "Disability Index (PDI) 35 51 61       Non-Pharmacologic Treatments:  Physical Therapy/Home Exercise: no  Ice/Heat:yes  TENS: no  Acupuncture: no  Massage: no  Chiropractic: no    Other: no      Pain Medications:  - Adjuvant Medications: Neurontin (Gabapentin) and Tylenol (Acetaminophen)    Pain Procedures:   -12/21/2021:  C6-7 IL HIMANSHU with left paramedian approach with limited pain relief   -09/28/2017:  Left-sided C6-7 TF HIMANSHU - Dr. Isabel with significant pain relief   -right C6/7 TF HIMANSHU with Dr. Kramer on 4/18/22 with at least 80% pain relief          Review of Systems:   GENERAL:  No weight loss, malaise or fevers.  HEENT:   No recent changes in vision or hearing  NECK:  Negative for lumps, no difficulty with swallowing.  RESPIRATORY:  Negative for cough, wheezing or shortness of breath, patient denies any recent URI.  CARDIOVASCULAR:  Negative for chest pain, leg swelling or palpitations.  GI:  Negative for abdominal discomfort, blood in stools or black stools or change in bowel habits.  MUSCULOSKELETAL:  See HPI.  SKIN:  Negative for lesions, rash, and itching.  PSYCH:  No mood disorder or recent psychosocial stressors.   HEMATOLOGY/LYMPHOLOGY:  Negative for prolonged bleeding, bruising easily or swollen nodes.    NEURO:   No history of headaches, syncope, paralysis, seizures or tremors.  All other reviewed and negative other than HPI.    OBJECTIVE:    Physical Exam:  Vitals:    05/17/22 0958   BP: 132/80   Pulse: 70   Weight: 92.1 kg (203 lb 0.7 oz)   Height: 5' 7" (1.702 m)   PainSc:   3   PainLoc: Arm    Body mass index is 31.8 kg/m².   (reviewed on 5/17/2022)    GENERAL: Well appearing, in no acute distress, alert and oriented x3.  PSYCH:  Mood and affect appropriate.  SKIN: Skin color, texture, turgor normal, no rashes or lesions.  HEAD/FACE:  Normocephalic, atraumatic. Cranial nerves grossly intact.    NECK: pain to palpation over the cervical paraspinous muscles. Spurling Equivocal - improved .  pain with " neck flexion, extension, or lateral flexion.   Normaltesting biceps, triceps and brachioradialis bilaterally.    NegativeHoffmann's bilaterally.    5/5 strength testing deltoid, biceps, triceps, wrist extensor, wrist flexor and ulnar intrinsics bilaterally.    Normal  strength bilaterally    PULM: No evidence of respiratory difficulty, symmetric chest rise.  GI:  Soft and non-tender.    NEURO: BUE & BLE extremity coordination and muscle stretch reflexes are physiologic and symmetric. No loss of sensation is noted.  GAIT: normal.      Imaging (Reviewed on 5/17/2022):    2/4/22 BUE EMG/NCS  1. ABNORMAL study  2. There is electrodiagnostic evidence of a mild demyelinating median neuropathy (Carpal tunnel syndrome) across bilateral wrists.  There was a mild chronic radiculopathy of the left C6 nerve root  3. There is clinical evidence of cervical myofascial pain and rotator cuff weakness    MRI cervical spine 11/16/2021  FINDINGS:  The cervical cord remains normal in signal and morphology.     The cervical vertebra reveal mild straightening of the normal lordosis.  No intrinsic marrow signal abnormality is seen.     Multilevel cervical degenerative disc disease is present.     C2-C3: Minimal annular bulge.     C3-C4: Mild central annular disc bulge.     C4-C5: Moderate disc degeneration with disc narrowing, desiccation and disc bulging osteophyte with ventral sac impression.  Mild central stenosis.  Uncovertebral joint spurring with moderate right and mild left foraminal stenosis.  AP diameter of the canal approximately 8 mm.     C5-C6: Moderate disc degeneration with disc narrowing, desiccation and disc bulging osteophyte.  Mild ventral sac impression.  Uncovertebral joint spurring with moderate left and mild right foraminal stenosis.     C6-C7: Mild-to-moderate disc degeneration with disc narrowing, desiccation and disc bulging osteophyte with mild ventral sac impression.  AP diameter canal at this level is  approximately 8.1 mm.  Uncovertebral joint spurring with mild foraminal stenosis.     C7-T1: Mild disc bulge.    09/14/17  MRI Cervical Spine Without Contrast    Findings: There is straightening of the cervical lordosis.  Degenerative vertebral endplate spurring and disc desiccation present at multiple levels with moderate disc space narrowing from C4-C5 through C6-C7.  Vertebral marrow signal pattern and architecture are normal.  Cervical cord and craniocervical junction are intact.  Paravertebral soft tissues are symmetric and normal in appearance.    C2-C3: Unremarkable.    C3-C4: Unremarkable.    C4-C5: Posterior disc osteophyte complex and bilateral uncovertebral hypertrophy resulting in moderate left greater than right foraminal narrowing.  Indentation of ventral thecal sac with associated anterior cord contact and mild central canal stenosis.  Minimum AP canal diameter is 9.0 mm.    C5-C6: Posterior disc osteophyte complex and asymmetric left greater than right uncovertebral hypertrophy producing marked left and moderate right foraminal narrowing.  Mild indentation of the ventral thecal sac without significant canal stenosis.    C6-C7: Posterior disc osteophyte complex and uncovertebral hypertrophy producing moderate to marked left greater than right foraminal narrowing.  Mild indentation of the ventral thecal sac without significant canal stenosis.    C7-T1: Unremarkable.    06/30/21    X-Ray Lumbar Spine AP And Lateral  FINDINGS:  Vertebral body height well maintained.  Minimal multilevel marginal spurring at the thoracolumbar junction region.  Facet disease identified in the mid and lower L-spine.  Most prominent findings at the L4-5 and L5-S1 levels.  Disc spaces appear adequate at all levels.  There is equivocal appearance minimal anterior subluxation L5 on S1.  Pedicles and SI joints intact.  Status post cholecystectomy      08/31/17  X-Ray Cervical Spine AP And Lateral  Findings: The vertebral bodies  demonstrate normal height.  The alignment is within normal limits. There is moderate displaced narrowing and spondylosis present at the C4-C5 through the C6-C7 levels. The C1-C2 articulation is within normal limits. No prevertebral soft tissue swelling.      ASSESSMENT: 61 y.o. year old female with neck and upper extremity pain, consistent with     1. Cervical radicular pain  gabapentin (NEURONTIN) 300 MG capsule   2. Right cervical radiculopathy     3. Degenerative cervical spinal stenosis         PLAN:   1. Interventional:   - S/p right C6/7 TF HIMANSHU with Dr. Kramer on 4/18/22 with at least 80% pain relief.   -12/21/2021:  C6-7 IL HIMANSHU with left paramedian approach with limited pain relief   -09/28/2017:  Left-sided C6-7 TF HIMANSHU - Dr. Isabel with significant pain relief     2. Pharmacologic:   - Refill gabapentin 300mg QAM/ 600mg QHS x 4 months.  Can consider further increase to 600 TID in the future if warranted.  - Anticoagulation use: None.     3. Rehabilitative: Encouraged regular exercise. Patient discharged from PT due to poor attendance. Consider restarting formal PT in the future if needed.     4. Diagnostic: BUE EMG/NCS reviewed previously.     5. Consult/ Referral:   - Previously referred to Neurosurgery. Saw Dr. Dover who recommended cervical TF HIMANSHU. Will consider follow-up in the future if warranted.   - Patient to follow-up with PCP regarding sinus symptoms.     6. Follow up: PRN     - This condition does not require this patient to take time off of work, and the primary goal of our Pain Management services is to improve the patient's functional capacity.   - I discussed the risks, benefits, and alternatives to potential treatment options. All questions and concerns were fully addressed today in clinic.           Disclaimer:  This note was prepared using voice recognition system and is likely to have sound alike errors that may have been overlooked even after proof reading.  Please call me with any  questions.

## 2022-05-17 NOTE — TELEPHONE ENCOUNTER
----- Message from Ade Perez sent at 5/17/2022 10:13 AM CDT -----  Contact: [facility] Tkuxduoo-[phone]  Pharmacy is calling to clarify an RX.    RX name:  gabapentin (NEURONTIN) 300 MG capsule    What do they need to clarify:  The instructions    Comments:  The pharmacy is requesting a callback as soon as possible.

## 2022-05-17 NOTE — TELEPHONE ENCOUNTER
Called pharmacy back to explain the pt takes 1 tabled of gabapentin in the morning and 2 in the afternoon.

## 2022-06-02 ENCOUNTER — TELEPHONE (OUTPATIENT)
Dept: OPHTHALMOLOGY | Facility: CLINIC | Age: 62
End: 2022-06-02
Payer: COMMERCIAL

## 2022-06-02 NOTE — TELEPHONE ENCOUNTER
Patient rs 6/2022 appt at [facility] - refused any other location - next available at [facility] not until August 2022 - patient accepted appt date and time even though appt will be longer than 6wk recommended f/u

## 2022-06-06 ENCOUNTER — OFFICE VISIT (OUTPATIENT)
Dept: INTERNAL MEDICINE | Facility: CLINIC | Age: 62
End: 2022-06-06
Payer: COMMERCIAL

## 2022-06-06 VITALS
RESPIRATION RATE: 18 BRPM | OXYGEN SATURATION: 98 % | SYSTOLIC BLOOD PRESSURE: 122 MMHG | WEIGHT: 205.69 LBS | HEART RATE: 72 BPM | DIASTOLIC BLOOD PRESSURE: 88 MMHG | BODY MASS INDEX: 32.28 KG/M2 | HEIGHT: 67 IN

## 2022-06-06 DIAGNOSIS — J06.9 VIRAL URI WITH COUGH: Primary | ICD-10-CM

## 2022-06-06 DIAGNOSIS — R11.0 NAUSEA: ICD-10-CM

## 2022-06-06 LAB
CTP QC/QA: YES
CTP QC/QA: YES
FLUAV AG NPH QL: NEGATIVE
FLUBV AG NPH QL: NEGATIVE
SARS-COV-2 RDRP RESP QL NAA+PROBE: NEGATIVE

## 2022-06-06 PROCEDURE — 87804 POCT INFLUENZA A/B: ICD-10-PCS | Mod: QW,S$GLB,, | Performed by: NURSE PRACTITIONER

## 2022-06-06 PROCEDURE — 3074F PR MOST RECENT SYSTOLIC BLOOD PRESSURE < 130 MM HG: ICD-10-PCS | Mod: CPTII,S$GLB,, | Performed by: NURSE PRACTITIONER

## 2022-06-06 PROCEDURE — 3079F DIAST BP 80-89 MM HG: CPT | Mod: CPTII,S$GLB,, | Performed by: NURSE PRACTITIONER

## 2022-06-06 PROCEDURE — 99999 PR PBB SHADOW E&M-EST. PATIENT-LVL III: ICD-10-PCS | Mod: PBBFAC,,, | Performed by: NURSE PRACTITIONER

## 2022-06-06 PROCEDURE — 99213 PR OFFICE/OUTPT VISIT, EST, LEVL III, 20-29 MIN: ICD-10-PCS | Mod: S$GLB,,, | Performed by: NURSE PRACTITIONER

## 2022-06-06 PROCEDURE — 99999 PR PBB SHADOW E&M-EST. PATIENT-LVL III: CPT | Mod: PBBFAC,,, | Performed by: NURSE PRACTITIONER

## 2022-06-06 PROCEDURE — 3008F PR BODY MASS INDEX (BMI) DOCUMENTED: ICD-10-PCS | Mod: CPTII,S$GLB,, | Performed by: NURSE PRACTITIONER

## 2022-06-06 PROCEDURE — 3074F SYST BP LT 130 MM HG: CPT | Mod: CPTII,S$GLB,, | Performed by: NURSE PRACTITIONER

## 2022-06-06 PROCEDURE — U0002 COVID-19 LAB TEST NON-CDC: HCPCS | Mod: QW,S$GLB,, | Performed by: NURSE PRACTITIONER

## 2022-06-06 PROCEDURE — 99213 OFFICE O/P EST LOW 20 MIN: CPT | Mod: S$GLB,,, | Performed by: NURSE PRACTITIONER

## 2022-06-06 PROCEDURE — 1159F PR MEDICATION LIST DOCUMENTED IN MEDICAL RECORD: ICD-10-PCS | Mod: CPTII,S$GLB,, | Performed by: NURSE PRACTITIONER

## 2022-06-06 PROCEDURE — 3079F PR MOST RECENT DIASTOLIC BLOOD PRESSURE 80-89 MM HG: ICD-10-PCS | Mod: CPTII,S$GLB,, | Performed by: NURSE PRACTITIONER

## 2022-06-06 PROCEDURE — 1159F MED LIST DOCD IN RCRD: CPT | Mod: CPTII,S$GLB,, | Performed by: NURSE PRACTITIONER

## 2022-06-06 PROCEDURE — 87804 INFLUENZA ASSAY W/OPTIC: CPT | Mod: QW,S$GLB,, | Performed by: NURSE PRACTITIONER

## 2022-06-06 PROCEDURE — 3008F BODY MASS INDEX DOCD: CPT | Mod: CPTII,S$GLB,, | Performed by: NURSE PRACTITIONER

## 2022-06-06 PROCEDURE — U0002: ICD-10-PCS | Mod: QW,S$GLB,, | Performed by: NURSE PRACTITIONER

## 2022-06-06 RX ORDER — METHYLPREDNISOLONE 4 MG/1
TABLET ORAL
Qty: 1 EACH | Refills: 0 | Status: SHIPPED | OUTPATIENT
Start: 2022-06-06 | End: 2022-06-27

## 2022-06-06 RX ORDER — ONDANSETRON 4 MG/1
4 TABLET, ORALLY DISINTEGRATING ORAL EVERY 12 HOURS PRN
Qty: 20 TABLET | Refills: 0 | Status: SHIPPED | OUTPATIENT
Start: 2022-06-06 | End: 2023-06-27

## 2022-06-06 RX ORDER — BENZONATATE 200 MG/1
200 CAPSULE ORAL 2 TIMES DAILY PRN
Qty: 20 CAPSULE | Refills: 0 | Status: SHIPPED | OUTPATIENT
Start: 2022-06-06 | End: 2022-06-16

## 2022-06-06 NOTE — PROGRESS NOTES
Subjective:       Patient ID: Janna Sheffield is a 61 y.o. female.    Chief Complaint: Headache, Ear Fullness, Sore Throat, and Cough    HPI    Pt here with above x 4 days. Taking zyrtec. Feeling worse. Denies fever, sob, cp    Past Medical History:   Diagnosis Date    Arthritis     Chest pain syndrome 10/23/2014    Hyperlipidemia     Hypertension     Macular degeneration, age related, nonexudative - Left Eye 3/24/2014    Retinal detachment, old, partial 4/14/2014    Sarcoidosis        Past Surgical History:   Procedure Laterality Date    CATARACT EXTRACTION      COLONOSCOPY N/A 3/14/2022    Procedure: COLONOSCOPY;  Surgeon: Christi Medrano MD;  Location: [facility];  Service: Endoscopy;  Laterality: N/A;    DILATION AND CURETTAGE OF UTERUS      missed ab x 3    EPIDURAL STEROID INJECTION INTO CERVICAL SPINE N/A 12/21/2021    Procedure: C6/C7 IL HIMANSHU RN IV sedation;  Surgeon: Hadley Marsh MD;  Location: [facility];  Service: Pain Management;  Laterality: N/A;    ESOPHAGOGASTRODUODENOSCOPY N/A 2/6/2019    Procedure: ESOPHAGOGASTRODUODENOSCOPY (EGD);  Surgeon: Jairo Vargas III, MD;  Location: [facility];  Service: Endoscopy;  Laterality: N/A;    HYSTERECTOMY  2001    MACARIO - complete    JOINT REPLACEMENT Left     Knee    knee surgery      OOPHORECTOMY      TRANSFORAMINAL EPIDURAL INJECTION OF STEROID Right 4/18/2022    Procedure: INJECTION, STEROID, EPIDURAL, TRANSFORAMINAL APPROACH, RIGHT C6-C7;  Surgeon: Viraj Kramer MD;  [facility];  Service: Pain Management;  Laterality: Right;    TUBAL LIGATION       Social History     Socioeconomic History    Marital status:    Occupational History     Employer: [employer]   Tobacco Use    Smoking status: Never Smoker    Smokeless tobacco: Never Used   Substance and Sexual Activity    Alcohol use: No    Drug use: No    Sexual activity: Yes     Partners: Male     Comment:      Review of patient's allergies  indicates:  No Known Allergies  Current Outpatient Medications   Medication Sig    diclofenac sodium (VOLTAREN) 1 % Gel Apply 2 g topically 3 (three) times daily as needed (Knee pain).    ergocalciferol (VITAMIN D2) 50,000 unit Cap Take 1 capsule (50,000 Units total) by mouth every 7 days.    gabapentin (NEURONTIN) 300 MG capsule Take 1 capsule (300 mg total) by mouth once daily AND 2 capsules (600 mg total) every evening.    hydroCHLOROthiazide (HYDRODIURIL) 25 MG tablet Take 1 tablet by mouth once daily    multivitamin (THERAGRAN) per tablet Take 1 tablet by mouth Daily.    rosuvastatin (CRESTOR) 40 MG Tab TAKE 1 TABLET BY MOUTH ONCE DAILY (  REPLACES  LIPITOR)    benzonatate (TESSALON) 200 MG capsule Take 1 capsule (200 mg total) by mouth 2 (two) times daily as needed for Cough.    methylPREDNISolone (MEDROL DOSEPACK) 4 mg tablet use as directed    ondansetron (ZOFRAN-ODT) 4 MG TbDL Take 1 tablet (4 mg total) by mouth every 12 (twelve) hours as needed (nausea).     No current facility-administered medications for this visit.           Review of Systems   Constitutional: Negative for activity change, appetite change, chills, diaphoresis, fatigue, fever and unexpected weight change.   HENT: Positive for congestion, postnasal drip, rhinorrhea and sore throat. Negative for ear pain, sinus pressure, sinus pain, sneezing, tinnitus, trouble swallowing and voice change.    Eyes: Negative for photophobia, pain and visual disturbance.   Respiratory: Positive for cough. Negative for chest tightness, shortness of breath and wheezing.    Cardiovascular: Negative for chest pain, palpitations and leg swelling.   Gastrointestinal: Negative for abdominal distention, abdominal pain, constipation, diarrhea, nausea and vomiting.   Genitourinary: Negative for decreased urine volume, difficulty urinating, dysuria, flank pain, frequency, hematuria and urgency.   Musculoskeletal: Negative for arthralgias, back pain, joint  swelling, neck pain and neck stiffness.   Allergic/Immunologic: Negative for immunocompromised state.   Neurological: Negative for dizziness, tremors, seizures, syncope, facial asymmetry, speech difficulty, weakness, light-headedness, numbness and headaches.   Hematological: Negative for adenopathy. Does not bruise/bleed easily.   Psychiatric/Behavioral: Negative for confusion and sleep disturbance.       Objective:      Physical Exam  HENT:      Right Ear: Tympanic membrane normal.      Left Ear: Tympanic membrane normal.      Nose: Congestion and rhinorrhea present.      Mouth/Throat:      Mouth: Mucous membranes are moist.      Pharynx: Posterior oropharyngeal erythema present.   Cardiovascular:      Rate and Rhythm: Normal rate and regular rhythm.      Heart sounds: Normal heart sounds.   Pulmonary:      Effort: Pulmonary effort is normal.      Breath sounds: Normal breath sounds.   Neurological:      Mental Status: She is alert.         Assessment:     Vitals:    06/06/22 1044   BP: 122/88   Pulse: 72   Resp: 18         1. Viral URI with cough    2. Nausea        Plan:   Viral URI with cough  -     POCT COVID-19 Rapid Screening  -     POCT Influenza A/B    Nausea    Other orders  -     methylPREDNISolone (MEDROL DOSEPACK) 4 mg tablet; use as directed  Dispense: 1 each; Refill: 0  -     benzonatate (TESSALON) 200 MG capsule; Take 1 capsule (200 mg total) by mouth 2 (two) times daily as needed for Cough.  Dispense: 20 capsule; Refill: 0  -     ondansetron (ZOFRAN-ODT) 4 MG TbDL; Take 1 tablet (4 mg total) by mouth every 12 (twelve) hours as needed (nausea).  Dispense: 20 tablet; Refill: 0      Flu and covid neg

## 2022-06-07 ENCOUNTER — DOCUMENTATION ONLY (OUTPATIENT)
Dept: REHABILITATION | Facility: HOSPITAL | Age: 62
End: 2022-06-07
Payer: COMMERCIAL

## 2022-06-07 NOTE — PROGRESS NOTES
Outpatient Therapy Discharge Summary     Name: Janna JOHNSON [facility] Number: [id number]     Therapy Diagnosis:        Encounter Diagnoses   Name Primary?    Painful cervical ROM Yes    Decreased range of motion of left shoulder      Decreased functional mobility and endurance        Physician: Devyn Dover MD       Physician Orders: PT Eval and Treat   Medical Diagnosis from Referral:   M54.12 (ICD-10-CM) - Cervical radiculopathy   M50.30 (ICD-10-CM) - DDD (degenerative disc disease), cervical   Evaluation Date: 2/15/2022  Authorization Period Expiration: 2/7/23  Plan of Care Expiration: extended to 5/31/22  Visits: 5/20    Discharge date: 6/7/22  Missed visits:1  No shows:0       PTA VISIT 0/5     Precautions: Standard,HTN, sarcodosis of the liver     Per the last visits notes which was a progress note:   Pt reports:she had a cortisone shot last Wednesday. PT discussed the poor consistency and how we can't get any relief for her if she is not attending.       She was not compliant with home exercise program.  Response to previous treatment: less sore  Functional change: none yet     Pre-Treatment Pain: 9/10  Post-Treatment Pain: 4/10  Location: R scapula to posterior/anterior hand and axillary region, R neck        OBJECTIVE      Posture: Pt noted to present with forward head/rounded shoulder posture with B scapula elevated and protracted and anterior tipping. Pt tight cervical muscles and levator result in her sitting in upper neck extension so the deep neck flexors need to activate as well as stretch the SCM, scalenes and suboccipitals and levators to restore neck ROM.  R side sleeping is not tolerated.     Lumbar ROM in degrees  Flexion with feet 4 inches from the wall 14 inches from reaching the floor  Extension        20     Shoulder ROM:     Active/Passive Joint Range Right Left   Flexion 150 150   ABDuction 150 150   External Rotation 80 80   Internal Rotation Functionally to T10 Functionally  to L1   Adduction 15 20   Extension 30 30         Cervical Spine AROM:    Degrees Pain   Flexion 35 n   Extend 55 Pain R scalene area   R side bend 30 n   L side bend 35 y pulls on R   R rotation 75 y   L rotation 75 n      Strength:  Muscle (Myotome) Right Left   Cervical flexion 2+ 2+   Shoulder Abduction 4 3+   Elbow Flexors (C5) 5 5   Wrist Extensors (C6) 5 4   Elbow Extensors (C7) 4 4   ER (arm at side) 3+ 3+   IR (arm at side) 4 4   Serratus Ant 4 4   Supraspinatus 4 4   Thumb extension 3 5   Intrinsic strength 4 5      Deep neck flexor 5 second hold tolerance out of 32 for a normal strong neck     Sensation: Intact to light touch     Special Test:              Radial nerve glides and ulnar negative but median tight on the R side                                      AC crossover L +                               Joint Mobility: upper C spine motion is very limited due to tight muscles discussed and stiffness in the C1/2 and OA joints.   Upper Limb Tension Test: general UE pain noted in the radial, medial and ulnar nerves but nerve tension was negative except tight with median nerve motion.     Palpation:  PT noted trigger points in the suboccipitals, B UT, levator scap, lats, teres minor, infraspinatus, subscap, pect minor and tight quadratus lumbar and erectors. Pt scapular are elevated and down rotated with her tending to activate the R upper trap frequently with guarding.          CMS Impairment/Limitation/Restriction for FOTO neck Survey    Therapist reviewed FOTO scores for Janna Sheffield on 6/7/2022.   FOTO documents entered into [employer] - see Media section.    Limitation Score: 27%       Discharge reason: Patient has not attended therapy since 3/22/22.    Plan   This patient is discharged from Physical Therapy    Armida Michael, PT

## 2022-07-28 ENCOUNTER — OFFICE VISIT (OUTPATIENT)
Dept: RHEUMATOLOGY | Facility: CLINIC | Age: 62
End: 2022-07-28
Payer: COMMERCIAL

## 2022-07-28 ENCOUNTER — LAB VISIT (OUTPATIENT)
Dept: LAB | Facility: HOSPITAL | Age: 62
End: 2022-07-28
Payer: COMMERCIAL

## 2022-07-28 ENCOUNTER — PATIENT MESSAGE (OUTPATIENT)
Dept: RHEUMATOLOGY | Facility: CLINIC | Age: 62
End: 2022-07-28

## 2022-07-28 VITALS
BODY MASS INDEX: 32.11 KG/M2 | RESPIRATION RATE: 16 BRPM | WEIGHT: 204.56 LBS | DIASTOLIC BLOOD PRESSURE: 86 MMHG | SYSTOLIC BLOOD PRESSURE: 128 MMHG | HEART RATE: 76 BPM | HEIGHT: 67 IN

## 2022-07-28 DIAGNOSIS — D86.9 SARCOIDOSIS: Primary | ICD-10-CM

## 2022-07-28 DIAGNOSIS — I10 HYPERTENSION, UNSPECIFIED TYPE: ICD-10-CM

## 2022-07-28 DIAGNOSIS — M54.12 CERVICAL RADICULOPATHY: ICD-10-CM

## 2022-07-28 DIAGNOSIS — E55.9 VITAMIN D DEFICIENCY: ICD-10-CM

## 2022-07-28 DIAGNOSIS — D86.9 SARCOIDOSIS: ICD-10-CM

## 2022-07-28 DIAGNOSIS — J98.4 CHRONIC RESTRICTIVE LUNG DISEASE: ICD-10-CM

## 2022-07-28 DIAGNOSIS — Z51.81 MEDICATION MONITORING ENCOUNTER: ICD-10-CM

## 2022-07-28 LAB
ALBUMIN SERPL BCP-MCNC: 4 G/DL (ref 3.5–5.2)
ALP SERPL-CCNC: 86 U/L (ref 55–135)
ALT SERPL W/O P-5'-P-CCNC: 15 U/L (ref 10–44)
ANION GAP SERPL CALC-SCNC: 12 MMOL/L (ref 8–16)
AST SERPL-CCNC: 19 U/L (ref 10–40)
BASOPHILS # BLD AUTO: 0.03 K/UL (ref 0–0.2)
BASOPHILS NFR BLD: 0.7 % (ref 0–1.9)
BILIRUB SERPL-MCNC: 0.6 MG/DL (ref 0.1–1)
BUN SERPL-MCNC: 9 MG/DL (ref 8–23)
CALCIUM SERPL-MCNC: 10 MG/DL (ref 8.7–10.5)
CHLORIDE SERPL-SCNC: 103 MMOL/L (ref 95–110)
CO2 SERPL-SCNC: 28 MMOL/L (ref 23–29)
CREAT SERPL-MCNC: 0.8 MG/DL (ref 0.5–1.4)
CRP SERPL-MCNC: 6.4 MG/L (ref 0–8.2)
DIFFERENTIAL METHOD: ABNORMAL
EOSINOPHIL # BLD AUTO: 0.1 K/UL (ref 0–0.5)
EOSINOPHIL NFR BLD: 2 % (ref 0–8)
ERYTHROCYTE [DISTWIDTH] IN BLOOD BY AUTOMATED COUNT: 12.7 % (ref 11.5–14.5)
ERYTHROCYTE [SEDIMENTATION RATE] IN BLOOD BY WESTERGREN METHOD: 32 MM/HR (ref 0–20)
EST. GFR  (AFRICAN AMERICAN): >60 ML/MIN/1.73 M^2
EST. GFR  (NON AFRICAN AMERICAN): >60 ML/MIN/1.73 M^2
GLUCOSE SERPL-MCNC: 89 MG/DL (ref 70–110)
HCT VFR BLD AUTO: 39 % (ref 37–48.5)
HGB BLD-MCNC: 13 G/DL (ref 12–16)
IMM GRANULOCYTES # BLD AUTO: 0.01 K/UL (ref 0–0.04)
IMM GRANULOCYTES NFR BLD AUTO: 0.2 % (ref 0–0.5)
LYMPHOCYTES # BLD AUTO: 2.2 K/UL (ref 1–4.8)
LYMPHOCYTES NFR BLD: 48 % (ref 18–48)
MCH RBC QN AUTO: 31.3 PG (ref 27–31)
MCHC RBC AUTO-ENTMCNC: 33.3 G/DL (ref 32–36)
MCV RBC AUTO: 94 FL (ref 82–98)
MONOCYTES # BLD AUTO: 0.4 K/UL (ref 0.3–1)
MONOCYTES NFR BLD: 8.4 % (ref 4–15)
NEUTROPHILS # BLD AUTO: 1.8 K/UL (ref 1.8–7.7)
NEUTROPHILS NFR BLD: 40.7 % (ref 38–73)
NRBC BLD-RTO: 0 /100 WBC
PLATELET # BLD AUTO: 211 K/UL (ref 150–450)
PMV BLD AUTO: 10.4 FL (ref 9.2–12.9)
POTASSIUM SERPL-SCNC: 3.7 MMOL/L (ref 3.5–5.1)
PROT SERPL-MCNC: 7.5 G/DL (ref 6–8.4)
RBC # BLD AUTO: 4.15 M/UL (ref 4–5.4)
SODIUM SERPL-SCNC: 143 MMOL/L (ref 136–145)
WBC # BLD AUTO: 4.52 K/UL (ref 3.9–12.7)

## 2022-07-28 PROCEDURE — 80053 COMPREHEN METABOLIC PANEL: CPT

## 2022-07-28 PROCEDURE — 99999 PR PBB SHADOW E&M-EST. PATIENT-LVL IV: CPT | Mod: PBBFAC,,,

## 2022-07-28 PROCEDURE — 86140 C-REACTIVE PROTEIN: CPT

## 2022-07-28 PROCEDURE — 3008F PR BODY MASS INDEX (BMI) DOCUMENTED: ICD-10-PCS | Mod: CPTII,S$GLB,,

## 2022-07-28 PROCEDURE — 1160F RVW MEDS BY RX/DR IN RCRD: CPT | Mod: CPTII,S$GLB,,

## 2022-07-28 PROCEDURE — 99215 PR OFFICE/OUTPT VISIT, EST, LEVL V, 40-54 MIN: ICD-10-PCS | Mod: S$GLB,,,

## 2022-07-28 PROCEDURE — 1160F PR REVIEW ALL MEDS BY PRESCRIBER/CLIN PHARMACIST DOCUMENTED: ICD-10-PCS | Mod: CPTII,S$GLB,,

## 2022-07-28 PROCEDURE — 85651 RBC SED RATE NONAUTOMATED: CPT

## 2022-07-28 PROCEDURE — 3074F SYST BP LT 130 MM HG: CPT | Mod: CPTII,S$GLB,,

## 2022-07-28 PROCEDURE — 3008F BODY MASS INDEX DOCD: CPT | Mod: CPTII,S$GLB,,

## 2022-07-28 PROCEDURE — 99999 PR PBB SHADOW E&M-EST. PATIENT-LVL IV: ICD-10-PCS | Mod: PBBFAC,,,

## 2022-07-28 PROCEDURE — 1159F MED LIST DOCD IN RCRD: CPT | Mod: CPTII,S$GLB,,

## 2022-07-28 PROCEDURE — 3074F PR MOST RECENT SYSTOLIC BLOOD PRESSURE < 130 MM HG: ICD-10-PCS | Mod: CPTII,S$GLB,,

## 2022-07-28 PROCEDURE — 36415 COLL VENOUS BLD VENIPUNCTURE: CPT

## 2022-07-28 PROCEDURE — 82164 ANGIOTENSIN I ENZYME TEST: CPT

## 2022-07-28 PROCEDURE — 3079F PR MOST RECENT DIASTOLIC BLOOD PRESSURE 80-89 MM HG: ICD-10-PCS | Mod: CPTII,S$GLB,,

## 2022-07-28 PROCEDURE — 3079F DIAST BP 80-89 MM HG: CPT | Mod: CPTII,S$GLB,,

## 2022-07-28 PROCEDURE — 99215 OFFICE O/P EST HI 40 MIN: CPT | Mod: S$GLB,,,

## 2022-07-28 PROCEDURE — 1159F PR MEDICATION LIST DOCUMENTED IN MEDICAL RECORD: ICD-10-PCS | Mod: CPTII,S$GLB,,

## 2022-07-28 PROCEDURE — 85025 COMPLETE CBC W/AUTO DIFF WBC: CPT

## 2022-07-28 NOTE — PROGRESS NOTES
RHEUMATOLOGY OUTPATIENT CLINIC NOTE    07/28/2022    Subjective:       Patient ID: Janna Sheffield is a 61 y.o. female.    Chief Complaint: Sarcoidosis      HPI   Janna Sheffield is a 61 y.o. pleasant female here for rheumatology follow up for history of osteoarthritis and sarcoidosis.     She has low vitamin D and started on 50Kunits weekly improved to now normal level. No issues with joint swelling, rash, worsening sob, cp, eye issues. She has mao knee pain with a TKA on the left, uses topical nsaid which helps      She has generalized osteoarthritis with radiating neck pain. MRI showed disc disease and degenerative arthritis. She was sent to pain mgt for ESIs which helped. She also is taking gabapentin at night 300-600mg occasionally.  Tramadol occasionally helps. Saw a pain management doctor in [place] and received an injection with great benefit.      Today with no cp, sob, rashes, or joint swelling.  pain today 5/10 in her back, bilateral legs, bilateral hips.  She has occasional joint pain in a improved with Tylenol Arthritis as needed.         HISTORY:  Sarcoidosis was diagnosed in 1985 through a biopsy of a spot on her liver. 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      She was on imuran 50mg every other day but her sarcoid seemed to be in remission.  Lungs stable. She does have restrictive lung disease and SOB when she is more active but no worsening. She sees Dr. Casper.  We dc'd imuran in august 2019 (suggested by pulm initially) with no new or worsening symtpoms.  Last visit with pulm was march 2020, cxr and pfts stable, echo stable at the time, hasn't seen them since      Review of Systems   Constitutional: Negative for chills, fatigue and fever.   HENT: Negative for mouth sores, rhinorrhea and sore throat.    Eyes: Negative for pain and redness.        Dry eyes   Respiratory: Positive for shortness of breath- stable and  not worsening. Negative for cough.    Cardiovascular: Negative for chest pain.   Gastrointestinal: Negative for abdominal pain, constipation, diarrhea, nausea and vomiting.   Genitourinary: Negative for dysuria and hematuria.   Musculoskeletal: Positive for arthralgias, back pain and neck pain. Negative for joint swelling and myalgias.        Left knee replacement   Skin: Negative for rash.   Neurological: Negative for weakness, numbness and headaches.   Psychiatric/Behavioral: The patient is not nervous/anxious.          Rheumatologic review of systems negative otherwise.     Past Medical History:   Diagnosis Date    Arthritis     Chest pain syndrome 10/23/2014    Hyperlipidemia     Hypertension     Macular degeneration, age related, nonexudative - Left Eye 3/24/2014    Retinal detachment, old, partial 4/14/2014    Sarcoidosis      Past Surgical History:   Procedure Laterality Date    CATARACT EXTRACTION      COLONOSCOPY N/A 3/14/2022    Procedure: COLONOSCOPY;  Surgeon: Christi Medrano MD;  Location: [facility];  Service: Endoscopy;  Laterality: N/A;    DILATION AND CURETTAGE OF UTERUS      missed ab x 3    EPIDURAL STEROID INJECTION INTO CERVICAL SPINE N/A 12/21/2021    Procedure: C6/C7 IL HIMANSHU RN IV sedation;  Surgeon: Hadley Marsh MD;  Location: [facility];  Service: Pain Management;  Laterality: N/A;    ESOPHAGOGASTRODUODENOSCOPY N/A 2/6/2019    Procedure: ESOPHAGOGASTRODUODENOSCOPY (EGD);  Surgeon: Jairo Vargas III, MD;  Location: [facility];  Service: Endoscopy;  Laterality: N/A;    HYSTERECTOMY  2001    MACARIO - complete    JOINT REPLACEMENT Left     Knee    knee surgery      OOPHORECTOMY      TRANSFORAMINAL EPIDURAL INJECTION OF STEROID Right 4/18/2022    Procedure: INJECTION, STEROID, EPIDURAL, TRANSFORAMINAL APPROACH, RIGHT C6-C7;  Surgeon: Viraj Kramer MD;  [facility];  Service: Pain Management;  Laterality: Right;    TUBAL LIGATION       Family History   Problem  "Relation Age of Onset    Cervical cancer Mother     Heart disease Mother     Heart attack Mother     Hypertension Mother     Hyperlipidemia Mother     Hypertension Father     Hyperlipidemia Father     Stroke Father     Breast cancer Maternal Aunt     Stroke Brother     Breast cancer Maternal Cousin     Breast cancer Maternal Cousin     Breast cancer Paternal Cousin     Colon cancer Neg Hx     Thrombophilia Neg Hx     Deep vein thrombosis Neg Hx     Pulmonary embolism Neg Hx      Social History     Socioeconomic History    Marital status:    Occupational History     Employer: [employer]   Tobacco Use    Smoking status: Never Smoker    Smokeless tobacco: Never Used   Substance and Sexual Activity    Alcohol use: No    Drug use: No    Sexual activity: Yes     Partners: Male     Comment:      Review of patient's allergies indicates:  No Known Allergies        Objective:   /86   Pulse 76   Resp 16   Ht 5' 7" (1.702 m)   Wt 92.8 kg (204 lb 9.4 oz)   BMI 32.04 kg/m²   Immunization History   Administered Date(s) Administered    COVID-19, MRNA, LN-S, PF (Pfizer) (Purple Cap) 03/05/2021, 03/26/2021, 10/18/2021    Influenza 11/27/2006, 11/28/2007, 10/20/2008, 11/24/2010, 12/26/2012    Influenza - High Dose - PF (65 years and older) 10/31/2017, 01/07/2019    Influenza - Quadrivalent 10/25/2016    Influenza - Quadrivalent - PF *Preferred* (6 months and older) 12/18/2013, 12/19/2019, 10/20/2020, 12/06/2021    Influenza A (H1N1) 2009 Monovalent - IM - PF 12/14/2009    Pneumococcal Conjugate - 13 Valent 04/10/2015    Pneumococcal Polysaccharide - 23 Valent 11/19/2003, 01/07/2019    Td (ADULT) 06/17/2008    Tdap 04/07/2015    Zoster Recombinant 07/13/2020, 10/06/2020              Physical Exam   Constitutional: She appears well-developed. No distress.   HENT:   Head: Normocephalic and atraumatic.   Pulmonary/Chest: Effort normal. No respiratory distress. "   Musculoskeletal:         General: No tenderness or deformity. Normal range of motion.      Comments: mao wrists, mcps, pips no synovitis, no tenderness, full rom, grib strength equal bilaterally  Left knee s/p replacement, no effusion, no warmth  Right knee no effusion, no warmth, full rom      No BLE weakness    Neurological: She is alert.   Skin: Skin is warm. Capillary refill takes less than 2 seconds. No rash noted. She is not diaphoretic. No erythema.   Psychiatric: Her behavior is normal. Judgment and thought content normal.   Vitals reviewed.        No results found for this or any previous visit (from the past 672 hour(s)).     No results found for: TBGOLDPLUS   Lab Results   Component Value Date    HEPCAB Negative 06/25/2013        Assessment:       1. Sarcoidosis    2. Cervical radiculopathy    3. Chronic restrictive lung disease    4. Vitamin D deficiency    5. Medication monitoring encounter    6. Hypertension, unspecified type          Impression:     Back pain: mid and low, main issue today, much better after pain management injection      Sarcoidosis: stopped Imuran 50mg every other day (8/2019-after pulm rec'd) no evidence of activity, remains stable in remission      Chronic restrictive lung disease: stable recently occasional SOB;  sees dr. Casper, last CT of chest 7/2016 stable, PFTs with moderately reduced diffusion--mild restriction, stable; cxr stable --overdue for pulm f/u      High risk Medication Monitoring: now off imuran      Cervical spondylosis with radiation into the left arm: saw Dr. Isabel  s/p HIMANSHU w good result, gabapentin makes her drowsy     Vit d def: started 50K units weekly, level in normal range now 56     Osteoarthritis right knee, left knee total knee arthroplasty  Pain worse with colder weather.  Has been applying  compound topical lotion.    Plan:             Stay off imuran no need to add back at this time  Cont weekly vitamin D, last level wnl  F/u with dr. casper  ---need to get into see him for yearly eval pfts      Continue follow-up with Pain Management     Chest x-ray every 12-18 months (last done 06/30/2021)     For knee pain, continue with topical.  Can also try Voltaren/diclofenac topical 3 times daily as needed for pain.  Make sure that current topical does not also contain NSAID. Rx sent to pharmacy.  Ice 10-15 minutes at a time can alternate with heat.  Elevate.  Can trial turmeric 500 mg bid or glucosamine 500 mg tid supplements consistently for joint pain.      We will order PFT since she has not reestablished with Pulm    Safety labs today, pft early available  RTC in 6mos  With reg4, koby Shen PA-C  [facility]  Rheumatology       40 minutes of total time spent on the encounter, which includes face to face time and non-face to face time preparing to see the patient (eg, review of tests), Obtaining and/or reviewing separately obtained history, Documenting clinical information in the electronic or other health record, Independently interpreting results (not separately reported) and communicating results to the patient/family/caregiver, or Care coordination (not separately reported).

## 2022-07-31 LAB — ACE SERPL-CCNC: 19 U/L (ref 16–85)

## 2022-08-01 ENCOUNTER — OFFICE VISIT (OUTPATIENT)
Dept: OPHTHALMOLOGY | Facility: CLINIC | Age: 62
End: 2022-08-01
Payer: COMMERCIAL

## 2022-08-01 DIAGNOSIS — D86.9 SARCOIDOSIS: ICD-10-CM

## 2022-08-01 DIAGNOSIS — Z98.42 CATARACT EXTRACTION STATUS, LEFT: ICD-10-CM

## 2022-08-01 DIAGNOSIS — Z98.41 CATARACT EXTRACTION STATUS, RIGHT: Primary | ICD-10-CM

## 2022-08-01 DIAGNOSIS — H35.3122 INTERMEDIATE STAGE NONEXUDATIVE AGE-RELATED MACULAR DEGENERATION OF LEFT EYE: ICD-10-CM

## 2022-08-01 DIAGNOSIS — H35.342 LAMELLAR MACULAR HOLE OF LEFT EYE: ICD-10-CM

## 2022-08-01 PROCEDURE — 99213 PR OFFICE/OUTPT VISIT, EST, LEVL III, 20-29 MIN: ICD-10-PCS | Mod: S$GLB,,, | Performed by: OPHTHALMOLOGY

## 2022-08-01 PROCEDURE — 99999 PR PBB SHADOW E&M-EST. PATIENT-LVL II: CPT | Mod: PBBFAC,,, | Performed by: OPHTHALMOLOGY

## 2022-08-01 PROCEDURE — 99213 OFFICE O/P EST LOW 20 MIN: CPT | Mod: S$GLB,,, | Performed by: OPHTHALMOLOGY

## 2022-08-01 PROCEDURE — 1159F MED LIST DOCD IN RCRD: CPT | Mod: CPTII,S$GLB,, | Performed by: OPHTHALMOLOGY

## 2022-08-01 PROCEDURE — 99999 PR PBB SHADOW E&M-EST. PATIENT-LVL II: ICD-10-PCS | Mod: PBBFAC,,, | Performed by: OPHTHALMOLOGY

## 2022-08-01 PROCEDURE — 1159F PR MEDICATION LIST DOCUMENTED IN MEDICAL RECORD: ICD-10-PCS | Mod: CPTII,S$GLB,, | Performed by: OPHTHALMOLOGY

## 2022-08-01 PROCEDURE — 1160F PR REVIEW ALL MEDS BY PRESCRIBER/CLIN PHARMACIST DOCUMENTED: ICD-10-PCS | Mod: CPTII,S$GLB,, | Performed by: OPHTHALMOLOGY

## 2022-08-01 PROCEDURE — 1160F RVW MEDS BY RX/DR IN RCRD: CPT | Mod: CPTII,S$GLB,, | Performed by: OPHTHALMOLOGY

## 2022-08-01 NOTE — PROGRESS NOTES
HPI     Glaucoma     Comments: Patient reports for IOP check. Denies pain or irritation at   this time. Va stable to previous. Has not used Durezol as advised, states   she has used drop ~once this month               Comments     [facility] Patient       1. H/O RETINA DETACHMENT OS   2. LATTICE / COBBLESTONE OU   3. MAC HOLE OS   HIGH MYOPE -9   4. PERIPHERAL PIGMENTATION   5. H/O SARCOID UVEITIS   6. GUTTATA OU   7. PCIOL OS   PCIOL OD W/ Kenalog 3/24/2022 (set for NVA) / CDE: 11.67/ SN60WF 22.5                  Last edited by Monty Cordero on 8/1/2022  8:49 AM. (History)            Assessment /Plan     For exam results, see Encounter Report.      ICD-10-CM ICD-9-CM    1. Cataract extraction status, right  Z98.41 V45.61    2. Cataract extraction status, left  Z98.42 V45.61    3. Sarcoidosis  D86.9 135    4. Intermediate stage nonexudative age-related macular degeneration of left eye  H35.3122 362.51    5. Lamellar macular hole of left eye  H35.342 362.54        Doing well post Phaco OD - oK to stop steroid  Has not ordered new Mr yet  Return to clinic 6 months for dilation with [facility]

## 2022-08-08 ENCOUNTER — LAB VISIT (OUTPATIENT)
Dept: LAB | Facility: HOSPITAL | Age: 62
End: 2022-08-08
Attending: PEDIATRICS
Payer: COMMERCIAL

## 2022-08-08 DIAGNOSIS — E78.00 PURE HYPERCHOLESTEROLEMIA: ICD-10-CM

## 2022-08-08 LAB
ALBUMIN SERPL BCP-MCNC: 3.7 G/DL (ref 3.5–5.2)
ALP SERPL-CCNC: 79 U/L (ref 55–135)
ALT SERPL W/O P-5'-P-CCNC: 16 U/L (ref 10–44)
ANION GAP SERPL CALC-SCNC: 9 MMOL/L (ref 8–16)
AST SERPL-CCNC: 20 U/L (ref 10–40)
BILIRUB SERPL-MCNC: 0.6 MG/DL (ref 0.1–1)
BUN SERPL-MCNC: 14 MG/DL (ref 8–23)
CALCIUM SERPL-MCNC: 9.5 MG/DL (ref 8.7–10.5)
CHLORIDE SERPL-SCNC: 102 MMOL/L (ref 95–110)
CHOLEST SERPL-MCNC: 158 MG/DL (ref 120–199)
CHOLEST/HDLC SERPL: 3.8 {RATIO} (ref 2–5)
CO2 SERPL-SCNC: 29 MMOL/L (ref 23–29)
CREAT SERPL-MCNC: 0.8 MG/DL (ref 0.5–1.4)
EST. GFR  (NO RACE VARIABLE): >60 ML/MIN/1.73 M^2
GLUCOSE SERPL-MCNC: 113 MG/DL (ref 70–110)
HDLC SERPL-MCNC: 42 MG/DL (ref 40–75)
HDLC SERPL: 26.6 % (ref 20–50)
LDLC SERPL CALC-MCNC: 100.8 MG/DL (ref 63–159)
NONHDLC SERPL-MCNC: 116 MG/DL
POTASSIUM SERPL-SCNC: 3.8 MMOL/L (ref 3.5–5.1)
PROT SERPL-MCNC: 7 G/DL (ref 6–8.4)
SODIUM SERPL-SCNC: 140 MMOL/L (ref 136–145)
TRIGL SERPL-MCNC: 76 MG/DL (ref 30–150)

## 2022-08-08 PROCEDURE — 36415 COLL VENOUS BLD VENIPUNCTURE: CPT | Performed by: NURSE PRACTITIONER

## 2022-08-08 PROCEDURE — 80053 COMPREHEN METABOLIC PANEL: CPT | Performed by: NURSE PRACTITIONER

## 2022-08-08 PROCEDURE — 80061 LIPID PANEL: CPT | Performed by: NURSE PRACTITIONER

## 2022-08-30 ENCOUNTER — CLINICAL SUPPORT (OUTPATIENT)
Dept: PULMONOLOGY | Facility: CLINIC | Age: 62
End: 2022-08-30
Payer: COMMERCIAL

## 2022-08-30 DIAGNOSIS — D86.9 SARCOIDOSIS: ICD-10-CM

## 2022-08-30 LAB
BRPFT: NORMAL
DLCO ADJ PRE: 11.63 ML/(MIN*MMHG)
DLCO SINGLE BREATH LLN: 18.68
DLCO SINGLE BREATH PRE REF: 47.6 %
DLCO SINGLE BREATH REF: 24.42
DLCOC SBVA LLN: 3.16
DLCOC SBVA PRE REF: 108.4 %
DLCOC SBVA REF: 4.5
DLCOC SINGLE BREATH LLN: 18.68
DLCOC SINGLE BREATH PRE REF: 47.6 %
DLCOC SINGLE BREATH REF: 24.42
DLCOVA LLN: 3.16
DLCOVA PRE REF: 108.4 %
DLCOVA PRE: 4.87 ML/(MIN*MMHG*L)
DLCOVA REF: 4.5
DLVAADJ PRE: 4.87 ML/(MIN*MMHG*L)
ERV LLN: -16449.18
ERV PRE REF: 58.2 %
ERV REF: 0.82
FEF 25 75 CHG: 29.5 %
FEF 25 75 LLN: 0.88
FEF 25 75 POST REF: 84 %
FEF 25 75 PRE REF: 64.8 %
FEF 25 75 REF: 2.07
FET100 CHG: -7.7 %
FEV1 CHG: -3.3 %
FEV1 FVC CHG: 9.2 %
FEV1 FVC LLN: 67
FEV1 FVC POST REF: 106.3 %
FEV1 FVC PRE REF: 97.4 %
FEV1 FVC REF: 79
FEV1 LLN: 1.68
FEV1 POST REF: 64.8 %
FEV1 PRE REF: 67 %
FEV1 REF: 2.32
FRCPLETH LLN: 2.05
FRCPLETH PREREF: 70.2 %
FRCPLETH REF: 2.87
FVC CHG: -11.4 %
FVC LLN: 2.16
FVC POST REF: 60.6 %
FVC PRE REF: 68.4 %
FVC REF: 2.95
IVC PRE: 1.72 L
IVC SINGLE BREATH LLN: 2.16
IVC SINGLE BREATH PRE REF: 58.2 %
IVC SINGLE BREATH REF: 2.95
MVV LLN: 88
MVV PRE REF: 71.4 %
MVV REF: 104
PEF CHG: 11.8 %
PEF LLN: 3.83
PEF POST REF: 85.9 %
PEF PRE REF: 76.8 %
PEF REF: 6.02
POST FEF 25 75: 1.74 L/S
POST FET 100: 6.87 SEC
POST FEV1 FVC: 84.16 %
POST FEV1: 1.5 L
POST FVC: 1.79 L
POST PEF: 5.17 L/S
PRE DLCO: 11.63 ML/(MIN*MMHG)
PRE ERV: 0.47 L
PRE FEF 25 75: 1.34 L/S
PRE FET 100: 7.45 SEC
PRE FEV1 FVC: 77.11 %
PRE FEV1: 1.56 L
PRE FRC PL: 2.01 L
PRE FVC: 2.02 L
PRE MVV: 74 L/MIN
PRE PEF: 4.63 L/S
PRE RV: 1.25 L
PRE TLC: 3.27 L
RAW LLN: 3.06
RAW PRE REF: 171.5 %
RAW PRE: 5.25 CMH2O*S/L
RAW REF: 3.06
RV LLN: 1.48
RV PRE REF: 61.1 %
RV REF: 2.05
RVTLC LLN: 30
RVTLC PRE REF: 96.6 %
RVTLC PRE: 38.34 %
RVTLC REF: 40
TLC LLN: 4.44
TLC PRE REF: 60.3 %
TLC REF: 5.43
VA PRE: 2.39 L
VA SINGLE BREATH LLN: 5.28
VA SINGLE BREATH PRE REF: 45.2 %
VA SINGLE BREATH REF: 5.28
VC LLN: 2.16
VC PRE REF: 68.4 %
VC PRE: 2.02 L
VC REF: 2.95
VTGRAWPRE: 2.04 L

## 2022-08-30 PROCEDURE — 99999 PR PBB SHADOW E&M-EST. PATIENT-LVL I: ICD-10-PCS | Mod: PBBFAC,,,

## 2022-08-30 PROCEDURE — 94726 PLETHYSMOGRAPHY LUNG VOLUMES: CPT | Mod: S$GLB,,, | Performed by: INTERNAL MEDICINE

## 2022-08-30 PROCEDURE — 94060 PR EVAL OF BRONCHOSPASM: ICD-10-PCS | Mod: S$GLB,,, | Performed by: INTERNAL MEDICINE

## 2022-08-30 PROCEDURE — 94729 PR C02/MEMBANE DIFFUSE CAPACITY: ICD-10-PCS | Mod: S$GLB,,, | Performed by: INTERNAL MEDICINE

## 2022-08-30 PROCEDURE — 94729 DIFFUSING CAPACITY: CPT | Mod: S$GLB,,, | Performed by: INTERNAL MEDICINE

## 2022-08-30 PROCEDURE — 94726 PULM FUNCT TST PLETHYSMOGRAP: ICD-10-PCS | Mod: S$GLB,,, | Performed by: INTERNAL MEDICINE

## 2022-08-30 PROCEDURE — 99999 PR PBB SHADOW E&M-EST. PATIENT-LVL I: CPT | Mod: PBBFAC,,,

## 2022-08-30 PROCEDURE — 94060 EVALUATION OF WHEEZING: CPT | Mod: S$GLB,,, | Performed by: INTERNAL MEDICINE

## 2022-11-21 ENCOUNTER — IMMUNIZATION (OUTPATIENT)
Dept: INTERNAL MEDICINE | Facility: CLINIC | Age: 62
End: 2022-11-21
Payer: COMMERCIAL

## 2022-11-21 PROCEDURE — 90471 FLU VACCINE (QUAD) GREATER THAN OR EQUAL TO 3YO PRESERVATIVE FREE IM: ICD-10-PCS | Mod: S$GLB,,, | Performed by: INTERNAL MEDICINE

## 2022-11-21 PROCEDURE — 90686 IIV4 VACC NO PRSV 0.5 ML IM: CPT | Mod: S$GLB,,, | Performed by: INTERNAL MEDICINE

## 2022-11-21 PROCEDURE — 90686 FLU VACCINE (QUAD) GREATER THAN OR EQUAL TO 3YO PRESERVATIVE FREE IM: ICD-10-PCS | Mod: S$GLB,,, | Performed by: INTERNAL MEDICINE

## 2022-11-21 PROCEDURE — 90471 IMMUNIZATION ADMIN: CPT | Mod: S$GLB,,, | Performed by: INTERNAL MEDICINE

## 2023-01-31 ENCOUNTER — OFFICE VISIT (OUTPATIENT)
Dept: RHEUMATOLOGY | Facility: CLINIC | Age: 63
End: 2023-01-31
Payer: COMMERCIAL

## 2023-01-31 ENCOUNTER — HOSPITAL ENCOUNTER (OUTPATIENT)
Dept: RADIOLOGY | Facility: HOSPITAL | Age: 63
Discharge: HOME | End: 2023-01-31
Payer: COMMERCIAL

## 2023-01-31 ENCOUNTER — TELEPHONE (OUTPATIENT)
Dept: PULMONOLOGY | Facility: CLINIC | Age: 63
End: 2023-01-31
Payer: COMMERCIAL

## 2023-01-31 VITALS
DIASTOLIC BLOOD PRESSURE: 92 MMHG | HEIGHT: 67 IN | WEIGHT: 203.5 LBS | SYSTOLIC BLOOD PRESSURE: 144 MMHG | HEART RATE: 66 BPM | BODY MASS INDEX: 31.94 KG/M2

## 2023-01-31 DIAGNOSIS — I10 HYPERTENSION, UNSPECIFIED TYPE: ICD-10-CM

## 2023-01-31 DIAGNOSIS — E55.9 VITAMIN D DEFICIENCY: ICD-10-CM

## 2023-01-31 DIAGNOSIS — D86.9 SARCOIDOSIS: ICD-10-CM

## 2023-01-31 DIAGNOSIS — H93.8X3 SENSATION OF FULLNESS IN BOTH EARS: ICD-10-CM

## 2023-01-31 DIAGNOSIS — D86.9 SARCOIDOSIS: Primary | ICD-10-CM

## 2023-01-31 DIAGNOSIS — Z51.81 MEDICATION MONITORING ENCOUNTER: ICD-10-CM

## 2023-01-31 DIAGNOSIS — M17.11 PRIMARY OSTEOARTHRITIS OF RIGHT KNEE: ICD-10-CM

## 2023-01-31 DIAGNOSIS — M54.12 CERVICAL RADICULOPATHY: ICD-10-CM

## 2023-01-31 DIAGNOSIS — J98.4 CHRONIC RESTRICTIVE LUNG DISEASE: ICD-10-CM

## 2023-01-31 PROCEDURE — 99999 PR PBB SHADOW E&M-EST. PATIENT-LVL IV: CPT | Mod: PBBFAC,,,

## 2023-01-31 PROCEDURE — 3008F PR BODY MASS INDEX (BMI) DOCUMENTED: ICD-10-PCS | Mod: CPTII,S$GLB,,

## 2023-01-31 PROCEDURE — 3077F PR MOST RECENT SYSTOLIC BLOOD PRESSURE >= 140 MM HG: ICD-10-PCS | Mod: CPTII,S$GLB,,

## 2023-01-31 PROCEDURE — 3077F SYST BP >= 140 MM HG: CPT | Mod: CPTII,S$GLB,,

## 2023-01-31 PROCEDURE — 99214 OFFICE O/P EST MOD 30 MIN: CPT | Mod: S$GLB,,,

## 2023-01-31 PROCEDURE — 99214 PR OFFICE/OUTPT VISIT, EST, LEVL IV, 30-39 MIN: ICD-10-PCS | Mod: S$GLB,,,

## 2023-01-31 PROCEDURE — 1160F RVW MEDS BY RX/DR IN RCRD: CPT | Mod: CPTII,S$GLB,,

## 2023-01-31 PROCEDURE — 71046 X-RAY EXAM CHEST 2 VIEWS: CPT | Mod: TC

## 2023-01-31 PROCEDURE — 3080F PR MOST RECENT DIASTOLIC BLOOD PRESSURE >= 90 MM HG: ICD-10-PCS | Mod: CPTII,S$GLB,,

## 2023-01-31 PROCEDURE — 99999 PR PBB SHADOW E&M-EST. PATIENT-LVL IV: ICD-10-PCS | Mod: PBBFAC,,,

## 2023-01-31 PROCEDURE — 1159F MED LIST DOCD IN RCRD: CPT | Mod: CPTII,S$GLB,,

## 2023-01-31 PROCEDURE — 3008F BODY MASS INDEX DOCD: CPT | Mod: CPTII,S$GLB,,

## 2023-01-31 PROCEDURE — 1159F PR MEDICATION LIST DOCUMENTED IN MEDICAL RECORD: ICD-10-PCS | Mod: CPTII,S$GLB,,

## 2023-01-31 PROCEDURE — 71046 XR CHEST PA AND LATERAL: ICD-10-PCS | Mod: 26,,, | Performed by: RADIOLOGY

## 2023-01-31 PROCEDURE — 71046 X-RAY EXAM CHEST 2 VIEWS: CPT | Mod: 26,,, | Performed by: RADIOLOGY

## 2023-01-31 PROCEDURE — 3080F DIAST BP >= 90 MM HG: CPT | Mod: CPTII,S$GLB,,

## 2023-01-31 PROCEDURE — 1160F PR REVIEW ALL MEDS BY PRESCRIBER/CLIN PHARMACIST DOCUMENTED: ICD-10-PCS | Mod: CPTII,S$GLB,,

## 2023-01-31 NOTE — TELEPHONE ENCOUNTER
----- Message from Sarah Beth Shen PA-C sent at 1/31/2023  2:59 PM CST -----  Good afternoon,     Patient is overdue for followup with Dr. Casper. Please schedule earliest convenient. Not urgent, first available is okay.     Sarah Beth Shen PA-C

## 2023-01-31 NOTE — Clinical Note
Good afternoon,   Patient is overdue for followup with Dr. Casper. Please schedule earliest convenient. Not urgent, first available is okay.   Sarah Beth Shen PA-C

## 2023-01-31 NOTE — PROGRESS NOTES
RHEUMATOLOGY OUTPATIENT CLINIC NOTE    01/31/2023    Subjective:       Patient ID: Janna Sheffield is a 62 y.o. female.    Chief Complaint: Sarcoidosis (/)      HPI   Janna Sheffield is a 62 y.o. pleasant female here for rheumatology follow up for history of osteoarthritis and sarcoidosis.     She has low vitamin D and started on 50Kunits weekly improved to now normal level. No issues with joint swelling, rash, worsening sob, cp, eye issues. She has mao knee pain with a TKA on the left, uses topical nsaid which helps      She has generalized osteoarthritis with radiating neck pain. MRI showed disc disease and degenerative arthritis. She was sent to pain mgt for ESIs which helped. She also is taking gabapentin at night 300-600mg occasionally.  Tramadol occasionally helps. Saw a pain management doctor in [place] and received an injection with great benefit. Has not continued following with pain management. Pain today is 6/10 in her knees, feet and hip.      Today with no cp, sob, rashes, or joint swelling.  She has occasional joint pain improved with Tylenol Arthritis as needed.     HISTORY:  Sarcoidosis was diagnosed in 1985 through a biopsy of a spot on her liver. 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      She was on imuran 50mg every other day but her sarcoid seemed to be in remission.  Lungs stable. She does have restrictive lung disease and SOB when she is more active but no worsening. She sees Dr. Casper.  We dc'd imuran in august 2019 (suggested by pulm initially) with no new or worsening symtpoms.  Last visit with pulm was march 2020, cxr and pfts stable, echo stable at the time, hasn't seen them since      Review of Systems   Constitutional: Negative for chills, fatigue and fever.   HENT: Negative for mouth sores, rhinorrhea and sore throat.    Eyes: Negative for pain and redness.        Dry eyes   Respiratory: Positive for  shortness of breath- stable and not worsening. Negative for cough.    Cardiovascular: Negative for chest pain.   Gastrointestinal: Negative for abdominal pain, constipation, diarrhea, nausea and vomiting.   Genitourinary: Negative for dysuria and hematuria.   Musculoskeletal: Positive for arthralgias, back pain and neck pain. Negative for joint swelling and myalgias.        Left knee replacement   Skin: Negative for rash.   Neurological: Negative for weakness, numbness and headaches.   Psychiatric/Behavioral: The patient is not nervous/anxious.      Rheumatologic review of systems negative otherwise.     Physical exam:  mao wrists, mcps, pips no synovitis, no tenderness, full rom, grib strength equal bilaterally  Left knee s/p replacement, no effusion, no warmth  Right knee no effusion, no warmth, full rom  No BLE weakness    No cough. No obvious respiratory distress.     Past Medical History:   Diagnosis Date    Arthritis     Chest pain syndrome 10/23/2014    Hyperlipidemia     Hypertension     Macular degeneration, age related, nonexudative - Left Eye 3/24/2014    Retinal detachment, old, partial 4/14/2014    Sarcoidosis      Past Surgical History:   Procedure Laterality Date    CATARACT EXTRACTION      COLONOSCOPY N/A 3/14/2022    Procedure: COLONOSCOPY;  Surgeon: Christi Medrano MD;  Location: [facility];  Service: Endoscopy;  Laterality: N/A;    DILATION AND CURETTAGE OF UTERUS      missed ab x 3    EPIDURAL STEROID INJECTION INTO CERVICAL SPINE N/A 12/21/2021    Procedure: C6/C7 IL HIMANSHU RN IV sedation;  Surgeon: Hadley Marsh MD;  Location: [facility];  Service: Pain Management;  Laterality: N/A;    ESOPHAGOGASTRODUODENOSCOPY N/A 2/6/2019    Procedure: ESOPHAGOGASTRODUODENOSCOPY (EGD);  Surgeon: Jairo Vargas III, MD;  Location: [facility];  Service: Endoscopy;  Laterality: N/A;    HYSTERECTOMY  2001    MACARIO - complete    JOINT REPLACEMENT Left     Knee    knee surgery      OOPHORECTOMY       "TRANSFORAMINAL EPIDURAL INJECTION OF STEROID Right 4/18/2022    Procedure: INJECTION, STEROID, EPIDURAL, TRANSFORAMINAL APPROACH, RIGHT C6-C7;  Surgeon: Viraj Kramer MD;  Location: [facility];  Service: Pain Management;  Laterality: Right;    TUBAL LIGATION       Family History   Problem Relation Age of Onset    Cervical cancer Mother     Heart disease Mother     Heart attack Mother     Hypertension Mother     Hyperlipidemia Mother     Hypertension Father     Hyperlipidemia Father     Stroke Father     Breast cancer Maternal Aunt     Stroke Brother     Breast cancer Maternal Cousin     Breast cancer Maternal Cousin     Breast cancer Paternal Cousin     Colon cancer Neg Hx     Thrombophilia Neg Hx     Deep vein thrombosis Neg Hx     Pulmonary embolism Neg Hx      Social History     Socioeconomic History    Marital status:    Occupational History     Employer: [employer]    Tobacco Use    Smoking status: Never    Smokeless tobacco: Never   Substance and Sexual Activity    Alcohol use: No    Drug use: No    Sexual activity: Yes     Partners: Male     Comment:      Review of patient's allergies indicates:  No Known Allergies        Objective:   BP (!) 144/92   Pulse 66   Ht 5' 7" (1.702 m)   Wt 92.3 kg (203 lb 7.8 oz)   BMI 31.87 kg/m²   Immunization History   Administered Date(s) Administered    COVID-19, MRNA, LN-S, PF (Pfizer) (Gray Cap) 05/23/2022    COVID-19, MRNA, LN-S, PF (Pfizer) (Purple Cap) 03/05/2021, 03/26/2021, 10/18/2021    Influenza 11/27/2006, 11/28/2007, 10/20/2008, 11/24/2010, 12/26/2012    Influenza - High Dose - PF (65 years and older) 10/31/2017, 01/07/2019    Influenza - Quadrivalent 10/25/2016    Influenza - Quadrivalent - PF *Preferred* (6 months and older) 12/18/2013, 12/19/2019, 10/20/2020, 12/06/2021, 11/21/2022    Influenza A (H1N1) 2009 Monovalent - IM - PF 12/14/2009    Pneumococcal Conjugate - 13 Valent 04/10/2015    Pneumococcal Polysaccharide - 23 Valent " 11/19/2003, 01/07/2019    Td (ADULT) 06/17/2008    Tdap 04/07/2015    Zoster Recombinant 07/13/2020, 10/06/2020                      Recent Results (from the past 672 hour(s))   CBC Auto Differential    Collection Time: 01/31/23  1:37 PM   Result Value Ref Range    WBC 4.34 3.90 - 12.70 K/uL    RBC 4.12 4.00 - 5.40 M/uL    Hemoglobin 12.7 12.0 - 16.0 g/dL    Hematocrit 39.5 37.0 - 48.5 %    MCV 96 82 - 98 fL    MCH 30.8 27.0 - 31.0 pg    MCHC 32.2 32.0 - 36.0 g/dL    RDW 12.6 11.5 - 14.5 %    Platelets 220 150 - 450 K/uL    MPV 10.1 9.2 - 12.9 fL    Immature Granulocytes 0.2 0.0 - 0.5 %    Gran # (ANC) 1.5 (L) 1.8 - 7.7 K/uL    Immature Grans (Abs) 0.01 0.00 - 0.04 K/uL    Lymph # 2.4 1.0 - 4.8 K/uL    Mono # 0.4 0.3 - 1.0 K/uL    Eos # 0.1 0.0 - 0.5 K/uL    Baso # 0.04 0.00 - 0.20 K/uL    nRBC 0 0 /100 WBC    Gran % 34.4 (L) 38.0 - 73.0 %    Lymph % 54.1 (H) 18.0 - 48.0 %    Mono % 8.8 4.0 - 15.0 %    Eosinophil % 1.6 0.0 - 8.0 %    Basophil % 0.9 0.0 - 1.9 %    Differential Method Automated    Sedimentation rate    Collection Time: 01/31/23  1:37 PM   Result Value Ref Range    Sed Rate 26 (H) 0 - 20 mm/Hr        No results found for: TBGOLDPLUS   Lab Results   Component Value Date    HEPCAB Negative 06/25/2013        Assessment:       1. Sarcoidosis    2. Cervical radiculopathy    3. Chronic restrictive lung disease    4. Vitamin D deficiency    5. Medication monitoring encounter    6. Hypertension, unspecified type    7. Primary osteoarthritis of right knee    8. Sensation of fullness in both ears            Impression:     Back pain: mid and low, main issue today, much better after pain management injection      Sarcoidosis: stopped Imuran 50mg every other day (8/2019-after pulm rec'd) no evidence of activity, remains stable in remission.     Chronic restrictive lung disease: stable recently occasional SOB;  Previously saw Dr. Casper, last CT of chest 7/2016 stable, PFTs with moderately reduced diffusion--mild  restriction, stable; cxr stable --overdue for pulm f/u      High Risk Medication Monitoring: now off imuran      Cervical spondylosis with radiation into the left arm: saw Dr. Isabel  s/p HIMANSHU w good result, gabapentin makes her drowsy     Vit d def: started 50K units weekly, level in normal range now 56     Osteoarthritis right knee, left knee total knee arthroplasty  Pain worse with colder weather.  Has been applying  compound topical lotion.    Ear fullness/ringing  Went to Urgent Care recently and was told she has fluid in her ear. Reports occasional ringing in ear.     Plan:          Stay off imuran no need to add back at this time  Cont weekly vitamin D, last level wnl  F/u with Dr. Casper -- last PFT 8/30/2022  Send message to Dr. Casper staff to get follow up     Continue follow-up with Pain Management     Chest x-ray every 12-18 months (last done 1/31/2023)     For knee pain, continue with topical.  Can also try Voltaren/diclofenac topical 3 times daily as needed for pain.  Make sure that current topical does not also contain NSAID. Rx sent to pharmacy.  Ice 10-15 minutes at a time can alternate with heat.  Elevate.  Can trial turmeric 500 mg bid or glucosamine 500 mg tid supplements consistently for joint pain.     Referral to ENT for evaluation of ear fullness and possible tinnitus        RTC in 6mos  With koby pagan PA-C  [facility]  Rheumatology       30 minutes of total time spent on the encounter, which includes face to face time and non-face to face time preparing to see the patient (eg, review of tests), Obtaining and/or reviewing separately obtained history, Documenting clinical information in the electronic or other health record, Independently interpreting results (not separately reported) and communicating results to the patient/family/caregiver, or Care coordination (not separately reported).

## 2023-02-06 NOTE — PROGRESS NOTES
Subjective:       Patient ID: Janna Sheffield is a 62 y.o. female.       Chief Complaint: Sarcoidosis    Ms Sheffield  is a 59 y.o. .  LV was 03/12 /2019,   Here to review CXR and [place]  No interval health issues.  She has sarcoidosis currently treated with IMURAN since 1985.  Was discontinued Last year  Last ACE level normal.  No cough no wheezing no shortness of breath  Her spirometry was consistent with a restrictive defect    Echocardiogram update pending  CXR was normal     Immunizations are up-to-date      02/07/2023  Last visit 03/04/2020  Doing well  Reviewed CXR and labs  Has eye appt due next week  Seen cardiology last year  Echo reviewed  Weight dropped from 208 lb to 203lb  Occasional SOB stooping down  No cough, No wheezing      The following portions of the patient's history were reviewed and updated as appropriate: She  has a past medical history of Arthritis, Chest pain syndrome (10/23/2014), Hyperlipidemia, Hypertension, Macular degeneration, age related, nonexudative - Left Eye (3/24/2014), Retinal detachment, old, partial (4/14/2014), and Sarcoidosis.  She does not have any pertinent problems on file.  She  has a past surgical history that includes Tubal ligation; knee surgery; Cataract extraction; Dilation and curettage of uterus; Oophorectomy; Hysterectomy (2001); Joint replacement (Left); Esophagogastroduodenoscopy (N/A, 2/6/2019); Epidural steroid injection into cervical spine (N/A, 12/21/2021); Colonoscopy (N/A, 3/14/2022); and Transforaminal epidural injection of steroid (Right, 4/18/2022).  Her family history includes Breast cancer in her maternal aunt, maternal cousin, maternal cousin, and paternal cousin; Cervical cancer in her mother; Heart attack in her mother; Heart disease in her mother; Hyperlipidemia in her father and mother; Hypertension in her father and mother; Stroke in her brother and father.  She  reports that she has never smoked. She has never used smokeless tobacco. She  reports that she does not drink alcohol and does not use drugs.  She has a current medication list which includes the following prescription(s): dexbrompheniramin/phenylephrin, diclofenac sodium, ergocalciferol, gabapentin, hydrochlorothiazide, multivitamin, ondansetron, and rosuvastatin.  Current Outpatient Medications on File Prior to Visit   Medication Sig Dispense Refill    dexbrompheniramin/phenylephrin (DEXBROMPHENIRAMINE-PHENYLEPH ORAL) Alahist PE Take 1 Tablet (oral) 3 times per day PRN - Congestion for 5 days 20230112 tablet 3 times per day oral 5 days active 2-7.5 mg      diclofenac sodium (VOLTAREN) 1 % Gel Apply 2 g topically 3 (three) times daily as needed (Knee pain). 100 g 3    ergocalciferol (VITAMIN D2) 50,000 unit Cap Take 1 capsule (50,000 Units total) by mouth every 7 days. 12 capsule 4    gabapentin (NEURONTIN) 300 MG capsule Take 1 capsule (300 mg total) by mouth once daily AND 2 capsules (600 mg total) every evening. 90 capsule 3    hydroCHLOROthiazide (HYDRODIURIL) 25 MG tablet Take 1 tablet by mouth once daily 90 tablet 3    multivitamin (THERAGRAN) per tablet Take 1 tablet by mouth Daily.      ondansetron (ZOFRAN-ODT) 4 MG TbDL Take 1 tablet (4 mg total) by mouth every 12 (twelve) hours as needed (nausea). 20 tablet 0    rosuvastatin (CRESTOR) 40 MG Tab TAKE 1 TABLET BY MOUTH ONCE DAILY (  REPLACES  LIPITOR) 90 tablet 3     No current facility-administered medications on file prior to visit.     She has No Known Allergies..     Review of Systems   Constitutional: Negative.    HENT: Negative.     Eyes: Negative.    Respiratory:  Negative for dyspnea on extertion.    Cardiovascular: Negative.    Genitourinary: Negative.    Endocrine: endocrine negative    Musculoskeletal: Negative.    Skin: Negative.    Gastrointestinal: Negative.    Neurological: Negative.    Psychiatric/Behavioral: Negative.     All other systems reviewed and are negative.    Objective:       Vitals:    02/07/23 0944   BP:  "(!) 140/80   Pulse: 70   Resp: 18   SpO2: 97%   Weight: 92.3 kg (203 lb 6 oz)   Height: 5' 7" (1.702 m)       Physical Exam   Constitutional: She is oriented to person, place, and time. She appears well-developed and well-nourished. She is obese.   HENT:   Head: Normocephalic.   Nose: Nose normal.   Neck: No JVD present. No tracheal deviation present. No thyromegaly present.   Cardiovascular: Normal rate, regular rhythm and normal heart sounds.   No murmur heard.  Pulmonary/Chest: Normal expansion, symmetric chest wall expansion, effort normal and breath sounds normal. No respiratory distress. She has no decreased breath sounds. She has no rales. Chest wall is not dull to percussion. She exhibits no tenderness. Negative for egophony.   Abdominal: Soft. Bowel sounds are normal.   Musculoskeletal:         General: No edema. Normal range of motion.      Cervical back: Normal range of motion and neck supple.   Lymphadenopathy:     She has no cervical adenopathy.   Neurological: She is alert and oriented to person, place, and time. Gait normal.   Skin: Skin is warm and dry. No cyanosis. Nails show no clubbing.   Psychiatric: She has a normal mood and affect. Her behavior is normal.   Nursing note and vitals reviewed.  Personal Diagnostic Review  X-Ray Chest PA And Lateral  Narrative: EXAM:  XR CHEST PA AND LATERAL    INDICATIONS: Sarcoidosis    TECHNIQUE: 2 view exam    COMPARISONS: June 2021    FINDINGS:      HEART SIZE: normal  MEDIASTINUM: normal  LUNGS: clear  SPINE/RIBS: normal  MISC: none  Impression:  Stable exam without evidence of cardiopulmonary disease    Finalized on: 1/31/2023 1:56 PM By:  Yakov Elam MD  [facility]# [id number]      2023-01-31 13:58:16.782    [facility]            PFT:     FEV1: 1.44L( 66.1%), FVC 1.80L( 65.5%)  FEV1/FVC  80  RESTRICTIVE VENTILATORY DEFECT      Component      Latest Ref Rng & Units 1/31/2023   Sodium      136 - 145 mmol/L 141   Potassium      3.5 - 5.1 mmol/L 4.5   Chloride      95 - 110 " mmol/L 106   CO2      23 - 29 mmol/L 26   Glucose      70 - 110 mg/dL 98   BUN      8 - 23 mg/dL 9   Creatinine      0.5 - 1.4 mg/dL 0.8   Calcium      8.7 - 10.5 mg/dL 8.8   PROTEIN TOTAL      6.0 - 8.4 g/dL 7.4   Albumin      3.5 - 5.2 g/dL 3.8   BILIRUBIN TOTAL      0.1 - 1.0 mg/dL 0.5   Alkaline Phosphatase      55 - 135 U/L 88   AST      10 - 40 U/L 18   ALT      10 - 44 U/L 11   Anion Gap      8 - 16 mmol/L 9   eGFR      >60 mL/min/1.73 m:2 >60   Sed Rate      0 - 20 mm/Hr 26 (H)   CRP      0.0 - 8.2 mg/L 7.1   Angio Convert Enzyme      16 - 85 U/L 21       The left ventricle is normal in size with concentric remodeling and normal systolic function.  Normal left ventricular diastolic function.  The estimated PA systolic pressure is 30 mmHg.  Normal right ventricular size with normal right ventricular systolic function.  Normal central venous pressure (3 mmHg).  The estimated ejection fraction is 60%.  Mild mitral regurgitation.  During stress, the following significant arrhythmias were observed: occasional PVCs.  The test was stopped because the patient experienced fatigue.  The stress echo portion of this study is negative for myocardial ischemia.  The patient's exercise capacity was normal.  The ECG portion of this study is abnormal but not diagnostic for ischemia.  No flowsheet data found.      Assessment:       Problem List Items Addressed This Visit       Abnormal diffusion capacity determined by pulmonary function test    Class 2 obesity in adult    HTN (hypertension)     On HCTZ         Pure hypercholesterolemia     On CRESTOR         Pulmonary sarcoidosis - Primary     ASSESSMENT:     Sarcoidosis stage:   []        Stage I Sarcoidosis  [x]        Stage II Sarcoidosis  []        Stage III Sarcoidosis  []        Stage IV Srcoidosis     Clinical assessment:   []        Symptomatic  [x]        Assymptomatic         Indications for treatment of pulmonary sarcoidosis:   [x]        NO Evidence of progressive  disease   []        Severe disease at presentation     Management options:   []        Observation without therapy  []        Systemic glucocorticoids  []        Methotrexate.  [x]        Azathioprine discontinued    []        Leuflonamide  []        Mycophenolate  []        Anti TNF alpha antagonists            Relevant Orders    Complete PFT without bronchodilator - Clinic    Stress test, pulmonary    X-Ray Chest PA And Lateral    Chronic restrictive lung disease     PFT next visit          Plan:        Currently off IMURAN  Stable sarcoid  Follow up cardiology and Opthamology    Follow up in about 1 year (around 2/7/2024), or PFT, 6MWD, CXR.    Thank you for the courtesy of participating in the care of this patient    Demetrio Casper MD

## 2023-02-07 ENCOUNTER — OFFICE VISIT (OUTPATIENT)
Dept: OTOLARYNGOLOGY | Facility: CLINIC | Age: 63
End: 2023-02-07
Payer: COMMERCIAL

## 2023-02-07 ENCOUNTER — CLINICAL SUPPORT (OUTPATIENT)
Dept: AUDIOLOGY | Facility: CLINIC | Age: 63
End: 2023-02-07
Payer: COMMERCIAL

## 2023-02-07 ENCOUNTER — OFFICE VISIT (OUTPATIENT)
Dept: PULMONOLOGY | Facility: CLINIC | Age: 63
End: 2023-02-07
Payer: COMMERCIAL

## 2023-02-07 VITALS
OXYGEN SATURATION: 97 % | SYSTOLIC BLOOD PRESSURE: 140 MMHG | WEIGHT: 203.38 LBS | BODY MASS INDEX: 31.92 KG/M2 | HEART RATE: 70 BPM | RESPIRATION RATE: 18 BRPM | DIASTOLIC BLOOD PRESSURE: 80 MMHG | HEIGHT: 67 IN

## 2023-02-07 DIAGNOSIS — H90.3 SENSORINEURAL HEARING LOSS (SNHL) OF BOTH EARS: Primary | ICD-10-CM

## 2023-02-07 DIAGNOSIS — H93.13 TINNITUS OF BOTH EARS: ICD-10-CM

## 2023-02-07 DIAGNOSIS — E78.00 PURE HYPERCHOLESTEROLEMIA: ICD-10-CM

## 2023-02-07 DIAGNOSIS — R94.2 ABNORMAL DIFFUSION CAPACITY DETERMINED BY PULMONARY FUNCTION TEST: ICD-10-CM

## 2023-02-07 DIAGNOSIS — H90.3 SENSORINEURAL HEARING LOSS, BILATERAL: Primary | ICD-10-CM

## 2023-02-07 DIAGNOSIS — D86.0 PULMONARY SARCOIDOSIS: Primary | ICD-10-CM

## 2023-02-07 DIAGNOSIS — I10 PRIMARY HYPERTENSION: ICD-10-CM

## 2023-02-07 DIAGNOSIS — J98.4 CHRONIC RESTRICTIVE LUNG DISEASE: ICD-10-CM

## 2023-02-07 DIAGNOSIS — R42 DIZZINESS: ICD-10-CM

## 2023-02-07 DIAGNOSIS — E66.01 CLASS 2 SEVERE OBESITY DUE TO EXCESS CALORIES WITH SERIOUS COMORBIDITY IN ADULT, UNSPECIFIED BMI: ICD-10-CM

## 2023-02-07 DIAGNOSIS — H93.8X3 SENSATION OF FULLNESS IN BOTH EARS: ICD-10-CM

## 2023-02-07 PROCEDURE — 3079F PR MOST RECENT DIASTOLIC BLOOD PRESSURE 80-89 MM HG: ICD-10-PCS | Mod: CPTII,S$GLB,, | Performed by: INTERNAL MEDICINE

## 2023-02-07 PROCEDURE — 3079F DIAST BP 80-89 MM HG: CPT | Mod: CPTII,S$GLB,, | Performed by: INTERNAL MEDICINE

## 2023-02-07 PROCEDURE — 3008F PR BODY MASS INDEX (BMI) DOCUMENTED: ICD-10-PCS | Mod: CPTII,S$GLB,, | Performed by: INTERNAL MEDICINE

## 2023-02-07 PROCEDURE — 3077F PR MOST RECENT SYSTOLIC BLOOD PRESSURE >= 140 MM HG: ICD-10-PCS | Mod: CPTII,S$GLB,, | Performed by: INTERNAL MEDICINE

## 2023-02-07 PROCEDURE — 99203 OFFICE O/P NEW LOW 30 MIN: CPT | Mod: S$GLB,,, | Performed by: PHYSICIAN ASSISTANT

## 2023-02-07 PROCEDURE — 99214 PR OFFICE/OUTPT VISIT, EST, LEVL IV, 30-39 MIN: ICD-10-PCS | Mod: S$GLB,,, | Performed by: INTERNAL MEDICINE

## 2023-02-07 PROCEDURE — 99203 PR OFFICE/OUTPT VISIT, NEW, LEVL III, 30-44 MIN: ICD-10-PCS | Mod: S$GLB,,, | Performed by: PHYSICIAN ASSISTANT

## 2023-02-07 PROCEDURE — 99214 OFFICE O/P EST MOD 30 MIN: CPT | Mod: S$GLB,,, | Performed by: INTERNAL MEDICINE

## 2023-02-07 PROCEDURE — 99999 PR PBB SHADOW E&M-EST. PATIENT-LVL IV: CPT | Mod: PBBFAC,,, | Performed by: INTERNAL MEDICINE

## 2023-02-07 PROCEDURE — 92557 COMPREHENSIVE HEARING TEST: CPT | Mod: S$GLB,,,

## 2023-02-07 PROCEDURE — 99999 PR PBB SHADOW E&M-EST. PATIENT-LVL I: ICD-10-PCS | Mod: PBBFAC,,,

## 2023-02-07 PROCEDURE — 99999 PR PBB SHADOW E&M-EST. PATIENT-LVL III: CPT | Mod: PBBFAC,,, | Performed by: PHYSICIAN ASSISTANT

## 2023-02-07 PROCEDURE — 1159F PR MEDICATION LIST DOCUMENTED IN MEDICAL RECORD: ICD-10-PCS | Mod: CPTII,S$GLB,, | Performed by: INTERNAL MEDICINE

## 2023-02-07 PROCEDURE — 99999 PR PBB SHADOW E&M-EST. PATIENT-LVL I: CPT | Mod: PBBFAC,,,

## 2023-02-07 PROCEDURE — 92567 TYMPANOMETRY: CPT | Mod: S$GLB,,,

## 2023-02-07 PROCEDURE — 3008F BODY MASS INDEX DOCD: CPT | Mod: CPTII,S$GLB,, | Performed by: INTERNAL MEDICINE

## 2023-02-07 PROCEDURE — 92557 PR COMPREHENSIVE HEARING TEST: ICD-10-PCS | Mod: S$GLB,,,

## 2023-02-07 PROCEDURE — 1159F MED LIST DOCD IN RCRD: CPT | Mod: CPTII,S$GLB,, | Performed by: INTERNAL MEDICINE

## 2023-02-07 PROCEDURE — 92567 PR TYMPA2METRY: ICD-10-PCS | Mod: S$GLB,,,

## 2023-02-07 PROCEDURE — 99999 PR PBB SHADOW E&M-EST. PATIENT-LVL III: ICD-10-PCS | Mod: PBBFAC,,, | Performed by: PHYSICIAN ASSISTANT

## 2023-02-07 PROCEDURE — 1160F PR REVIEW ALL MEDS BY PRESCRIBER/CLIN PHARMACIST DOCUMENTED: ICD-10-PCS | Mod: CPTII,S$GLB,, | Performed by: INTERNAL MEDICINE

## 2023-02-07 PROCEDURE — 3077F SYST BP >= 140 MM HG: CPT | Mod: CPTII,S$GLB,, | Performed by: INTERNAL MEDICINE

## 2023-02-07 PROCEDURE — 99999 PR PBB SHADOW E&M-EST. PATIENT-LVL IV: ICD-10-PCS | Mod: PBBFAC,,, | Performed by: INTERNAL MEDICINE

## 2023-02-07 PROCEDURE — 1160F RVW MEDS BY RX/DR IN RCRD: CPT | Mod: CPTII,S$GLB,, | Performed by: INTERNAL MEDICINE

## 2023-02-07 NOTE — ASSESSMENT & PLAN NOTE
ASSESSMENT:     Sarcoidosis stage:   []        Stage I Sarcoidosis  [x]        Stage II Sarcoidosis  []        Stage III Sarcoidosis  []        Stage IV Srcoidosis     Clinical assessment:   []        Symptomatic  [x]        Assymptomatic         Indications for treatment of pulmonary sarcoidosis:   [x]        NO Evidence of progressive disease   []        Severe disease at presentation     Management options:   []        Observation without therapy  []        Systemic glucocorticoids  []        Methotrexate.  [x]        Azathioprine discontinued    []        Leuflonamide  []        Mycophenolate  []        Anti TNF alpha antagonists

## 2023-02-07 NOTE — PROGRESS NOTES
Subjective:       Patient ID: Janna Sheffield is a 62 y.o. female.    Chief Complaint: Ear Fullness (X 3- 4 weeks )    Patient is a very pleasant 62 y.o. female here to see me today for the first time for the first time in consultation at the request of Sarah Beth Shen PA-C for evaluation of bilateral ear fullness over past 4 weeks.  She has noticed some gradual hearing loss AU over the years; reports that her family complains about her hearing.  She has not noted any difference in hearing between the ears, with either ear being the better hearing ear.  She has noted occasional tinnitus in the right ear>>left ear over past 2 years.  She has not had any recent issues with ear pain or ear drainage.  She has a family history of hearing loss, and has not had any previous otologic surgery.  She denies any history of significant loud noise exposure. She has issues with dizziness which she describes as lightheadedness and woozy sensation that lasts seconds; worse with sudden movements.  She has had brief spinning in the past as well.   Has been told in the past that she has fluid in her ears.  She does not smoke or use any nasal sprays.         Review of Systems   Constitutional:  Negative for activity change, appetite change and fever.   HENT:  Positive for hearing loss and tinnitus. Negative for nasal congestion, ear discharge, ear pain (pressure/fullness), nosebleeds, rhinorrhea, sinus pressure/congestion and sore throat.    Eyes:  Negative for discharge.   Respiratory:  Negative for shortness of breath and stridor.    Cardiovascular:  Negative for chest pain.   Gastrointestinal:  Negative for diarrhea, nausea and vomiting.   Musculoskeletal:  Negative for gait problem.   Allergic/Immunologic: Negative for food allergies.   Neurological:  Positive for dizziness and light-headedness. Negative for headaches.   Hematological:  Negative for adenopathy.   Psychiatric/Behavioral:  Negative for confusion.         Objective:      Physical Exam  Constitutional:       General: She is not in acute distress.     Appearance: She is well-developed.   HENT:      Head: Normocephalic and atraumatic.      Right Ear: Tympanic membrane, ear canal and external ear normal. No drainage. No middle ear effusion. Tympanic membrane is not injected or erythematous.      Left Ear: Tympanic membrane, ear canal and external ear normal. No drainage.  No middle ear effusion. Tympanic membrane is not injected or erythematous.      Nose: Nose normal. No mucosal edema, congestion or rhinorrhea.      Right Sinus: No maxillary sinus tenderness or frontal sinus tenderness.      Left Sinus: No maxillary sinus tenderness or frontal sinus tenderness.      Mouth/Throat:      Mouth: Mucous membranes are moist. Mucous membranes are not pale and not dry.      Pharynx: Uvula midline. No oropharyngeal exudate or posterior oropharyngeal erythema.   Eyes:      General: Lids are normal. No scleral icterus.     Extraocular Movements:      Right eye: Normal extraocular motion and no nystagmus.      Left eye: Normal extraocular motion and no nystagmus.      Conjunctiva/sclera: Conjunctivae normal.      Right eye: Right conjunctiva is not injected. No chemosis.     Left eye: Left conjunctiva is not injected. No chemosis.     Pupils: Pupils are equal, round, and reactive to light.   Neck:      Trachea: Trachea and phonation normal. No tracheal tenderness or tracheal deviation.   Pulmonary:      Effort: Pulmonary effort is normal. No respiratory distress.      Breath sounds: No stridor.   Abdominal:      General: There is no distension.   Lymphadenopathy:      Head:      Right side of head: No submental, submandibular, preauricular or posterior auricular adenopathy.      Left side of head: No submental, submandibular, preauricular or posterior auricular adenopathy.      Cervical: No cervical adenopathy.   Skin:     General: Skin is warm and dry.      Findings: No erythema  or rash.   Neurological:      Mental Status: She is alert and oriented to person, place, and time.      Cranial Nerves: No cranial nerve deficit.   Psychiatric:         Behavior: Behavior normal. Behavior is cooperative.         AUDIOGRAM today:          Assessment:       Problem List Items Addressed This Visit    None  Visit Diagnoses       Sensorineural hearing loss (SNHL) of both ears    -  Primary    Tinnitus of both ears        Sensation of fullness in both ears        Dizziness                  Plan:             We reviewed the patient's recent audiogram and hearing loss in detail.  Recommend annual audiograms.  We also discussed the use hearing protection when exposed to loud noise, including lawn equipment.      We also discussed that tinnitus is most often caused by a hearing loss, and that as the hair cells are damaged, either genetic or as a result of loud noise exposure, they then cause tinnitus.  Some patients find that restricting the salt or caffeine in their diet helps, and there is also an OTC supplement, lipoflavinoids, that some people find to be effective though their benefit is not fully proven.  Tinnitus tends to be louder in times of stress and fatigue, and may decrease with time.  Sound machines may also be an effective masking technique if needed at night.  Discussed that her tympanograms are normal today, indicative of no middle ear fluid or significant ETD.  We had a long discussion regarding the anatomy and function of the eustachian tube which acts as a pump to keep the appropriate amount of pressure behind the ear drum.  Consider trial of nasal steroid spray to be used on a daily basis if aural pressure worsens.    I had a long discussion with the patient regarding their symptoms.  Dizziness, vertigo and disequilibrium are common symptoms reported by adults during visits to their doctors. They are all symptoms that can result from a peripheral vestibular disorder (a dysfunction of the  balance organs of the inner ear) or central vestibular disorder (a dysfunction of one or more parts of the central nervous system that help process balance and spatial information).  There are also non-vestibular causes of dizziness, and dizziness can be linked to a wide array of problems such as blood-flow irregularities from cardiovascular problems and blood pressure fluctuations.  Discussed that lightheadedness is usually not related to the ear and often linked to issues with blood pressure.  Discussed with patient that if dizziness (especially spinning ) persists, I would recommend a VNG for further diagnostic testing, and will contact the patient with further recommendations when that is complete.  She will call back to schedule if needed.      Thanks for the referral Sarah Beth Shen PA-C.  Report returned via Epic.

## 2023-02-07 NOTE — PROGRESS NOTES
Janna Sheffield was seen 02/07/2023 for an audiological evaluation. Patient complains of intermittent aural fullness and tinnitus in both ears for 3-4 weeks. She also noted lightheadedness during those times. Patient reported difficulties hearing in both ears. She noted having to ask people to repeat themselves. There is a family history of hearing loss. Patient denied a history of noise exposure.     Otoscopy revealed clear canals with visualization of the tympanic membrane in both ears. Tympanograms were Type A for the right ear and Type A for the left ear. Audiometry revealed a borderline normal to mild sensorineural hearing loss through 6000 Hz sloping to a moderate sensorineural hearing loss at 8000 Hz for the right ear, and normal hearing sensitivity through 1000 Hz sloping to a mild sensorineural hearing loss for the left ear. Speech Reception Thresholds were  20 dBHL for the right ear and 20 dBHL for the left ear. Word recognition scores were excellent for the right ear and excellent for the left ear.    Patient was counseled on the above findings.    Recommendations:  Follow-up with ENT, as scheduled.  Repeat audiological evaluation in one or two years to monitor, or sooner if needed.

## 2023-02-09 ENCOUNTER — OFFICE VISIT (OUTPATIENT)
Dept: OPHTHALMOLOGY | Facility: CLINIC | Age: 63
End: 2023-02-09
Payer: COMMERCIAL

## 2023-02-09 DIAGNOSIS — Z98.42 CATARACT EXTRACTION STATUS, LEFT: ICD-10-CM

## 2023-02-09 DIAGNOSIS — H35.342 LAMELLAR MACULAR HOLE OF LEFT EYE: Primary | ICD-10-CM

## 2023-02-09 DIAGNOSIS — H35.3122 INTERMEDIATE STAGE NONEXUDATIVE AGE-RELATED MACULAR DEGENERATION OF LEFT EYE: ICD-10-CM

## 2023-02-09 DIAGNOSIS — D86.9 SARCOIDOSIS: ICD-10-CM

## 2023-02-09 DIAGNOSIS — Z98.41 CATARACT EXTRACTION STATUS, RIGHT: ICD-10-CM

## 2023-02-09 PROCEDURE — 1160F PR REVIEW ALL MEDS BY PRESCRIBER/CLIN PHARMACIST DOCUMENTED: ICD-10-PCS | Mod: CPTII,S$GLB,, | Performed by: OPHTHALMOLOGY

## 2023-02-09 PROCEDURE — 99999 PR PBB SHADOW E&M-EST. PATIENT-LVL III: CPT | Mod: PBBFAC,,, | Performed by: OPHTHALMOLOGY

## 2023-02-09 PROCEDURE — 92014 COMPRE OPH EXAM EST PT 1/>: CPT | Mod: S$GLB,,, | Performed by: OPHTHALMOLOGY

## 2023-02-09 PROCEDURE — 1159F PR MEDICATION LIST DOCUMENTED IN MEDICAL RECORD: ICD-10-PCS | Mod: CPTII,S$GLB,, | Performed by: OPHTHALMOLOGY

## 2023-02-09 PROCEDURE — 1160F RVW MEDS BY RX/DR IN RCRD: CPT | Mod: CPTII,S$GLB,, | Performed by: OPHTHALMOLOGY

## 2023-02-09 PROCEDURE — 1159F MED LIST DOCD IN RCRD: CPT | Mod: CPTII,S$GLB,, | Performed by: OPHTHALMOLOGY

## 2023-02-09 PROCEDURE — 92014 PR EYE EXAM, EST PATIENT,COMPREHESV: ICD-10-PCS | Mod: S$GLB,,, | Performed by: OPHTHALMOLOGY

## 2023-02-09 PROCEDURE — 92134 POSTERIOR SEGMENT OCT RETINA (OCULAR COHERENCE TOMOGRAPHY)-BOTH EYES: ICD-10-PCS | Mod: S$GLB,,, | Performed by: OPHTHALMOLOGY

## 2023-02-09 PROCEDURE — 99999 PR PBB SHADOW E&M-EST. PATIENT-LVL III: ICD-10-PCS | Mod: PBBFAC,,, | Performed by: OPHTHALMOLOGY

## 2023-02-09 PROCEDURE — 92134 CPTRZ OPH DX IMG PST SGM RTA: CPT | Mod: S$GLB,,, | Performed by: OPHTHALMOLOGY

## 2023-02-09 NOTE — PROGRESS NOTES
===============================  Date today is 2/9/2023  Janna Sheffield is a 62 y.o. female  Last visit [facility]: :2/14/2022   Last visit eye dept. Visit date not found    Corrected distance visual acuity was 20/30 in the right eye and 20/400 in the left eye.  Tonometry       Tonometry (Applanation, 11:04 AM)         Right Left    Pressure 14 14                  Wearing Rx       Wearing Rx         Sphere Cylinder Axis Add    Right -9.00 +2.50 100 +2.50    Left -9.50 +2.50 080 +2.50      Type: PAL                  Manifest Refraction       Manifest Refraction         Sphere Cylinder [place] Dist [place]    Right -10.75 +2.00 075 20/20    Left -10.75 +1.50 090 20/80                  Not recorded       Chief Complaint   Patient presents with    Macular Degeneration     Yearly exam     HPI     Macular Degeneration     Additional comments: Yearly exam           Comments    [facility] Patient       1. H/O RETINA DETACHMENT OS   2. LATTICE / COBBLESTONE OU   3. MAC HOLE OS   HIGH MYOPE -9   4. PERIPHERAL PIGMENTATION   5. H/O SARCOID UVEITIS   6. GUTTATA OU   7. PCIOL OS   PCIOL OD W/ Kenalog 3/24/2022 (set for [facility]) / CDE: 11.67/ SN60WF 22.5                  Last edited by Izabel Gama on 2/9/2023 10:41 AM.      Problem List Items Addressed This Visit          Eye/Vision problems    Macular degeneration, age related, nonexudative - Left Eye    Relevant Orders    Posterior Segment OCT Retina-Both eyes (Completed)     Other Visit Diagnoses       Lamellar macular hole of left eye    -  Primary    Relevant Orders    Posterior Segment OCT Retina-Both eyes (Completed)    Sarcoidosis        Cataract extraction status, right        Cataract extraction status, left              Instructed to call 24/7 for any worsening of vision, visual distortion or pain.  Check OU independently daily.    Gave my office and personal cell phone number.  ________________  2/9/2023 today  Janna Sheffield    OS shearing [facility]  OCT stable  Dry SMD  PCIOL  OU    RTC 1 year  Instructed to call 24/7 for any worsening of vision or symptoms. Check OU daily.   Gave my office and cell phone number.      =============================

## 2023-04-19 DIAGNOSIS — Z12.31 OTHER SCREENING MAMMOGRAM: ICD-10-CM

## 2023-04-26 ENCOUNTER — TELEPHONE (OUTPATIENT)
Dept: INTERNAL MEDICINE | Facility: CLINIC | Age: 63
End: 2023-04-26
Payer: COMMERCIAL

## 2023-04-26 RX ORDER — ROSUVASTATIN CALCIUM 40 MG/1
TABLET, COATED ORAL
Qty: 90 TABLET | Refills: 0 | Status: SHIPPED | OUTPATIENT
Start: 2023-04-26 | End: 2023-10-26

## 2023-04-26 NOTE — TELEPHONE ENCOUNTER
Care Due:                  Date            Visit Type   Department     Provider  --------------------------------------------------------------------------------                                EP -                              PRIMARY      [facility] INTER[facility]  Last Visit: 02-      CARE ([facility])   MEDICINE       Ajit Botello  Next Visit: None Scheduled  None         None Found                                                            Last  Test          Frequency    Reason                     Performed    Due Date  --------------------------------------------------------------------------------    Office Visit  12 months..  hydroCHLOROthiazide......  02- 02-    [facility] Embedded Care Gaps. Reference number: [id number]. 4/26/2023   11:58:01 AM CDT

## 2023-04-26 NOTE — TELEPHONE ENCOUNTER
----- Message from Evelyn Colindres sent at 4/26/2023 11:06 AM CDT -----  Type:  RX Refill Request    Who Called: pt  Refill or New Rx:refill  RX Name and Strength:rosuvastatin (CRESTOR) 40 MG Tab  How is the patient currently taking it? (ex. 1XDay):  Is this a 30 day or 90 day RX:90  Preferred Pharmacy with phone number:  Local or Mail Order:;local  Ordering Provider:  Would the patient rather a call back or a response via [facility]? call  Best Call Back Number:[phone]  Additional Information:

## 2023-04-26 NOTE — TELEPHONE ENCOUNTER
----- Message from Jeimy Rivers sent at 4/26/2023  2:32 PM CDT -----  Contact: Janna  Type:  Patient Returning Call    Who Called: Janna  Who Left Message for Patient: Griselda Lockhart  Does the patient know what this is regarding?: follow up appointment and labs after refill rx approved  Would the patient rather a call back or a response via [facility]? call  Best Call Back Number:[phone]   Additional Information: Patient missed call and is requesting a call back.  Thank you,  RH

## 2023-05-01 ENCOUNTER — OFFICE VISIT (OUTPATIENT)
Dept: INTERNAL MEDICINE | Facility: CLINIC | Age: 63
End: 2023-05-01
Payer: COMMERCIAL

## 2023-05-01 VITALS
HEART RATE: 75 BPM | OXYGEN SATURATION: 98 % | DIASTOLIC BLOOD PRESSURE: 86 MMHG | BODY MASS INDEX: 31.87 KG/M2 | SYSTOLIC BLOOD PRESSURE: 124 MMHG | TEMPERATURE: 96 F | WEIGHT: 203.06 LBS | HEIGHT: 67 IN

## 2023-05-01 DIAGNOSIS — I10 PRIMARY HYPERTENSION: ICD-10-CM

## 2023-05-01 DIAGNOSIS — M17.11 PRIMARY OSTEOARTHRITIS OF RIGHT KNEE: ICD-10-CM

## 2023-05-01 DIAGNOSIS — E78.00 PURE HYPERCHOLESTEROLEMIA: Primary | ICD-10-CM

## 2023-05-01 DIAGNOSIS — E66.01 CLASS 2 SEVERE OBESITY DUE TO EXCESS CALORIES WITH SERIOUS COMORBIDITY IN ADULT, UNSPECIFIED BMI: ICD-10-CM

## 2023-05-01 PROCEDURE — 3008F BODY MASS INDEX DOCD: CPT | Mod: CPTII,S$GLB,, | Performed by: PEDIATRICS

## 2023-05-01 PROCEDURE — 3079F PR MOST RECENT DIASTOLIC BLOOD PRESSURE 80-89 MM HG: ICD-10-PCS | Mod: CPTII,S$GLB,, | Performed by: PEDIATRICS

## 2023-05-01 PROCEDURE — 99999 PR PBB SHADOW E&M-EST. PATIENT-LVL IV: CPT | Mod: PBBFAC,,, | Performed by: PEDIATRICS

## 2023-05-01 PROCEDURE — 1159F MED LIST DOCD IN RCRD: CPT | Mod: CPTII,S$GLB,, | Performed by: PEDIATRICS

## 2023-05-01 PROCEDURE — 1159F PR MEDICATION LIST DOCUMENTED IN MEDICAL RECORD: ICD-10-PCS | Mod: CPTII,S$GLB,, | Performed by: PEDIATRICS

## 2023-05-01 PROCEDURE — 3008F PR BODY MASS INDEX (BMI) DOCUMENTED: ICD-10-PCS | Mod: CPTII,S$GLB,, | Performed by: PEDIATRICS

## 2023-05-01 PROCEDURE — 1160F RVW MEDS BY RX/DR IN RCRD: CPT | Mod: CPTII,S$GLB,, | Performed by: PEDIATRICS

## 2023-05-01 PROCEDURE — 1160F PR REVIEW ALL MEDS BY PRESCRIBER/CLIN PHARMACIST DOCUMENTED: ICD-10-PCS | Mod: CPTII,S$GLB,, | Performed by: PEDIATRICS

## 2023-05-01 PROCEDURE — 99214 PR OFFICE/OUTPT VISIT, EST, LEVL IV, 30-39 MIN: ICD-10-PCS | Mod: S$GLB,,, | Performed by: PEDIATRICS

## 2023-05-01 PROCEDURE — 3074F PR MOST RECENT SYSTOLIC BLOOD PRESSURE < 130 MM HG: ICD-10-PCS | Mod: CPTII,S$GLB,, | Performed by: PEDIATRICS

## 2023-05-01 PROCEDURE — 3079F DIAST BP 80-89 MM HG: CPT | Mod: CPTII,S$GLB,, | Performed by: PEDIATRICS

## 2023-05-01 PROCEDURE — 3074F SYST BP LT 130 MM HG: CPT | Mod: CPTII,S$GLB,, | Performed by: PEDIATRICS

## 2023-05-01 PROCEDURE — 99214 OFFICE O/P EST MOD 30 MIN: CPT | Mod: S$GLB,,, | Performed by: PEDIATRICS

## 2023-05-01 PROCEDURE — 99999 PR PBB SHADOW E&M-EST. PATIENT-LVL IV: ICD-10-PCS | Mod: PBBFAC,,, | Performed by: PEDIATRICS

## 2023-05-01 NOTE — PATIENT INSTRUCTIONS
Call insurance to see if obesity coverage not diabetes for Mounjaro, trulicity, ozempic, rybelsus or wegovy.

## 2023-05-01 NOTE — PROGRESS NOTES
Subjective     Patient ID: Janna Sheffield is a 62 y.o. female.    Chief Complaint: Follow-up    Janna Sheffield is a 62 y.o. female who presents to the clinic for a follow up.     1) HTN: No home B/P monitoring. No HTNive symptoms  2) Hypercholesterol: compliant with statin. No D&E.  3) Fibromyalgia:Gabapentin working(uses prn)  4) GERD: pantoprazole is helping- occ flares. S/P choley.  5) LIPIDS:following D&E, tolerating and compliant with med(s).  6) Sarcoid/fibromyalgia/immunocompromised: seeing pulmonary and rheum. Currently off meds.   7) Obesity- sedentary, not exercising  8) DJD: currently stable     LABS REVIEWED AND DISCUSSED WITH PATIENT:     Review of Systems   Constitutional:  Negative for activity change, appetite change, chills, diaphoresis, fatigue, fever and unexpected weight change.   HENT:  Negative for nasal congestion, ear pain, mouth sores, nosebleeds, postnasal drip, rhinorrhea, sneezing and sore throat.    Eyes:  Negative for photophobia, pain, discharge, redness and visual disturbance.   Respiratory:  Negative for cough, chest tightness, shortness of breath, wheezing and stridor.    Cardiovascular:  Negative for chest pain, palpitations and leg swelling.   Gastrointestinal:  Negative for constipation, diarrhea, nausea and vomiting.   Genitourinary:  Negative for decreased urine volume, difficulty urinating, dysuria, flank pain, frequency, hematuria and urgency.   Musculoskeletal:  Negative for arthralgias, back pain, joint swelling, neck pain and neck stiffness.   Integumentary:  Negative for color change and rash.   Neurological:  Negative for dizziness, syncope, speech difficulty, weakness, light-headedness and headaches.   Hematological:  Negative for adenopathy. Does not bruise/bleed easily.   Psychiatric/Behavioral:  Negative for confusion, decreased concentration, dysphoric mood, hallucinations, sleep disturbance and suicidal ideas. The patient is not nervous/anxious.    All  other systems reviewed and are negative.       Objective     Physical Exam  Vitals and nursing note reviewed.   Constitutional:       General: She is not in acute distress.     Appearance: She is well-developed. She is obese.   Neck:      Thyroid: No thyromegaly.      Vascular: No JVD.   Cardiovascular:      Rate and Rhythm: Normal rate and regular rhythm.      Heart sounds: Normal heart sounds. No murmur heard.  Pulmonary:      Effort: Pulmonary effort is normal. No respiratory distress.      Breath sounds: Normal breath sounds. No wheezing or rales.   Abdominal:      General: There is no distension.      Palpations: Abdomen is soft. There is no mass.      Tenderness: There is no abdominal tenderness. There is no guarding.   Musculoskeletal:      Right lower leg: No edema.      Left lower leg: No edema.   Lymphadenopathy:      Cervical: No cervical adenopathy.   Skin:     Capillary Refill: Capillary refill takes less than 2 seconds.      Findings: No rash.   Neurological:      General: No focal deficit present.      Mental Status: She is alert and oriented to person, place, and time.      Cranial Nerves: No cranial nerve deficit.      Coordination: Coordination normal.   Psychiatric:         Mood and Affect: Mood normal.         Behavior: Behavior normal.         Thought Content: Thought content normal.         Judgment: Judgment normal.        Assessment and Plan     Problem List Items Addressed This Visit       Class 2 obesity in adult    HTN (hypertension)    Relevant Orders    Hemoglobin A1C    Lipid Panel    Comprehensive Metabolic Panel    Primary osteoarthritis of right knee    Pure hypercholesterolemia - Primary     Pure hypercholesterolemia    Primary hypertension  -     Hemoglobin A1C; Future; Expected date: 05/01/2023  -     Lipid Panel; Future; Expected date: 05/01/2023  -     Comprehensive Metabolic Panel; Future; Expected date: 05/01/2023    Primary osteoarthritis of right knee    Class 2 severe  obesity due to excess calories with serious comorbidity in adult, unspecified BMI    BP and lipids at goal. Maintain meds, D&E, weight loss, follow up 6 months with labs. GLP1 inhibitor and its RB/CI/SE discussed with Pt. She will check with insurance if covered, if so will follow up in 6 weeks once started.

## 2023-05-08 ENCOUNTER — TELEPHONE (OUTPATIENT)
Dept: INTERNAL MEDICINE | Facility: CLINIC | Age: 63
End: 2023-05-08
Payer: COMMERCIAL

## 2023-05-08 DIAGNOSIS — E66.01 CLASS 2 SEVERE OBESITY DUE TO EXCESS CALORIES WITH SERIOUS COMORBIDITY IN ADULT, UNSPECIFIED BMI: Primary | ICD-10-CM

## 2023-05-08 RX ORDER — SEMAGLUTIDE 0.68 MG/ML
0.25 INJECTION, SOLUTION SUBCUTANEOUS
Qty: 3 ML | Refills: 3 | Status: SHIPPED | OUTPATIENT
Start: 2023-05-08 | End: 2023-06-27

## 2023-05-08 NOTE — TELEPHONE ENCOUNTER
----- Message from Ayana Gandhi sent at 5/8/2023  2:35 PM CDT -----  Contact: [phone]  Pt is calling regarding medication, ozempic, insurance # [id number] (verbal authorization)  . Please call back at [phone]  . Thanksdj

## 2023-05-08 NOTE — TELEPHONE ENCOUNTER
Ozempic sent with dx for obesity as PA. If not covered, patient will have to take up with insurance. If covererd telemed in 6 weeks.

## 2023-05-08 NOTE — TELEPHONE ENCOUNTER
Returned call to pt and she states she contacted her insurance regarding covering ozempic and she states they do. I advised her that I will send the message to Dr. Botello to have medication sent to the pharmacy.

## 2023-05-18 ENCOUNTER — TELEPHONE (OUTPATIENT)
Dept: INTERNAL MEDICINE | Facility: CLINIC | Age: 63
End: 2023-05-18
Payer: COMMERCIAL

## 2023-05-18 NOTE — TELEPHONE ENCOUNTER
----- Message from Dominique Gandhi sent at 5/18/2023  1:25 PM CDT -----  Contact: Janna  .Type:  Patient Returning Call    Who Called:Janna  Who Left Message for Patient:Nurse  Does the patient know what this is regarding?:Yes  Would the patient rather a call back or a response via [facility]? Call back  Best Call Back Number:[phone]  Additional Information: NA          Thanks  KT

## 2023-05-18 NOTE — TELEPHONE ENCOUNTER
Returned call to pt to advise her that the ozempic is not covered and the pharmacy is requesting an alternative. I advised her to contact her insurance and return call with an alternative medication. He verbalized understanding.

## 2023-05-18 NOTE — TELEPHONE ENCOUNTER
----- Message from Danette Jesus sent at 5/18/2023  9:24 AM CDT -----  Contact: [phone]  [facility] pharmacy requesting a call back at [phone] in regards to Ozempic. She stated the patient insurance doesn't cover the medication and she would like to know if an alternative can be prescribed. Thanks KD

## 2023-06-14 DIAGNOSIS — M54.12 CERVICAL RADICULAR PAIN: ICD-10-CM

## 2023-06-14 NOTE — TELEPHONE ENCOUNTER
Called patient regarding Rx refill for gabapentin received from pt pharmacy. Pt states that she takes one tablet at night and 1 tablet at night if needed. Pharmacy Confirmed. Informed pt that she needs to scheduled an appointment for further refills. Appointment schedule for 07/26

## 2023-06-15 RX ORDER — GABAPENTIN 300 MG/1
CAPSULE ORAL
Qty: 90 CAPSULE | Refills: 0 | Status: SHIPPED | OUTPATIENT
Start: 2023-06-15 | End: 2023-07-26 | Stop reason: SDUPTHER

## 2023-06-27 ENCOUNTER — HOSPITAL ENCOUNTER (OUTPATIENT)
Dept: RADIOLOGY | Facility: HOSPITAL | Age: 63
Discharge: HOME | End: 2023-06-27
Attending: PEDIATRICS
Payer: COMMERCIAL

## 2023-06-27 ENCOUNTER — OFFICE VISIT (OUTPATIENT)
Dept: OBSTETRICS AND GYNECOLOGY | Facility: CLINIC | Age: 63
End: 2023-06-27
Payer: COMMERCIAL

## 2023-06-27 VITALS
WEIGHT: 200.63 LBS | SYSTOLIC BLOOD PRESSURE: 118 MMHG | BODY MASS INDEX: 31.49 KG/M2 | HEIGHT: 67 IN | DIASTOLIC BLOOD PRESSURE: 80 MMHG

## 2023-06-27 DIAGNOSIS — Z78.0 MENOPAUSE: ICD-10-CM

## 2023-06-27 DIAGNOSIS — Z01.419 ROUTINE GYNECOLOGICAL EXAMINATION: Primary | ICD-10-CM

## 2023-06-27 DIAGNOSIS — Z12.31 OTHER SCREENING MAMMOGRAM: ICD-10-CM

## 2023-06-27 DIAGNOSIS — N81.10 BADEN-WALKER GRADE 1 CYSTOCELE: ICD-10-CM

## 2023-06-27 PROBLEM — E78.5 HYPERLIPIDEMIA: Status: ACTIVE | Noted: 2023-06-27

## 2023-06-27 PROBLEM — Z51.81 MEDICATION MONITORING ENCOUNTER: Chronic | Status: RESOLVED | Noted: 2017-02-20 | Resolved: 2023-06-27

## 2023-06-27 PROBLEM — G56.03 BILATERAL CARPAL TUNNEL SYNDROME: Status: RESOLVED | Noted: 2022-02-04 | Resolved: 2023-06-27

## 2023-06-27 PROBLEM — Z74.09 DECREASED FUNCTIONAL MOBILITY AND ENDURANCE: Status: RESOLVED | Noted: 2022-02-15 | Resolved: 2023-06-27

## 2023-06-27 PROBLEM — M54.2 PAINFUL CERVICAL ROM: Status: RESOLVED | Noted: 2022-02-15 | Resolved: 2023-06-27

## 2023-06-27 PROBLEM — M79.18 CERVICAL MYOFASCIAL PAIN SYNDROME: Status: RESOLVED | Noted: 2022-02-04 | Resolved: 2023-06-27

## 2023-06-27 PROBLEM — M17.11 PRIMARY OSTEOARTHRITIS OF RIGHT KNEE: Status: RESOLVED | Noted: 2019-05-07 | Resolved: 2023-06-27

## 2023-06-27 PROBLEM — M47.22 OSTEOARTHRITIS OF SPINE WITH RADICULOPATHY, CERVICAL REGION: Status: RESOLVED | Noted: 2017-09-18 | Resolved: 2023-06-27

## 2023-06-27 PROBLEM — R29.898 ARM WEAKNESS: Status: RESOLVED | Noted: 2022-02-04 | Resolved: 2023-06-27

## 2023-06-27 PROBLEM — R94.31 EKG ABNORMALITIES: Status: RESOLVED | Noted: 2022-02-08 | Resolved: 2023-06-27

## 2023-06-27 PROBLEM — M25.612 DECREASED RANGE OF MOTION OF LEFT SHOULDER: Status: RESOLVED | Noted: 2022-02-15 | Resolved: 2023-06-27

## 2023-06-27 PROBLEM — R94.2 ABNORMAL DIFFUSION CAPACITY DETERMINED BY PULMONARY FUNCTION TEST: Status: RESOLVED | Noted: 2019-03-12 | Resolved: 2023-06-27

## 2023-06-27 PROBLEM — Z12.11 ENCOUNTER FOR SCREENING COLONOSCOPY: Status: RESOLVED | Noted: 2022-03-14 | Resolved: 2023-06-27

## 2023-06-27 PROBLEM — M54.12 CERVICAL RADICULOPATHY: Status: RESOLVED | Noted: 2017-09-28 | Resolved: 2023-06-27

## 2023-06-27 PROCEDURE — 99999 PR PBB SHADOW E&M-EST. PATIENT-LVL III: ICD-10-PCS | Mod: PBBFAC,,, | Performed by: OBSTETRICS & GYNECOLOGY

## 2023-06-27 PROCEDURE — 3074F PR MOST RECENT SYSTOLIC BLOOD PRESSURE < 130 MM HG: ICD-10-PCS | Mod: CPTII,S$GLB,, | Performed by: OBSTETRICS & GYNECOLOGY

## 2023-06-27 PROCEDURE — 1159F MED LIST DOCD IN RCRD: CPT | Mod: CPTII,S$GLB,, | Performed by: OBSTETRICS & GYNECOLOGY

## 2023-06-27 PROCEDURE — 3074F SYST BP LT 130 MM HG: CPT | Mod: CPTII,S$GLB,, | Performed by: OBSTETRICS & GYNECOLOGY

## 2023-06-27 PROCEDURE — 3008F BODY MASS INDEX DOCD: CPT | Mod: CPTII,S$GLB,, | Performed by: OBSTETRICS & GYNECOLOGY

## 2023-06-27 PROCEDURE — 1160F PR REVIEW ALL MEDS BY PRESCRIBER/CLIN PHARMACIST DOCUMENTED: ICD-10-PCS | Mod: CPTII,S$GLB,, | Performed by: OBSTETRICS & GYNECOLOGY

## 2023-06-27 PROCEDURE — 77067 SCR MAMMO BI INCL CAD: CPT | Mod: 26,,, | Performed by: RADIOLOGY

## 2023-06-27 PROCEDURE — 77067 SCR MAMMO BI INCL CAD: CPT | Mod: TC

## 2023-06-27 PROCEDURE — 3079F PR MOST RECENT DIASTOLIC BLOOD PRESSURE 80-89 MM HG: ICD-10-PCS | Mod: CPTII,S$GLB,, | Performed by: OBSTETRICS & GYNECOLOGY

## 2023-06-27 PROCEDURE — 3008F PR BODY MASS INDEX (BMI) DOCUMENTED: ICD-10-PCS | Mod: CPTII,S$GLB,, | Performed by: OBSTETRICS & GYNECOLOGY

## 2023-06-27 PROCEDURE — 1160F RVW MEDS BY RX/DR IN RCRD: CPT | Mod: CPTII,S$GLB,, | Performed by: OBSTETRICS & GYNECOLOGY

## 2023-06-27 PROCEDURE — 77067 MAMMO DIGITAL SCREENING BILAT WITH TOMO: ICD-10-PCS | Mod: 26,,, | Performed by: RADIOLOGY

## 2023-06-27 PROCEDURE — 99386 PR PREVENTIVE VISIT,NEW,40-64: ICD-10-PCS | Mod: S$GLB,,, | Performed by: OBSTETRICS & GYNECOLOGY

## 2023-06-27 PROCEDURE — 3079F DIAST BP 80-89 MM HG: CPT | Mod: CPTII,S$GLB,, | Performed by: OBSTETRICS & GYNECOLOGY

## 2023-06-27 PROCEDURE — 77063 BREAST TOMOSYNTHESIS BI: CPT | Mod: 26,,, | Performed by: RADIOLOGY

## 2023-06-27 PROCEDURE — 77063 MAMMO DIGITAL SCREENING BILAT WITH TOMO: ICD-10-PCS | Mod: 26,,, | Performed by: RADIOLOGY

## 2023-06-27 PROCEDURE — 1159F PR MEDICATION LIST DOCUMENTED IN MEDICAL RECORD: ICD-10-PCS | Mod: CPTII,S$GLB,, | Performed by: OBSTETRICS & GYNECOLOGY

## 2023-06-27 PROCEDURE — 99999 PR PBB SHADOW E&M-EST. PATIENT-LVL III: CPT | Mod: PBBFAC,,, | Performed by: OBSTETRICS & GYNECOLOGY

## 2023-06-27 PROCEDURE — 99386 PREV VISIT NEW AGE 40-64: CPT | Mod: S$GLB,,, | Performed by: OBSTETRICS & GYNECOLOGY

## 2023-06-27 NOTE — PROGRESS NOTES
"  Subjective:       Patient ID: Janna Sheffield is a 62 y.o. female.    Chief Complaint:  Annual Exam      History of Present Illness  HPI  Patient presents to day for annual exam , postmenopausal.   Benign hyst. Over 20 years ago    Ovaries are INTACT    Epic history corrected today   No gyn complaints, no vaginal bleeding or pelvic pain noted.   Hormonal therapy reviewed and discussed. On none   Known cystocele on exam with no symptoms   Preventive screening exam indication and testing reviewed and discussed.  Pap not indicate   Pelvic every 2 years   Mammogram is up to date     Health Maintenance   Topic Date Due    Mammogram  2023    Lipid Panel  2023    DEXA Scan  2024    TETANUS VACCINE  2025    Hepatitis C Screening  Completed     GYN & OB History  No LMP recorded. Patient has had a hysterectomy.   Date of Last Pap: No result found    OB History    Para Term  AB Living   6 3 2 1 3 2   SAB IAB Ectopic Multiple Live Births   3       2      # Outcome Date GA Lbr Hunter/2nd Weight Sex Delivery Anes PTL Lv   6  92    F Vag-Spont   CANDICE   5 Term 82    F Vag-Spont EPI  CANDICE   4 Term            3 SAB            2 SAB            1 SAB                Review of Systems  Review of Systems        Objective:   /80   Ht 5' 7" (1.702 m)   Wt 91 kg (200 lb 9.9 oz)   BMI 31.42 kg/m²    Physical Exam:   Constitutional: She appears well-developed and well-nourished. No distress.      Neck: No JVD present. No thyroid mass and no thyromegaly present.    Cardiovascular:  Normal rate and regular rhythm.                  Abdominal: Soft. Bowel sounds are normal. No hernia. Hernia confirmed negative in the ventral area, confirmed negative in the right inguinal area and confirmed negative in the left inguinal area.     Genitourinary:    Vagina and rectum normal.   The external female genitalia was normal.   No external genitalia lesions identified,Labial bartholins " normal.There is no rash, tenderness, lesion or injury on the right labia. There is no rash, tenderness, lesion or injury on the left labia. Right adnexum displays no mass, no tenderness and no fullness. Left adnexum displays no mass, no tenderness and no fullness. Vaginal cuff normal.  No erythema,  no vaginal discharge, tenderness or bleeding in the vagina.    No foreign body in the vagina.   Cervix is absent.Uterus is absent. Urethral Meatus exhibits: urethral lesion and prolapsedUrethra findings: no tendernessBladder findings: no bladder tenderness                   Assessment:        1. Routine gynecological examination    2. Menopause    3. Bo[place]-Walker grade 1 cystocele                Plan:            Janna was seen today for annual exam.    Diagnoses and all orders for this visit:    Routine gynecological examination    Menopause    Bo[place]-Walker grade 1 cystocele  Comments:  asymptomatic,  discussed with patient

## 2023-06-30 ENCOUNTER — TELEPHONE (OUTPATIENT)
Dept: RADIOLOGY | Facility: HOSPITAL | Age: 63
End: 2023-06-30
Payer: COMMERCIAL

## 2023-07-05 ENCOUNTER — HOSPITAL ENCOUNTER (OUTPATIENT)
Dept: RADIOLOGY | Facility: HOSPITAL | Age: 63
Discharge: HOME | End: 2023-07-05
Attending: PEDIATRICS
Payer: COMMERCIAL

## 2023-07-05 DIAGNOSIS — R92.8 ABNORMAL MAMMOGRAM: ICD-10-CM

## 2023-07-05 PROCEDURE — 77065 DX MAMMO INCL CAD UNI: CPT | Mod: 26,LT,, | Performed by: RADIOLOGY

## 2023-07-05 PROCEDURE — 77061 BREAST TOMOSYNTHESIS UNI: CPT | Mod: TC,LT

## 2023-07-05 PROCEDURE — 77061 BREAST TOMOSYNTHESIS UNI: CPT | Mod: 26,LT,, | Performed by: RADIOLOGY

## 2023-07-05 PROCEDURE — 77065 MAMMO DIGITAL DIAGNOSTIC LEFT WITH TOMO: ICD-10-PCS | Mod: 26,LT,, | Performed by: RADIOLOGY

## 2023-07-05 PROCEDURE — 76642 US BREAST LEFT LIMITED: ICD-10-PCS | Mod: 26,LT,, | Performed by: RADIOLOGY

## 2023-07-05 PROCEDURE — 77061 MAMMO DIGITAL DIAGNOSTIC LEFT WITH TOMO: ICD-10-PCS | Mod: 26,LT,, | Performed by: RADIOLOGY

## 2023-07-05 PROCEDURE — 76642 ULTRASOUND BREAST LIMITED: CPT | Mod: TC,LT

## 2023-07-05 PROCEDURE — 76642 ULTRASOUND BREAST LIMITED: CPT | Mod: 26,LT,, | Performed by: RADIOLOGY

## 2023-07-25 NOTE — PROGRESS NOTES
Established Patient Chronic Pain Note     Referring Physician: self    PCP: HEIDE Botello Jr, MD    Chief Complaint:   Chief Complaint   Patient presents with    Neck Pain    Shoulder Pain     Right    Neck with RUE radicular pain        Interval History (7/26/2023): Patient was last seen on Visit date not found. she presents today for follow-up for medication refill and discussion of her pain. she feels the gabapentin medication is providing some pain relief, but she only takes it in the morning. When pain is flared, she will take 2 gabapentin + Tylenol (acetaminophen). So, she is unsure how helpful gabapentin has been with pain relief.  No major SE from medications.  Pain started to return from last injection about 2 months ago. Patient reports pain as 7/10 today.     Interval History (5/17/2022): Janna Sheffield presents today for follow-up visit.  she underwent right C6/7 TF HIMANSHU with Dr. Kramer on 4/18/22.  The patient reports that she is/was better following the procedure.  she reports at least 80% pain relief.  The changes lasted 4 weeks so far.  The changes have continued through this visit.  Patient reports pain as 3/10 today.  She is feeling much better overall and felt pain relief the day of the procedure.  She continues to take gabapentin 300 mg in the morning and 600 mg at night with pain relief, and she is enquiring about correct dosage today as she was previously prescribed 600 TID.    Interval History (3/16/2022):  Janna Sheffield presents today for follow-up visit.  Patient was last seen on 1/18/2022. At that visit, the plan was to complete BUE EMG/NCS and see Neurosurgery. She saw Dr. Dover recently who referred her back to us for left cervical TF HIMANSHU.  She is here today under the impression that she was seen in primary care for a sinus infection and also for evaluation of right arm pain, which she is wearing an arm brace for.  She has continued to have left cervical radiculopathy, but she  is also now having this right forearm pain.  She does feel that it occasionally radiates from her neck all the way down her arm, but the most bothersome area is localized in lateral forearm.  Patient reports pain as 10/10 today.    Interval history 01/18/2022  Patient presents status post C6-7 interlaminar epidural steroid injection with left paramedian approach 12/21/2021.  Patient reports no discernible relief following her cervical C6-7 epidural steroid injection.  Today she again reports neck pain which radiates into the trapezius distribution, today on the left in C5-6 distribution and down the left upper extremity to the cubital fossa.  Patient reports she was unclear on gabapentin titration and discontinued this medication following her injection.  Patient continues to report significant weakness in the upper extremities requiring help in activities of daily living such as brushing her hair reporting on her clothes.    HPI 11/16/2021  Janna Sheffield is a 61 y.o. female with past medical history significant for fibromyalgia, history of left total knee arthroplasty, hypertension, hypercholesterolemia, pulmonary sarcoidosis on immunosuppression, chronic restrictive lung disease who presents to the clinic for the evaluation of neck and upper extremity pain.  Today patient reports pain which has been longstanding over several years with particularly exacerbated over the last 5 months.  Today patient reports pain is constant and is rated as a 9/10.  Patient reports pain in bilateral cervical paraspinous musculature which radiates down bilateral upper extremities in C7-8 distribution to the hand.  Pain is described as cramping with numbness and tingling in bilateral hands.  Pain is exacerbated with cervical flexion and extension and reaching and pulling motions with bilateral upper extremities.  Patient does report associated weakness in bilateral upper extremities.  Pain has been improved with prior cervical  transforaminal epidural steroid injection and gabapentin medication.  Patient denies any lower extremity weakness, gait ataxia or imbalance.  Patient reports significant motor weakness and loss of sensations.  Patient denies night fever/night sweats, urinary incontinence, bowel incontinence and significant weight loss.  Of note patient last saw Dr. Isabel October 2017 following cervical TF epidural steroid injection with significant relief and continuation of gabapentin.      Pain Disability Index Review:  Last 3 PDI Scores 7/26/2023 5/17/2022 1/18/2022   Pain Disability Index (PDI) 41 35 51       Non-Pharmacologic Treatments:  Physical Therapy/Home Exercise: no  Ice/Heat:yes  TENS: no  Acupuncture: no  Massage: no  Chiropractic: no    Other: no      Pain Medications:  - Adjuvant Medications: Neurontin (Gabapentin) and Tylenol (Acetaminophen)    Pain Procedures:   -09/28/2017:  Left-sided C6-7 TF HIMANSHU - Dr. Isabel with significant pain relief   -12/21/2021:  C6-7 IL HIMANSHU with left paramedian approach with limited pain relief - Dr. Marsh  -right C6/7 TF HIMANSHU with Dr. Kramer on 4/18/22 with at least 80% pain relief for about a year          Review of Systems:   GENERAL:  No weight loss, malaise or fevers.  HEENT:   No recent changes in vision or hearing  NECK:  Negative for lumps, no difficulty with swallowing.  RESPIRATORY:  Negative for cough, wheezing or shortness of breath, patient denies any recent URI.  CARDIOVASCULAR:  Negative for chest pain, leg swelling or palpitations.  GI:  Negative for abdominal discomfort, blood in stools or black stools or change in bowel habits.  MUSCULOSKELETAL:  See HPI.  SKIN:  Negative for lesions, rash, and itching.  PSYCH:  No mood disorder or recent psychosocial stressors.   HEMATOLOGY/LYMPHOLOGY:  Negative for prolonged bleeding, bruising easily or swollen nodes.    NEURO:   No history of headaches, syncope, paralysis, seizures or tremors.  All other reviewed and negative other than  "HPI.    OBJECTIVE:    Physical Exam:  Vitals:    07/26/23 0909   BP: 116/80   Pulse: 71   Resp: 17   Weight: 93 kg (205 lb 0.4 oz)   Height: 5' 7" (1.702 m)   PainSc:   7    Body mass index is 32.11 kg/m².   (reviewed on 7/26/2023)    GENERAL: Well appearing, in no acute distress, alert and oriented x3.  PSYCH:  Mood and affect appropriate.  SKIN: Skin color, texture, turgor normal, no rashes or lesions.  HEAD/FACE:  Normocephalic, atraumatic. Cranial nerves grossly intact.    NECK: pain to palpation over the cervical paraspinous muscles. Spurling Positive on right.  pain with neck flexion, extension, or lateral flexion.   Normaltesting biceps, triceps and brachioradialis bilaterally.    5/5 strength testing deltoid, biceps, triceps, wrist extensor, wrist flexor and ulnar intrinsics bilaterally.    Normal  strength bilaterally    PULM: No evidence of respiratory difficulty, symmetric chest rise.  GI:  Soft and non-tender.    NEURO: BUE & BLE extremity coordination and muscle stretch reflexes are physiologic and symmetric. No loss of sensation is noted.  GAIT: normal.      Imaging (Reviewed on 7/26/2023):    2/4/22 BUE EMG/NCS  1. ABNORMAL study  2. There is electrodiagnostic evidence of a mild demyelinating median neuropathy (Carpal tunnel syndrome) across bilateral wrists.  There was a mild chronic radiculopathy of the left C6 nerve root  3. There is clinical evidence of cervical myofascial pain and rotator cuff weakness    MRI cervical spine 11/16/2021  FINDINGS:  The cervical cord remains normal in signal and morphology.     The cervical vertebra reveal mild straightening of the normal lordosis.  No intrinsic marrow signal abnormality is seen.     Multilevel cervical degenerative disc disease is present.     C2-C3: Minimal annular bulge.     C3-C4: Mild central annular disc bulge.     C4-C5: Moderate disc degeneration with disc narrowing, desiccation and disc bulging osteophyte with ventral sac impression.  " Mild central stenosis.  Uncovertebral joint spurring with moderate right and mild left foraminal stenosis.  AP diameter of the canal approximately 8 mm.     C5-C6: Moderate disc degeneration with disc narrowing, desiccation and disc bulging osteophyte.  Mild ventral sac impression.  Uncovertebral joint spurring with moderate left and mild right foraminal stenosis.     C6-C7: Mild-to-moderate disc degeneration with disc narrowing, desiccation and disc bulging osteophyte with mild ventral sac impression.  AP diameter canal at this level is approximately 8.1 mm.  Uncovertebral joint spurring with mild foraminal stenosis.     C7-T1: Mild disc bulge.    09/14/17  MRI Cervical Spine Without Contrast    Findings: There is straightening of the cervical lordosis.  Degenerative vertebral endplate spurring and disc desiccation present at multiple levels with moderate disc space narrowing from C4-C5 through C6-C7.  Vertebral marrow signal pattern and architecture are normal.  Cervical cord and craniocervical junction are intact.  Paravertebral soft tissues are symmetric and normal in appearance.    C2-C3: Unremarkable.    C3-C4: Unremarkable.    C4-C5: Posterior disc osteophyte complex and bilateral uncovertebral hypertrophy resulting in moderate left greater than right foraminal narrowing.  Indentation of ventral thecal sac with associated anterior cord contact and mild central canal stenosis.  Minimum AP canal diameter is 9.0 mm.    C5-C6: Posterior disc osteophyte complex and asymmetric left greater than right uncovertebral hypertrophy producing marked left and moderate right foraminal narrowing.  Mild indentation of the ventral thecal sac without significant canal stenosis.    C6-C7: Posterior disc osteophyte complex and uncovertebral hypertrophy producing moderate to marked left greater than right foraminal narrowing.  Mild indentation of the ventral thecal sac without significant canal stenosis.    C7-T1:  Unremarkable.    06/30/21 X-Ray Lumbar Spine AP And Lateral  FINDINGS:  Vertebral body height well maintained.  Minimal multilevel marginal spurring at the thoracolumbar junction region.  Facet disease identified in the mid and lower L-spine.  Most prominent findings at the L4-5 and L5-S1 levels.  Disc spaces appear adequate at all levels.  There is equivocal appearance minimal anterior subluxation L5 on S1.  Pedicles and SI joints intact.  Status post cholecystectomy      08/31/17 X-Ray Cervical Spine AP And Lateral  Findings: The vertebral bodies demonstrate normal height.  The alignment is within normal limits. There is moderate displaced narrowing and spondylosis present at the C4-C5 through the C6-C7 levels. The C1-C2 articulation is within normal limits. No prevertebral soft tissue swelling.      ASSESSMENT: 62 y.o. year old female with neck and upper extremity pain, consistent with     1. Right cervical radiculopathy  Case Request-RAD/Other Procedure Area: right C6/7 TF HIMANSHU w/ RN IV sedation      2. Cervical radicular pain  gabapentin (NEURONTIN) 300 MG capsule      3. Degenerative cervical spinal stenosis            PLAN:   1. Interventional:   - Schedule right C6/7 TF HIMANSHU with Dr. Watts. Patient is not taking prescription blood thinners or ASA.    - S/p right C6/7 TF HIMANSHU with Dr. Kramer on 4/18/22 with at least 80% pain relief for about a year  -12/21/2021:  C6-7 IL HIMANSHU with left paramedian approach with limited pain relief   -09/28/2017:  Left-sided C6-7 TF HIMANSHU - Dr. Isabel with significant pain relief     2. Pharmacologic:   - Refill gabapentin 300mg QAM/ 600mg QHS.  Can consider further increase to 600mg TID in the future if warranted.  She is usually only taking the morning dose right now, so she is unsure how helpful this has been.  - Continue Tylenol (acetaminophen) 500mg PRN.   - Anticoagulation use: None.     3. Rehabilitative: Encouraged regular exercise. Patient discharged from PT due to poor  attendance. Consider restarting formal PT in the future if needed.     4. Diagnostic/ Imaging: No new imaging ordered. Previous imaging reviewed.     5. Consult/ Referral:  Previously referred to Neurosurgery. Saw Dr. Dover who recommended cervical TF HIMANSHU. Will consider follow-up in the future if warranted.     6. Follow up: 4 weeks post-procedure - in clinic (per pt request)  - doesn't make own appt    - This condition does not require this patient to take time off of work, and the primary goal of our Pain Management services is to improve the patient's functional capacity.   - I discussed the risks, benefits, and alternatives to potential treatment options. All questions and concerns were fully addressed today in clinic.         Joi Whiting PA-C  Interventional Pain Management - [facility]    Disclaimer:  This note was prepared using voice recognition system and is likely to have sound alike errors that may have been overlooked even after proof reading.  Please call me with any questions.

## 2023-07-26 ENCOUNTER — OFFICE VISIT (OUTPATIENT)
Dept: PAIN MEDICINE | Facility: CLINIC | Age: 63
End: 2023-07-26
Payer: COMMERCIAL

## 2023-07-26 VITALS
BODY MASS INDEX: 32.18 KG/M2 | RESPIRATION RATE: 17 BRPM | HEART RATE: 71 BPM | DIASTOLIC BLOOD PRESSURE: 80 MMHG | HEIGHT: 67 IN | WEIGHT: 205 LBS | SYSTOLIC BLOOD PRESSURE: 116 MMHG

## 2023-07-26 DIAGNOSIS — M48.02 DEGENERATIVE CERVICAL SPINAL STENOSIS: ICD-10-CM

## 2023-07-26 DIAGNOSIS — M54.12 CERVICAL RADICULAR PAIN: ICD-10-CM

## 2023-07-26 DIAGNOSIS — M54.12 RIGHT CERVICAL RADICULOPATHY: Primary | ICD-10-CM

## 2023-07-26 PROCEDURE — 3074F PR MOST RECENT SYSTOLIC BLOOD PRESSURE < 130 MM HG: ICD-10-PCS | Mod: CPTII,S$GLB,, | Performed by: PHYSICIAN ASSISTANT

## 2023-07-26 PROCEDURE — 99999 PR PBB SHADOW E&M-EST. PATIENT-LVL III: CPT | Mod: PBBFAC,,, | Performed by: PHYSICIAN ASSISTANT

## 2023-07-26 PROCEDURE — 3074F SYST BP LT 130 MM HG: CPT | Mod: CPTII,S$GLB,, | Performed by: PHYSICIAN ASSISTANT

## 2023-07-26 PROCEDURE — 99999 PR PBB SHADOW E&M-EST. PATIENT-LVL III: ICD-10-PCS | Mod: PBBFAC,,, | Performed by: PHYSICIAN ASSISTANT

## 2023-07-26 PROCEDURE — 3008F BODY MASS INDEX DOCD: CPT | Mod: CPTII,S$GLB,, | Performed by: PHYSICIAN ASSISTANT

## 2023-07-26 PROCEDURE — 99214 OFFICE O/P EST MOD 30 MIN: CPT | Mod: S$GLB,,, | Performed by: PHYSICIAN ASSISTANT

## 2023-07-26 PROCEDURE — 1160F RVW MEDS BY RX/DR IN RCRD: CPT | Mod: CPTII,S$GLB,, | Performed by: PHYSICIAN ASSISTANT

## 2023-07-26 PROCEDURE — 1159F PR MEDICATION LIST DOCUMENTED IN MEDICAL RECORD: ICD-10-PCS | Mod: CPTII,S$GLB,, | Performed by: PHYSICIAN ASSISTANT

## 2023-07-26 PROCEDURE — 1160F PR REVIEW ALL MEDS BY PRESCRIBER/CLIN PHARMACIST DOCUMENTED: ICD-10-PCS | Mod: CPTII,S$GLB,, | Performed by: PHYSICIAN ASSISTANT

## 2023-07-26 PROCEDURE — 99214 PR OFFICE/OUTPT VISIT, EST, LEVL IV, 30-39 MIN: ICD-10-PCS | Mod: S$GLB,,, | Performed by: PHYSICIAN ASSISTANT

## 2023-07-26 PROCEDURE — 3079F DIAST BP 80-89 MM HG: CPT | Mod: CPTII,S$GLB,, | Performed by: PHYSICIAN ASSISTANT

## 2023-07-26 PROCEDURE — 1159F MED LIST DOCD IN RCRD: CPT | Mod: CPTII,S$GLB,, | Performed by: PHYSICIAN ASSISTANT

## 2023-07-26 PROCEDURE — 3079F PR MOST RECENT DIASTOLIC BLOOD PRESSURE 80-89 MM HG: ICD-10-PCS | Mod: CPTII,S$GLB,, | Performed by: PHYSICIAN ASSISTANT

## 2023-07-26 PROCEDURE — 3008F PR BODY MASS INDEX (BMI) DOCUMENTED: ICD-10-PCS | Mod: CPTII,S$GLB,, | Performed by: PHYSICIAN ASSISTANT

## 2023-07-26 RX ORDER — GABAPENTIN 300 MG/1
CAPSULE ORAL
Qty: 90 CAPSULE | Refills: 2 | Status: SHIPPED | OUTPATIENT
Start: 2023-07-26 | End: 2023-10-10 | Stop reason: DRUGHIGH

## 2023-07-28 ENCOUNTER — LAB VISIT (OUTPATIENT)
Dept: LAB | Facility: HOSPITAL | Age: 63
End: 2023-07-28
Payer: COMMERCIAL

## 2023-07-28 ENCOUNTER — OFFICE VISIT (OUTPATIENT)
Dept: RHEUMATOLOGY | Facility: CLINIC | Age: 63
End: 2023-07-28
Payer: COMMERCIAL

## 2023-07-28 VITALS
RESPIRATION RATE: 16 BRPM | WEIGHT: 205 LBS | HEIGHT: 67 IN | DIASTOLIC BLOOD PRESSURE: 85 MMHG | BODY MASS INDEX: 32.18 KG/M2 | SYSTOLIC BLOOD PRESSURE: 134 MMHG | HEART RATE: 71 BPM

## 2023-07-28 DIAGNOSIS — M54.12 CERVICAL RADICULOPATHY: ICD-10-CM

## 2023-07-28 DIAGNOSIS — R21 RASH: ICD-10-CM

## 2023-07-28 DIAGNOSIS — J98.4 CHRONIC RESTRICTIVE LUNG DISEASE: ICD-10-CM

## 2023-07-28 DIAGNOSIS — E55.9 VITAMIN D DEFICIENCY: ICD-10-CM

## 2023-07-28 DIAGNOSIS — D86.9 SARCOIDOSIS: Primary | ICD-10-CM

## 2023-07-28 DIAGNOSIS — I10 HYPERTENSION, UNSPECIFIED TYPE: ICD-10-CM

## 2023-07-28 DIAGNOSIS — Z51.81 MEDICATION MONITORING ENCOUNTER: ICD-10-CM

## 2023-07-28 DIAGNOSIS — D86.9 SARCOIDOSIS: ICD-10-CM

## 2023-07-28 LAB
ALBUMIN SERPL BCP-MCNC: 3.6 G/DL (ref 3.5–5.2)
ALP SERPL-CCNC: 80 U/L (ref 55–135)
ALT SERPL W/O P-5'-P-CCNC: 14 U/L (ref 10–44)
ANION GAP SERPL CALC-SCNC: 10 MMOL/L (ref 8–16)
AST SERPL-CCNC: 16 U/L (ref 10–40)
BASOPHILS # BLD AUTO: 0.04 K/UL (ref 0–0.2)
BASOPHILS NFR BLD: 0.7 % (ref 0–1.9)
BILIRUB SERPL-MCNC: 0.7 MG/DL (ref 0.1–1)
BUN SERPL-MCNC: 11 MG/DL (ref 8–23)
CALCIUM SERPL-MCNC: 9.3 MG/DL (ref 8.7–10.5)
CHLORIDE SERPL-SCNC: 108 MMOL/L (ref 95–110)
CO2 SERPL-SCNC: 27 MMOL/L (ref 23–29)
CREAT SERPL-MCNC: 0.9 MG/DL (ref 0.5–1.4)
CRP SERPL-MCNC: 6.2 MG/L (ref 0–8.2)
DIFFERENTIAL METHOD: ABNORMAL
EOSINOPHIL # BLD AUTO: 0.1 K/UL (ref 0–0.5)
EOSINOPHIL NFR BLD: 1.9 % (ref 0–8)
ERYTHROCYTE [DISTWIDTH] IN BLOOD BY AUTOMATED COUNT: 13 % (ref 11.5–14.5)
ERYTHROCYTE [SEDIMENTATION RATE] IN BLOOD BY WESTERGREN METHOD: 33 MM/HR (ref 0–20)
EST. GFR  (NO RACE VARIABLE): >60 ML/MIN/1.73 M^2
GLUCOSE SERPL-MCNC: 103 MG/DL (ref 70–110)
HCT VFR BLD AUTO: 37.6 % (ref 37–48.5)
HGB BLD-MCNC: 11.8 G/DL (ref 12–16)
IMM GRANULOCYTES # BLD AUTO: 0.01 K/UL (ref 0–0.04)
IMM GRANULOCYTES NFR BLD AUTO: 0.2 % (ref 0–0.5)
LYMPHOCYTES # BLD AUTO: 2.7 K/UL (ref 1–4.8)
LYMPHOCYTES NFR BLD: 46.9 % (ref 18–48)
MCH RBC QN AUTO: 30.2 PG (ref 27–31)
MCHC RBC AUTO-ENTMCNC: 31.4 G/DL (ref 32–36)
MCV RBC AUTO: 96 FL (ref 82–98)
MONOCYTES # BLD AUTO: 0.6 K/UL (ref 0.3–1)
MONOCYTES NFR BLD: 9.6 % (ref 4–15)
NEUTROPHILS # BLD AUTO: 2.3 K/UL (ref 1.8–7.7)
NEUTROPHILS NFR BLD: 40.7 % (ref 38–73)
NRBC BLD-RTO: 0 /100 WBC
PLATELET # BLD AUTO: 221 K/UL (ref 150–450)
PMV BLD AUTO: 10.3 FL (ref 9.2–12.9)
POTASSIUM SERPL-SCNC: 3.9 MMOL/L (ref 3.5–5.1)
PROT SERPL-MCNC: 7 G/DL (ref 6–8.4)
RBC # BLD AUTO: 3.91 M/UL (ref 4–5.4)
SODIUM SERPL-SCNC: 145 MMOL/L (ref 136–145)
WBC # BLD AUTO: 5.73 K/UL (ref 3.9–12.7)

## 2023-07-28 PROCEDURE — 1160F RVW MEDS BY RX/DR IN RCRD: CPT | Mod: CPTII,S$GLB,,

## 2023-07-28 PROCEDURE — 3008F PR BODY MASS INDEX (BMI) DOCUMENTED: ICD-10-PCS | Mod: CPTII,S$GLB,,

## 2023-07-28 PROCEDURE — 1160F PR REVIEW ALL MEDS BY PRESCRIBER/CLIN PHARMACIST DOCUMENTED: ICD-10-PCS | Mod: CPTII,S$GLB,,

## 2023-07-28 PROCEDURE — 3075F SYST BP GE 130 - 139MM HG: CPT | Mod: CPTII,S$GLB,,

## 2023-07-28 PROCEDURE — 99999 PR PBB SHADOW E&M-EST. PATIENT-LVL V: ICD-10-PCS | Mod: PBBFAC,,,

## 2023-07-28 PROCEDURE — 99214 PR OFFICE/OUTPT VISIT, EST, LEVL IV, 30-39 MIN: ICD-10-PCS | Mod: S$GLB,,,

## 2023-07-28 PROCEDURE — 80053 COMPREHEN METABOLIC PANEL: CPT

## 2023-07-28 PROCEDURE — 85651 RBC SED RATE NONAUTOMATED: CPT

## 2023-07-28 PROCEDURE — 3079F PR MOST RECENT DIASTOLIC BLOOD PRESSURE 80-89 MM HG: ICD-10-PCS | Mod: CPTII,S$GLB,,

## 2023-07-28 PROCEDURE — 99999 PR PBB SHADOW E&M-EST. PATIENT-LVL V: CPT | Mod: PBBFAC,,,

## 2023-07-28 PROCEDURE — 82164 ANGIOTENSIN I ENZYME TEST: CPT

## 2023-07-28 PROCEDURE — 85025 COMPLETE CBC W/AUTO DIFF WBC: CPT

## 2023-07-28 PROCEDURE — 1159F MED LIST DOCD IN RCRD: CPT | Mod: CPTII,S$GLB,,

## 2023-07-28 PROCEDURE — 99214 OFFICE O/P EST MOD 30 MIN: CPT | Mod: S$GLB,,,

## 2023-07-28 PROCEDURE — 3075F PR MOST RECENT SYSTOLIC BLOOD PRESS GE 130-139MM HG: ICD-10-PCS | Mod: CPTII,S$GLB,,

## 2023-07-28 PROCEDURE — 1159F PR MEDICATION LIST DOCUMENTED IN MEDICAL RECORD: ICD-10-PCS | Mod: CPTII,S$GLB,,

## 2023-07-28 PROCEDURE — 36415 COLL VENOUS BLD VENIPUNCTURE: CPT

## 2023-07-28 PROCEDURE — 86140 C-REACTIVE PROTEIN: CPT

## 2023-07-28 PROCEDURE — 3008F BODY MASS INDEX DOCD: CPT | Mod: CPTII,S$GLB,,

## 2023-07-28 PROCEDURE — 3079F DIAST BP 80-89 MM HG: CPT | Mod: CPTII,S$GLB,,

## 2023-07-28 RX ORDER — ERGOCALCIFEROL 1.25 MG/1
50000 CAPSULE ORAL
Qty: 12 CAPSULE | Refills: 3 | Status: SHIPPED | OUTPATIENT
Start: 2023-07-28

## 2023-07-28 RX ORDER — ERGOCALCIFEROL 1.25 MG/1
50000 CAPSULE ORAL
COMMUNITY
Start: 2023-07-28 | End: 2023-07-28 | Stop reason: SDUPTHER

## 2023-07-28 NOTE — PATIENT INSTRUCTIONS
Glucosamine 1500 mg daily for joint pain  Turmeric 1000 mg daily - natural antiinflammatory for joint pain     Could consider one of the above if worsening joint pain

## 2023-07-28 NOTE — PROGRESS NOTES
RHEUMATOLOGY OUTPATIENT CLINIC NOTE    07/28/2023    Subjective:       Patient ID: Janna Sheffield is a 62 y.o. female.    Chief Complaint: sacoidosis       HPI   Janna Sheffield is a 62 y.o. pleasant female here for rheumatology follow up for history of osteoarthritis and sarcoidosis.     She has low vitamin D and started on 50Kunits weekly improved to now normal level. No issues with joint swelling, rash, worsening sob, cp, eye issues. She has mao knee pain with a TKA on the left, uses topical nsaid which helps.  No worsening joint pain since last appointment.     She has generalized osteoarthritis with radiating neck pain. MRI showed disc disease and degenerative arthritis. She was sent to pain mgt for ESIs which helped. She also is taking gabapentin at night 300-600mg occasionally.  Now following with pain management here in [place].      Today with no cp, sob, rashes, or joint swelling.  She has occasional joint pain improved with Tylenol Arthritis as needed.  Recently went on vacation and went for a hike.  She had to walk slower than her family members due to shortness of breath with this additional exertion from her usual baseline.     HISTORY:  Sarcoidosis was diagnosed in 1985 through a biopsy of a spot on her liver. 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      She was on imuran 50mg every other day but her sarcoid seemed to be in remission.  Lungs stable. She does have restrictive lung disease and SOB when she is more active but no worsening. She sees Dr. Casper.  We dc'd imuran in august 2019 (suggested by pulm initially) with no new or worsening symtpoms.  Last visit with pulm was march 2020, cxr and pfts stable, echo stable at the time, hasn't seen them since      Review of Systems   Constitutional: Negative for chills, fatigue and fever.   HENT: Negative for mouth sores, rhinorrhea and sore throat.    Eyes: Negative for  pain and redness.        Dry eyes   Respiratory: Positive for shortness of breath- stable and not worsening. Negative for cough.    Cardiovascular: Negative for chest pain.   Gastrointestinal: Negative for abdominal pain, constipation, diarrhea, nausea and vomiting.   Genitourinary: Negative for dysuria and hematuria.   Musculoskeletal: Positive for arthralgias, back pain and neck pain. Negative for joint swelling and myalgias.        Left knee replacement   Skin: Negative for rash.   Neurological: Negative for weakness, numbness and headaches.   Psychiatric/Behavioral: The patient is not nervous/anxious.      Rheumatologic review of systems negative otherwise.     Physical exam:  mao wrists, mcps, pips no synovitis, no tenderness, full rom, grib strength equal bilaterally  Left knee s/p replacement, no effusion, no warmth  Right knee no effusion, no warmth, full rom  No BLE weakness    No cough. No obvious respiratory distress.     Past Medical History:   Diagnosis Date    Arthritis     Chest pain syndrome 10/23/2014    Hyperlipidemia     Hypertension     Macular degeneration, age related, nonexudative - Left Eye 3/24/2014    Retinal detachment, old, partial 4/14/2014    Sarcoidosis      Past Surgical History:   Procedure Laterality Date    CATARACT EXTRACTION      COLONOSCOPY N/A 03/14/2022    Procedure: COLONOSCOPY;  Surgeon: Christi Medrano MD;  Location: [facility];  Service: Endoscopy;  Laterality: N/A;    DILATION AND CURETTAGE OF UTERUS      missed ab x 3    EPIDURAL STEROID INJECTION INTO CERVICAL SPINE N/A 12/21/2021    Procedure: C6/C7 IL HIMANSHU RN IV sedation;  Surgeon: Hadley Marsh MD;  Location: [facility];  Service: Pain Management;  Laterality: N/A;    ESOPHAGOGASTRODUODENOSCOPY N/A 02/06/2019    Procedure: ESOPHAGOGASTRODUODENOSCOPY (EGD);  Surgeon: Jairo Vargas III, MD;  Location: [facility];  Service: Endoscopy;  Laterality: N/A;    HYSTERECTOMY  2001    MACARIO - complete    JOINT REPLACEMENT  "Left     Knee    knee surgery      TRANSFORAMINAL EPIDURAL INJECTION OF STEROID Right 04/18/2022    Procedure: INJECTION, STEROID, EPIDURAL, TRANSFORAMINAL APPROACH, RIGHT C6-C7;  Surgeon: Viraj Kramer MD;  Location: [facility];  Service: Pain Management;  Laterality: Right;    TUBAL LIGATION       Family History   Problem Relation Age of Onset    Cervical cancer Mother     Heart disease Mother     Heart attack Mother     Hypertension Mother     Hyperlipidemia Mother     Hypertension Father     Hyperlipidemia Father     Stroke Father     Breast cancer Maternal Aunt     Stroke Brother     Breast cancer Maternal Cousin     Breast cancer Maternal Cousin     Breast cancer Paternal Cousin     Colon cancer Neg Hx     Thrombophilia Neg Hx     Deep vein thrombosis Neg Hx     Pulmonary embolism Neg Hx      Social History     Socioeconomic History    Marital status:    Occupational History     Employer: [employer]    Tobacco Use    Smoking status: Never    Smokeless tobacco: Never   Substance and Sexual Activity    Alcohol use: No    Drug use: No    Sexual activity: Yes     Partners: Male     Comment:      Review of patient's allergies indicates:  No Known Allergies        Objective:   /85 (BP Location: Right arm, Patient Position: Sitting, BP Method: Large (Automatic))   Pulse 71   Resp 16   Ht 5' 7" (1.702 m)   Wt 93 kg (205 lb 0.4 oz)   BMI 32.11 kg/m²   Immunization History   Administered Date(s) Administered    COVID-19, MRNA, LN-S, PF (Pfizer) (Gray Cap) 05/23/2022    COVID-19, MRNA, LN-S, PF (Pfizer) (Purple Cap) 03/05/2021, 03/26/2021, 10/18/2021    Influenza 11/27/2006, 11/28/2007, 10/20/2008, 11/24/2010, 12/26/2012    Influenza - High Dose - PF (65 years and older) 10/31/2017, 01/07/2019    Influenza - Quadrivalent 10/25/2016    Influenza - Quadrivalent - PF *Preferred* (6 months and older) 12/18/2013, 12/19/2019, 10/20/2020, 12/06/2021, 11/21/2022    Influenza A (H1N1) 2009 " Monovalent - IM - PF 12/14/2009    Pneumococcal Conjugate - 13 Valent 04/10/2015    Pneumococcal Polysaccharide - 23 Valent 11/19/2003, 01/07/2019    Td (ADULT) 06/17/2008    Tdap 04/07/2015    Zoster Recombinant 07/13/2020, 10/06/2020                      No results found for this or any previous visit (from the past 672 hour(s)).       No results found for: TBGOLDPLUS   Lab Results   Component Value Date    HEPCAB Negative 06/25/2013        Assessment:       1. Sarcoidosis    2. Rash    3. Cervical radiculopathy    4. Chronic restrictive lung disease    5. Vitamin D deficiency    6. Medication monitoring encounter    7. Hypertension, unspecified type              Impression:   Sarcoidosis: stopped Imuran 50mg every other day (8/2019-after pulm rec'd) no evidence of activity, remains stable in remission.     Back pain: mid and low, now following with pain management     Chronic restrictive lung disease: stable recently occasional SOB; follows with Dr. Casper     High Risk Medication Monitoring: now off imuran      Cervical spondylosis with radiation into the left arm: saw Dr. Isabel  s/p HIMANSHU w good result, gabapentin makes her drowsy.  Following with pain management     Vit d def: started 50K units weekly, level in normal range now 56     Osteoarthritis right knee, left knee total knee arthroplasty  Pain worse with colder weather.  Has been applying  compound topical lotion.  Doing okay at this point.  She is inquiring if she needs to see an orthopedic doctor.  Denies any worsening joint pain or swelling of either knees.  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  Patient is concerned about the look of this and would like to establish with Dermatology    Plan:          Sarcoid:   Stay off imuran no need to add back at this time    Vitamin-D deficiency:  Cont weekly vitamin D, last level  wnl    Restricted lung disease:  Continue following with Dr. Casper   Chest x-ray every 12-18 months (last done 1/31/2023)    Cervical spondylosis and back pain:  Continue follow-up with Pain Management     For knee pain, continue with topical.  Conservative therapy, let me know if any worsening joint pain   Can trial turmeric 500 mg bid or glucosamine 500 mg tid supplements consistently for joint pain.     Referral to Dermatology for facial rash (likely dermatosis papulosis nigra)  Discussed with patient these are likely benign.     Nicola Pagan today  RTC in 6mos  With nicola pagan PA-C  [facility]  Rheumatology       30 minutes of total time spent on the encounter, which includes face to face time and non-face to face time preparing to see the patient (eg, review of tests), Obtaining and/or reviewing separately obtained history, Documenting clinical information in the electronic or other health record, Independently interpreting results (not separately reported) and communicating results to the patient/family/caregiver, or Care coordination (not separately reported).    Disclaimer: This note was prepared using voice recognition system and is likely to have sound alike errors and is not proof read.  Please call me with any questions

## 2023-07-29 LAB — ACE SERPL-CCNC: 18 U/L (ref 16–85)

## 2023-09-05 NOTE — PRE-PROCEDURE INSTRUCTIONS
Spoke with patient regarding procedure scheduled on 9.11     Arrival time 0600     Has patient been sick with fever or on antibiotics within the last 7 days? No     Does the patient have any open wounds, sores or rashes? No     Does the patient have any recent fractures? no     Has patient received a vaccination within the last 7 days? No     Received the COVID vaccination? yes     Has the patient stopped all medications as directed? na     Does patient have a pacemaker and or defibrillator? no     Does the patient have a ride to and from procedure and someone reliable to remain with patient? BERNAYL     Is the patient diabetic? no     Does the patient have sleep apnea? Or use O2 at home? no     Is the patient receiving sedation? yes     Is the patient instructed to remain NPO beginning at midnight the night before their procedure? yes     Procedure location confirmed with patient? Yes     Covid- Denies signs/symptoms. Instructed to notify PAT/MD if any changes.

## 2023-09-11 ENCOUNTER — HOSPITAL ENCOUNTER (OUTPATIENT)
Facility: HOSPITAL | Age: 63
Discharge: HOME | End: 2023-09-11
Attending: ANESTHESIOLOGY | Admitting: ANESTHESIOLOGY
Payer: COMMERCIAL

## 2023-09-11 VITALS
RESPIRATION RATE: 15 BRPM | HEART RATE: 67 BPM | DIASTOLIC BLOOD PRESSURE: 61 MMHG | SYSTOLIC BLOOD PRESSURE: 123 MMHG | HEIGHT: 67 IN | TEMPERATURE: 97 F | OXYGEN SATURATION: 95 % | BODY MASS INDEX: 32.15 KG/M2 | WEIGHT: 204.81 LBS

## 2023-09-11 DIAGNOSIS — M54.12 CERVICAL RADICULOPATHY: Primary | ICD-10-CM

## 2023-09-11 PROCEDURE — 64479 PR INJECT ANES/STEROID FORAMEN CERV/THORACIC W IMG GUIDE ,1 LEVEL: ICD-10-PCS | Mod: RT,,, | Performed by: ANESTHESIOLOGY

## 2023-09-11 PROCEDURE — 64479 NJX AA&/STRD TFRM EPI C/T 1: CPT | Mod: RT | Performed by: ANESTHESIOLOGY

## 2023-09-11 PROCEDURE — 63600175 PHARM REV CODE 636 W HCPCS: Performed by: ANESTHESIOLOGY

## 2023-09-11 PROCEDURE — 25500020 PHARM REV CODE 255: Performed by: ANESTHESIOLOGY

## 2023-09-11 PROCEDURE — 64479 NJX AA&/STRD TFRM EPI C/T 1: CPT | Mod: RT,,, | Performed by: ANESTHESIOLOGY

## 2023-09-11 PROCEDURE — 25000003 PHARM REV CODE 250: Performed by: ANESTHESIOLOGY

## 2023-09-11 RX ORDER — MIDAZOLAM HYDROCHLORIDE 1 MG/ML
INJECTION, SOLUTION INTRAMUSCULAR; INTRAVENOUS
Status: DISCONTINUED | OUTPATIENT
Start: 2023-09-11 | End: 2023-09-11 | Stop reason: HOSPADM

## 2023-09-11 RX ORDER — ONDANSETRON 2 MG/ML
4 INJECTION INTRAMUSCULAR; INTRAVENOUS ONCE AS NEEDED
Status: DISCONTINUED | OUTPATIENT
Start: 2023-09-11 | End: 2023-09-11 | Stop reason: HOSPADM

## 2023-09-11 RX ORDER — FENTANYL CITRATE 50 UG/ML
INJECTION, SOLUTION INTRAMUSCULAR; INTRAVENOUS
Status: DISCONTINUED | OUTPATIENT
Start: 2023-09-11 | End: 2023-09-11 | Stop reason: HOSPADM

## 2023-09-11 RX ORDER — LIDOCAINE HYDROCHLORIDE 10 MG/ML
INJECTION, SOLUTION EPIDURAL; INFILTRATION; INTRACAUDAL; PERINEURAL
Status: DISCONTINUED | OUTPATIENT
Start: 2023-09-11 | End: 2023-09-11 | Stop reason: HOSPADM

## 2023-09-11 RX ORDER — DEXAMETHASONE SODIUM PHOSPHATE 10 MG/ML
INJECTION INTRAMUSCULAR; INTRAVENOUS
Status: DISCONTINUED | OUTPATIENT
Start: 2023-09-11 | End: 2023-09-11 | Stop reason: HOSPADM

## 2023-09-11 NOTE — H&P
HPI  Patient presenting for Procedure(s) (LRB):  right C6/7 TF HIMANSHU w/ RN IV sedation (Right)     Patient on Anti-coagulation No    No health changes since previous encounter    Past Medical History:   Diagnosis Date    Arthritis     Chest pain syndrome 10/23/2014    Hyperlipidemia     Hypertension     Macular degeneration, age related, nonexudative - Left Eye 3/24/2014    Retinal detachment, old, partial 4/14/2014    Sarcoidosis      Past Surgical History:   Procedure Laterality Date    CATARACT EXTRACTION      COLONOSCOPY N/A 03/14/2022    Procedure: COLONOSCOPY;  Surgeon: Christi Medrano MD;  Location: [facility];  Service: Endoscopy;  Laterality: N/A;    DILATION AND CURETTAGE OF UTERUS      missed ab x 3    EPIDURAL STEROID INJECTION INTO CERVICAL SPINE N/A 12/21/2021    Procedure: C6/C7 IL HIMANSHU RN IV sedation;  Surgeon: Hadley Marsh MD;  Location: [facility];  Service: Pain Management;  Laterality: N/A;    ESOPHAGOGASTRODUODENOSCOPY N/A 02/06/2019    Procedure: ESOPHAGOGASTRODUODENOSCOPY (EGD);  Surgeon: Jairo Vargas III, MD;  Location: [facility];  Service: Endoscopy;  Laterality: N/A;    HYSTERECTOMY  2001    MACARIO - complete    JOINT REPLACEMENT Left     Knee    knee surgery      TRANSFORAMINAL EPIDURAL INJECTION OF STEROID Right 04/18/2022    Procedure: INJECTION, STEROID, EPIDURAL, TRANSFORAMINAL APPROACH, RIGHT C6-C7;  Surgeon: Viraj Kramer MD;  Location: [facility];  Service: Pain Management;  Laterality: Right;    TUBAL LIGATION       Review of patient's allergies indicates:  No Known Allergies     No current facility-administered medications on file prior to encounter.     Current Outpatient Medications on File Prior to Encounter   Medication Sig Dispense Refill    gabapentin (NEURONTIN) 300 MG capsule TAKE 1 CAPSULE BY MOUTH IN THE MORNING AND 2 IN THE EVENING 90 capsule 2    hydroCHLOROthiazide (HYDRODIURIL) 25 MG tablet Take 1 tablet by mouth once daily 90 tablet 3    multivitamin  "(THERAGRAN) per tablet Take 1 tablet by mouth Daily.      rosuvastatin (CRESTOR) 40 MG Tab TAKE 1 TABLET BY MOUTH ONCE DAILY 90 tablet 0    dexbrompheniramin/phenylephrin (DEXBROMPHENIRAMINE-PHENYLEPH ORAL) Alahist PE Take 1 Tablet (oral) 3 times per day PRN - Congestion for 5 days [id number] tablet 3 times per day oral 5 days active 2-7.5 mg      diclofenac sodium (VOLTAREN) 1 % Gel Apply 2 g topically 3 (three) times daily as needed (Knee pain). 100 g 3        PMHx, PSHx, Allergies, Medications reviewed in epic    ROS negative except pain complaints in HPI    OBJECTIVE:    BP (!) 127/93 (BP Location: Right arm, Patient Position: Sitting)   Pulse 73   Temp 97.1 °F (36.2 °C) (Temporal)   Resp 17   Ht 5' 7" (1.702 m)   Wt 92.9 kg (204 lb 12.9 oz)   SpO2 97%   Breastfeeding No   BMI 32.08 kg/m²     PHYSICAL EXAMINATION:    GENERAL: Well appearing, in no acute distress, alert and oriented x3.  PSYCH:  Mood and affect appropriate.  SKIN: Skin color, texture, turgor normal, no rashes or lesions which will impact the procedure.  CV: RRR with palpation of the radial artery.  PULM: No evidence of respiratory difficulty, symmetric chest rise. Clear to auscultation.  NEURO: Cranial nerves grossly intact.    Plan:    Proceed with procedure as planned Procedure(s) (LRB):  right C6/7 TF HIMANSHU w/ RN IV sedation (Right)    Derian Watts MD  09/11/2023            "

## 2023-09-11 NOTE — DISCHARGE SUMMARY
Discharge Note  Short Stay      SUMMARY     Admit Date: 9/11/2023    Attending Physician: Derian Watts MD        Discharge Physician: Derian Watts MD        Discharge Date: 9/11/2023 7:12 AM    Procedure(s) (LRB):  right C6/7 TF HIMANSHU w/ RN IV sedation (Right)    Final Diagnosis: Right cervical radiculopathy [M54.12]    Disposition: Home or self care    Patient Instructions:   Current Discharge Medication List        CONTINUE these medications which have NOT CHANGED    Details   ergocalciferol (ERGOCALCIFEROL) 50,000 unit Cap Take 1 capsule (50,000 Units total) by mouth every 7 days.  Qty: 12 capsule, Refills: 3    Associated Diagnoses: Vitamin D deficiency      gabapentin (NEURONTIN) 300 MG capsule TAKE 1 CAPSULE BY MOUTH IN THE MORNING AND 2 IN THE EVENING  Qty: 90 capsule, Refills: 2    Comments: Dr. Hadley Marsh - GALLITO# [id number]  Associated Diagnoses: Cervical radicular pain      hydroCHLOROthiazide (HYDRODIURIL) 25 MG tablet Take 1 tablet by mouth once daily  Qty: 90 tablet, Refills: 3    Associated Diagnoses: Essential hypertension      multivitamin (THERAGRAN) per tablet Take 1 tablet by mouth Daily.      rosuvastatin (CRESTOR) 40 MG Tab TAKE 1 TABLET BY MOUTH ONCE DAILY  Qty: 90 tablet, Refills: 0      dexbrompheniramin/phenylephrin (DEXBROMPHENIRAMINE-PHENYLEPH ORAL) Alahist PE Take 1 Tablet (oral) 3 times per day PRN - Congestion for 5 days [id number] tablet 3 times per day oral 5 days active 2-7.5 mg      diclofenac sodium (VOLTAREN) 1 % Gel Apply 2 g topically 3 (three) times daily as needed (Knee pain).  Qty: 100 g, Refills: 3    Associated Diagnoses: Primary osteoarthritis of right knee                 Discharge Diagnosis: Right cervical radiculopathy [M54.12]  Condition on Discharge: Stable with no complications to procedure   Diet on Discharge: Same as before.  Activity: as per instruction sheet.  Discharge to: Home with a responsible adult.  Follow up: 2-4 weeks       Please call the office at  [phone] if you experience any weakness or loss of sensation, fever > 101.5, pain uncontrolled with oral medications, persistent nausea/vomiting/or diarrhea, redness or drainage from the incisions, or any other worrisome concerns. If physician on call was not reached or could not communicate with our office for any reason please go to the nearest emergency department

## 2023-09-11 NOTE — DISCHARGE INSTRUCTIONS

## 2023-09-11 NOTE — OP NOTE
Cervical Epidural Steroid Injection    INFORMED CONSENT: The procedure, risks, benefits and options were discussed with patient. There are no contraindications to the procedure. The patient expressed understanding and agreed to proceed. The personnel performing the procedure was discussed.    Date of procedure 09/11/2023    Time-out taken to identify patient and procedure side prior to starting the procedure.                     PROCEDURE:    1)  Right   C6/C7  TRANSFORAMINAL EPIDURAL STEROID INJECTION      Pre Procedure diagnosis:    Right cervical radiculopathy [M54.12]  1. Cervical radiculopathy        Post-Procedure diagnosis:   same    Surgeon: Derian Watts MD    Assistants: None    Medication: 1ml Decadron PF 10mg/ml PF and 2ml Lidocaine PF 1%    Local: 3ml Lidocaine PF 1%    Sedation: Conscious sedation provided by M.D    SEDATION MEDICATIONS: local/IV sedation: Versed 2 mg and fentanyl 75 mcg IV.  Conscious sedation ordered by MD.  Patient reevaluated and sedation administered by MD and monitored by RN.  Total sedation time was less than 10 min.    Total sedation time was <10 min    Complications: None    Specimens: None        TECHNIQUE: The patient was brought to the procedure room. IV access was obtained prior to the procedure. The patient was positioned supine on the fluoroscopy table. Continuous hemodynamic monitoring was initiated including blood pressure, EKG, and pulse oximetry. . The skin was prepped with chlorhexidine and draped in a sterile fashion. The neck was then turned to the contralateral side.   Local Xylocaine was injected by raising a wheel and going down to the periosteum using a 27-gauge hypodermic needle.      The Right  C6/C7   transforaminal space was identified with fluoroscopy in the oblique view making sure to identify the C6/C7 foramen and counting downward from there.   A 22 gauge spinal quinke needle was then advanced toward the pedicle of the inferior vertebral body. Once  Os was contacted, the needle  was walked off into the posterior inferior portion of the foramen. The needle was then advanced in the AP view making sure not to advance past the tubercle. Once the needle tip was in the area of the transforaminal space, and there was no blood, CSF or paraesthesias,  2 mL of Omnipaque 300mg/ml was injected at the above level for a total of 2 mL.  Fluoroscopic imaging in the AP and lateral views revealed a clear outline of the spinal nerve with proximal spread of agent through the neural foramen into the epidural space. After aspiration, the above medication mixture was injection slowly with displacement of the contrast dye confirming that the medication went into the area of the transforaminal spaces. A sterile dressing was applied. Patient tolerated procedure well.        The patient was monitored for approximately 30 minutes after the procedure.  Patient was given post procedure and discharge instructions to follow at home.  The patient was discharged in a stable condition

## 2023-09-28 ENCOUNTER — TELEPHONE (OUTPATIENT)
Dept: PAIN MEDICINE | Facility: CLINIC | Age: 63
End: 2023-09-28
Payer: COMMERCIAL

## 2023-09-28 NOTE — TELEPHONE ENCOUNTER
----- Message from Dora Becerra sent at 9/28/2023 10:43 AM CDT -----  Contact: [phone]  Patient is returning a phone call.  Who left a message for the patient: Nicky Moore MA     Does patient know what this is regarding:  yes   Would you like a call back, or a response through your [facility] portal?:   call back   Comments:

## 2023-09-28 NOTE — TELEPHONE ENCOUNTER
Called patient.  Patient wanted to know if she could increase medication due to increased pain. Reviewed chart patient can increase medication.  Gabapentin 300mg qam/600mg qhs- patient was taking 300mg qam / 300 mg qhs and tylenol bid.  Patient will take gabapentin 600 mg bid and 2 tylenol bid to see if this helps with pain.

## 2023-09-28 NOTE — TELEPHONE ENCOUNTER
----- Message from Kia Santo sent at 9/28/2023 10:15 AM CDT -----  Contact: Janna navarro [phone]  [email or web address]     Patient is calling for Medical Advice regarding:    How long has patient had these symptoms:    Pharmacy name and phone#:    Would like response via LXSNhart: call back    Comments: Pt is requesting a call back from the nurse because she stated that she is in a lot of pain and may need to be seen sooner

## 2023-09-28 NOTE — TELEPHONE ENCOUNTER
Called patient in regards to her message.  Patient wants sooner appt, she had a inj on 9/11/23 and f/u is scheduled for 10/10/23.  Patient did not answer left VM to return call to office.

## 2023-10-10 ENCOUNTER — OFFICE VISIT (OUTPATIENT)
Dept: PAIN MEDICINE | Facility: CLINIC | Age: 63
End: 2023-10-10
Payer: COMMERCIAL

## 2023-10-10 VITALS
DIASTOLIC BLOOD PRESSURE: 85 MMHG | SYSTOLIC BLOOD PRESSURE: 149 MMHG | HEART RATE: 69 BPM | BODY MASS INDEX: 32.46 KG/M2 | HEIGHT: 67 IN | WEIGHT: 206.81 LBS

## 2023-10-10 DIAGNOSIS — M25.521 BILATERAL ELBOW JOINT PAIN: Primary | ICD-10-CM

## 2023-10-10 DIAGNOSIS — M54.12 RIGHT CERVICAL RADICULOPATHY: ICD-10-CM

## 2023-10-10 DIAGNOSIS — M48.02 DEGENERATIVE CERVICAL SPINAL STENOSIS: ICD-10-CM

## 2023-10-10 DIAGNOSIS — M25.522 BILATERAL ELBOW JOINT PAIN: Primary | ICD-10-CM

## 2023-10-10 PROCEDURE — 3008F PR BODY MASS INDEX (BMI) DOCUMENTED: ICD-10-PCS | Mod: CPTII,S$GLB,, | Performed by: PHYSICIAN ASSISTANT

## 2023-10-10 PROCEDURE — 1160F RVW MEDS BY RX/DR IN RCRD: CPT | Mod: CPTII,S$GLB,, | Performed by: PHYSICIAN ASSISTANT

## 2023-10-10 PROCEDURE — 3079F DIAST BP 80-89 MM HG: CPT | Mod: CPTII,S$GLB,, | Performed by: PHYSICIAN ASSISTANT

## 2023-10-10 PROCEDURE — 99214 OFFICE O/P EST MOD 30 MIN: CPT | Mod: S$GLB,,, | Performed by: PHYSICIAN ASSISTANT

## 2023-10-10 PROCEDURE — 3077F SYST BP >= 140 MM HG: CPT | Mod: CPTII,S$GLB,, | Performed by: PHYSICIAN ASSISTANT

## 2023-10-10 PROCEDURE — 3077F PR MOST RECENT SYSTOLIC BLOOD PRESSURE >= 140 MM HG: ICD-10-PCS | Mod: CPTII,S$GLB,, | Performed by: PHYSICIAN ASSISTANT

## 2023-10-10 PROCEDURE — 1159F PR MEDICATION LIST DOCUMENTED IN MEDICAL RECORD: ICD-10-PCS | Mod: CPTII,S$GLB,, | Performed by: PHYSICIAN ASSISTANT

## 2023-10-10 PROCEDURE — 99999 PR PBB SHADOW E&M-EST. PATIENT-LVL III: CPT | Mod: PBBFAC,,, | Performed by: PHYSICIAN ASSISTANT

## 2023-10-10 PROCEDURE — 3079F PR MOST RECENT DIASTOLIC BLOOD PRESSURE 80-89 MM HG: ICD-10-PCS | Mod: CPTII,S$GLB,, | Performed by: PHYSICIAN ASSISTANT

## 2023-10-10 PROCEDURE — 99999 PR PBB SHADOW E&M-EST. PATIENT-LVL III: ICD-10-PCS | Mod: PBBFAC,,, | Performed by: PHYSICIAN ASSISTANT

## 2023-10-10 PROCEDURE — 1160F PR REVIEW ALL MEDS BY PRESCRIBER/CLIN PHARMACIST DOCUMENTED: ICD-10-PCS | Mod: CPTII,S$GLB,, | Performed by: PHYSICIAN ASSISTANT

## 2023-10-10 PROCEDURE — 1159F MED LIST DOCD IN RCRD: CPT | Mod: CPTII,S$GLB,, | Performed by: PHYSICIAN ASSISTANT

## 2023-10-10 PROCEDURE — 99214 PR OFFICE/OUTPT VISIT, EST, LEVL IV, 30-39 MIN: ICD-10-PCS | Mod: S$GLB,,, | Performed by: PHYSICIAN ASSISTANT

## 2023-10-10 PROCEDURE — 3008F BODY MASS INDEX DOCD: CPT | Mod: CPTII,S$GLB,, | Performed by: PHYSICIAN ASSISTANT

## 2023-10-10 RX ORDER — GABAPENTIN 600 MG/1
600 TABLET ORAL 3 TIMES DAILY
Qty: 90 TABLET | Refills: 1 | Status: SHIPPED | OUTPATIENT
Start: 2023-10-10 | End: 2024-01-03 | Stop reason: SDUPTHER

## 2023-10-10 RX ORDER — LIDOCAINE AND PRILOCAINE 25; 25 MG/G; MG/G
CREAM TOPICAL
Qty: 60 G | Refills: 0 | Status: SHIPPED | OUTPATIENT
Start: 2023-10-10

## 2023-10-10 NOTE — PROGRESS NOTES
Established Patient Chronic Pain Note     Referring Physician: self    PCP: Ajit Botello MD    Chief Complaint:   No chief complaint on file.  Neck with RUE radicular pain      Interval History (10/10/2023): Janna Sheffield presents today for follow-up visit.  she underwent right C6/7 TF HIMANSHU (with Dr. Watts) on 9/11/23.  The patient reports that she is/was better following the procedure.  she reports 75% pain relief.  The changes lasted 4 weeks so far.  The changes have continued through this visit.  Patient reports pain as 6/10 today.    Interval History (7/26/2023): Patient was last seen on Visit date not found. she presents today for follow-up for medication refill and discussion of her pain. she feels the gabapentin medication is providing some pain relief, but she only takes it in the morning. When pain is flared, she will take 2 gabapentin + Tylenol (acetaminophen). So, she is unsure how helpful gabapentin has been with pain relief.  No major SE from medications.  Pain started to return from last injection about 2 months ago. Patient reports pain as 7/10 today.     Interval History (5/17/2022): Janna Sheffield presents today for follow-up visit.  she underwent right C6/7 TF HIMANSHU with Dr. Kramer on 4/18/22.  The patient reports that she is/was better following the procedure.  she reports at least 80% pain relief.  The changes lasted 4 weeks so far.  The changes have continued through this visit.  Patient reports pain as 3/10 today.  She is feeling much better overall and felt pain relief the day of the procedure.  She continues to take gabapentin 300 mg in the morning and 600 mg at night with pain relief, and she is enquiring about correct dosage today as she was previously prescribed 600 TID.    Interval History (3/16/2022):  Janna Sheffield presents today for follow-up visit.  Patient was last seen on 1/18/2022. At that visit, the plan was to complete BUE EMG/NCS and see Neurosurgery. She  saw Dr. Dover recently who referred her back to us for left cervical TF HIMANSHU.  She is here today under the impression that she was seen in primary care for a sinus infection and also for evaluation of right arm pain, which she is wearing an arm brace for.  She has continued to have left cervical radiculopathy, but she is also now having this right forearm pain.  She does feel that it occasionally radiates from her neck all the way down her arm, but the most bothersome area is localized in lateral forearm.  Patient reports pain as 10/10 today.    Interval history 01/18/2022  Patient presents status post C6-7 interlaminar epidural steroid injection with left paramedian approach 12/21/2021.  Patient reports no discernible relief following her cervical C6-7 epidural steroid injection.  Today she again reports neck pain which radiates into the trapezius distribution, today on the left in C5-6 distribution and down the left upper extremity to the cubital fossa.  Patient reports she was unclear on gabapentin titration and discontinued this medication following her injection.  Patient continues to report significant weakness in the upper extremities requiring help in activities of daily living such as brushing her hair reporting on her clothes.    HPI 11/16/2021  Janna Sheffield is a 61 y.o. female with past medical history significant for fibromyalgia, history of left total knee arthroplasty, hypertension, hypercholesterolemia, pulmonary sarcoidosis on immunosuppression, chronic restrictive lung disease who presents to the clinic for the evaluation of neck and upper extremity pain.  Today patient reports pain which has been longstanding over several years with particularly exacerbated over the last 5 months.  Today patient reports pain is constant and is rated as a 9/10.  Patient reports pain in bilateral cervical paraspinous musculature which radiates down bilateral upper extremities in C7-8 distribution to the  hand.  Pain is described as cramping with numbness and tingling in bilateral hands.  Pain is exacerbated with cervical flexion and extension and reaching and pulling motions with bilateral upper extremities.  Patient does report associated weakness in bilateral upper extremities.  Pain has been improved with prior cervical transforaminal epidural steroid injection and gabapentin medication.  Patient denies any lower extremity weakness, gait ataxia or imbalance.  Patient reports significant motor weakness and loss of sensations.  Patient denies night fever/night sweats, urinary incontinence, bowel incontinence and significant weight loss.  Of note patient last saw Dr. Isabel October 2017 following cervical TF epidural steroid injection with significant relief and continuation of gabapentin.      Pain Disability Index Review:      10/10/2023     9:44 AM 7/26/2023     9:10 AM 5/17/2022     9:56 AM   Last 3 PDI Scores   Pain Disability Index (PDI) 44 [phone] 35       Non-Pharmacologic Treatments:  Physical Therapy/Home Exercise: no  Ice/Heat:yes  TENS: no  Acupuncture: no  Massage: no  Chiropractic: no    Other: no      Pain Medications:  - Adjuvant Medications: Neurontin (Gabapentin) and Tylenol (Acetaminophen)    Pain Procedures:   -09/28/2017:  Left-sided C6-7 TF HIMANSHU - Dr. Isabel with significant pain relief   -12/21/2021:  C6-7 IL HIMANSHU with left paramedian approach with limited pain relief - Dr. Marsh  -right C6/7 TF HIMANSHU with Dr. Kramer on 4/18/22 with at least 80% pain relief for about a year  -right C6/7 TF HIMANSHU (with Dr. Watts) on 9/11/23 with 75% pain relief           Review of Systems:   GENERAL:  No weight loss, malaise or fevers.  HEENT:   No recent changes in vision or hearing  NECK:  Negative for lumps, no difficulty with swallowing.  RESPIRATORY:  Negative for cough, wheezing or shortness of breath, patient denies any recent URI.  CARDIOVASCULAR:  Negative for chest pain, leg swelling or palpitations.  GI:   "Negative for abdominal discomfort, blood in stools or black stools or change in bowel habits.  MUSCULOSKELETAL:  See HPI.  SKIN:  Negative for lesions, rash, and itching.  PSYCH:  No mood disorder or recent psychosocial stressors.   HEMATOLOGY/LYMPHOLOGY:  Negative for prolonged bleeding, bruising easily or swollen nodes.    NEURO:   No history of headaches, syncope, paralysis, seizures or tremors.  All other reviewed and negative other than HPI.        OBJECTIVE:    Physical Exam:  Vitals:    10/10/23 0943   BP: (!) 149/85   Pulse: 69   Weight: 93.8 kg (206 lb 12.7 oz)   Height: 5' 7" (1.702 m)   PainSc:   6   PainLoc: Neck    Body mass index is 32.39 kg/m².   (reviewed on 10/12/2023)    GENERAL: Well appearing, in no acute distress, alert and oriented x3.  PSYCH:  Mood and affect appropriate.  SKIN: Skin color, texture, turgor normal, no rashes or lesions.  HEAD/FACE:  Normocephalic, atraumatic.   GI: no obvious distention.     NECK: pain to palpation over the cervical paraspinous muscles. Spurling Positive on right.  pain with neck flexion, extension, or lateral flexion.   Normaltesting biceps, triceps and brachioradialis bilaterally.    5/5 strength testing deltoid, biceps, triceps, wrist extensor, wrist flexor and ulnar intrinsics bilaterally.    Normal  strength bilaterally    PULM: No evidence of respiratory difficulty, symmetric chest rise.  GI:  Soft and non-tender.    NEURO: BUE & BLE extremity coordination and muscle stretch reflexes are physiologic and symmetric. No loss of sensation is noted.  GAIT: normal.      Imaging (Reviewed on 10/12/2023):    2/4/22 BUE EMG/NCS  1. ABNORMAL study  2. There is electrodiagnostic evidence of a mild demyelinating median neuropathy (Carpal tunnel syndrome) across bilateral wrists.  There was a mild chronic radiculopathy of the left C6 nerve root  3. There is clinical evidence of cervical myofascial pain and rotator cuff weakness    MRI cervical spine " 11/16/2021  FINDINGS:  The cervical cord remains normal in signal and morphology.     The cervical vertebra reveal mild straightening of the normal lordosis.  No intrinsic marrow signal abnormality is seen.     Multilevel cervical degenerative disc disease is present.     C2-C3: Minimal annular bulge.     C3-C4: Mild central annular disc bulge.     C4-C5: Moderate disc degeneration with disc narrowing, desiccation and disc bulging osteophyte with ventral sac impression.  Mild central stenosis.  Uncovertebral joint spurring with moderate right and mild left foraminal stenosis.  AP diameter of the canal approximately 8 mm.     C5-C6: Moderate disc degeneration with disc narrowing, desiccation and disc bulging osteophyte.  Mild ventral sac impression.  Uncovertebral joint spurring with moderate left and mild right foraminal stenosis.     C6-C7: Mild-to-moderate disc degeneration with disc narrowing, desiccation and disc bulging osteophyte with mild ventral sac impression.  AP diameter canal at this level is approximately 8.1 mm.  Uncovertebral joint spurring with mild foraminal stenosis.     C7-T1: Mild disc bulge.    09/14/17  MRI Cervical Spine Without Contrast    Findings: There is straightening of the cervical lordosis.  Degenerative vertebral endplate spurring and disc desiccation present at multiple levels with moderate disc space narrowing from C4-C5 through C6-C7.  Vertebral marrow signal pattern and architecture are normal.  Cervical cord and craniocervical junction are intact.  Paravertebral soft tissues are symmetric and normal in appearance.    C2-C3: Unremarkable.    C3-C4: Unremarkable.    C4-C5: Posterior disc osteophyte complex and bilateral uncovertebral hypertrophy resulting in moderate left greater than right foraminal narrowing.  Indentation of ventral thecal sac with associated anterior cord contact and mild central canal stenosis.  Minimum AP canal diameter is 9.0 mm.    C5-C6: Posterior disc  osteophyte complex and asymmetric left greater than right uncovertebral hypertrophy producing marked left and moderate right foraminal narrowing.  Mild indentation of the ventral thecal sac without significant canal stenosis.    C6-C7: Posterior disc osteophyte complex and uncovertebral hypertrophy producing moderate to marked left greater than right foraminal narrowing.  Mild indentation of the ventral thecal sac without significant canal stenosis.    C7-T1: Unremarkable.    06/30/21 X-Ray Lumbar Spine AP And Lateral  FINDINGS:  Vertebral body height well maintained.  Minimal multilevel marginal spurring at the thoracolumbar junction region.  Facet disease identified in the mid and lower L-spine.  Most prominent findings at the L4-5 and L5-S1 levels.  Disc spaces appear adequate at all levels.  There is equivocal appearance minimal anterior subluxation L5 on S1.  Pedicles and SI joints intact.  Status post cholecystectomy      08/31/17 X-Ray Cervical Spine AP And Lateral  Findings: The vertebral bodies demonstrate normal height.  The alignment is within normal limits. There is moderate displaced narrowing and spondylosis present at the C4-C5 through the C6-C7 levels. The C1-C2 articulation is within normal limits. No prevertebral soft tissue swelling.      ASSESSMENT: 62 y.o. year old female with neck and upper extremity pain, consistent with     1. Bilateral elbow joint pain  LIDOcaine-prilocaine (EMLA) cream      2. Right cervical radiculopathy  gabapentin (NEURONTIN) 600 MG tablet      3. Degenerative cervical spinal stenosis            PLAN:   1. Interventional:   - S/p right C6/7 TF HIMANSHU (with Dr. Watts) on 9/11/23 with 75% pain relief.  - S/p right C6/7 TF HIMANSHU with Dr. Kramer on 4/18/22 with at least 80% pain relief for about a year  -12/21/2021:  C6-7 IL HIMANSHU with left paramedian approach with limited pain relief   -09/28/2017:  Left-sided C6-7 TF HIMANSHU - Dr. Isabel with significant pain relief     2.  Pharmacologic:   - Increase gabapentin 300mg QAM/ 600mg QHS to gabapentin (Neurontin) 600mg TID.    - Continue diclofenac 1% gel to apply 2g topically up to 4 times per day PRN.   - Start lidocaine 2.5%-prilocaine 2.5% topical cream Q6H PRN topically; can alternate with Voltaren gel.     - Continue Tylenol (acetaminophen) 500mg PRN.   - Anticoagulation use: None.     3. Rehabilitative: Encouraged regular exercise. Patient discharged from PT due to poor attendance previously. Consider restarting formal PT in the future if needed.     4. Diagnostic/ Imaging: No new imaging ordered. Previous imaging reviewed.     5. Consult/ Referral:  Previously referred to Neurosurgery. Saw Dr. Dover who recommended cervical TF HIMANSHU. Will consider follow-up in the future if warranted.     6. Follow up: 3 months follow-up - in clinic (per pt request)  - doesn't make own appt    - This condition does not require this patient to take time off of work, and the primary goal of our Pain Management services is to improve the patient's functional capacity.   - I discussed the risks, benefits, and alternatives to potential treatment options. All questions and concerns were fully addressed today in clinic.         Joi Whiting PA-C  Interventional Pain Management - [facility]    Disclaimer:  This note was prepared using voice recognition system and is likely to have sound alike errors that may have been overlooked even after proof reading.  Please call me with any questions.

## 2023-10-17 ENCOUNTER — OFFICE VISIT (OUTPATIENT)
Dept: DERMATOLOGY | Facility: CLINIC | Age: 63
End: 2023-10-17
Payer: COMMERCIAL

## 2023-10-17 DIAGNOSIS — L82.1 SEBORRHEIC KERATOSIS: Primary | ICD-10-CM

## 2023-10-17 DIAGNOSIS — R21 RASH: ICD-10-CM

## 2023-10-17 PROCEDURE — 99202 PR OFFICE/OUTPT VISIT, NEW, LEVL II, 15-29 MIN: ICD-10-PCS | Mod: S$GLB,,, | Performed by: DERMATOLOGY

## 2023-10-17 PROCEDURE — 1160F PR REVIEW ALL MEDS BY PRESCRIBER/CLIN PHARMACIST DOCUMENTED: ICD-10-PCS | Mod: CPTII,S$GLB,, | Performed by: DERMATOLOGY

## 2023-10-17 PROCEDURE — 99999 PR PBB SHADOW E&M-EST. PATIENT-LVL III: ICD-10-PCS | Mod: PBBFAC,,, | Performed by: DERMATOLOGY

## 2023-10-17 PROCEDURE — 1159F PR MEDICATION LIST DOCUMENTED IN MEDICAL RECORD: ICD-10-PCS | Mod: CPTII,S$GLB,, | Performed by: DERMATOLOGY

## 2023-10-17 PROCEDURE — 99202 OFFICE O/P NEW SF 15 MIN: CPT | Mod: S$GLB,,, | Performed by: DERMATOLOGY

## 2023-10-17 PROCEDURE — 1159F MED LIST DOCD IN RCRD: CPT | Mod: CPTII,S$GLB,, | Performed by: DERMATOLOGY

## 2023-10-17 PROCEDURE — 99999 PR PBB SHADOW E&M-EST. PATIENT-LVL III: CPT | Mod: PBBFAC,,, | Performed by: DERMATOLOGY

## 2023-10-17 PROCEDURE — 1160F RVW MEDS BY RX/DR IN RCRD: CPT | Mod: CPTII,S$GLB,, | Performed by: DERMATOLOGY

## 2023-10-17 NOTE — PATIENT INSTRUCTIONS
Amlactin (ammonium lactate) cream for rough bumpy spots twice daily  Sarna Sensitive (pramoxine) for itchy spots      CRYOSURGERY      Your doctor has used a method called cryosurgery to treat your skin condition. Cryosurgery refers to the use of very cold substances to treat a variety of skin conditions such as warts, pre-skin cancers, molluscum contagiosum, sun spots, and several benign growths. The substance we use in cryosurgery is liquid nitrogen and is so cold (-195 degrees Celsius) that it burns when administered.     Following treatment in the office, the skin may immediately burn and become red. You may find the area around the lesion is affected as well. It is sometimes necessary to treat not only the lesion, but a small area of the surrounding normal skin to achieve a good response.     A blister, and even a blood filled blister, may form after treatment.   This is a normal response. If the blister is painful, it is acceptable to sterilize a needle with rubbing alcohol and gently pop the blister. It is important that you gently wash the area with soap and warm water as the blister fluid may contain wart virus if a wart was treated. Do not remove the roof of the blister.     The area treated can take anywhere from 1-3 weeks to heal. Healing time depends on the kind of skin lesion treated, the location, and how aggressively the lesion was treated. It is recommended that the areas treated are covered with Vaseline or bacitracin ointment and a band-aid. If a band-aid is not practical, just ointment applied several times per day will do. Keeping these areas moist will speed the healing time.    Treatment with liquid nitrogen can leave a scar. In dark skin, it may be a light or dark scar, in light skin it may be a white or pink scar. These will generally fade with time, but may never go away completely.     If you have any concerns after your treatment, please feel free to call the office.       [address] [place] [address]/ [phone] FAX/ [email or web address]         SEBORRHEIC KERATOSES        What causes seborrheic keratoses?    Seborrheic keratoses are harmless, common skin growths that first appear during adult life.  As time goes by, more growths appear.  Some persons have a very large number of them.  Seborrheic keratoses appear on both covered and uncovered parts of the body; they are not caused by sunlight.  The tendency to develop seborrheic keratoses is inherited.    Seborrheic keratoses are harmless and never become malignant.  They begin as slightly raised, light brown spots.  Gradually they thicken and take on a rough wartlike surface.  They slowly darken and may turn black.  These color changes are harmless.  Seborrheic keratoses are superficial and look as if they were stuck on the skin.  Persons who have had several seborrheic keratoses can usually recognize this type of benign growth.  However, if you are concerned or unsure about any growth, consult me.    Treatment    Seborrheic keratoses can be removed in the office.  The only reason for removing a seborrheic keratosis is your wish to get rid of it.

## 2023-10-17 NOTE — PROGRESS NOTES
Subjective:      Patient ID:  Janna Sheffield is a 62 y.o. female who presents for   Chief Complaint   Patient presents with    Mole     C/o moles on face      Referred by rheum for suspected DPN:    Patient complains of lesion(s)  Location: face  Duration: years  Symptoms: more coming; some are itchy  Relieving factors/Previous treatments: washes face and puts vaseline on      pmedhx:   restrictive lung disease  sarcoid (prev on imuran with rheum, stopped 8/2019 per pulm rec)  OA    Denies personal/fam h/o skin cancer      Review of Systems   Skin:  Positive for itching. Negative for rash, daily sunscreen use and activity-related sunscreen use.       Objective:   Physical Exam   Constitutional: She appears well-developed and well-nourished. No distress.   Neurological: She is alert and oriented to person, place, and time. She is not disoriented.   Psychiatric: She has a normal mood and affect.   Skin:   Areas Examined (abnormalities noted in diagram):   Head / Face Inspection Performed            Diagram Legend     Erythematous scaling macule/papule c/w actinic keratosis       Vascular papule c/w angioma      Pigmented verrucoid papule/plaque c/w seborrheic keratosis      Yellow umbilicated papule c/w sebaceous hyperplasia      Irregularly shaped tan macule c/w lentigo     1-2 mm smooth white papules consistent with Milia      Movable subcutaneous cyst with punctum c/w epidermal inclusion cyst      Subcutaneous movable cyst c/w pilar cyst      Firm pink to brown papule c/w dermatofibroma      Pedunculated fleshy papule(s) c/w skin tag(s)      Evenly pigmented macule c/w junctional nevus     Mildly variegated pigmented, slightly irregular-bordered macule c/w mildly atypical nevus      Flesh colored to evenly pigmented papule c/w intradermal nevus       Pink pearly papule/plaque c/w basal cell carcinoma      Erythematous hyperkeratotic cursted plaque c/w SCC      Surgical scar with no sign of skin cancer  "recurrence      Open and closed comedones      Inflammatory papules and pustules      Verrucoid papule consistent consistent with wart     Erythematous eczematous patches and plaques     Dystrophic onycholytic nail with subungual debris c/w onychomycosis     Umbilicated papule    Erythematous-base heme-crusted tan verrucoid plaque consistent with inflamed seborrheic keratosis     Erythematous Silvery Scaling Plaque c/w Psoriasis     See annotation      Assessment / Plan:        Seborrheic keratosis  These are benign inherited growths without a malignant potential. Reassurance given to patient. No treatment is necessary.   Recommended OTC Amlactin BID , Sarna sensitive for itching  Offered cryotherapy for "test spot" to one lesion (L lateral cheek) and treated after discussion of r/b/ae including blister, pain, recurrence, permanent discoloration.  If additional treatment desired, gave cosmetic pricing list (also would consider hyfrecation low setting)               Follow up if symptoms worsen or fail to improve.    "

## 2023-10-25 ENCOUNTER — LAB VISIT (OUTPATIENT)
Dept: LAB | Facility: HOSPITAL | Age: 63
End: 2023-10-25
Attending: PEDIATRICS
Payer: COMMERCIAL

## 2023-10-25 DIAGNOSIS — I10 PRIMARY HYPERTENSION: ICD-10-CM

## 2023-10-25 LAB
ALBUMIN SERPL BCP-MCNC: 3.7 G/DL (ref 3.5–5.2)
ALP SERPL-CCNC: 84 U/L (ref 55–135)
ALT SERPL W/O P-5'-P-CCNC: 13 U/L (ref 10–44)
ANION GAP SERPL CALC-SCNC: 12 MMOL/L (ref 8–16)
AST SERPL-CCNC: 17 U/L (ref 10–40)
BILIRUB SERPL-MCNC: 0.5 MG/DL (ref 0.1–1)
BUN SERPL-MCNC: 13 MG/DL (ref 8–23)
CALCIUM SERPL-MCNC: 9.6 MG/DL (ref 8.7–10.5)
CHLORIDE SERPL-SCNC: 101 MMOL/L (ref 95–110)
CHOLEST SERPL-MCNC: 178 MG/DL (ref 120–199)
CHOLEST/HDLC SERPL: 4.2 {RATIO} (ref 2–5)
CO2 SERPL-SCNC: 28 MMOL/L (ref 23–29)
CREAT SERPL-MCNC: 0.9 MG/DL (ref 0.5–1.4)
EST. GFR  (NO RACE VARIABLE): >60 ML/MIN/1.73 M^2
ESTIMATED AVG GLUCOSE: 120 MG/DL (ref 68–131)
GLUCOSE SERPL-MCNC: 124 MG/DL (ref 70–110)
HBA1C MFR BLD: 5.8 % (ref 4–5.6)
HDLC SERPL-MCNC: 42 MG/DL (ref 40–75)
HDLC SERPL: 23.6 % (ref 20–50)
LDLC SERPL CALC-MCNC: 120.2 MG/DL (ref 63–159)
NONHDLC SERPL-MCNC: 136 MG/DL
POTASSIUM SERPL-SCNC: 3.5 MMOL/L (ref 3.5–5.1)
PROT SERPL-MCNC: 7.3 G/DL (ref 6–8.4)
SODIUM SERPL-SCNC: 141 MMOL/L (ref 136–145)
TRIGL SERPL-MCNC: 79 MG/DL (ref 30–150)

## 2023-10-25 PROCEDURE — 80053 COMPREHEN METABOLIC PANEL: CPT | Performed by: PEDIATRICS

## 2023-10-25 PROCEDURE — 36415 COLL VENOUS BLD VENIPUNCTURE: CPT | Performed by: PEDIATRICS

## 2023-10-25 PROCEDURE — 83036 HEMOGLOBIN GLYCOSYLATED A1C: CPT | Performed by: PEDIATRICS

## 2023-10-25 PROCEDURE — 80061 LIPID PANEL: CPT | Performed by: PEDIATRICS

## 2023-10-26 DIAGNOSIS — I10 ESSENTIAL HYPERTENSION: ICD-10-CM

## 2023-10-26 RX ORDER — ROSUVASTATIN CALCIUM 40 MG/1
TABLET, COATED ORAL
Qty: 90 TABLET | Refills: 1 | Status: SHIPPED | OUTPATIENT
Start: 2023-10-26

## 2023-10-26 RX ORDER — HYDROCHLOROTHIAZIDE 25 MG/1
TABLET ORAL
Qty: 90 TABLET | Refills: 1 | Status: SHIPPED | OUTPATIENT
Start: 2023-10-26

## 2023-10-26 NOTE — TELEPHONE ENCOUNTER
Refill Routing Note   Medication(s) are not appropriate for processing by [facility] for the following reason(s):      Required vitals abnormal    [facility] action(s):  Defer  Approve Care Due:  None identified          Appointments  past 12m or future 3m with PCP    Date Provider   Last Visit   5/1/2023 Ajit Botello MD   Next Visit   Visit date not found Ajit Botello MD   ED visits in past 90 days: 0        Note composed:1:38 PM 10/26/2023

## 2023-10-26 NOTE — TELEPHONE ENCOUNTER
No care due was identified.  [facility] Embedded Care Due Messages. Reference number: [id number].   10/26/2023 11:42:29 AM CDT

## 2023-11-02 ENCOUNTER — OFFICE VISIT (OUTPATIENT)
Dept: INTERNAL MEDICINE | Facility: CLINIC | Age: 63
End: 2023-11-02
Payer: COMMERCIAL

## 2023-11-02 VITALS
OXYGEN SATURATION: 98 % | BODY MASS INDEX: 33.01 KG/M2 | HEART RATE: 64 BPM | SYSTOLIC BLOOD PRESSURE: 128 MMHG | TEMPERATURE: 98 F | HEIGHT: 67 IN | DIASTOLIC BLOOD PRESSURE: 88 MMHG | WEIGHT: 210.31 LBS

## 2023-11-02 DIAGNOSIS — M15.9 GENERALIZED OSTEOARTHRITIS OF MULTIPLE SITES: Chronic | ICD-10-CM

## 2023-11-02 DIAGNOSIS — D84.9 IMMUNOCOMPROMISED: ICD-10-CM

## 2023-11-02 DIAGNOSIS — D86.0 PULMONARY SARCOIDOSIS: ICD-10-CM

## 2023-11-02 DIAGNOSIS — R73.03 PREDIABETES: ICD-10-CM

## 2023-11-02 DIAGNOSIS — E78.5 HYPERLIPIDEMIA, UNSPECIFIED HYPERLIPIDEMIA TYPE: ICD-10-CM

## 2023-11-02 DIAGNOSIS — I10 PRIMARY HYPERTENSION: Primary | ICD-10-CM

## 2023-11-02 DIAGNOSIS — M79.7 FIBROMYALGIA: ICD-10-CM

## 2023-11-02 DIAGNOSIS — E55.9 VITAMIN D DEFICIENCY: ICD-10-CM

## 2023-11-02 PROCEDURE — 99214 PR OFFICE/OUTPT VISIT, EST, LEVL IV, 30-39 MIN: ICD-10-PCS | Mod: 25,S$GLB,, | Performed by: NURSE PRACTITIONER

## 2023-11-02 PROCEDURE — 99214 OFFICE O/P EST MOD 30 MIN: CPT | Mod: 25,S$GLB,, | Performed by: NURSE PRACTITIONER

## 2023-11-02 PROCEDURE — 3074F PR MOST RECENT SYSTOLIC BLOOD PRESSURE < 130 MM HG: ICD-10-PCS | Mod: CPTII,S$GLB,, | Performed by: NURSE PRACTITIONER

## 2023-11-02 PROCEDURE — 3079F PR MOST RECENT DIASTOLIC BLOOD PRESSURE 80-89 MM HG: ICD-10-PCS | Mod: CPTII,S$GLB,, | Performed by: NURSE PRACTITIONER

## 2023-11-02 PROCEDURE — 1159F MED LIST DOCD IN RCRD: CPT | Mod: CPTII,S$GLB,, | Performed by: NURSE PRACTITIONER

## 2023-11-02 PROCEDURE — 3079F DIAST BP 80-89 MM HG: CPT | Mod: CPTII,S$GLB,, | Performed by: NURSE PRACTITIONER

## 2023-11-02 PROCEDURE — 90471 IMMUNIZATION ADMIN: CPT | Mod: S$GLB,,, | Performed by: NURSE PRACTITIONER

## 2023-11-02 PROCEDURE — 90471 FLU VACCINE (QUAD) GREATER THAN OR EQUAL TO 3YO PRESERVATIVE FREE IM: ICD-10-PCS | Mod: S$GLB,,, | Performed by: NURSE PRACTITIONER

## 2023-11-02 PROCEDURE — 3074F SYST BP LT 130 MM HG: CPT | Mod: CPTII,S$GLB,, | Performed by: NURSE PRACTITIONER

## 2023-11-02 PROCEDURE — 99999 PR PBB SHADOW E&M-EST. PATIENT-LVL IV: ICD-10-PCS | Mod: PBBFAC,,, | Performed by: NURSE PRACTITIONER

## 2023-11-02 PROCEDURE — 1159F PR MEDICATION LIST DOCUMENTED IN MEDICAL RECORD: ICD-10-PCS | Mod: CPTII,S$GLB,, | Performed by: NURSE PRACTITIONER

## 2023-11-02 PROCEDURE — 90686 FLU VACCINE (QUAD) GREATER THAN OR EQUAL TO 3YO PRESERVATIVE FREE IM: ICD-10-PCS | Mod: S$GLB,,, | Performed by: NURSE PRACTITIONER

## 2023-11-02 PROCEDURE — 3008F BODY MASS INDEX DOCD: CPT | Mod: CPTII,S$GLB,, | Performed by: NURSE PRACTITIONER

## 2023-11-02 PROCEDURE — 3044F PR MOST RECENT HEMOGLOBIN A1C LEVEL <7.0%: ICD-10-PCS | Mod: CPTII,S$GLB,, | Performed by: NURSE PRACTITIONER

## 2023-11-02 PROCEDURE — 90686 IIV4 VACC NO PRSV 0.5 ML IM: CPT | Mod: S$GLB,,, | Performed by: NURSE PRACTITIONER

## 2023-11-02 PROCEDURE — 3008F PR BODY MASS INDEX (BMI) DOCUMENTED: ICD-10-PCS | Mod: CPTII,S$GLB,, | Performed by: NURSE PRACTITIONER

## 2023-11-02 PROCEDURE — 3044F HG A1C LEVEL LT 7.0%: CPT | Mod: CPTII,S$GLB,, | Performed by: NURSE PRACTITIONER

## 2023-11-02 PROCEDURE — 99999 PR PBB SHADOW E&M-EST. PATIENT-LVL IV: CPT | Mod: PBBFAC,,, | Performed by: NURSE PRACTITIONER

## 2023-11-02 NOTE — PROGRESS NOTES
Subjective:       Patient ID: Janna Sheffield is a 62 y.o. female.    Chief Complaint: follow up    HPI      1) HTN: No home B/P monitoring. No HTNive symptoms  2) Hypercholesterol: compliant with statin. No D&E.  3) Fibromyalgia:Gabapentin working(uses prn)  4) GERD: pantoprazole is helping- occ flares. S/P choley.  5) LIPIDS:following D&E, tolerating and compliant with med(s).  6) Sarcoid/fibromyalgia/immunocompromised: seeing pulmonary and rheum. Currently off meds.   7) Obesity- sedentary, not exercising  8) DJD: c/o continued cervical radic\-seeing pain mgt. Recent HIMANSHU not helpful . Getting referred to neurosurg upcoming    Blood work shows elevated glucose/prediabetes    Review of Systems   Constitutional:  Negative for activity change, appetite change, chills, diaphoresis, fatigue, fever and unexpected weight change.   HENT:  Negative for congestion, ear pain, postnasal drip, rhinorrhea, sinus pressure, sinus pain, sneezing, sore throat, tinnitus, trouble swallowing and voice change.    Eyes:  Negative for photophobia, pain and visual disturbance.   Respiratory:  Negative for cough, chest tightness, shortness of breath and wheezing.    Cardiovascular:  Negative for chest pain, palpitations and leg swelling.   Gastrointestinal:  Negative for abdominal distention, abdominal pain, constipation, diarrhea, nausea and vomiting.   Genitourinary:  Negative for decreased urine volume, difficulty urinating, dysuria, flank pain, frequency, hematuria and urgency.   Musculoskeletal:  Positive for arthralgias and myalgias. Negative for back pain, joint swelling, neck pain and neck stiffness.   Allergic/Immunologic: Negative for immunocompromised state.   Neurological:  Negative for dizziness, tremors, seizures, syncope, facial asymmetry, speech difficulty, weakness, light-headedness, numbness and headaches.   Hematological:  Negative for adenopathy. Does not bruise/bleed easily.   Psychiatric/Behavioral:  Negative for  confusion and sleep disturbance.        Objective:      Physical Exam  HENT:      Head: Normocephalic and atraumatic.      Right Ear: Tympanic membrane normal.      Left Ear: Tympanic membrane normal.   Eyes:      Conjunctiva/sclera: Conjunctivae normal.   Cardiovascular:      Rate and Rhythm: Normal rate and regular rhythm.      Heart sounds: Normal heart sounds.   Pulmonary:      Effort: Pulmonary effort is normal.      Breath sounds: Normal breath sounds.   Abdominal:      General: Bowel sounds are normal.      Palpations: Abdomen is soft.   Musculoskeletal:      Cervical back: Neck supple. Pain with movement and muscular tenderness present. Decreased range of motion.   Skin:     General: Skin is warm and dry.   Neurological:      Mental Status: She is alert and oriented to person, place, and time.         Assessment:     Vitals:    11/02/23 1027   BP: 128/88   Pulse: 64   Temp: 98 °F (36.7 °C)         1. Primary hypertension    2. Hyperlipidemia, unspecified hyperlipidemia type    3. Pulmonary sarcoidosis    4. Immunocompromised    5. Vitamin D deficiency    6. Fibromyalgia    7. Generalized osteoarthritis of multiple sites    8. Prediabetes        Plan:   Primary hypertension    Hyperlipidemia, unspecified hyperlipidemia type  -     Comprehensive Metabolic Panel; Future; Expected date: 04/30/2024  -     Hemoglobin A1C; Future; Expected date: 04/30/2024    Pulmonary sarcoidosis    Immunocompromised    Vitamin D deficiency    Fibromyalgia    Generalized osteoarthritis of multiple sites    Prediabetes  -     Comprehensive Metabolic Panel; Future; Expected date: 04/30/2024  -     Hemoglobin A1C; Future; Expected date: 04/30/2024  -     Lipid Panel; Future; Expected date: 04/30/2024    Other orders  -     Influenza - Quadrivalent (PF)      Blood work shows elevated glucose/prediabetes- eating lots of carbs and sweets usually only one meal a day in large portions. We discussed changes needed  Flu shot  Will get  covid/rsv vaccine upcoming  Return in 6 mo

## 2023-12-29 ENCOUNTER — TELEPHONE (OUTPATIENT)
Dept: INTERNAL MEDICINE | Facility: CLINIC | Age: 63
End: 2023-12-29
Payer: COMMERCIAL

## 2023-12-29 NOTE — TELEPHONE ENCOUNTER
----- Message from Kaila Sandoval sent at 12/29/2023 10:10 AM CST -----  Contact: Janna   1MEDICALADVICE     Patient is calling for Medical Advice regarding:right knee pain, can't put pressure on it at all     How long has patient had these symptoms:Wednesday     Pharmacy name and phone#:    Would like response via [facility]:  Call back     Comments:

## 2023-12-29 NOTE — TELEPHONE ENCOUNTER
Spoke with patient and advised that she be seen for her knee pain. Informed her that there are no available appointments at this location and transferred her to the appointment desk to search surrounding clinics, also informed her she could do an urgent care visit as an alternative. Patient verbalized understanding.

## 2024-01-03 ENCOUNTER — TELEPHONE (OUTPATIENT)
Dept: NEUROSURGERY | Facility: CLINIC | Age: 64
End: 2024-01-03
Payer: COMMERCIAL

## 2024-01-03 ENCOUNTER — OFFICE VISIT (OUTPATIENT)
Dept: PAIN MEDICINE | Facility: CLINIC | Age: 64
End: 2024-01-03
Payer: COMMERCIAL

## 2024-01-03 VITALS
DIASTOLIC BLOOD PRESSURE: 85 MMHG | HEIGHT: 67 IN | SYSTOLIC BLOOD PRESSURE: 123 MMHG | WEIGHT: 205.69 LBS | HEART RATE: 81 BPM | BODY MASS INDEX: 32.28 KG/M2

## 2024-01-03 DIAGNOSIS — Z96.652 H/O TOTAL KNEE REPLACEMENT, LEFT: ICD-10-CM

## 2024-01-03 DIAGNOSIS — M48.02 DEGENERATIVE CERVICAL SPINAL STENOSIS: ICD-10-CM

## 2024-01-03 DIAGNOSIS — M54.12 RIGHT CERVICAL RADICULOPATHY: Primary | ICD-10-CM

## 2024-01-03 DIAGNOSIS — G89.29 CHRONIC PAIN OF RIGHT KNEE: ICD-10-CM

## 2024-01-03 DIAGNOSIS — M54.12 CERVICAL RADICULAR PAIN: ICD-10-CM

## 2024-01-03 DIAGNOSIS — M25.561 CHRONIC PAIN OF RIGHT KNEE: ICD-10-CM

## 2024-01-03 PROCEDURE — 99214 OFFICE O/P EST MOD 30 MIN: CPT | Mod: S$GLB,,, | Performed by: PHYSICIAN ASSISTANT

## 2024-01-03 PROCEDURE — 3074F SYST BP LT 130 MM HG: CPT | Mod: CPTII,S$GLB,, | Performed by: PHYSICIAN ASSISTANT

## 2024-01-03 PROCEDURE — 99999 PR PBB SHADOW E&M-EST. PATIENT-LVL IV: CPT | Mod: PBBFAC,,, | Performed by: PHYSICIAN ASSISTANT

## 2024-01-03 PROCEDURE — 3008F BODY MASS INDEX DOCD: CPT | Mod: CPTII,S$GLB,, | Performed by: PHYSICIAN ASSISTANT

## 2024-01-03 PROCEDURE — 3079F DIAST BP 80-89 MM HG: CPT | Mod: CPTII,S$GLB,, | Performed by: PHYSICIAN ASSISTANT

## 2024-01-03 PROCEDURE — 1159F MED LIST DOCD IN RCRD: CPT | Mod: CPTII,S$GLB,, | Performed by: PHYSICIAN ASSISTANT

## 2024-01-03 PROCEDURE — 1160F RVW MEDS BY RX/DR IN RCRD: CPT | Mod: CPTII,S$GLB,, | Performed by: PHYSICIAN ASSISTANT

## 2024-01-03 RX ORDER — GABAPENTIN 600 MG/1
600 TABLET ORAL 3 TIMES DAILY
Qty: 90 TABLET | Refills: 5 | Status: SHIPPED | OUTPATIENT
Start: 2024-01-03

## 2024-01-03 NOTE — PROGRESS NOTES
Established Patient Chronic Pain Note     Referring Physician: self    PCP: Ajit Botello MD    Chief Complaint:   Chief Complaint   Patient presents with    Arm Pain     right    Leg Pain     right    Neck Pain     Right side    Foot Pain     right   Neck with RUE radicular pain      Interval History (1/3/2024):  Patient presents today for follow-up visit.  Patient was last seen about 3 months ago.  Patient reports pain as 8/10 today.  She called right after [place] complaining of severe right knee pain.  She has not seen orthopedics in several years.  She has never had injections for this.  She does have a history of left knee replacement.  She also reports that her neck pain with right arm radicular pain has returned.  She is inquiring about Neurosurgery as well.    Interval History (10/10/2023): Janna Sheffield presents today for follow-up visit.  she underwent right C6/7 TF HIMANSHU (with Dr. Watts) on 9/11/23.  The patient reports that she is/was better following the procedure.  she reports 75% pain relief.  The changes lasted 4 weeks so far.  The changes have continued through this visit.  Patient reports pain as 6/10 today.    Interval History (7/26/2023): Patient was last seen on Visit date not found. she presents today for follow-up for medication refill and discussion of her pain. she feels the gabapentin medication is providing some pain relief, but she only takes it in the morning. When pain is flared, she will take 2 gabapentin + Tylenol (acetaminophen). So, she is unsure how helpful gabapentin has been with pain relief.  No major SE from medications.  Pain started to return from last injection about 2 months ago. Patient reports pain as 7/10 today.     Interval History (5/17/2022): Janna Sheffield presents today for follow-up visit.  she underwent right C6/7 TF HIMANSHU with Dr. Kramer on 4/18/22.  The patient reports that she is/was better following the procedure.  she reports at least 80%  pain relief.  The changes lasted 4 weeks so far.  The changes have continued through this visit.  Patient reports pain as 3/10 today.  She is feeling much better overall and felt pain relief the day of the procedure.  She continues to take gabapentin 300 mg in the morning and 600 mg at night with pain relief, and she is enquiring about correct dosage today as she was previously prescribed 600 TID.    Interval History (3/16/2022):  Janna Sheffield presents today for follow-up visit.  Patient was last seen on 1/18/2022. At that visit, the plan was to complete BUE EMG/NCS and see Neurosurgery. She saw Dr. Dover recently who referred her back to us for left cervical TF HIMANSHU.  She is here today under the impression that she was seen in primary care for a sinus infection and also for evaluation of right arm pain, which she is wearing an arm brace for.  She has continued to have left cervical radiculopathy, but she is also now having this right forearm pain.  She does feel that it occasionally radiates from her neck all the way down her arm, but the most bothersome area is localized in lateral forearm.  Patient reports pain as 10/10 today.    Interval history 01/18/2022  Patient presents status post C6-7 interlaminar epidural steroid injection with left paramedian approach 12/21/2021.  Patient reports no discernible relief following her cervical C6-7 epidural steroid injection.  Today she again reports neck pain which radiates into the trapezius distribution, today on the left in C5-6 distribution and down the left upper extremity to the cubital fossa.  Patient reports she was unclear on gabapentin titration and discontinued this medication following her injection.  Patient continues to report significant weakness in the upper extremities requiring help in activities of daily living such as brushing her hair reporting on her clothes.    HPI 11/16/2021  Janna Sheffield is a 61 y.o. female with past medical history  significant for fibromyalgia, history of left total knee arthroplasty, hypertension, hypercholesterolemia, pulmonary sarcoidosis on immunosuppression, chronic restrictive lung disease who presents to the clinic for the evaluation of neck and upper extremity pain.  Today patient reports pain which has been longstanding over several years with particularly exacerbated over the last 5 months.  Today patient reports pain is constant and is rated as a 9/10.  Patient reports pain in bilateral cervical paraspinous musculature which radiates down bilateral upper extremities in C7-8 distribution to the hand.  Pain is described as cramping with numbness and tingling in bilateral hands.  Pain is exacerbated with cervical flexion and extension and reaching and pulling motions with bilateral upper extremities.  Patient does report associated weakness in bilateral upper extremities.  Pain has been improved with prior cervical transforaminal epidural steroid injection and gabapentin medication.  Patient denies any lower extremity weakness, gait ataxia or imbalance.  Patient reports significant motor weakness and loss of sensations.  Patient denies night fever/night sweats, urinary incontinence, bowel incontinence and significant weight loss.  Of note patient last saw Dr. Isabel October 2017 following cervical TF epidural steroid injection with significant relief and continuation of gabapentin.      Pain Disability Index Review:      1/3/2024     9:58 AM 10/10/2023     9:44 AM 7/26/2023     9:10 AM   Last 3 PDI Scores   Pain Disability Index (PDI) 56 44 41       Non-Pharmacologic Treatments:  Physical Therapy/Home Exercise: no  Ice/Heat:yes  TENS: no  Acupuncture: no  Massage: no  Chiropractic: no    Other: no      Pain Medications:  - Adjuvant Medications: Neurontin (Gabapentin) and Tylenol (Acetaminophen)    Pain Procedures:   -09/28/2017:  Left-sided C6-7 TF HIMANSHU - Dr. Isabel with significant pain relief   -12/21/2021:  C6-7 IL HIMANSHU  "with left paramedian approach with limited pain relief - Dr. Marsh  -right C6/7 TF HIMANSHU with Dr. Kramer on 4/18/22 with at least 80% pain relief for about a year  -right C6/7 TF HIMANSHU (with Dr. Watts) on 9/11/23 with 75% pain relief           Review of Systems:   GENERAL:  No weight loss, malaise or fevers.  HEENT:   No recent changes in vision or hearing  NECK:  Negative for lumps, no difficulty with swallowing.  RESPIRATORY:  Negative for cough, wheezing or shortness of breath, patient denies any recent URI.  CARDIOVASCULAR:  Negative for chest pain, leg swelling or palpitations.  GI:  Negative for abdominal discomfort, blood in stools or black stools or change in bowel habits.  MUSCULOSKELETAL:  See HPI.  SKIN:  Negative for lesions, rash, and itching.  PSYCH:  No mood disorder or recent psychosocial stressors.   HEMATOLOGY/LYMPHOLOGY:  Negative for prolonged bleeding, bruising easily or swollen nodes.    NEURO:   No history of headaches, syncope, paralysis, seizures or tremors.  All other reviewed and negative other than HPI.        OBJECTIVE:    Physical Exam:  Vitals:    01/03/24 0956   BP: 123/85   Pulse: 81   Weight: 93.3 kg (205 lb 11 oz)   Height: 5' 7" (1.702 m)   PainSc:   8   PainLoc: Arm    Body mass index is 32.22 kg/m².   (reviewed on 1/3/2024)    GENERAL: Well appearing, in no acute distress, alert and oriented x3.  PSYCH:  Mood and affect appropriate.  SKIN: Skin color, texture, turgor normal, no rashes or lesions.  HEAD/FACE:  Normocephalic, atraumatic. No audible wheezing.     NECK: pain to palpation over the cervical paraspinous muscles. Spurling Positive on right.  pain with neck flexion, extension, or lateral flexion.   Normaltesting biceps, triceps and brachioradialis bilaterally.    5/5 strength testing deltoid, biceps, triceps, wrist extensor, wrist flexor and ulnar intrinsics bilaterally.    Normal  strength bilaterally    PULM: No evidence of respiratory difficulty, symmetric chest " rise.  GI:  Soft and non-tender.    NEURO: BUE & BLE extremity coordination and muscle stretch reflexes are physiologic and symmetric. No loss of sensation is noted.  GAIT: normal.      Imaging (Reviewed on 1/3/2024):    2/4/22 BUE EMG/NCS  1. ABNORMAL study  2. There is electrodiagnostic evidence of a mild demyelinating median neuropathy (Carpal tunnel syndrome) across bilateral wrists.  There was a mild chronic radiculopathy of the left C6 nerve root  3. There is clinical evidence of cervical myofascial pain and rotator cuff weakness    MRI cervical spine 11/16/2021  FINDINGS:  The cervical cord remains normal in signal and morphology.     The cervical vertebra reveal mild straightening of the normal lordosis.  No intrinsic marrow signal abnormality is seen.     Multilevel cervical degenerative disc disease is present.     C2-C3: Minimal annular bulge.     C3-C4: Mild central annular disc bulge.     C4-C5: Moderate disc degeneration with disc narrowing, desiccation and disc bulging osteophyte with ventral sac impression.  Mild central stenosis.  Uncovertebral joint spurring with moderate right and mild left foraminal stenosis.  AP diameter of the canal approximately 8 mm.     C5-C6: Moderate disc degeneration with disc narrowing, desiccation and disc bulging osteophyte.  Mild ventral sac impression.  Uncovertebral joint spurring with moderate left and mild right foraminal stenosis.     C6-C7: Mild-to-moderate disc degeneration with disc narrowing, desiccation and disc bulging osteophyte with mild ventral sac impression.  AP diameter canal at this level is approximately 8.1 mm.  Uncovertebral joint spurring with mild foraminal stenosis.     C7-T1: Mild disc bulge.    09/14/17  MRI Cervical Spine Without Contrast    Findings: There is straightening of the cervical lordosis.  Degenerative vertebral endplate spurring and disc desiccation present at multiple levels with moderate disc space narrowing from C4-C5 through  C6-C7.  Vertebral marrow signal pattern and architecture are normal.  Cervical cord and craniocervical junction are intact.  Paravertebral soft tissues are symmetric and normal in appearance.    C2-C3: Unremarkable.    C3-C4: Unremarkable.    C4-C5: Posterior disc osteophyte complex and bilateral uncovertebral hypertrophy resulting in moderate left greater than right foraminal narrowing.  Indentation of ventral thecal sac with associated anterior cord contact and mild central canal stenosis.  Minimum AP canal diameter is 9.0 mm.    C5-C6: Posterior disc osteophyte complex and asymmetric left greater than right uncovertebral hypertrophy producing marked left and moderate right foraminal narrowing.  Mild indentation of the ventral thecal sac without significant canal stenosis.    C6-C7: Posterior disc osteophyte complex and uncovertebral hypertrophy producing moderate to marked left greater than right foraminal narrowing.  Mild indentation of the ventral thecal sac without significant canal stenosis.    C7-T1: Unremarkable.    06/30/21 X-Ray Lumbar Spine AP And Lateral  FINDINGS:  Vertebral body height well maintained.  Minimal multilevel marginal spurring at the thoracolumbar junction region.  Facet disease identified in the mid and lower L-spine.  Most prominent findings at the L4-5 and L5-S1 levels.  Disc spaces appear adequate at all levels.  There is equivocal appearance minimal anterior subluxation L5 on S1.  Pedicles and SI joints intact.  Status post cholecystectomy      08/31/17 X-Ray Cervical Spine AP And Lateral  Findings: The vertebral bodies demonstrate normal height.  The alignment is within normal limits. There is moderate displaced narrowing and spondylosis present at the C4-C5 through the C6-C7 levels. The C1-C2 articulation is within normal limits. No prevertebral soft tissue swelling.        ASSESSMENT: 63 y.o. year old female with neck and upper extremity pain, consistent with     1. Right cervical  radiculopathy  Ambulatory referral/consult to Neurosurgery    Case Request-RAD/Other Procedure Area: right C6/7 TF HIMANSHU w/ RN IV sedation    Ambulatory referral/consult to Neurosurgery    gabapentin (NEURONTIN) 600 MG tablet      2. Degenerative cervical spinal stenosis  Ambulatory referral/consult to Neurosurgery      3. Cervical radicular pain        4. Chronic pain of right knee  Case Request-RAD/Other Procedure Area: Right Knee injection      5. H/O total knee replacement, left              PLAN:   1. Interventional:   - Schedule right knee injection. Patient is not taking prescription blood thinners or ASA.   - Repeat right C6/7 TF HIMANSHU - patient can call to schedule.  - S/p right C6/7 TF HIMANSHU (with Dr. Watts) on 9/11/23 with 75% pain relief.  - S/p right C6/7 TF HIMANSHU with Dr. Kramer on 4/18/22 with at least 80% pain relief for about a year  - 12/21/2021:  C6-7 IL HIMANSHU with left paramedian approach with limited pain relief   - 9/28/2017:  Left-sided C6-7 TF HIMANSHU - Dr. Isabel with significant pain relief     2. Pharmacologic:   - Refill gabapentin (Neurontin) 600mg TID -- increased last visit.   - Continue diclofenac 1% gel to apply 2g topically up to 4 times per day PRN.   - Continue lidocaine 2.5%-prilocaine 2.5% topical cream Q6H PRN topically; can alternate with Voltaren gel.     - Continue Tylenol (acetaminophen) 500mg PRN.   - Anticoagulation use: None.     3. Rehabilitative: Encouraged regular exercise. Patient discharged from PT due to poor attendance previously. Consider restarting formal PT in the future if needed.     4. Diagnostic/ Imaging: No new imaging ordered. Previous imaging reviewed.     5. Consult/ Referral:   - Previously referred to Neurosurgery. Saw Dr. Dover who recommended cervical TF HIMANSHU. Will place new internal referral with Dr. Rodriguez & also external [place] [facility] [place]. Rodriguez is booked so far out.  - Consider referral to Orthopedics for  continued right knee pain after injection.    6. Follow up: 3 months post-procedure      - This condition does not require this patient to take time off of work, and the primary goal of our Pain Management services is to improve the patient's functional capacity.   - I discussed the risks, benefits, and alternatives to potential treatment options. All questions and concerns were fully addressed today in clinic.         Joi Whiting PA-C  Interventional Pain Management - [facility]    Disclaimer:  This note was prepared using voice recognition system and is likely to have sound alike errors that may have been overlooked even after proof reading.  Please call me with any questions.

## 2024-01-04 ENCOUNTER — TELEPHONE (OUTPATIENT)
Dept: NEUROSURGERY | Facility: CLINIC | Age: 64
End: 2024-01-04
Payer: COMMERCIAL

## 2024-01-04 DIAGNOSIS — M54.12 RIGHT CERVICAL RADICULOPATHY: Primary | ICD-10-CM

## 2024-01-04 NOTE — TELEPHONE ENCOUNTER
Contacted patient to let her know that [facility] put in orders for an MRI so she can have required imaging to see Dr. Rodriguez. Patient was advised that if she did not hear from the radiology department within the next couple of days, to call them for scheduling. Patient scheduled her appointment with Dr. Rodriguez on 2/9/24 to allow enough time for her to get the MRI. Patient was very appreciative and v/u of all discussed. She is aware that the MRI must be completed before her appt with Dr. Rodriguez.  RD

## 2024-01-04 NOTE — TELEPHONE ENCOUNTER
Spoke with Ms. Sheffield about her NRSX referral from [facility]. Explained that she will need either a current CT or MRI of C spine to schedule appt with Dr. Rodriguez. I told her I would send message over to Mrs. Whiting's office. I advised patient that if she has not heard anything in a couple of days to reach out to their office. I also told her that when she has her appt with radiology scheduled, to give me a call back so we can her scheduled with Dr. Rodriguez. She v/u and I will also defer referral in WQ.  RD

## 2024-01-08 ENCOUNTER — TELEPHONE (OUTPATIENT)
Dept: PAIN MEDICINE | Facility: CLINIC | Age: 64
End: 2024-01-08
Payer: COMMERCIAL

## 2024-01-08 NOTE — TELEPHONE ENCOUNTER
----- Message from Jose Gandhi sent at 1/8/2024  3:21 PM CST -----  Contact: Janna Saldivar is needing a call back in regards to understanding her MRI order. Please give her a call back at [phone]

## 2024-01-17 ENCOUNTER — TELEPHONE (OUTPATIENT)
Dept: PAIN MEDICINE | Facility: CLINIC | Age: 64
End: 2024-01-17
Payer: COMMERCIAL

## 2024-01-17 DIAGNOSIS — M25.561 CHRONIC PAIN OF RIGHT KNEE: Primary | ICD-10-CM

## 2024-01-17 DIAGNOSIS — G89.29 CHRONIC PAIN OF RIGHT KNEE: Primary | ICD-10-CM

## 2024-01-17 NOTE — TELEPHONE ENCOUNTER
----- Message from Rubia Castro sent at 1/17/2024 10:00 AM CST -----  Contact: self  Patient called in this morning requesting a call back to discuss the pain in her right knee and leg. Would like to know next plan of care. Please  call back [phone]. Thanks mauro

## 2024-01-17 NOTE — TELEPHONE ENCOUNTER
Returned pt call. Pt states that she is having 8/10 R knee and leg pain. States it is a aching pain from the top of her R knee down to her ankle. Pt states that she would like a MRI or imaging done prior to injection. Informed pt that her insurance will not cover a MRI unless she has have at least 6 weeks of PT for her knee and leg. Pt verbalized understanding and asked for xray at [facility]. Informed pt that I will send a message to a provider regarding her imaging request. Pt verbalized understanding.

## 2024-01-18 ENCOUNTER — HOSPITAL ENCOUNTER (OUTPATIENT)
Dept: RADIOLOGY | Facility: HOSPITAL | Age: 64
Discharge: HOME | End: 2024-01-18
Attending: PHYSICIAN ASSISTANT
Payer: COMMERCIAL

## 2024-01-18 DIAGNOSIS — G89.29 CHRONIC PAIN OF RIGHT KNEE: ICD-10-CM

## 2024-01-18 DIAGNOSIS — M25.561 CHRONIC PAIN OF RIGHT KNEE: ICD-10-CM

## 2024-01-18 PROCEDURE — 73562 X-RAY EXAM OF KNEE 3: CPT | Mod: TC,RT

## 2024-01-18 PROCEDURE — 73560 X-RAY EXAM OF KNEE 1 OR 2: CPT | Mod: TC,LT

## 2024-01-18 PROCEDURE — 73562 X-RAY EXAM OF KNEE 3: CPT | Mod: 26,RT,, | Performed by: RADIOLOGY

## 2024-01-19 ENCOUNTER — TELEPHONE (OUTPATIENT)
Dept: NEUROSURGERY | Facility: CLINIC | Age: 64
End: 2024-01-19
Payer: COMMERCIAL

## 2024-01-19 NOTE — TELEPHONE ENCOUNTER
I spoke with the patient, who stated that she needed to schedule her appointment after her imaging. The patient was reschedule for the next available 02/09 @ 11:30 AM. The patient confirmed appointment date and time.

## 2024-01-19 NOTE — TELEPHONE ENCOUNTER
----- Message from Kylah Ward sent at 1/19/2024  1:45 PM CST -----  Contact: Josie  .Patient is calling to speak with the nurse regarding appt today . Reports this is the 3rd attempt the patient tried to reach out to office. Please give patient a call back at .[phone].

## 2024-01-19 NOTE — TELEPHONE ENCOUNTER
----- Message from Amber Osborne sent at 1/18/2024  4:23 PM CST -----  Contact: Janna Solis is calling in regards to getting appt 01/19 reschedule. Please call back at [phone]                            Thanks  KT

## 2024-01-23 ENCOUNTER — HOSPITAL ENCOUNTER (OUTPATIENT)
Dept: RADIOLOGY | Facility: HOSPITAL | Age: 64
Discharge: HOME | End: 2024-01-23
Attending: INTERNAL MEDICINE
Payer: COMMERCIAL

## 2024-01-23 ENCOUNTER — HOSPITAL ENCOUNTER (OUTPATIENT)
Dept: RADIOLOGY | Facility: HOSPITAL | Age: 64
Discharge: HOME | End: 2024-01-23
Attending: PHYSICIAN ASSISTANT
Payer: COMMERCIAL

## 2024-01-23 DIAGNOSIS — D86.0 PULMONARY SARCOIDOSIS: ICD-10-CM

## 2024-01-23 DIAGNOSIS — M54.12 RIGHT CERVICAL RADICULOPATHY: ICD-10-CM

## 2024-01-23 PROCEDURE — 72141 MRI NECK SPINE W/O DYE: CPT | Mod: TC

## 2024-01-23 PROCEDURE — 72141 MRI NECK SPINE W/O DYE: CPT | Mod: 26,,, | Performed by: RADIOLOGY

## 2024-01-23 PROCEDURE — 71046 X-RAY EXAM CHEST 2 VIEWS: CPT | Mod: 26,,, | Performed by: RADIOLOGY

## 2024-01-23 PROCEDURE — 71046 X-RAY EXAM CHEST 2 VIEWS: CPT | Mod: TC

## 2024-02-09 ENCOUNTER — OFFICE VISIT (OUTPATIENT)
Dept: NEUROSURGERY | Facility: CLINIC | Age: 64
End: 2024-02-09
Payer: COMMERCIAL

## 2024-02-09 VITALS
DIASTOLIC BLOOD PRESSURE: 80 MMHG | HEART RATE: 74 BPM | WEIGHT: 203.5 LBS | HEIGHT: 67 IN | SYSTOLIC BLOOD PRESSURE: 106 MMHG | BODY MASS INDEX: 31.94 KG/M2

## 2024-02-09 DIAGNOSIS — M54.12 CERVICAL RADICULOPATHY: ICD-10-CM

## 2024-02-09 DIAGNOSIS — M54.12 RIGHT CERVICAL RADICULOPATHY: ICD-10-CM

## 2024-02-09 DIAGNOSIS — M50.30 DEGENERATIVE DISC DISEASE, CERVICAL: Primary | ICD-10-CM

## 2024-02-09 DIAGNOSIS — M25.511 RIGHT SHOULDER PAIN, UNSPECIFIED CHRONICITY: ICD-10-CM

## 2024-02-09 DIAGNOSIS — M48.02 DEGENERATIVE CERVICAL SPINAL STENOSIS: ICD-10-CM

## 2024-02-09 PROCEDURE — 3074F SYST BP LT 130 MM HG: CPT | Mod: CPTII,S$GLB,, | Performed by: NEUROLOGICAL SURGERY

## 2024-02-09 PROCEDURE — 99999 PR PBB SHADOW E&M-EST. PATIENT-LVL IV: CPT | Mod: PBBFAC,,, | Performed by: NEUROLOGICAL SURGERY

## 2024-02-09 PROCEDURE — 99214 OFFICE O/P EST MOD 30 MIN: CPT | Mod: S$GLB,,, | Performed by: NEUROLOGICAL SURGERY

## 2024-02-09 PROCEDURE — 1159F MED LIST DOCD IN RCRD: CPT | Mod: CPTII,S$GLB,, | Performed by: NEUROLOGICAL SURGERY

## 2024-02-09 PROCEDURE — 3079F DIAST BP 80-89 MM HG: CPT | Mod: CPTII,S$GLB,, | Performed by: NEUROLOGICAL SURGERY

## 2024-02-09 PROCEDURE — 3008F BODY MASS INDEX DOCD: CPT | Mod: CPTII,S$GLB,, | Performed by: NEUROLOGICAL SURGERY

## 2024-02-09 NOTE — PROGRESS NOTES
Subjective:      Patient ID: Janna Sheffield is a 63 y.o. female.    Chief Complaint: No chief complaint on file.      Patient here today for evaluation cervical spine  History of having pain in the past she has a history of sarcoidosis as well as fibromyalgia  Previously she was seen in the neurosurgical clinic in 2022 for left-sided shoulder pain  Today she is in clinic for right-sided shoulder pain states the pain is 8/10 primarily in the right shoulder  She had an EMG nerve conduction study which showed moderate carpal tunnel bilaterally as well as possible rotator cuff irritation  There was evidence of left C6 chronic radic  She denies any weakness  She also is complaining of right-sided knee pain for which she sees Orthopedics   Takes gabapentin for symptom relief  Not really having symptoms into the hand  or any numbness and tingling                   Review of Systems   Constitutional:  Negative for activity change, appetite change and chills.   HENT:  Negative for congestion and ear pain.    Eyes:  Negative for photophobia, redness and visual disturbance.   Respiratory:  Negative for apnea and chest tightness.    Cardiovascular:  Negative for chest pain.   Gastrointestinal:  Negative for abdominal distention and abdominal pain.   Endocrine: Negative for cold intolerance.   Genitourinary:  Negative for difficulty urinating.   Musculoskeletal:  Positive for myalgias, neck pain and neck stiffness. Negative for arthralgias.   Skin:  Negative for color change.   Allergic/Immunologic: Negative for environmental allergies.   Neurological:  Positive for weakness and numbness. Negative for dizziness.   Hematological:  Negative for adenopathy. Does not bruise/bleed easily.   Psychiatric/Behavioral:  Negative for agitation, behavioral problems and confusion.          Objective:       Physical Exam:  Nursing note and vitals reviewed.    Constitutional: She appears well-nourished. No distress.     Eyes: EOM are  normal.     Cardiovascular: Normal rate.     Psych/Behavior: She is alert. She is oriented to person, place, and time. She has a normal mood and affect.     Musculoskeletal:        Neck: Range of motion is limited. There is tenderness. Muscle strength is 5/5.        Right Upper Extremities: There is no tenderness. Muscle strength is 5/5.        Left Upper Extremities: There is no tenderness. Muscle strength is 5/5.     Neurological:        Sensory: There is no sensory deficit in the trunk. There is no sensory deficit in the extremities.        Cranial nerves: Cranial nerve(s) II, III, IV, V, VI, VII, VIII, IX, X, XI and XII are intact.     General    Nursing note and vitals reviewed.  Constitutional: She is oriented to person, place, and time. She appears well-nourished. No distress.   Eyes: EOM are normal.   Cardiovascular:  Normal rate.            Neurological: She is alert and oriented to person, place, and time.   Psychiatric: She has a normal mood and affect.             Ortho Exam     MRI cervical     C4-C5: Mild broad-based posterior disc osteophyte complex contacts and flattens the ventral cord.  Right greater than left facet arthropathy with ligamentum flavum hypertrophy and uncovertebral joint spurring contribute to mild spinal canal stenosis moderate bilateral neural foraminal stenosis.     C5-C6: Asymmetric left disc osteophyte complex flattens the ventral thecal sac.  No ventral cord deformity.  Facet arthropathy and uncovertebral joint spurring contribute to moderate bilateral neural foraminal stenosis.     C6-C7: Broad-based posterior disc osteophyte complex contacts and slightly flattens the ventral cord contributing to mild spinal canal stenosis.  Facet arthropathy and uncovertebral joint spurring contribute to mild bilateral neural foraminal stenosis.     C7-T1: Minimal broad-based posterior disc osteophyte complex.  No ventral cord contact, spinal canal stenosis or neural foraminal stenosis.      T1-T2: No significant posterior disc bulge, spinal canal stenosis or neural foraminal stenosis.     Paraspinal muscles & soft tissues: Within normal limits.     Impression:     Multilevel degenerative changes of the cervical spine appear similar to the prior MRI as above.        I  reviewed all pertinent imaging regarding this case.  Assessment:     1. Degenerative disc disease, cervical    2. Right cervical radiculopathy    3. Degenerative cervical spinal stenosis    4. Cervical radiculopathy    5. Right shoulder pain, unspecified chronicity      Plan:     Degenerative disc disease, cervical    Right cervical radiculopathy  -     Ambulatory referral/consult to Neurosurgery    Degenerative cervical spinal stenosis  -     Ambulatory referral/consult to Neurosurgery    Cervical radiculopathy    Right shoulder pain, unspecified chronicity  -     IR ARTHROGRAM SHOULDER RIGHT, INJECTION ONLY WITH MRI TO FOLLOW (XPD); Future; Expected date: 02/09/2024  -     MRI Arthrogram Shoulder With Contrast Right; Future; Expected date: 02/09/2024    Patient history of mild degenerative cervical disc disease.  She has had multiple MRIs across the years without any significant change.  Very mild degenerative changes at C5-6 and C6-7 with foraminal stenosis more on the left than on the right  EMG nerve conduction study consistent with median nerve compression left C6 radic  Patient is MRI not consistent with the clinical findings and based under physical exam appears she may have some underlying rotator cuff pathology contributing to her symptoms  Like to order an MRI of the shoulder  She has a follow-up with rheumatology next several weeks  Following a with pain management in the next several months    Thank you for the referral   Please call with any questions    Ozzie Rodriguez MD  Neurosurgery     Disclaimer: This note was prepared using a voice recognition system and is likely to have sound alike errors within the text.

## 2024-02-13 ENCOUNTER — OFFICE VISIT (OUTPATIENT)
Dept: OPHTHALMOLOGY | Facility: CLINIC | Age: 64
End: 2024-02-13
Payer: COMMERCIAL

## 2024-02-13 DIAGNOSIS — H35.342 LAMELLAR MACULAR HOLE OF LEFT EYE: Primary | ICD-10-CM

## 2024-02-13 DIAGNOSIS — D86.9 SARCOIDOSIS: ICD-10-CM

## 2024-02-13 DIAGNOSIS — H35.3122 INTERMEDIATE STAGE NONEXUDATIVE AGE-RELATED MACULAR DEGENERATION OF LEFT EYE: ICD-10-CM

## 2024-02-13 DIAGNOSIS — Z98.42 CATARACT EXTRACTION STATUS, LEFT: ICD-10-CM

## 2024-02-13 DIAGNOSIS — Z98.41 CATARACT EXTRACTION STATUS, RIGHT: ICD-10-CM

## 2024-02-13 PROCEDURE — 99213 OFFICE O/P EST LOW 20 MIN: CPT | Mod: S$GLB,,, | Performed by: OPHTHALMOLOGY

## 2024-02-13 PROCEDURE — 1159F MED LIST DOCD IN RCRD: CPT | Mod: CPTII,S$GLB,, | Performed by: OPHTHALMOLOGY

## 2024-02-13 PROCEDURE — 99999 PR PBB SHADOW E&M-EST. PATIENT-LVL III: CPT | Mod: PBBFAC,,, | Performed by: OPHTHALMOLOGY

## 2024-02-13 PROCEDURE — 92134 CPTRZ OPH DX IMG PST SGM RTA: CPT | Mod: S$GLB,,, | Performed by: OPHTHALMOLOGY

## 2024-02-13 PROCEDURE — 1160F RVW MEDS BY RX/DR IN RCRD: CPT | Mod: CPTII,S$GLB,, | Performed by: OPHTHALMOLOGY

## 2024-02-13 NOTE — PROGRESS NOTES
===============================  Date today is 2/13/2024  Janna Sheffield is a 63 y.o. female  Last visit [facility]: :2/9/2023   Last visit eye dept. Visit date not found    Corrected distance visual acuity was 20/50 in the right eye and 20/200 in the left eye.  Tonometry       Tonometry (Applanation, 9:14 AM)         Right Left    Pressure 16 16                  Wearing Rx       Wearing Rx         Sphere Cylinder Axis Add    Right -9.00 +2.50 100 +2.50    Left -9.50 +2.50 080 +2.50      Type: PAL                  Manifest Refraction       Manifest Refraction         Sphere Cylinder [place]    Right -11.25 +2.00 075 20/25    Left -11.50 +1.50 090 20/60                  Not recorded       Chief Complaint   Patient presents with    Lamellar macular hole of left eye     Yearly exam     HPI     Lamellar macular hole of left eye     Additional comments: Yearly exam           Comments    [facility] Patient       1. H/O RETINA DETACHMENT OS   2. LATTICE / COBBLESTONE OU   3. MAC HOLE OS   HIGH MYOPE -9   4. PERIPHERAL PIGMENTATION   5. H/O SARCOID UVEITIS   6. GUTTATA OU   7. PCIOL OS   PCIOL OD W/ Kenalog 3/24/2022 (set for NVA) / CDE: 11.67/ SN60WF 22.5                  Last edited by Izabel Gama on 2/13/2024  8:56 AM.      Problem List Items Addressed This Visit    None  Visit Diagnoses       Lamellar macular hole of left eye    -  Primary    Relevant Orders    Posterior Segment OCT Retina-Both eyes (Completed)    Intermediate stage nonexudative age-related macular degeneration of left eye        Relevant Orders    Posterior Segment OCT Retina-Both eyes (Completed)    Sarcoidosis        Cataract extraction status, right        Cataract extraction status, left              Instructed to call 24/7 for any worsening of vision, visual distortion or pain.  Check OU independently daily.    Gave my office and personal cell phone number.  ________________  2/13/2024 today  Janna Sheffield    OS   OCT OS unchanged,  OD looks good  PCIOL OU  1+ PCM OU  Sarcoidosis  Dry SMD  OD inferior cobblestone and lattice  OS macula looks good  Update glasses today    RTC 1 year  Instructed to call 24/7 for any worsening of vision or symptoms. Check OU daily.   Gave my office and cell phone number.    =============================

## 2024-02-21 ENCOUNTER — OFFICE VISIT (OUTPATIENT)
Dept: RHEUMATOLOGY | Facility: CLINIC | Age: 64
End: 2024-02-21
Payer: COMMERCIAL

## 2024-02-21 ENCOUNTER — LAB VISIT (OUTPATIENT)
Dept: LAB | Facility: HOSPITAL | Age: 64
End: 2024-02-21
Attending: PHYSICIAN ASSISTANT
Payer: COMMERCIAL

## 2024-02-21 VITALS
BODY MASS INDEX: 32.15 KG/M2 | WEIGHT: 204.81 LBS | SYSTOLIC BLOOD PRESSURE: 111 MMHG | HEART RATE: 74 BPM | DIASTOLIC BLOOD PRESSURE: 80 MMHG | HEIGHT: 67 IN

## 2024-02-21 DIAGNOSIS — Z51.81 MEDICATION MONITORING ENCOUNTER: ICD-10-CM

## 2024-02-21 DIAGNOSIS — E66.9 CLASS 1 OBESITY WITH BODY MASS INDEX (BMI) OF 32.0 TO 32.9 IN ADULT, UNSPECIFIED OBESITY TYPE, UNSPECIFIED WHETHER SERIOUS COMORBIDITY PRESENT: ICD-10-CM

## 2024-02-21 DIAGNOSIS — D86.9 SARCOIDOSIS: ICD-10-CM

## 2024-02-21 DIAGNOSIS — M17.11 PRIMARY OSTEOARTHRITIS OF RIGHT KNEE: ICD-10-CM

## 2024-02-21 DIAGNOSIS — D86.9 SARCOIDOSIS: Primary | ICD-10-CM

## 2024-02-21 LAB
ALBUMIN SERPL BCP-MCNC: 3.6 G/DL (ref 3.5–5.2)
ALP SERPL-CCNC: 75 U/L (ref 55–135)
ALT SERPL W/O P-5'-P-CCNC: 12 U/L (ref 10–44)
ANION GAP SERPL CALC-SCNC: 9 MMOL/L (ref 8–16)
AST SERPL-CCNC: 16 U/L (ref 10–40)
BASOPHILS # BLD AUTO: 0.03 K/UL (ref 0–0.2)
BASOPHILS NFR BLD: 0.6 % (ref 0–1.9)
BILIRUB SERPL-MCNC: 0.7 MG/DL (ref 0.1–1)
BUN SERPL-MCNC: 8 MG/DL (ref 8–23)
CALCIUM SERPL-MCNC: 9.2 MG/DL (ref 8.7–10.5)
CHLORIDE SERPL-SCNC: 105 MMOL/L (ref 95–110)
CO2 SERPL-SCNC: 28 MMOL/L (ref 23–29)
CREAT SERPL-MCNC: 0.9 MG/DL (ref 0.5–1.4)
CRP SERPL-MCNC: 3.1 MG/L (ref 0–8.2)
DIFFERENTIAL METHOD BLD: ABNORMAL
EOSINOPHIL # BLD AUTO: 0.1 K/UL (ref 0–0.5)
EOSINOPHIL NFR BLD: 1.5 % (ref 0–8)
ERYTHROCYTE [DISTWIDTH] IN BLOOD BY AUTOMATED COUNT: 13.2 % (ref 11.5–14.5)
ERYTHROCYTE [SEDIMENTATION RATE] IN BLOOD BY WESTERGREN METHOD: 34 MM/HR (ref 0–20)
EST. GFR  (NO RACE VARIABLE): >60 ML/MIN/1.73 M^2
GLUCOSE SERPL-MCNC: 120 MG/DL (ref 70–110)
HCT VFR BLD AUTO: 39.2 % (ref 37–48.5)
HGB BLD-MCNC: 12.8 G/DL (ref 12–16)
IMM GRANULOCYTES # BLD AUTO: 0.01 K/UL (ref 0–0.04)
IMM GRANULOCYTES NFR BLD AUTO: 0.2 % (ref 0–0.5)
LYMPHOCYTES # BLD AUTO: 2.3 K/UL (ref 1–4.8)
LYMPHOCYTES NFR BLD: 49.3 % (ref 18–48)
MCH RBC QN AUTO: 30.9 PG (ref 27–31)
MCHC RBC AUTO-ENTMCNC: 32.7 G/DL (ref 32–36)
MCV RBC AUTO: 95 FL (ref 82–98)
MONOCYTES # BLD AUTO: 0.4 K/UL (ref 0.3–1)
MONOCYTES NFR BLD: 8.2 % (ref 4–15)
NEUTROPHILS # BLD AUTO: 1.9 K/UL (ref 1.8–7.7)
NEUTROPHILS NFR BLD: 40.2 % (ref 38–73)
NRBC BLD-RTO: 0 /100 WBC
PLATELET # BLD AUTO: 238 K/UL (ref 150–450)
PMV BLD AUTO: 9.9 FL (ref 9.2–12.9)
POTASSIUM SERPL-SCNC: 3.6 MMOL/L (ref 3.5–5.1)
PROT SERPL-MCNC: 7 G/DL (ref 6–8.4)
RBC # BLD AUTO: 4.14 M/UL (ref 4–5.4)
SODIUM SERPL-SCNC: 142 MMOL/L (ref 136–145)
WBC # BLD AUTO: 4.73 K/UL (ref 3.9–12.7)

## 2024-02-21 PROCEDURE — 3074F SYST BP LT 130 MM HG: CPT | Mod: CPTII,S$GLB,, | Performed by: STUDENT IN AN ORGANIZED HEALTH CARE EDUCATION/TRAINING PROGRAM

## 2024-02-21 PROCEDURE — 1159F MED LIST DOCD IN RCRD: CPT | Mod: CPTII,S$GLB,, | Performed by: STUDENT IN AN ORGANIZED HEALTH CARE EDUCATION/TRAINING PROGRAM

## 2024-02-21 PROCEDURE — 85025 COMPLETE CBC W/AUTO DIFF WBC: CPT | Performed by: PHYSICIAN ASSISTANT

## 2024-02-21 PROCEDURE — 36415 COLL VENOUS BLD VENIPUNCTURE: CPT | Performed by: PHYSICIAN ASSISTANT

## 2024-02-21 PROCEDURE — 82164 ANGIOTENSIN I ENZYME TEST: CPT

## 2024-02-21 PROCEDURE — 85651 RBC SED RATE NONAUTOMATED: CPT | Performed by: PHYSICIAN ASSISTANT

## 2024-02-21 PROCEDURE — 3079F DIAST BP 80-89 MM HG: CPT | Mod: CPTII,S$GLB,, | Performed by: STUDENT IN AN ORGANIZED HEALTH CARE EDUCATION/TRAINING PROGRAM

## 2024-02-21 PROCEDURE — 3008F BODY MASS INDEX DOCD: CPT | Mod: CPTII,S$GLB,, | Performed by: STUDENT IN AN ORGANIZED HEALTH CARE EDUCATION/TRAINING PROGRAM

## 2024-02-21 PROCEDURE — 99214 OFFICE O/P EST MOD 30 MIN: CPT | Mod: S$GLB,,, | Performed by: STUDENT IN AN ORGANIZED HEALTH CARE EDUCATION/TRAINING PROGRAM

## 2024-02-21 PROCEDURE — 80053 COMPREHEN METABOLIC PANEL: CPT | Performed by: PHYSICIAN ASSISTANT

## 2024-02-21 PROCEDURE — 99999 PR PBB SHADOW E&M-EST. PATIENT-LVL IV: CPT | Mod: PBBFAC,,, | Performed by: STUDENT IN AN ORGANIZED HEALTH CARE EDUCATION/TRAINING PROGRAM

## 2024-02-21 PROCEDURE — 86140 C-REACTIVE PROTEIN: CPT | Performed by: PHYSICIAN ASSISTANT

## 2024-02-21 PROCEDURE — 1160F RVW MEDS BY RX/DR IN RCRD: CPT | Mod: CPTII,S$GLB,, | Performed by: STUDENT IN AN ORGANIZED HEALTH CARE EDUCATION/TRAINING PROGRAM

## 2024-02-21 NOTE — PATIENT INSTRUCTIONS
Try taking Tylenol up to 3000mg daily (from all sources)    Try taking Turmeric (make sure contains black pepper extract)    Try using Voltaren gel up to four times daily on painful joints

## 2024-02-23 LAB — ACE SERPL-CCNC: 21 U/L (ref 16–85)

## 2024-02-24 PROBLEM — D84.9 IMMUNOCOMPROMISED: Status: RESOLVED | Noted: 2017-06-28 | Resolved: 2024-02-24

## 2024-02-24 NOTE — PROGRESS NOTES
RHEUMATOLOGY CLINIC ESTABLISHED PATIENT VISIT    Reason for consult:- sarcoidosis    Chief complaints, HPI, ROS, EXAM, Assessment & Plans:-    Janna Sheffield is a 63 y.o. pleasant female who presents to follow-up from her previous visit in July for management of sarcoidosis.  She was last seen in [facility].  She is new to me.  Overall she has been doing well since her last visit.  Her breathing is stable and she has no significant cough.  No rashes.  No significant abdominal pain.  Does continue to have some joint pains for which she follows with pain management and takes gabapentin which is helpful.  Rheumatologic review of systems otherwise negative.  No synovitis, dactylitis or enthesitis.  No rashes noted.      Reviewed all available old and outside pertinent medical records.    All lab results personally reviewed and interpreted by me.    1. Sarcoidosis    2. Primary osteoarthritis of right knee    3. Class 1 obesity with body mass index (BMI) of 32.0 to 32.9 in adult, unspecified obesity type, unspecified whether serious comorbidity present        Problem List Items Addressed This Visit    None  Visit Diagnoses       Sarcoidosis    -  Primary    Relevant Orders    Comprehensive Metabolic Panel    CBC Auto Differential    Sedimentation rate    C-Reactive Protein    Angiotensin Converting Enzyme    Primary osteoarthritis of right knee        Relevant Orders    Ambulatory referral/consult to Sports Medicine    Class 1 obesity with body mass index (BMI) of 32.0 to 32.9 in adult, unspecified obesity type, unspecified whether serious comorbidity present                Patient following up today for management of sarcoidosis  Currently seemingly in remission off immunosuppression with previous Imuran  She also follows with pulmonology  No need to resume therapy at this point  Ace level normal  We will place referral to sports Medicine for evaluation of right knee arthritis which has progressed  Encouraged to take  up to 3000mg of Tylenol daily (from all sources), try taking turmeric supplement and use Voltaren gel up to four times daily on painful joints      # Follow up in about 6 months (around 8/21/2024).    Chronic comorbid conditions affecting medical decision making today:    Past Medical History:   Diagnosis Date    Arthritis     Chest pain syndrome 10/23/2014    Hyperlipidemia     Hypertension     Macular degeneration, age related, nonexudative - Left Eye 3/24/2014    Retinal detachment, old, partial 4/14/2014    Sarcoidosis        Past Surgical History:   Procedure Laterality Date    CATARACT EXTRACTION      COLONOSCOPY N/A 03/14/2022    Procedure: COLONOSCOPY;  Surgeon: Christi Medrano MD;  Location: [facility];  Service: Endoscopy;  Laterality: N/A;    DILATION AND CURETTAGE OF UTERUS      missed ab x 3    EPIDURAL STEROID INJECTION INTO CERVICAL SPINE N/A 12/21/2021    Procedure: C6/C7 IL HIMANSHU RN IV sedation;  Surgeon: Hadley Marsh MD;  [facility];  Service: Pain Management;  Laterality: N/A;    ESOPHAGOGASTRODUODENOSCOPY N/A 02/06/2019    Procedure: ESOPHAGOGASTRODUODENOSCOPY (EGD);  Surgeon: Jairo Vargas III, MD;  Location: [facility];  Service: Endoscopy;  Laterality: N/A;    HYSTERECTOMY  2001    MACARIO - complete    JOINT REPLACEMENT Left     Knee    knee surgery      TRANSFORAMINAL EPIDURAL INJECTION OF STEROID Right 04/18/2022    Procedure: INJECTION, STEROID, EPIDURAL, TRANSFORAMINAL APPROACH, RIGHT C6-C7;  Surgeon: Viraj Kramer MD;  Location: [facility];  Service: Pain Management;  Laterality: Right;    TRANSFORAMINAL EPIDURAL INJECTION OF STEROID Right 9/11/2023    Procedure: right C6/7 TF HIMANSHU w/ RN IV sedation;  Surgeon: Derian Watts MD;  Location: [facility];  Service: Pain Management;  Laterality: Right;    TUBAL LIGATION          Social History     Tobacco Use    Smoking status: Never    Smokeless tobacco: Never   Substance Use Topics    Alcohol use: No    Drug use: No        Family History   Problem Relation Age of Onset    Cervical cancer Mother     Heart disease Mother     Heart attack Mother     Hypertension Mother     Hyperlipidemia Mother     Hypertension Father     Hyperlipidemia Father     Stroke Father     Breast cancer Maternal Aunt     Stroke Brother     Breast cancer Maternal Cousin     Breast cancer Maternal Cousin     Breast cancer Paternal Cousin     Colon cancer Neg Hx     Thrombophilia Neg Hx     Deep vein thrombosis Neg Hx     Pulmonary embolism Neg Hx        Review of patient's allergies indicates:  No Known Allergies    Medication List with Changes/Refills   Current Medications    DICLOFENAC SODIUM (VOLTAREN) 1 % GEL    Apply 2 g topically 3 (three) times daily as needed (Knee pain).    ERGOCALCIFEROL (ERGOCALCIFEROL) 50,000 UNIT CAP    Take 1 capsule (50,000 Units total) by mouth every 7 days.    GABAPENTIN (NEURONTIN) 600 MG TABLET    Take 1 tablet (600 mg total) by mouth 3 (three) times daily.    HYDROCHLOROTHIAZIDE (HYDRODIURIL) 25 MG TABLET    Take 1 tablet by mouth once daily    LIDOCAINE-PRILOCAINE (EMLA) CREAM    Apply topically up to 4x per day to affected area for pain relief    MULTIVITAMIN (THERAGRAN) PER TABLET    Take 1 tablet by mouth Daily.    ROSUVASTATIN (CRESTOR) 40 MG TAB    Take 1 tablet by mouth once daily         Disclaimer: This note was prepared using voice recognition system and is likely to have sound alike errors and is not proofread.  Please message me with any questions.    32 minutes of total time spent on the encounter, which includes face to face time and non-face to face time preparing to see the patient (eg, review of tests), Obtaining and/or reviewing separately obtained history, Documenting clinical information in the electronic or other health record, Independently interpreting results (not separately reported) and communicating results to the patient/family/caregiver, or Care coordination (not separately reported).      Thank you for allowing me to participate in the care of Janna Sheffield.    Caleb Ang MD

## 2024-02-29 ENCOUNTER — OFFICE VISIT (OUTPATIENT)
Dept: SPORTS MEDICINE | Facility: CLINIC | Age: 64
End: 2024-02-29
Payer: COMMERCIAL

## 2024-02-29 DIAGNOSIS — M17.11 PRIMARY OSTEOARTHRITIS OF RIGHT KNEE: ICD-10-CM

## 2024-02-29 PROCEDURE — 1159F MED LIST DOCD IN RCRD: CPT | Mod: CPTII,S$GLB,, | Performed by: STUDENT IN AN ORGANIZED HEALTH CARE EDUCATION/TRAINING PROGRAM

## 2024-02-29 PROCEDURE — 1160F RVW MEDS BY RX/DR IN RCRD: CPT | Mod: CPTII,S$GLB,, | Performed by: STUDENT IN AN ORGANIZED HEALTH CARE EDUCATION/TRAINING PROGRAM

## 2024-02-29 PROCEDURE — 20611 DRAIN/INJ JOINT/BURSA W/US: CPT | Mod: RT,S$GLB,, | Performed by: STUDENT IN AN ORGANIZED HEALTH CARE EDUCATION/TRAINING PROGRAM

## 2024-02-29 PROCEDURE — 99204 OFFICE O/P NEW MOD 45 MIN: CPT | Mod: 25,S$GLB,, | Performed by: STUDENT IN AN ORGANIZED HEALTH CARE EDUCATION/TRAINING PROGRAM

## 2024-02-29 PROCEDURE — 99999 PR PBB SHADOW E&M-EST. PATIENT-LVL III: CPT | Mod: PBBFAC,,, | Performed by: STUDENT IN AN ORGANIZED HEALTH CARE EDUCATION/TRAINING PROGRAM

## 2024-02-29 RX ORDER — TRIAMCINOLONE ACETONIDE 40 MG/ML
40 INJECTION, SUSPENSION INTRA-ARTICULAR; INTRAMUSCULAR
Status: DISCONTINUED | OUTPATIENT
Start: 2024-02-29 | End: 2024-02-29 | Stop reason: HOSPADM

## 2024-02-29 RX ADMIN — TRIAMCINOLONE ACETONIDE 40 MG: 40 INJECTION, SUSPENSION INTRA-ARTICULAR; INTRAMUSCULAR at 03:02

## 2024-02-29 NOTE — PROGRESS NOTES
Patient ID: Janna Sheffield  YOB: 1960  MRN: [id number]    Chief Complaint: Pain of the Right Knee      Referred By: Dr. Alexis Botello    History of Present Illness: Janna Sheffield is a right-hand dominant 63 y.o. female who presents today with right knee pain.  Patient reports pain as 8/10 today.  She reports having severe right knee pain since [place].  She has not seen orthopedics in several years.  She has never had injections for this.  She does have a history of left knee replacement.  She also reports that her neck pain with right arm radicular pain has returned.  cough. No rashes. No significant abdominal pain. Does continue to have some joint pains for which she follows with pain management and takes gabapentin which is helpful.     The patient is active in none.  Occupation: none      Past Medical History:   Past Medical History:   Diagnosis Date    Arthritis     Chest pain syndrome 10/23/2014    Hyperlipidemia     Hypertension     Macular degeneration, age related, nonexudative - Left Eye 3/24/2014    Retinal detachment, old, partial 4/14/2014    Sarcoidosis      Past Surgical History:   Procedure Laterality Date    CATARACT EXTRACTION      COLONOSCOPY N/A 03/14/2022    Procedure: COLONOSCOPY;  Surgeon: Christi Medrano MD;  Location: [facility];  Service: Endoscopy;  Laterality: N/A;    DILATION AND CURETTAGE OF UTERUS      missed ab x 3    EPIDURAL STEROID INJECTION INTO CERVICAL SPINE N/A 12/21/2021    Procedure: C6/C7 IL HIMANSHU RN IV sedation;  Surgeon: Hadley Marsh MD;  Location: [facility];  Service: Pain Management;  Laterality: N/A;    ESOPHAGOGASTRODUODENOSCOPY N/A 02/06/2019    Procedure: ESOPHAGOGASTRODUODENOSCOPY (EGD);  Surgeon: Jairo Vargas III, MD;  Location: [facility];  Service: Endoscopy;  Laterality: N/A;    HYSTERECTOMY  2001    MACARIO - complete    JOINT REPLACEMENT Left     Knee    knee surgery      TRANSFORAMINAL EPIDURAL INJECTION OF STEROID Right  04/18/2022    Procedure: INJECTION, STEROID, EPIDURAL, TRANSFORAMINAL APPROACH, RIGHT C6-C7;  Surgeon: Viraj Kramer MD;  Location: [facility];  Service: Pain Management;  Laterality: Right;    TRANSFORAMINAL EPIDURAL INJECTION OF STEROID Right 9/11/2023    Procedure: right C6/7 TF HIMANSHU w/ RN IV sedation;  Surgeon: Derian Watts MD;  Location: [facility];  Service: Pain Management;  Laterality: Right;    TUBAL LIGATION       Family History   Problem Relation Age of Onset    Cervical cancer Mother     Heart disease Mother     Heart attack Mother     Hypertension Mother     Hyperlipidemia Mother     Hypertension Father     Hyperlipidemia Father     Stroke Father     Breast cancer Maternal Aunt     Stroke Brother     Breast cancer Maternal Cousin     Breast cancer Maternal Cousin     Breast cancer Paternal Cousin     Colon cancer Neg Hx     Thrombophilia Neg Hx     Deep vein thrombosis Neg Hx     Pulmonary embolism Neg Hx      Social History     Socioeconomic History    Marital status:    Occupational History     Employer: [employer]   Tobacco Use    Smoking status: Never    Smokeless tobacco: Never   Substance and Sexual Activity    Alcohol use: No    Drug use: No    Sexual activity: Yes     Partners: Male     Comment:      Medication List with Changes/Refills   Current Medications    DICLOFENAC SODIUM (VOLTAREN) 1 % GEL    Apply 2 g topically 3 (three) times daily as needed (Knee pain).    ERGOCALCIFEROL (ERGOCALCIFEROL) 50,000 UNIT CAP    Take 1 capsule (50,000 Units total) by mouth every 7 days.    GABAPENTIN (NEURONTIN) 600 MG TABLET    Take 1 tablet (600 mg total) by mouth 3 (three) times daily.    HYDROCHLOROTHIAZIDE (HYDRODIURIL) 25 MG TABLET    Take 1 tablet by mouth once daily    LIDOCAINE-PRILOCAINE (EMLA) CREAM    Apply topically up to 4x per day to affected area for pain relief    MULTIVITAMIN (THERAGRAN) PER TABLET    Take 1 tablet by mouth Daily.    ROSUVASTATIN  (CRESTOR) 40 MG TAB    Take 1 tablet by mouth once daily     Review of patient's allergies indicates:  No Known Allergies    Physical Exam:   There is no height or weight on file to calculate BMI.    GENERAL: Well appearing, in no acute distress.  HEAD: Normocephalic and atraumatic.  ENT: External ears and nose grossly normal.  EYES: EOMI bilaterally  PULMONARY: Respirations are grossly even and non-labored.  NEURO: Awake, alert, and oriented x 3.  SKIN: No obvious rashes appreciated.  PSYCH: Mood & affect are appropriate.    Detailed MSK exam:     Right knee exam:   -ROM: extension 0, flexion 120  -TTP: Medial joint line and Lateral joint line  -effusion: none  -Patellar apprehension negative  -Jessie test positive -  lateral  -stable to varus and valgus stress tests  -Lachman test negative, anterior drawer test negative, posterior drawer test negative        Imaging:  Posterior Segment OCT Retina-Both eyes  Right Eye  Quality was good. Scan locations included subfoveal, juxtafoveal,   extrafoveal, nasal, temporal, superior, inferior. Progression has been   stable. Findings include normal foveal contour.     Left Eye  Quality was good. Scan locations included subfoveal, juxtafoveal,   extrafoveal, nasal, temporal, superior, inferior. Progression has been   stable. Findings include normal foveal contour, macular hole.     Notes  See progress note.        Relevant imaging results were reviewed and interpreted by me and per my read shows moderate arthritic changes right knee radiographs.  This was discussed with the patient and / or family today.     Assessment:  Janna Sheffield is a 63 y.o. female presenting with chronic right knee pain.   History, physical and radiographs are consistent with a likely diagnosis of OA, possible LMT.   Plan: Steroid injection given today (see separate procedure note for details). We discussed the proper protocols after the injection such as no submerging pools, baths tubs, or hot  tubs for 24 hr.  Showering is okay today.  We also discussed that blood sugars can be elevated after an injection and asked patient to properly checked her sugars over the next few days and contact their PCP if there are any concerns.  We discussed red flags such as fevers, chills, red, warm, tender joint at the area of injection to please seek medical care immediately.   Home exercises given. Continue conservative management for pain.   Follow up 3 months. All questions answered.      Primary osteoarthritis of right knee  -     Ambulatory referral/consult to Sports Medicine  -     Sports Medicine US - Guidance for Needle Placement  -     Large Joint Aspiration/Injection: R knee         Ultrasound guidance was used for needle localization. Images were saved and stored for documentation. The appropriate structures were visualized. Dynamic visualization of the needle was continuous throughout the procedures and maintained good position.      At least 15 minutes were spent instructing the patient in therapeutic exercises.  All questions were answered and exercises were provided in the After Visit Summary.  This service was performed under direction of Aleksandar Ovalles MD.  CPT [id number].    MEDICAL NECESSITY FOR VISCOSUPPLEMENTATION: After thorough evaluation of the patient, I have determined that visco-supplementation is medically necessary. The patient has painful degenerative changes of the knee with failure of conservative treatments including lifestyle modifications and rehabilitation exercises.  Oral analgesis/NSAIDs have not adequately controlled symptoms and there is radiographic evidence of Kellgren Dhiraj grade 2 or greater osteoarthritic changes, or in lack of radiographic evidence, there is arthroscopic or other evidence of chondrosis.     A copy of today's visit note has been sent to the referring provider.     Electronically signed:  Aleksandar Ovalles MD, MPH  02/29/2024  4:13 PM

## 2024-02-29 NOTE — PATIENT INSTRUCTIONS
Assessment:  Janna Sheffield is a 63 y.o. female   Chief Complaint   Patient presents with    Right Knee - Pain       Encounter Diagnosis   Name Primary?    Primary osteoarthritis of right knee         Plan:  Ultrasound guided cortisone injection to the right knee  We discussed the proper protocols after the injection such as no submerging pools, baths tubs, or hot tubs for 24 hr.  Showering is okay today.  We also discussed that blood sugars can be elevated after an injection and asked patient to properly checked her sugars over the next few days and contact their PCP if there are any concerns.  We discussed red flags such as fevers, chills, red, warm, tender joint at the area of injection to please seek medical care immediately.    Apply topical diclofenac (Voltaren) up to 4 times a day to the affected area.  It can be bought over the counter at any local pharmacy.    Patient may ice every 2 hours for 15 minutes as needed to control pain and swelling.   Instructed and issued home exercise program for knee osteoarthritis  At least 15 minutes were spent developing, teaching, and performing a home exercise program.  A written summary was provided and all questions were answered.  This service was performed under direction the of Dr. Aleksandar Ovalles MD.  CPT 81231.  Follow up in 3 months          STRETCHING EXERCISES            STRENGTHENING EXERCISES                  If you have any difficulties reading this information, you may visit the online version using the following link: Knee Conditioning Program (https://orthoinfo.aaos.org/globalassets/pdfs/[email or web address]-rehab_knee.pdf)      General Arthritis info:    -shiny white stuff at end of a chicken bones is cartilage    -arthritis is wearing away of the cartilage that lines the end of your bones    -osteoarthritis is thought to be a wear and tear phenomenon    -symptoms are due to inflammation of joint causing stiffness, aching, and sometimes swelling    -occasionally  sharp pain will occur causing a give way sensation    -Risk factors: genetic, weight, female > male, age    Treatment options:    -maintain healthy weight (every pound is 4 pounds of pressure on the knee)    -daily moderate exercise (walk, bike, swim 30 minutes per day) to keep joints moving    -daily strengthening exercises (through therapy or on own) to keep muscles supporting joint healthy and strong    -glucosamine 1500mg daily (look for USP label on bottle)    -tylenol as needed for pain (follow directions on the bottle)    -anti-inflammatory medication such as alleve may be helpful- take 1-2 tabs twice daily for 7 days. If it helps your pain, continue. If you do not feel any change, you may stop and then take it as needed.    (you may be given a once daily anti-inflammatory such as MOBIC. If given, avoid other anti-inflammatory medications such as advil, ibuprofen, motrin, naprosyn, alleve, etc)    -if swollen and painful, ice, decrease activity, and take anti-inflammatory daily for 5-7 days and if no relief call your doctor for further options    -consider cortisone injection (every 3-4 months at most)- anti- inflammatory steroid medication that can be injected directly into the joint to reduce inflammation    -consider hyaluronic acid injections (eufflexxa, hyalgan, synvisc, supartz) (every 6 months at most)- protein injection that helps decrease pain and irritability in the joint. It is best used to help prolong intervals between cortisone injections to minimize steroid injections. These are currently approved for knee injections. Discuss with your doctor if other joint involved. Call to seek approval prior to the injections.    -long-term treatment may include a total joint replacement (keep diary of good days and bad days, then evaluate as to when you are ready)      Follow-up: 3 months or sooner if there are problems between now and then.    Thank you for choosing [facility] and  Dr. Aleksandar Ovalles for your orthopedic & sports medicine care. It is our goal to provide you with exceptional care that will help keep you healthy, active, and get you back in the game.    Please do not hesitate to reach out to us via email, phone, or MyChart with any questions, concerns, or feedback.    If you felt that you received exemplary care today, please consider leaving us feedback on [employer] at:  [email or web address]    If you are experiencing pain/discomfort ,or have questions after 5pm and would like to be connected to the [facility] on-call team, please call this number and specify which Sports Medicine provider is treating you: [phone]

## 2024-02-29 NOTE — PROCEDURES
Large Joint Aspiration/Injection: R knee    Date/Time: 2/29/2024 3:40 PM    Performed by: Aleksandar Ovalles MD  Authorized by: Aleksandar Ovalles MD    Consent Done?:  Yes (Verbal)  Indications:  Arthritis and pain  Site marked: the procedure site was marked    Timeout: prior to procedure the correct patient, procedure, and site was verified    Prep: patient was prepped and draped in usual sterile fashion    Local anesthetic:  Bupivacaine 0.5% without epinephrine and lidocaine 1% without epinephrine    Details:  Needle Size:  21 G  Ultrasonic Guidance for needle placement?: Yes    Images are saved and documented.  Approach:  Lateral (superior)  Location:  Knee  Site:  R knee  Medications:  40 mg triamcinolone acetonide 40 mg/mL  Patient tolerance:  Patient tolerated the procedure well with no immediate complications     Ultrasound guidance was used for needle localization. Images were saved and stored for documentation. The appropriate structures were visualized. Dynamic visualization of the needle was continuous throughout the procedures and maintained good position.     
Sports Medicine US - Guidance for Needle Placement    Date/Time: 2/29/2024 3:40 PM    Performed by: Aleksandar Ovalles MD  Authorized by: Aleksandar Ovalles MD  Preparation: Patient was prepped and draped in the usual sterile fashion.  Local anesthesia used: no    Anesthesia:  Local anesthesia used: no    Sedation:  Patient sedated: no    Patient tolerance: patient tolerated the procedure well with no immediate complications  Comments: Ultrasound guidance was used for needle localization. Images were saved and stored for documentation. The appropriate structures were visualized. Dynamic visualization of the needle was continuous throughout the procedures and maintained good position.         
left flank pain with hematuria

## 2024-02-29 NOTE — H&P (VIEW-ONLY)
Patient ID: Janna Sheffield  YOB: 1960  MRN: [id number]    Chief Complaint: Pain of the Right Knee      Referred By: Dr. Alexis Botello    History of Present Illness: Janna Sheffield is a right-hand dominant 63 y.o. female who presents today with right knee pain.  Patient reports pain as 8/10 today.  She reports having severe right knee pain since [place].  She has not seen orthopedics in several years.  She has never had injections for this.  She does have a history of left knee replacement.  She also reports that her neck pain with right arm radicular pain has returned.  cough. No rashes. No significant abdominal pain. Does continue to have some joint pains for which she follows with pain management and takes gabapentin which is helpful.     The patient is active in none.  Occupation: none      Past Medical History:   Past Medical History:   Diagnosis Date    Arthritis     Chest pain syndrome 10/23/2014    Hyperlipidemia     Hypertension     Macular degeneration, age related, nonexudative - Left Eye 3/24/2014    Retinal detachment, old, partial 4/14/2014    Sarcoidosis      Past Surgical History:   Procedure Laterality Date    CATARACT EXTRACTION      COLONOSCOPY N/A 03/14/2022    Procedure: COLONOSCOPY;  Surgeon: Christi Medrano MD;  Location: [facility];  Service: Endoscopy;  Laterality: N/A;    DILATION AND CURETTAGE OF UTERUS      missed ab x 3    EPIDURAL STEROID INJECTION INTO CERVICAL SPINE N/A 12/21/2021    Procedure: C6/C7 IL HIMANSHU RN IV sedation;  Surgeon: Hadley Marsh MD;  Location: [facility];  Service: Pain Management;  Laterality: N/A;    ESOPHAGOGASTRODUODENOSCOPY N/A 02/06/2019    Procedure: ESOPHAGOGASTRODUODENOSCOPY (EGD);  Surgeon: Jairo Vargas III, MD;  Location: [facility];  Service: Endoscopy;  Laterality: N/A;    HYSTERECTOMY  2001    MACARIO - complete    JOINT REPLACEMENT Left     Knee    knee surgery      TRANSFORAMINAL EPIDURAL INJECTION OF STEROID Right  04/18/2022    Procedure: INJECTION, STEROID, EPIDURAL, TRANSFORAMINAL APPROACH, RIGHT C6-C7;  Surgeon: Viraj Kramer MD;  Location: [facility];  Service: Pain Management;  Laterality: Right;    TRANSFORAMINAL EPIDURAL INJECTION OF STEROID Right 9/11/2023    Procedure: right C6/7 TF HIMANSHU w/ RN IV sedation;  Surgeon: Derian Watts MD;  Location: [facility];  Service: Pain Management;  Laterality: Right;    TUBAL LIGATION       Family History   Problem Relation Age of Onset    Cervical cancer Mother     Heart disease Mother     Heart attack Mother     Hypertension Mother     Hyperlipidemia Mother     Hypertension Father     Hyperlipidemia Father     Stroke Father     Breast cancer Maternal Aunt     Stroke Brother     Breast cancer Maternal Cousin     Breast cancer Maternal Cousin     Breast cancer Paternal Cousin     Colon cancer Neg Hx     Thrombophilia Neg Hx     Deep vein thrombosis Neg Hx     Pulmonary embolism Neg Hx      Social History     Socioeconomic History    Marital status:    Occupational History     Employer: [employer]   Tobacco Use    Smoking status: Never    Smokeless tobacco: Never   Substance and Sexual Activity    Alcohol use: No    Drug use: No    Sexual activity: Yes     Partners: Male     Comment:      Medication List with Changes/Refills   Current Medications    DICLOFENAC SODIUM (VOLTAREN) 1 % GEL    Apply 2 g topically 3 (three) times daily as needed (Knee pain).    ERGOCALCIFEROL (ERGOCALCIFEROL) 50,000 UNIT CAP    Take 1 capsule (50,000 Units total) by mouth every 7 days.    GABAPENTIN (NEURONTIN) 600 MG TABLET    Take 1 tablet (600 mg total) by mouth 3 (three) times daily.    HYDROCHLOROTHIAZIDE (HYDRODIURIL) 25 MG TABLET    Take 1 tablet by mouth once daily    LIDOCAINE-PRILOCAINE (EMLA) CREAM    Apply topically up to 4x per day to affected area for pain relief    MULTIVITAMIN (THERAGRAN) PER TABLET    Take 1 tablet by mouth Daily.    ROSUVASTATIN  (CRESTOR) 40 MG TAB    Take 1 tablet by mouth once daily     Review of patient's allergies indicates:  No Known Allergies    Physical Exam:   There is no height or weight on file to calculate BMI.    GENERAL: Well appearing, in no acute distress.  HEAD: Normocephalic and atraumatic.  ENT: External ears and nose grossly normal.  EYES: EOMI bilaterally  PULMONARY: Respirations are grossly even and non-labored.  NEURO: Awake, alert, and oriented x 3.  SKIN: No obvious rashes appreciated.  PSYCH: Mood & affect are appropriate.    Detailed MSK exam:     Right knee exam:   -ROM: extension 0, flexion 120  -TTP: Medial joint line and Lateral joint line  -effusion: none  -Patellar apprehension negative  -Jessie test positive -  lateral  -stable to varus and valgus stress tests  -Lachman test negative, anterior drawer test negative, posterior drawer test negative        Imaging:  Posterior Segment OCT Retina-Both eyes  Right Eye  Quality was good. Scan locations included subfoveal, juxtafoveal,   extrafoveal, nasal, temporal, superior, inferior. Progression has been   stable. Findings include normal foveal contour.     Left Eye  Quality was good. Scan locations included subfoveal, juxtafoveal,   extrafoveal, nasal, temporal, superior, inferior. Progression has been   stable. Findings include normal foveal contour, macular hole.     Notes  See progress note.        Relevant imaging results were reviewed and interpreted by me and per my read shows moderate arthritic changes right knee radiographs.  This was discussed with the patient and / or family today.     Assessment:  Janna Sheffield is a 63 y.o. female presenting with chronic right knee pain.   History, physical and radiographs are consistent with a likely diagnosis of OA, possible LMT.   Plan: Steroid injection given today (see separate procedure note for details). We discussed the proper protocols after the injection such as no submerging pools, baths tubs, or hot  tubs for 24 hr.  Showering is okay today.  We also discussed that blood sugars can be elevated after an injection and asked patient to properly checked her sugars over the next few days and contact their PCP if there are any concerns.  We discussed red flags such as fevers, chills, red, warm, tender joint at the area of injection to please seek medical care immediately.   Home exercises given. Continue conservative management for pain.   Follow up 3 months. All questions answered.      Primary osteoarthritis of right knee  -     Ambulatory referral/consult to Sports Medicine  -     Sports Medicine US - Guidance for Needle Placement  -     Large Joint Aspiration/Injection: R knee         Ultrasound guidance was used for needle localization. Images were saved and stored for documentation. The appropriate structures were visualized. Dynamic visualization of the needle was continuous throughout the procedures and maintained good position.      At least 15 minutes were spent instructing the patient in therapeutic exercises.  All questions were answered and exercises were provided in the After Visit Summary.  This service was performed under direction of Aleksandar Ovalles MD.  CPT 82134.    MEDICAL NECESSITY FOR VISCOSUPPLEMENTATION: After thorough evaluation of the patient, I have determined that visco-supplementation is medically necessary. The patient has painful degenerative changes of the knee with failure of conservative treatments including lifestyle modifications and rehabilitation exercises.  Oral analgesis/NSAIDs have not adequately controlled symptoms and there is radiographic evidence of Kellgren Dhiraj grade 2 or greater osteoarthritic changes, or in lack of radiographic evidence, there is arthroscopic or other evidence of chondrosis.     A copy of today's visit note has been sent to the referring provider.     Electronically signed:  Aleksandar Ovalles MD, MPH  02/29/2024  4:13 PM

## 2024-03-01 ENCOUNTER — OFFICE VISIT (OUTPATIENT)
Dept: INTERNAL MEDICINE | Facility: CLINIC | Age: 64
End: 2024-03-01
Payer: COMMERCIAL

## 2024-03-01 VITALS
BODY MASS INDEX: 31.56 KG/M2 | SYSTOLIC BLOOD PRESSURE: 136 MMHG | RESPIRATION RATE: 17 BRPM | HEART RATE: 84 BPM | DIASTOLIC BLOOD PRESSURE: 90 MMHG | TEMPERATURE: 99 F | WEIGHT: 201.5 LBS | OXYGEN SATURATION: 99 %

## 2024-03-01 DIAGNOSIS — I10 PRIMARY HYPERTENSION: ICD-10-CM

## 2024-03-01 DIAGNOSIS — M54.16 LUMBAR RADICULOPATHY: Primary | ICD-10-CM

## 2024-03-01 PROCEDURE — 99213 OFFICE O/P EST LOW 20 MIN: CPT | Mod: S$GLB,,, | Performed by: PHYSICIAN ASSISTANT

## 2024-03-01 PROCEDURE — 99999 PR PBB SHADOW E&M-EST. PATIENT-LVL IV: CPT | Mod: PBBFAC,,, | Performed by: PHYSICIAN ASSISTANT

## 2024-03-01 PROCEDURE — 3008F BODY MASS INDEX DOCD: CPT | Mod: CPTII,S$GLB,, | Performed by: PHYSICIAN ASSISTANT

## 2024-03-01 PROCEDURE — 3075F SYST BP GE 130 - 139MM HG: CPT | Mod: CPTII,S$GLB,, | Performed by: PHYSICIAN ASSISTANT

## 2024-03-01 PROCEDURE — 1159F MED LIST DOCD IN RCRD: CPT | Mod: CPTII,S$GLB,, | Performed by: PHYSICIAN ASSISTANT

## 2024-03-01 PROCEDURE — 1160F RVW MEDS BY RX/DR IN RCRD: CPT | Mod: CPTII,S$GLB,, | Performed by: PHYSICIAN ASSISTANT

## 2024-03-01 PROCEDURE — 3080F DIAST BP >= 90 MM HG: CPT | Mod: CPTII,S$GLB,, | Performed by: PHYSICIAN ASSISTANT

## 2024-03-01 RX ORDER — METHOCARBAMOL 750 MG/1
750 TABLET, FILM COATED ORAL 3 TIMES DAILY PRN
Qty: 20 TABLET | Refills: 0 | Status: SHIPPED | OUTPATIENT
Start: 2024-03-01 | End: 2024-03-08

## 2024-03-01 RX ORDER — PREDNISONE 20 MG/1
TABLET ORAL
Qty: 9 TABLET | Refills: 0 | Status: SHIPPED | OUTPATIENT
Start: 2024-03-01 | End: 2024-05-02

## 2024-03-01 RX ORDER — DICLOFENAC SODIUM 10 MG/G
2 GEL TOPICAL 2 TIMES DAILY
Qty: 100 G | Refills: 0 | Status: SHIPPED | OUTPATIENT
Start: 2024-03-01 | End: 2024-03-08

## 2024-03-01 NOTE — PROGRESS NOTES
Subjective:      Patient ID: Janna Sheffield is a 63 y.o. female.    Chief Complaint: Pain in right leg, side, and butt. (Since yesterday.)    Back Pain  This is a new problem. The current episode started yesterday. The pain is present in the gluteal. The pain radiates to the right thigh. The pain is severe. The symptoms are aggravated by position. Associated symptoms include leg pain. Pertinent negatives include no abdominal pain, bladder incontinence, bowel incontinence, chest pain, dysuria, fever, headaches, numbness, paresis, paresthesias, pelvic pain, perianal numbness, tingling, weakness or weight loss. Treatments tried: gabapentin, tylenol arthritis. The treatment provided mild relief.   Pain so severe almost went to the ER.   No trauma or falls.   NO swelling, redness, or rashes.     Didn't take blood pressure medication this morning.     Patient Active Problem List   Diagnosis    HTN (hypertension)    Pulmonary sarcoidosis    Class 2 obesity in adult    Fibromyalgia    Generalized osteoarthritis of multiple sites    Vitamin D deficiency    H/O total knee replacement, left    Degenerative cervical spinal stenosis    Hyperlipidemia         Current Outpatient Medications:     diclofenac sodium (VOLTAREN) 1 % Gel, Apply 2 g topically 3 (three) times daily as needed (Knee pain)., Disp: 100 g, Rfl: 3    ergocalciferol (ERGOCALCIFEROL) 50,000 unit Cap, Take 1 capsule (50,000 Units total) by mouth every 7 days., Disp: 12 capsule, Rfl: 3    gabapentin (NEURONTIN) 600 MG tablet, Take 1 tablet (600 mg total) by mouth 3 (three) times daily., Disp: 90 tablet, Rfl: 5    hydroCHLOROthiazide (HYDRODIURIL) 25 MG tablet, Take 1 tablet by mouth once daily, Disp: 90 tablet, Rfl: 1    LIDOcaine-prilocaine (EMLA) cream, Apply topically up to 4x per day to affected area for pain relief, Disp: 60 g, Rfl: 0    multivitamin (THERAGRAN) per tablet, Take 1 tablet by mouth Daily., Disp: , Rfl:     rosuvastatin (CRESTOR) 40 MG Tab,  Take 1 tablet by mouth once daily, Disp: 90 tablet, Rfl: 1    diclofenac sodium (VOLTAREN) 1 % Gel, Apply 2 g topically 2 (two) times daily. for 7 days, Disp: 100 g, Rfl: 0    methocarbamoL (ROBAXIN) 750 MG Tab, Take 1 tablet (750 mg total) by mouth 3 (three) times daily as needed (back pain)., Disp: 20 tablet, Rfl: 0    predniSONE (DELTASONE) 20 MG tablet, Take 2 tablets with food for 3 days; then take one tablet with food for 2 days; then take 1/2 tablet with food for 2 days., Disp: 9 tablet, Rfl: 0  No current facility-administered medications for this visit.    Review of Systems   Constitutional:  Negative for activity change, appetite change, chills, diaphoresis, fatigue, fever, unexpected weight change and weight loss.   HENT: Negative.  Negative for congestion, hearing loss, postnasal drip, rhinorrhea, sore throat, trouble swallowing and voice change.    Eyes: Negative.  Negative for visual disturbance.   Respiratory: Negative.  Negative for cough, choking, chest tightness and shortness of breath.    Cardiovascular:  Negative for chest pain, palpitations and leg swelling.   Gastrointestinal:  Negative for abdominal distention, abdominal pain, blood in stool, bowel incontinence, constipation, diarrhea, nausea and vomiting.   Endocrine: Negative for cold intolerance, heat intolerance, polydipsia and polyuria.   Genitourinary: Negative.  Negative for bladder incontinence, difficulty urinating, dysuria, frequency and pelvic pain.   Musculoskeletal:  Positive for back pain and myalgias. Negative for arthralgias, gait problem and joint swelling.   Skin:  Negative for color change, pallor, rash and wound.   Neurological:  Negative for dizziness, tingling, tremors, weakness, light-headedness, numbness, headaches and paresthesias.   Hematological:  Negative for adenopathy.   Psychiatric/Behavioral:  Negative for behavioral problems, confusion, self-injury, sleep disturbance and suicidal ideas. The patient is not  nervous/anxious.      Objective:   BP (!) 136/90 (BP Location: Left arm, Patient Position: Sitting, BP Method: Large (Manual))   Pulse 84   Temp 98.6 °F (37 °C) (Tympanic)   Resp 17   Wt 91.4 kg (201 lb 8 oz)   SpO2 99%   BMI 31.56 kg/m²     Physical Exam  Vitals and nursing note reviewed.   Constitutional:       General: She is not in acute distress.     Appearance: Normal appearance. She is well-developed. She is not ill-appearing, toxic-appearing or diaphoretic.   HENT:      Head: Normocephalic and atraumatic.   Cardiovascular:      Rate and Rhythm: Normal rate and regular rhythm.      Heart sounds: Normal heart sounds. No murmur heard.     No friction rub. No gallop.   Pulmonary:      Effort: Pulmonary effort is normal. No respiratory distress.      Breath sounds: Normal breath sounds. No wheezing or rales.   Musculoskeletal:         General: Normal range of motion.      Right hip: No tenderness or bony tenderness.      Left hip: No tenderness or bony tenderness.        Legs:       Comments: Pain location. No erythema, warmth, inflammation   Skin:     General: Skin is warm.      Capillary Refill: Capillary refill takes less than 2 seconds.      Findings: No rash.   Neurological:      Mental Status: She is alert and oriented to person, place, and time.      Motor: No weakness.      Gait: Gait normal.   Psychiatric:         Mood and Affect: Mood normal.         Behavior: Behavior normal.         Thought Content: Thought content normal.         Judgment: Judgment normal.         Assessment:     1. Lumbar radiculopathy    2. Primary hypertension      Plan:   Lumbar radiculopathy  -     predniSONE (DELTASONE) 20 MG tablet; Take 2 tablets with food for 3 days; then take one tablet with food for 2 days; then take 1/2 tablet with food for 2 days.  Dispense: 9 tablet; Refill: 0  -     methocarbamoL (ROBAXIN) 750 MG Tab; Take 1 tablet (750 mg total) by mouth 3 (three) times daily as needed (back pain).  Dispense: 20  tablet; Refill: 0  -     diclofenac sodium (VOLTAREN) 1 % Gel; Apply 2 g topically 2 (two) times daily. for 7 days  Dispense: 100 g; Refill: 0    Primary hypertension    -mildly elevated today. DIdnt take meds this morning. Advised to take right away when she gets home.   -monitor BP on prednisone    Follow up if symptoms worsen or fail to improve.

## 2024-03-04 ENCOUNTER — HOSPITAL ENCOUNTER (OUTPATIENT)
Dept: RADIOLOGY | Facility: HOSPITAL | Age: 64
Discharge: HOME | End: 2024-03-04
Attending: NEUROLOGICAL SURGERY
Payer: COMMERCIAL

## 2024-03-04 DIAGNOSIS — M25.511 RIGHT SHOULDER PAIN, UNSPECIFIED CHRONICITY: ICD-10-CM

## 2024-03-04 PROCEDURE — 25500020 PHARM REV CODE 255: Performed by: NEUROLOGICAL SURGERY

## 2024-03-04 PROCEDURE — 77002 NEEDLE LOCALIZATION BY XRAY: CPT | Mod: 26,RT,, | Performed by: RADIOLOGY

## 2024-03-04 PROCEDURE — 23350 INJECTION FOR SHOULDER X-RAY: CPT | Mod: RT,,, | Performed by: RADIOLOGY

## 2024-03-04 PROCEDURE — 73222 MRI JOINT UPR EXTREM W/DYE: CPT | Mod: TC,RT

## 2024-03-04 PROCEDURE — A9585 GADOBUTROL INJECTION: HCPCS | Performed by: NEUROLOGICAL SURGERY

## 2024-03-04 PROCEDURE — 73222 MRI JOINT UPR EXTREM W/DYE: CPT | Mod: 26,RT,, | Performed by: RADIOLOGY

## 2024-03-04 RX ORDER — GADOBUTROL 604.72 MG/ML
0.2 INJECTION INTRAVENOUS
Status: COMPLETED | OUTPATIENT
Start: 2024-03-04 | End: 2024-03-04

## 2024-03-04 RX ADMIN — IOHEXOL 5 ML: 300 INJECTION, SOLUTION INTRAVENOUS at 01:03

## 2024-03-04 RX ADMIN — GADOBUTROL 0.2 ML: 604.72 INJECTION INTRAVENOUS at 01:03

## 2024-03-04 NOTE — DISCHARGE SUMMARY
[facility] (Hospital)  Discharge Note  Short Stay    IR ARTHROGRAM SHOULDER RIGHT, INJECTION ONLY WITH MRI TO FOLLOW (XPD)      OUTCOME: Patient tolerated treatment/procedure well without complication and is now ready for discharge.    DISPOSITION: Home or Self Care    FINAL DIAGNOSIS:  <principal problem not specified>    FOLLOWUP: In clinic    DISCHARGE INSTRUCTIONS:  No discharge procedures on file.     TIME SPENT ON DISCHARGE: 15 minutes    Pre Op Diagnosis: RIGHT SHoulder pain     Post Op Diagnosis: same     Procedure:  shoulder arthrogram     Procedure performed by: Dalton ELLISON, Jihan CROWDER     Written Informed Consent Obtained: Yes     Specimen Removed:  yes     Estimated Blood Loss:  minimal     Findings: Local anesthesia     Sedation:  no     The patient tolerated the procedure well and there were no complications.      Disposition:  F/U in clinic or with ordering physician    Discharge instructions:  Light activity for 24 hours.  Remove band aid in 24 hours.  No baths (showers are appropriate).      Sterile technique was performed in the anterior right shoulder, lidocaine was used as a local anesthetic. Intra articular contrast administered and patient to MRI.  Pt tolerated the procedure well without immediate complications.  Please see radiologist report for details. F/u with PCP and/or ordering physician.

## 2024-03-13 ENCOUNTER — TELEPHONE (OUTPATIENT)
Dept: PHARMACY | Facility: CLINIC | Age: 64
End: 2024-03-13
Payer: COMMERCIAL

## 2024-03-14 ENCOUNTER — OFFICE VISIT (OUTPATIENT)
Dept: NEUROSURGERY | Facility: CLINIC | Age: 64
End: 2024-03-14
Payer: COMMERCIAL

## 2024-03-14 VITALS
HEART RATE: 73 BPM | SYSTOLIC BLOOD PRESSURE: 122 MMHG | HEIGHT: 67 IN | BODY MASS INDEX: 31.82 KG/M2 | WEIGHT: 202.75 LBS | DIASTOLIC BLOOD PRESSURE: 82 MMHG

## 2024-03-14 DIAGNOSIS — M19.011 OSTEOARTHRITIS OF RIGHT AC (ACROMIOCLAVICULAR) JOINT: ICD-10-CM

## 2024-03-14 DIAGNOSIS — M54.12 CERVICAL RADICULOPATHY: ICD-10-CM

## 2024-03-14 DIAGNOSIS — G89.29 CHRONIC RIGHT SHOULDER PAIN: Primary | ICD-10-CM

## 2024-03-14 DIAGNOSIS — M25.511 CHRONIC RIGHT SHOULDER PAIN: Primary | ICD-10-CM

## 2024-03-14 DIAGNOSIS — M50.30 DEGENERATIVE DISC DISEASE, CERVICAL: ICD-10-CM

## 2024-03-14 PROCEDURE — 3074F SYST BP LT 130 MM HG: CPT | Mod: CPTII,S$GLB,, | Performed by: NEUROLOGICAL SURGERY

## 2024-03-14 PROCEDURE — 3079F DIAST BP 80-89 MM HG: CPT | Mod: CPTII,S$GLB,, | Performed by: NEUROLOGICAL SURGERY

## 2024-03-14 PROCEDURE — 3008F BODY MASS INDEX DOCD: CPT | Mod: CPTII,S$GLB,, | Performed by: NEUROLOGICAL SURGERY

## 2024-03-14 PROCEDURE — 99214 OFFICE O/P EST MOD 30 MIN: CPT | Mod: S$GLB,,, | Performed by: NEUROLOGICAL SURGERY

## 2024-03-14 PROCEDURE — 99999 PR PBB SHADOW E&M-EST. PATIENT-LVL III: CPT | Mod: PBBFAC,,, | Performed by: NEUROLOGICAL SURGERY

## 2024-03-14 PROCEDURE — 3044F HG A1C LEVEL LT 7.0%: CPT | Mod: CPTII,S$GLB,, | Performed by: NEUROLOGICAL SURGERY

## 2024-03-14 NOTE — PROGRESS NOTES
Subjective:      Patient ID: Janna Sheffield is a 63 y.o. female.    Chief Complaint: No chief complaint on file.    Patient is here today for follow-up after having an MRI of the cervical spine as well as the shoulder  Symptoms have remained stable from prior visit  Muscle spasm bilaterally in the cervical spine and has right sided pain   She does state that the shoulder symptoms are worse than the cervical spine symptoms  Very occasionally she will have associated Numbness and tingling   Takes gabapentin for symptom relief  No previous cervical spine surgery      Review of Systems   Constitutional:  Negative for activity change, appetite change and chills.   HENT:  Negative for hearing loss, sore throat and tinnitus.    Eyes:  Negative for pain, discharge and itching.   Cardiovascular:  Negative for chest pain.   Gastrointestinal:  Negative for abdominal pain.   Endocrine: Negative for cold intolerance and heat intolerance.   Genitourinary:  Negative for difficulty urinating and dysuria.   Musculoskeletal:  Positive for back pain and gait problem.   Allergic/Immunologic: Negative for environmental allergies.   Neurological:  Positive for weakness. Negative for dizziness, tremors, light-headedness and headaches.   Hematological:  Negative for adenopathy.   Psychiatric/Behavioral:  Negative for agitation, behavioral problems and confusion.          Objective:       Neurosurgery Physical Exam  Ortho/SPM Exam  Ortho Exam      MRI cervical spine     C3-C4: Small central disc osteophyte complex slightly contacts and deforms the ventral cord without associated spinal canal stenosis.  No significant neural foraminal stenosis.     C4-C5: Mild broad-based posterior disc osteophyte complex contacts and flattens the ventral cord.  Right greater than left facet arthropathy with ligamentum flavum hypertrophy and uncovertebral joint spurring contribute to mild spinal canal stenosis moderate bilateral neural foraminal  stenosis.     C5-C6: Asymmetric left disc osteophyte complex flattens the ventral thecal sac.  No ventral cord deformity.  Facet arthropathy and uncovertebral joint spurring contribute to moderate bilateral neural foraminal stenosis.     C6-C7: Broad-based posterior disc osteophyte complex contacts and slightly flattens the ventral cord contributing to mild spinal canal stenosis.  Facet arthropathy and uncovertebral joint spurring contribute to mild bilateral neural foraminal stenosis.     MRI shoulder arthrogram    1.     Low-grade intrasubstance partial tearing distal and anterior supraspinatus tendon at the greater tuberosity.     2.    Mild intrasubstance articular surface partial tearing distal subscapularis tendon.     3.     Severe osteoarthritis AC joint.     4.    Biceps tendinosis.  No split tearing.  Minimal free edge fraying of the anterior superior labrum    Assessment:     1. Chronic right shoulder pain    2. Degenerative disc disease, cervical    3. Cervical radiculopathy    4. Osteoarthritis of right AC (acromioclavicular) joint      Plan:     Chronic right shoulder pain  -     Ambulatory referral/consult to Sports Medicine; Future; Expected date: 03/21/2024    Degenerative disc disease, cervical    Cervical radiculopathy    Osteoarthritis of right AC (acromioclavicular) joint        Based on the patient's symptoms along with the MRI findings in the cervical spine as well as the shoulder she has likely symptoms from both the cervical spine as well as the shoulder  I would like to send her to sports Medicine to see if they can try an injection to her shoulder to offer service some temporary relief and to see the extent to which she improves following this  To what expand the cervical spine is also having contribution to her symptoms is unknown will see how she is doing after seems worse medicine all see her back in the clinic after discuss the findings and her symptoms at that time    Thank you for  the referral   Please call with any questions    Ozzie Rodriguez MD  Neurosurgery     Disclaimer: This note was prepared using a voice recognition system and is likely to have sound alike errors within the text.

## 2024-03-26 DIAGNOSIS — M25.511 RIGHT SHOULDER PAIN, UNSPECIFIED CHRONICITY: Primary | ICD-10-CM

## 2024-04-03 ENCOUNTER — HOSPITAL ENCOUNTER (OUTPATIENT)
Dept: RADIOLOGY | Facility: HOSPITAL | Age: 64
Discharge: HOME | End: 2024-04-03
Attending: STUDENT IN AN ORGANIZED HEALTH CARE EDUCATION/TRAINING PROGRAM
Payer: COMMERCIAL

## 2024-04-03 ENCOUNTER — OFFICE VISIT (OUTPATIENT)
Dept: SPORTS MEDICINE | Facility: CLINIC | Age: 64
End: 2024-04-03
Payer: COMMERCIAL

## 2024-04-03 DIAGNOSIS — M25.511 CHRONIC RIGHT SHOULDER PAIN: ICD-10-CM

## 2024-04-03 DIAGNOSIS — M25.511 RIGHT SHOULDER PAIN, UNSPECIFIED CHRONICITY: ICD-10-CM

## 2024-04-03 DIAGNOSIS — M67.911 TENDINOPATHY OF RIGHT ROTATOR CUFF: ICD-10-CM

## 2024-04-03 DIAGNOSIS — M75.111 INCOMPLETE TEAR OF RIGHT ROTATOR CUFF, UNSPECIFIED WHETHER TRAUMATIC: Primary | ICD-10-CM

## 2024-04-03 DIAGNOSIS — M19.011 OSTEOARTHRITIS OF RIGHT ACROMIOCLAVICULAR JOINT: ICD-10-CM

## 2024-04-03 DIAGNOSIS — G89.29 CHRONIC RIGHT SHOULDER PAIN: ICD-10-CM

## 2024-04-03 PROCEDURE — 1160F RVW MEDS BY RX/DR IN RCRD: CPT | Mod: CPTII,S$GLB,, | Performed by: STUDENT IN AN ORGANIZED HEALTH CARE EDUCATION/TRAINING PROGRAM

## 2024-04-03 PROCEDURE — 99215 OFFICE O/P EST HI 40 MIN: CPT | Mod: 25,S$GLB,, | Performed by: STUDENT IN AN ORGANIZED HEALTH CARE EDUCATION/TRAINING PROGRAM

## 2024-04-03 PROCEDURE — 20611 DRAIN/INJ JOINT/BURSA W/US: CPT | Mod: RT,S$GLB,, | Performed by: STUDENT IN AN ORGANIZED HEALTH CARE EDUCATION/TRAINING PROGRAM

## 2024-04-03 PROCEDURE — 73030 X-RAY EXAM OF SHOULDER: CPT | Mod: 26,RT,, | Performed by: RADIOLOGY

## 2024-04-03 PROCEDURE — 1159F MED LIST DOCD IN RCRD: CPT | Mod: CPTII,S$GLB,, | Performed by: STUDENT IN AN ORGANIZED HEALTH CARE EDUCATION/TRAINING PROGRAM

## 2024-04-03 PROCEDURE — 73030 X-RAY EXAM OF SHOULDER: CPT | Mod: TC,RT

## 2024-04-03 PROCEDURE — 99999 PR PBB SHADOW E&M-EST. PATIENT-LVL III: CPT | Mod: PBBFAC,,, | Performed by: STUDENT IN AN ORGANIZED HEALTH CARE EDUCATION/TRAINING PROGRAM

## 2024-04-03 RX ORDER — TRIAMCINOLONE ACETONIDE 40 MG/ML
40 INJECTION, SUSPENSION INTRA-ARTICULAR; INTRAMUSCULAR
Status: DISCONTINUED | OUTPATIENT
Start: 2024-04-03 | End: 2024-04-03 | Stop reason: HOSPADM

## 2024-04-03 RX ADMIN — TRIAMCINOLONE ACETONIDE 40 MG: 40 INJECTION, SUSPENSION INTRA-ARTICULAR; INTRAMUSCULAR at 02:04

## 2024-04-03 NOTE — PROCEDURES
Sports Medicine US - Guidance for Needle Placement    Date/Time: 4/3/2024 2:40 PM    Performed by: Aleksandar Ovalles MD  Authorized by: Aleksandar Ovalles MD  Preparation: Patient was prepped and draped in the usual sterile fashion.  Local anesthesia used: no    Anesthesia:  Local anesthesia used: no    Sedation:  Patient sedated: no    Patient tolerance: patient tolerated the procedure well with no immediate complications  Comments: Ultrasound guidance was used for needle localization. Images were saved and stored for documentation. The appropriate structures were visualized. Dynamic visualization of the needle was continuous throughout the procedures and maintained good position.

## 2024-04-03 NOTE — PROGRESS NOTES
Patient ID: Janna Sheffield  YOB: 1960  MRN: [id number]    Chief Complaint: Pain of the Right Shoulder      Referred By: self      History of Present Illness: Janna Sheffield is a right-hand dominant 63 y.o. female who presents today with right shoulder pain. The pt presents today with right shoulder joint pain radiating to bicep. The pt stated her pain is worsened when she's cleaning, cooking and dressing and her pain is treated with extra strength Tylenol. The pt stated she was 6/10 pain and denies having any falls. History of cervical radiculopathy. She has not tried any conservative treatment for this problem.    The patient is active in none.  Occupation: administrative assist      Past Medical History:   Past Medical History:   Diagnosis Date    Arthritis     Chest pain syndrome 10/23/2014    Hyperlipidemia     Hypertension     Macular degeneration, age related, nonexudative - Left Eye 3/24/2014    Retinal detachment, old, partial 4/14/2014    Sarcoidosis      Past Surgical History:   Procedure Laterality Date    CATARACT EXTRACTION      COLONOSCOPY N/A 03/14/2022    Procedure: COLONOSCOPY;  Surgeon: Christi Medrano MD;  Location: [facility];  Service: Endoscopy;  Laterality: N/A;    DILATION AND CURETTAGE OF UTERUS      missed ab x 3    EPIDURAL STEROID INJECTION INTO CERVICAL SPINE N/A 12/21/2021    Procedure: C6/C7 IL HIMANSHU RN IV sedation;  Surgeon: Hadley Marsh MD;  Location: [facility];  Service: Pain Management;  Laterality: N/A;    ESOPHAGOGASTRODUODENOSCOPY N/A 02/06/2019    Procedure: ESOPHAGOGASTRODUODENOSCOPY (EGD);  Surgeon: Jairo Vargas III, MD;  Location: [facility];  Service: Endoscopy;  Laterality: N/A;    HYSTERECTOMY  2001    MACARIO - complete    JOINT REPLACEMENT Left     Knee    knee surgery      TRANSFORAMINAL EPIDURAL INJECTION OF STEROID Right 04/18/2022    Procedure: INJECTION, STEROID, EPIDURAL, TRANSFORAMINAL APPROACH, RIGHT C6-C7;  Surgeon: Viraj  MD Nia;  Location: [facility];  Service: Pain Management;  Laterality: Right;    TRANSFORAMINAL EPIDURAL INJECTION OF STEROID Right 9/11/2023    Procedure: right C6/7 TF HIMANSHU w/ RN IV sedation;  Surgeon: Derian Watts MD;  Location: [facility];  Service: Pain Management;  Laterality: Right;    TUBAL LIGATION       Family History   Problem Relation Age of Onset    Cervical cancer Mother     Heart disease Mother     Heart attack Mother     Hypertension Mother     Hyperlipidemia Mother     Hypertension Father     Hyperlipidemia Father     Stroke Father     Breast cancer Maternal Aunt     Stroke Brother     Breast cancer Maternal Cousin     Breast cancer Maternal Cousin     Breast cancer Paternal Cousin     Colon cancer Neg Hx     Thrombophilia Neg Hx     Deep vein thrombosis Neg Hx     Pulmonary embolism Neg Hx      Social History     Socioeconomic History    Marital status:    Occupational History     Employer: [employer]    Tobacco Use    Smoking status: Never    Smokeless tobacco: Never   Substance and Sexual Activity    Alcohol use: No    Drug use: No    Sexual activity: Yes     Partners: Male     Comment:      Medication List with Changes/Refills   Current Medications    DICLOFENAC SODIUM (VOLTAREN) 1 % GEL    Apply 2 g topically 3 (three) times daily as needed (Knee pain).    ERGOCALCIFEROL (ERGOCALCIFEROL) 50,000 UNIT CAP    Take 1 capsule (50,000 Units total) by mouth every 7 days.    GABAPENTIN (NEURONTIN) 600 MG TABLET    Take 1 tablet (600 mg total) by mouth 3 (three) times daily.    HYDROCHLOROTHIAZIDE (HYDRODIURIL) 25 MG TABLET    Take 1 tablet by mouth once daily    LIDOCAINE-PRILOCAINE (EMLA) CREAM    Apply topically up to 4x per day to affected area for pain relief    MULTIVITAMIN (THERAGRAN) PER TABLET    Take 1 tablet by mouth Daily.    PREDNISONE (DELTASONE) 20 MG TABLET    Take 2 tablets with food for 3 days; then take one tablet with food for 2 days; then take  1/2 tablet with food for 2 days.    ROSUVASTATIN (CRESTOR) 40 MG TAB    Take 1 tablet by mouth once daily     Review of patient's allergies indicates:  No Known Allergies    Physical Exam:   There is no height or weight on file to calculate BMI.    GENERAL: Well appearing, in no acute distress.  HEAD: Normocephalic and atraumatic.  ENT: External ears and nose grossly normal.  EYES: EOMI bilaterally  PULMONARY: Respirations are grossly even and non-labored.  NEURO: Awake, alert, and oriented x 3.  SKIN: No obvious rashes appreciated.  PSYCH: Mood & affect are appropriate.    Detailed MSK exam:     Right shoulder exam:   -ROM: abduction 130, forward flexion 130, external rotation 90, internal rotation 70  -empty can test pain but no weakness, resisted ER negative, belly press negative  -preston test negative, neers test negative, whipple test positive  -biceps load test negative, yerguson test negative, Peck's test negative  -sensation intact, pulses 2+  -TTP: AC joint, lateral cuff insertion, and posterior    Left shoulder exam:   -ROM: abduction 130, forward flexion 130, external rotation 90, internal rotation 70  -empty can test negative, resisted ER negative, belly press negative  -preston test negative, neers test negative, whipple test negative  -biceps load test negative, yerguson test negative, Peck's test negative  -sensation intact, pulses 2+  -TTP: none      Imaging:  IR ARTHROGRAM SHOULDER RIGHT, INJECTION ONLY WITH MRI TO FOLLOW (XPD)  Narrative: EXAMINATION:  IR ARTHROGRAM SHOULDER RIGHT, INJECTION ONLY WITH MRI TO FOLLOW (XPD)    CLINICAL HISTORY:  Pain in right shoulder    TECHNIQUE:  Technique: Written informed consent was obtained and the patient was prepped and draped in a sterile fashion.  The right shoulder was localized under fluoroscopy and 1% lidocaine was used to achieve local anesthesia.  A 22-gauge spinal needle was inserted into the joint via a anterior approach and position was  confirmed with low resistance 1% lidocaine injection.  Solution of 6 mL gadavist solution (1 ml Wali:50 ml saline) and 6 mL Omnipaque 350 contrast was injected.  Needle was removed and adequate hemostasis was achieved.    Fluoroscopic time was 18 seconds, 10 mGy and 8 fluoroscopic images were obtained.    FINDINGS:  Intra-articular contrast is noted.  No extravasation into the subacromial subdeltoid bursa.  Impression: Successful left shoulder arthrogram.  MRI report to follow.    Electronically signed by: Alexis Hoffman MD  Date:    04/02/2024  Time:    08:33        Relevant imaging results were reviewed and interpreted by me and per my read shows moderate AC joint arthritic changes on radiographs. MRI showed partial RC tears.  This was discussed with the patient and / or family today.     Assessment:  Janna Sheffield is a 63 y.o. female presenting with right shoulder pain.   History, physical and radiographs are consistent with a likely diagnosis of AC OA, RC partial tears, RC tendinopathy, SA bursitis.   Plan: Steroid injection given today (see separate procedure note for details). We discussed the proper protocols after the injection such as no submerging pools, baths tubs, or hot tubs for 24 hr.  Showering is okay today.  We also discussed that blood sugars can be elevated after an injection and asked patient to properly checked her sugars over the next few days and contact their PCP if there are any concerns.  We discussed red flags such as fevers, chills, red, warm, tender joint at the area of injection to please seek medical care immediately.   Not interested in PT. Consider shoulder referral if not improving. Continue conservative management for pain.   Follow up 3 months. All questions answered.      Incomplete tear of right rotator cuff, unspecified whether traumatic  -     Large Joint Aspiration/Injection: R subacromial bursa    Chronic right shoulder pain  -     Ambulatory referral/consult to Sports  Medicine  -     Sports Medicine US - Guidance for Needle Placement    Tendinopathy of right rotator cuff  -     Large Joint Aspiration/Injection: R subacromial bursa    Osteoarthritis of right acromioclavicular joint  -     Large Joint Aspiration/Injection: R subacromial bursa         Ultrasound guidance was used for needle localization. Images were saved and stored for documentation. The appropriate structures were visualized. Dynamic visualization of the needle was continuous throughout the procedures and maintained good position.      I spent a total of 40 minutes on the day of the visit.  This includes face to face time and non-face to face time preparing to see the patient (eg, review of tests), obtaining and/or reviewing separately obtained history, documenting clinical information in the electronic or other health record, independently interpreting results and communicating results to the patient/family/caregiver, or care coordinator.      A copy of today's visit note has been sent to the referring provider.     Electronically signed:  Aleksandar Ovalles MD, MPH  04/03/2024  3:37 PM

## 2024-04-03 NOTE — PROCEDURES
Large Joint Aspiration/Injection: R subacromial bursa    Date/Time: 4/3/2024 2:40 PM    Performed by: Aleksandar Ovalles MD  Authorized by: Aleksandar Ovalles MD    Consent Done?:  Yes (Verbal)  Indications:  Pain  Site marked: the procedure site was marked    Timeout: prior to procedure the correct patient, procedure, and site was verified    Prep: patient was prepped and draped in usual sterile fashion    Local anesthetic:  Bupivacaine 0.5% without epinephrine and lidocaine 1% without epinephrine    Details:  Needle Size:  21 G  Ultrasonic Guidance for needle placement?: Yes    Images are saved and documented.  Approach:  Lateral  Location:  Shoulder  Site:  R subacromial bursa  Medications:  40 mg triamcinolone acetonide 40 mg/mL  Patient tolerance:  Patient tolerated the procedure well with no immediate complications     Ultrasound guidance was used for needle localization. Images were saved and stored for documentation. The appropriate structures were visualized. Dynamic visualization of the needle was continuous throughout the procedures and maintained good position.

## 2024-04-03 NOTE — PATIENT INSTRUCTIONS
Assessment:  Janna Sheffield is a 63 y.o. female   Chief Complaint   Patient presents with    Right Shoulder - Pain       Encounter Diagnoses   Name Primary?    Chronic right shoulder pain     Incomplete tear of right rotator cuff, unspecified whether traumatic Yes    Tendinopathy of right rotator cuff     Osteoarthritis of right acromioclavicular joint         Plan:  Ultrasound guided subacromial cortisone injection to the right shoulder  We discussed the proper protocols after the injection such as no submerging pools, baths tubs, or hot tubs for 24 hr.  Showering is okay today.  We also discussed that blood sugars can be elevated after an injection and asked patient to properly checked her sugars over the next few days and contact their PCP if there are any concerns.  We discussed red flags such as fevers, chills, red, warm, tender joint at the area of injection to please seek medical care immediately.    Apply topical diclofenac (Voltaren) up to 4 times a day to the affected area.  It can be bought over the counter at any local pharmacy.    Patient may use over the counter lidocaine patches as needed for pain.  Patient may use ice and heat as needed for pain every 2 hours for 15 minutes  Follow up in 3 months          Follow-up: 3 months or sooner if there are problems between now and then.    Thank you for choosing [facility] and Dr. Aleksandar Ovalles for your orthopedic & sports medicine care. It is our goal to provide you with exceptional care that will help keep you healthy, active, and get you back in the game.    Please do not hesitate to reach out to us via email, phone, or MyChart with any questions, concerns, or feedback.    If you felt that you received exemplary care today, please consider leaving us feedback on [employer] at:  [email or web address]    If you are experiencing pain/discomfort ,or have questions after 5pm and would like  to be connected to the [facility] on-call team, please call this number and specify which Sports Medicine provider is treating you: [phone]

## 2024-04-22 ENCOUNTER — TELEPHONE (OUTPATIENT)
Dept: OPHTHALMOLOGY | Facility: CLINIC | Age: 64
End: 2024-04-22
Payer: COMMERCIAL

## 2024-04-22 NOTE — TELEPHONE ENCOUNTER
She has to call optical for the pupilary distance measurement.  ----- Message from Chandni Crooks sent at 4/22/2024  4:36 PM CDT -----  Contact: Patient  Janna Sheffield would like a call back at [phone], in regards to a question she is having about her eye glass prescription.

## 2024-05-02 ENCOUNTER — LAB VISIT (OUTPATIENT)
Dept: LAB | Facility: HOSPITAL | Age: 64
End: 2024-05-02
Attending: NURSE PRACTITIONER
Payer: COMMERCIAL

## 2024-05-02 ENCOUNTER — OFFICE VISIT (OUTPATIENT)
Dept: INTERNAL MEDICINE | Facility: CLINIC | Age: 64
End: 2024-05-02
Payer: COMMERCIAL

## 2024-05-02 VITALS
TEMPERATURE: 97 F | OXYGEN SATURATION: 99 % | RESPIRATION RATE: 17 BRPM | WEIGHT: 200.63 LBS | HEART RATE: 71 BPM | BODY MASS INDEX: 31.49 KG/M2 | SYSTOLIC BLOOD PRESSURE: 124 MMHG | HEIGHT: 67 IN | DIASTOLIC BLOOD PRESSURE: 80 MMHG

## 2024-05-02 DIAGNOSIS — M48.02 DEGENERATIVE CERVICAL SPINAL STENOSIS: ICD-10-CM

## 2024-05-02 DIAGNOSIS — R73.03 PREDIABETES: ICD-10-CM

## 2024-05-02 DIAGNOSIS — Z00.00 WELL ADULT EXAM: Primary | ICD-10-CM

## 2024-05-02 DIAGNOSIS — I10 PRIMARY HYPERTENSION: ICD-10-CM

## 2024-05-02 DIAGNOSIS — E55.9 VITAMIN D DEFICIENCY: ICD-10-CM

## 2024-05-02 DIAGNOSIS — Z13.820 ENCOUNTER FOR OSTEOPOROSIS SCREENING IN ASYMPTOMATIC POSTMENOPAUSAL PATIENT: ICD-10-CM

## 2024-05-02 DIAGNOSIS — E78.5 HYPERLIPIDEMIA, UNSPECIFIED HYPERLIPIDEMIA TYPE: ICD-10-CM

## 2024-05-02 DIAGNOSIS — R73.03 PRE-DIABETES: ICD-10-CM

## 2024-05-02 DIAGNOSIS — M15.9 GENERALIZED OSTEOARTHRITIS OF MULTIPLE SITES: Chronic | ICD-10-CM

## 2024-05-02 DIAGNOSIS — Z12.31 ENCOUNTER FOR SCREENING MAMMOGRAM FOR MALIGNANT NEOPLASM OF BREAST: ICD-10-CM

## 2024-05-02 DIAGNOSIS — K21.9 GASTROESOPHAGEAL REFLUX DISEASE WITHOUT ESOPHAGITIS: ICD-10-CM

## 2024-05-02 DIAGNOSIS — M79.7 FIBROMYALGIA: ICD-10-CM

## 2024-05-02 DIAGNOSIS — E66.01 CLASS 2 SEVERE OBESITY DUE TO EXCESS CALORIES WITH SERIOUS COMORBIDITY IN ADULT, UNSPECIFIED BMI: ICD-10-CM

## 2024-05-02 DIAGNOSIS — Z78.0 ENCOUNTER FOR OSTEOPOROSIS SCREENING IN ASYMPTOMATIC POSTMENOPAUSAL PATIENT: ICD-10-CM

## 2024-05-02 DIAGNOSIS — D86.0 PULMONARY SARCOIDOSIS: ICD-10-CM

## 2024-05-02 LAB
ESTIMATED AVG GLUCOSE: 123 MG/DL (ref 68–131)
HBA1C MFR BLD: 5.9 % (ref 4–5.6)

## 2024-05-02 PROCEDURE — 80053 COMPREHEN METABOLIC PANEL: CPT | Performed by: NURSE PRACTITIONER

## 2024-05-02 PROCEDURE — 3008F BODY MASS INDEX DOCD: CPT | Mod: CPTII,S$GLB,, | Performed by: PEDIATRICS

## 2024-05-02 PROCEDURE — 80061 LIPID PANEL: CPT | Performed by: NURSE PRACTITIONER

## 2024-05-02 PROCEDURE — 99999 PR PBB SHADOW E&M-EST. PATIENT-LVL V: CPT | Mod: PBBFAC,,, | Performed by: PEDIATRICS

## 2024-05-02 PROCEDURE — 36415 COLL VENOUS BLD VENIPUNCTURE: CPT | Performed by: NURSE PRACTITIONER

## 2024-05-02 PROCEDURE — 1159F MED LIST DOCD IN RCRD: CPT | Mod: CPTII,S$GLB,, | Performed by: PEDIATRICS

## 2024-05-02 PROCEDURE — 1160F RVW MEDS BY RX/DR IN RCRD: CPT | Mod: CPTII,S$GLB,, | Performed by: PEDIATRICS

## 2024-05-02 PROCEDURE — 3074F SYST BP LT 130 MM HG: CPT | Mod: CPTII,S$GLB,, | Performed by: PEDIATRICS

## 2024-05-02 PROCEDURE — 83036 HEMOGLOBIN GLYCOSYLATED A1C: CPT | Performed by: NURSE PRACTITIONER

## 2024-05-02 PROCEDURE — 99396 PREV VISIT EST AGE 40-64: CPT | Mod: S$GLB,,, | Performed by: PEDIATRICS

## 2024-05-02 PROCEDURE — 3079F DIAST BP 80-89 MM HG: CPT | Mod: CPTII,S$GLB,, | Performed by: PEDIATRICS

## 2024-05-02 NOTE — PROGRESS NOTES
Patient ID: Janna Sheffield is a 63 y.o. female.    Chief Complaint: Follow-up    History of Present Illness    CHIEF COMPLAINT:  Patient presents today for a follow-up.    ARTHRITIS AND FIBROMYALGIA:  Patient reports general improvement in her arthritis and fibromyalgia.    NEUROLOGICAL AND MUSCULOSKELETAL:  She previously experienced significant pain on her right side, but this has improved and stopped after treatments from a neurologist and a sports doctor. She is scheduled for further treatment in April.    PULMONARY SARCOIDOSIS:  Patient reports no significant change in her pulmonary sarcoidosis. She perceives her breathing to be the same.    CARDIOVASCULAR:  She does not monitor her blood pressure at home and denies experiencing symptoms such as blurry vision, dizziness, or headaches. She adheres to her prescribed medication regimen which includes hydrochlorothiazide for blood pressure control and rosuvastatin for cholesterol management.    VISION:  Patient reports no issues and is expecting new lenses soon.    VITAMIN D DEFICIENCY:  She is taking Vitamin D supplements (50,000 units once a week) for her Vitamin D deficiency.    GASTROINTESTINAL:  Patient denies having significant issues with acid reflux and is not currently on any medications for this.    NEUROPATHY:  She is taking gabapentin as prescribed.    LABS:  Recent labs was done with results pending. In previous diabetes tests, her A1C level was 5.8, in pre-diabetic range. She denies symptoms of diabetes such as blurry vision, dizziness, or headaches.    VACCINATIONS:  She is due for routine mammogram and bone density scan and has not yet received her RSV vaccination.    EARS/NOSE/THROAT (ENT):  She reports minor ear discomfort, attributed to seasonal allergies.    PMH, PSH, SH, FH reviewed with patient.    ROS:  General: -fever, -chills, -fatigue, -weight gain, -weight loss  Eyes: -vision changes, -redness, -discharge  ENT: -ear pain, -nasal  congestion, -sore throat  Cardiovascular: -chest pain, -palpitations, -lower extremity edema  Respiratory: -cough, -shortness of breath  Gastrointestinal: -abdominal pain, -nausea, -vomiting, -diarrhea, -constipation, -blood in stool, -heartburn  Genitourinary: -dysuria, -hematuria, -frequency  Musculoskeletal: +joint pain, -muscle pain  Skin: -rash, -lesion  Neurological: -headache, -dizziness, -numbness, -tingling  Psychiatric: -anxiety, -depression, -sleep difficulty  Allergic: +seasonal allergies         Exam:  Physical Exam    General: No acute distress. Well-developed. Well-nourished.  Eyes: EOMI. Sclerae anicteric.  HENT: Normocephalic. Atraumatic. Nares patent. Moist oral mucosa. Posterior pharyngeal drip. Nasal congestion.  Ears: Bilateral TMs clear. Bilateral EACs clear.  Cardiovascular: Regular rate. Regular rhythm. No murmurs. No rubs. No gallops. Normal S1, S2.  Respiratory: Normal respiratory effort. Clear to auscultation bilaterally. No rales. No rhonchi. No wheezing.  Abdomen: Soft. Non-tender. Non-distended. Normoactive bowel sounds.  Musculoskeletal: No  obvious deformity.  Extremities: No lower extremity edema.  Neurological: Alert & oriented x3. No slurred speech. Normal gait.  Psychiatric: Normal mood. Normal affect. Good insight. Good judgment.  Skin: Warm. Dry. No rash.         Assessment/Plan:  Well adult exam    Pulmonary sarcoidosis  -     Ambulatory referral/consult to Pulmonology; Future; Expected date: 05/09/2024    Hyperlipidemia, unspecified hyperlipidemia type    Degenerative cervical spinal stenosis    Generalized osteoarthritis of multiple sites    Vitamin D deficiency    Fibromyalgia    Class 2 severe obesity due to excess calories with serious comorbidity in adult, unspecified BMI    Primary hypertension    Gastroesophageal reflux disease without esophagitis    Pre-diabetes    Encounter for screening mammogram for malignant neoplasm of breast  -     Mammo Digital Screening Bilat  w/ Vinh; Future; Expected date: 05/02/2024    Encounter for osteoporosis screening in asymptomatic postmenopausal patient  -     DXA Bone Density Axial Skeleton 1 or more sites; Future; Expected date: 05/02/2024         Assessment & Plan    M06.9 Rheumatoid arthritis, unspecified  M79.7 Fibromyalgia  E55.9 Vitamin D deficiency, unspecified  K21.9 Gastro-esophageal reflux disease without esophagitis  E78.2 Mixed hyperlipidemia  I10 Essential (primary) hypertension  PULMONARY SARCOIDOSIS:  - Ordered a pulmonary function test to monitor the patient's pulmonary sarcoidosis.  ROUTINE CHECK-UP:  - Scheduled a mammogram and bone density scan due to their routine nature.  METABOLIC MONITORING:  - Requested labs to assess the patient's cholesterol, kidney function, liver, blood sugar, and A1C levels.  - Reviewed the patient's lab results to determine necessary adjustments to the treatment plan.  - Encouraged the patient to manage cholesterol levels and weight through diet and exercise.  PREDIABETES:  - Explained the slightly elevated diabetes test result and emphasized the importance of monitoring it to prevent full-blown diabetes.  ACID REFLUX:  - Advised the patient to take OTC medications such as Pepcid Complete, Prilosec, or Nexium if acid reflux symptoms persist.  ALLERGY:  - Suggested the use of Claritin to alleviate allergy symptoms affecting the eustachian tube.  VACCINATION:  - Referred the patient to a pharmacist for the RSV vaccination and educated them about its importance for adults over 60.  FOLLOW-UP:  - Recommend a follow-up visit in 6 months.          Visit today included increased complexity associated with the care of the episodic problem  addressed and managing the longitudinal care of the patient due to the serious and/or complex managed problem(s) .      Follow up in about 6 months (around 11/2/2024).    This note was generated with the assistance of ambient listening technology. Verbal consent was  obtained by the patient and accompanying visitor(s) for the recording of patient appointment to facilitate this note. I attest to having reviewed and edited the generated note for accuracy, though some syntax or spelling errors may persist. Please contact the author of this note for any clarification.

## 2024-05-03 LAB
ALBUMIN SERPL BCP-MCNC: 3.6 G/DL (ref 3.5–5.2)
ALP SERPL-CCNC: 80 U/L (ref 55–135)
ALT SERPL W/O P-5'-P-CCNC: 15 U/L (ref 10–44)
ANION GAP SERPL CALC-SCNC: 8 MMOL/L (ref 8–16)
AST SERPL-CCNC: 16 U/L (ref 10–40)
BILIRUB SERPL-MCNC: 0.5 MG/DL (ref 0.1–1)
BUN SERPL-MCNC: 11 MG/DL (ref 8–23)
CALCIUM SERPL-MCNC: 9.9 MG/DL (ref 8.7–10.5)
CHLORIDE SERPL-SCNC: 100 MMOL/L (ref 95–110)
CHOLEST SERPL-MCNC: 181 MG/DL (ref 120–199)
CHOLEST/HDLC SERPL: 3.9 {RATIO} (ref 2–5)
CO2 SERPL-SCNC: 31 MMOL/L (ref 23–29)
CREAT SERPL-MCNC: 0.8 MG/DL (ref 0.5–1.4)
EST. GFR  (NO RACE VARIABLE): >60 ML/MIN/1.73 M^2
GLUCOSE SERPL-MCNC: 110 MG/DL (ref 70–110)
HDLC SERPL-MCNC: 47 MG/DL (ref 40–75)
HDLC SERPL: 26 % (ref 20–50)
LDLC SERPL CALC-MCNC: 117.2 MG/DL (ref 63–159)
NONHDLC SERPL-MCNC: 134 MG/DL
POTASSIUM SERPL-SCNC: 3.4 MMOL/L (ref 3.5–5.1)
PROT SERPL-MCNC: 7.4 G/DL (ref 6–8.4)
SODIUM SERPL-SCNC: 139 MMOL/L (ref 136–145)
TRIGL SERPL-MCNC: 84 MG/DL (ref 30–150)

## 2024-05-08 ENCOUNTER — PATIENT MESSAGE (OUTPATIENT)
Dept: PULMONOLOGY | Facility: CLINIC | Age: 64
End: 2024-05-08
Payer: COMMERCIAL

## 2024-05-23 DIAGNOSIS — I10 ESSENTIAL HYPERTENSION: ICD-10-CM

## 2024-05-23 RX ORDER — HYDROCHLOROTHIAZIDE 25 MG/1
TABLET ORAL
Qty: 90 TABLET | Refills: 3 | Status: SHIPPED | OUTPATIENT
Start: 2024-05-23

## 2024-05-23 NOTE — TELEPHONE ENCOUNTER
No care due was identified.  Health [facility] Embedded Care Due Messages. Reference number: [id number].   5/23/2024 6:02:34 AM CDT

## 2024-05-23 NOTE — TELEPHONE ENCOUNTER
Refill Routing Note   Medication(s) are not appropriate for processing by [facility] for the following reason(s):        Required labs abnormal    [facility] action(s):  Defer               Appointments  past 12m or future 3m with PCP    Date Provider   Last Visit   5/2/2024 Ajit Botello MD   Next Visit   Visit date not found Ajit Botello MD   ED visits in past 90 days: 0        Note composed:11:10 AM 05/23/2024

## 2024-06-19 RX ORDER — ROSUVASTATIN CALCIUM 40 MG/1
TABLET, COATED ORAL
Qty: 90 TABLET | Refills: 3 | Status: SHIPPED | OUTPATIENT
Start: 2024-06-19

## 2024-06-19 NOTE — TELEPHONE ENCOUNTER
No care due was identified.  [facility] Embedded Care Due Messages. Reference number: [id number].   6/19/2024 9:47:56 AM CDT

## 2024-06-19 NOTE — TELEPHONE ENCOUNTER
Refill Decision Note   Janna Sheffield  is requesting a refill authorization.  Brief Assessment and Rationale for Refill:  Approve     Medication Therapy Plan:         Comments:     Note composed:11:14 AM 06/19/2024             20-Mar-2018

## 2024-07-03 ENCOUNTER — OFFICE VISIT (OUTPATIENT)
Dept: SPORTS MEDICINE | Facility: CLINIC | Age: 64
End: 2024-07-03
Payer: COMMERCIAL

## 2024-07-03 DIAGNOSIS — M67.911 TENDINOPATHY OF RIGHT ROTATOR CUFF: ICD-10-CM

## 2024-07-03 DIAGNOSIS — G89.29 CHRONIC RIGHT SHOULDER PAIN: ICD-10-CM

## 2024-07-03 DIAGNOSIS — M19.011 OSTEOARTHRITIS OF RIGHT ACROMIOCLAVICULAR JOINT: ICD-10-CM

## 2024-07-03 DIAGNOSIS — M75.111 INCOMPLETE TEAR OF RIGHT ROTATOR CUFF, UNSPECIFIED WHETHER TRAUMATIC: ICD-10-CM

## 2024-07-03 DIAGNOSIS — M25.511 CHRONIC RIGHT SHOULDER PAIN: ICD-10-CM

## 2024-07-03 DIAGNOSIS — M17.11 PRIMARY OSTEOARTHRITIS OF RIGHT KNEE: Primary | ICD-10-CM

## 2024-07-03 PROCEDURE — 99999 PR PBB SHADOW E&M-EST. PATIENT-LVL III: CPT | Mod: PBBFAC,,, | Performed by: STUDENT IN AN ORGANIZED HEALTH CARE EDUCATION/TRAINING PROGRAM

## 2024-07-03 NOTE — PROGRESS NOTES
Patient ID: Janna Sheffield  YOB: 1960  MRN: [id number]    Chief Complaint: Pain of the Right Shoulder and Pain of the Right Knee      History of Present Illness: Janna Sheffield is a right-hand dominant 63 y.o. female who presents today with right shoulder and right knee pain. LOV 04/03/24 w/ CSI. She report 8/10 pain on the shoulder and 7/10 pain on the knee. She report achy intermittent pain for the shoulder and sharp shooting pain on the knee mostly during the day. She report shoulder pain at night sometimes. She report relief lasted until 1 week ago.       Occupation: Administration      Past Medical History:   Past Medical History:   Diagnosis Date    Arthritis     Chest pain syndrome 10/23/2014    Hyperlipidemia     Hypertension     Macular degeneration, age related, nonexudative - Left Eye 3/24/2014    Retinal detachment, old, partial 4/14/2014    Sarcoidosis      Past Surgical History:   Procedure Laterality Date    CATARACT EXTRACTION      COLONOSCOPY N/A 03/14/2022    Procedure: COLONOSCOPY;  Surgeon: Christi Medrano MD;  Location: [facility];  Service: Endoscopy;  Laterality: N/A;    DILATION AND CURETTAGE OF UTERUS      missed ab x 3    EPIDURAL STEROID INJECTION INTO CERVICAL SPINE N/A 12/21/2021    Procedure: C6/C7 IL HIMANSHU RN IV sedation;  Surgeon: Hadley Marsh MD;  Location: [facility];  Service: Pain Management;  Laterality: N/A;    ESOPHAGOGASTRODUODENOSCOPY N/A 02/06/2019    Procedure: ESOPHAGOGASTRODUODENOSCOPY (EGD);  Surgeon: Jairo Vargas III, MD;  Location: [facility];  Service: Endoscopy;  Laterality: N/A;    HYSTERECTOMY  2001    MACARIO - complete    JOINT REPLACEMENT Left     Knee    knee surgery      TRANSFORAMINAL EPIDURAL INJECTION OF STEROID Right 04/18/2022    Procedure: INJECTION, STEROID, EPIDURAL, TRANSFORAMINAL APPROACH, RIGHT C6-C7;  Surgeon: Viraj Kramer MD;  Location: [facility]IN [facility];  Service: Pain Management;  Laterality: Right;    TRANSFORAMINAL  EPIDURAL INJECTION OF STEROID Right 9/11/2023    Procedure: right C6/7 TF HIMANSHU w/ RN IV sedation;  Surgeon: Derian Watts MD;  Location: [facility]IN [facility];  Service: Pain Management;  Laterality: Right;    TUBAL LIGATION       Family History   Problem Relation Name Age of Onset    Cervical cancer Mother      Heart disease Mother      Heart attack Mother      Hypertension Mother      Hyperlipidemia Mother      Hypertension Father      Hyperlipidemia Father      Stroke Father      Breast cancer Maternal Aunt      Stroke Brother      Breast cancer Maternal Cousin      Breast cancer Maternal Cousin      Breast cancer Paternal Cousin      Colon cancer Neg Hx      Thrombophilia Neg Hx      Deep vein thrombosis Neg Hx      Pulmonary embolism Neg Hx       Social History     Socioeconomic History    Marital status:    Occupational History     Employer: [employer]    Tobacco Use    Smoking status: Never    Smokeless tobacco: Never   Substance and Sexual Activity    Alcohol use: No    Drug use: No    Sexual activity: Yes     Partners: Male     Comment:      Medication List with Changes/Refills   Current Medications    DICLOFENAC SODIUM (VOLTAREN) 1 % GEL    Apply 2 g topically 3 (three) times daily as needed (Knee pain).    ERGOCALCIFEROL (ERGOCALCIFEROL) 50,000 UNIT CAP    Take 1 capsule (50,000 Units total) by mouth every 7 days.    GABAPENTIN (NEURONTIN) 600 MG TABLET    Take 1 tablet (600 mg total) by mouth 3 (three) times daily.    HYDROCHLOROTHIAZIDE (HYDRODIURIL) 25 MG TABLET    Take 1 tablet by mouth once daily    LIDOCAINE-PRILOCAINE (EMLA) CREAM    Apply topically up to 4x per day to affected area for pain relief    MULTIVITAMIN (THERAGRAN) PER TABLET    Take 1 tablet by mouth Daily.    ROSUVASTATIN (CRESTOR) 40 MG TAB    Take 1 tablet by mouth once daily     Review of patient's allergies indicates:  No Known Allergies    Physical Exam:   There is no height or weight on file to calculate  BMI.    GENERAL: Well appearing, in no acute distress.  HEAD: Normocephalic and atraumatic.  ENT: External ears and nose grossly normal.  EYES: EOMI bilaterally  PULMONARY: Respirations are grossly even and non-labored.  NEURO: Awake, alert, and oriented x 3.  SKIN: No obvious rashes appreciated.  PSYCH: Mood & affect are appropriate.    Detailed MSK exam:     Right knee exam:   -ROM: extension 0, flexion 130  -TTP: Medial joint line  -effusion: none  -Patellar apprehension negative  -Jessie test negative  -stable to varus and valgus stress tests  -Lachman test negative, anterior drawer test negative, posterior drawer test negative    Right shoulder exam:   -ROM: abduction 130, forward flexion 130, external rotation 80, internal rotation 70  -empty can test negative, resisted ER negative, belly press negative  -preston test negative, neers test negative, whipple test negative  -biceps load test negative, yerguson test negative, Catahoula's test negative  -sensation intact, pulses 2+  -TTP: none      Imaging:  X-ray Shoulder 2 or More Views Right  Narrative: EXAM:  XR SHOULDER COMPLETE 2 OR MORE VIEWS RIGHT    CLINICAL HISTORY:  Right shoulder pain    FINDINGS: No fracture or dislocation of the right shoulder. Mild arthritic change of the acromioclavicular joint.  Impression:  See above    Finalized on: 4/3/2024 9:08 PM By:  Yrn Arevalo MD  [facility]# [id number]      2024-04-03 21:10:56.312    [facility]  Sports Medicine US - Guidance for Needle Placement  Aleksandar Ovalles MD     4/3/2024  3:59 PM  Sports Medicine US - Guidance for Needle Placement    Date/Time: 4/3/2024 2:40 PM    Performed by: Aleksandar Ovalles MD  Authorized by: Aleksandar Ovalles MD  Preparation: Patient was prepped and   draped in the usual sterile fashion.  Local anesthesia used: no    Anesthesia:  Local anesthesia used: no    Sedation:  Patient sedated: no    Patient tolerance: patient tolerated the procedure well with no immediate    complications  Comments: Ultrasound guidance was used for needle localization. Images   were saved and stored for documentation. The appropriate structures were   visualized. Dynamic visualization of the needle was continuous throughout   the procedures and maintained good position.         Relevant imaging results were reviewed and interpreted by me and per my read shows moderate AC arthritic changes.  This was discussed with the patient and / or family today.     Assessment:  Janna Sheffield is a 63 y.o. female following up for right shoulder and right knee pain. Both doing well today.   Plan: consider repeat steroid injection if pain returns. Continue conservative management for pain.   Follow up as needed. All questions answered.     Primary osteoarthritis of right knee    Chronic right shoulder pain    Tendinopathy of right rotator cuff    Incomplete tear of right rotator cuff, unspecified whether traumatic    Osteoarthritis of right acromioclavicular joint         Today's visit is associated with current or anticipated ongoing medical care related to this patient's diagnosis of osteoarthritis.  Currently there is no cure of osteoarthritis and the patient will require regular follow up to manage symptoms and progression.  The patient is to return to the office within a minimum of 3-6 months to review symptoms and function at that time.   CPT code      MEDICAL NECESSITY FOR VISCOSUPPLEMENTATION: After thorough evaluation of the patient, I have determined that visco-supplementation is medically necessary. The patient has painful degenerative changes of the knee with failure of conservative treatments including lifestyle modifications and rehabilitation exercises.  Oral analgesis/NSAIDs have not adequately controlled symptoms and there is radiographic evidence of Kellgren Dhiraj grade 2 or greater osteoarthritic changes, or in lack of radiographic evidence, there is arthroscopic or other evidence of  chondrosis.       Electronically signed:  Aleksandar Ovalles MD, MPH  07/03/2024  2:18 PM

## 2024-07-03 NOTE — PATIENT INSTRUCTIONS
Assessment:  Janna Sheffield is a 63 y.o. female   Chief Complaint   Patient presents with    Right Shoulder - Pain    Right Knee - Pain       Encounter Diagnoses   Name Primary?    Chronic right shoulder pain     Tendinopathy of right rotator cuff     Incomplete tear of right rotator cuff, unspecified whether traumatic     Osteoarthritis of right acromioclavicular joint     Primary osteoarthritis of right knee Yes        Plan:  Apply topical diclofenac (Voltaren) up to 4 times a day to the affected area.  It can be bought over the counter at any local pharmacy.    Patient can use ice as needed    Follow-up: as needed.    Thank you for choosing [facility] and Dr. Aleksandar Ovalles for your orthopedic & sports medicine care. It is our goal to provide you with exceptional care that will help keep you healthy, active, and get you back in the game.    Please do not hesitate to reach out to us via email, phone, or MyChart with any questions, concerns, or feedback.    If you felt that you received exemplary care today, please consider leaving us feedback on [email or web address] at:  [email or web address]    If you are experiencing pain/discomfort ,or have questions after 5pm and would like to be connected to the [facility] on-call team, please call this number and specify which Sports Medicine provider is treating you: [phone]

## 2024-07-25 ENCOUNTER — OFFICE VISIT (OUTPATIENT)
Dept: DERMATOLOGY | Facility: CLINIC | Age: 64
End: 2024-07-25
Payer: COMMERCIAL

## 2024-07-25 ENCOUNTER — TELEPHONE (OUTPATIENT)
Dept: OPHTHALMOLOGY | Facility: CLINIC | Age: 64
End: 2024-07-25
Payer: COMMERCIAL

## 2024-07-25 VITALS — HEIGHT: 67 IN | WEIGHT: 200.63 LBS | BODY MASS INDEX: 31.49 KG/M2

## 2024-07-25 DIAGNOSIS — L30.9 DERMATITIS: Primary | ICD-10-CM

## 2024-07-25 PROCEDURE — 99999 PR PBB SHADOW E&M-EST. PATIENT-LVL III: CPT | Mod: PBBFAC,,, | Performed by: STUDENT IN AN ORGANIZED HEALTH CARE EDUCATION/TRAINING PROGRAM

## 2024-07-25 RX ORDER — MOMETASONE FUROATE 1 MG/G
OINTMENT TOPICAL DAILY
Qty: 45 G | Refills: 1 | Status: SHIPPED | OUTPATIENT
Start: 2024-07-25

## 2024-07-25 NOTE — PROGRESS NOTES
Subjective:       Patient ID:  Janna Sheffield is a 63 y.o. female who presents for   Chief Complaint   Patient presents with    Rash    Itching    Spot     History of Present Illness: The patient presents with chief complaint of persistent rash.  Location: back of both hands and on the inside of the left arm  Duration: ongoing for several weeks   Signs/Symptoms: reports having persistent reddish, itchy and irritated bumps on the skin  Prior treatments: has tried OTC cortisone with minimal improvement in symptoms.   Patient with a history of sarcoidosis (previously on Imuran, currently in remission).         Review of Systems   Constitutional:  Negative for fever and chills.   Skin:  Positive for itching and rash.        Objective:    Physical Exam   Constitutional: She appears well-developed and well-nourished. No distress.   Neurological: She is alert and oriented to person, place, and time. She is not disoriented.   Psychiatric: She has a normal mood and affect.   Skin:   Areas Examined (abnormalities noted in diagram):   Head / Face Inspection Performed  Neck Inspection Performed  RUE Inspected  LUE Inspection Performed  Nails and Digits Inspection Performed                  Diagram Legend     Erythematous scaling macule/papule c/w actinic keratosis       Vascular papule c/w angioma      Pigmented verrucoid papule/plaque c/w seborrheic keratosis      Yellow umbilicated papule c/w sebaceous hyperplasia      Irregularly shaped tan macule c/w lentigo     1-2 mm smooth white papules consistent with Milia      Movable subcutaneous cyst with punctum c/w epidermal inclusion cyst      Subcutaneous movable cyst c/w pilar cyst      Firm pink to brown papule c/w dermatofibroma      Pedunculated fleshy papule(s) c/w skin tag(s)      Evenly pigmented macule c/w junctional nevus     Mildly variegated pigmented, slightly irregular-bordered macule c/w mildly atypical nevus      Flesh colored to evenly pigmented papule c/w  intradermal nevus       Pink pearly papule/plaque c/w basal cell carcinoma      Erythematous hyperkeratotic cursted plaque c/w SCC      Surgical scar with no sign of skin cancer recurrence      Open and closed comedones      Inflammatory papules and pustules      Verrucoid papule consistent consistent with wart     Erythematous eczematous patches and plaques     Dystrophic onycholytic nail with subungual debris c/w onychomycosis     Umbilicated papule    Erythematous-base heme-crusted tan verrucoid plaque consistent with inflamed seborrheic keratosis     Erythematous Silvery Scaling Plaque c/w Psoriasis     See annotation      Assessment / Plan:        Dermatitis - dorsal hands and left upper arm. Will try topical steroids.   -     mometasone (ELOCON) 0.1 % ointment; Apply topically once daily.  Dispense: 45 g; Refill: 1  -     Counseled patient on gentle skin care regimen, including need for sensitive soaps/detergents, as well as need for frequent use of sensitize moisturizers.   -     If no improvement at follow-up visit, consider biopsy.              Follow up in about 8 weeks (around 9/19/2024).

## 2024-07-25 NOTE — TELEPHONE ENCOUNTER
----- Message from Shilpa Sherman sent at 7/25/2024  7:48 AM CDT -----  Contact: pt  Type:  Same Day Appointment Request    Caller is requesting a same day appointment.  Caller declined first available appointment listed below.    Name of Caller: pt  When is the first available appointment?   Symptoms: eyes/vision blurry  Best Call Back Number: [phone]  Additional Information:   please call pt to [facility] time

## 2024-07-29 ENCOUNTER — OFFICE VISIT (OUTPATIENT)
Dept: OPHTHALMOLOGY | Facility: CLINIC | Age: 64
End: 2024-07-29
Payer: COMMERCIAL

## 2024-07-29 DIAGNOSIS — H35.342 LAMELLAR MACULAR HOLE OF LEFT EYE: Primary | ICD-10-CM

## 2024-07-29 DIAGNOSIS — H35.413 BILATERAL RETINAL LATTICE DEGENERATION: ICD-10-CM

## 2024-07-29 DIAGNOSIS — D86.9 SARCOIDOSIS: ICD-10-CM

## 2024-07-29 DIAGNOSIS — Z98.42 CATARACT EXTRACTION STATUS, LEFT: ICD-10-CM

## 2024-07-29 DIAGNOSIS — H35.3122 INTERMEDIATE STAGE NONEXUDATIVE AGE-RELATED MACULAR DEGENERATION OF LEFT EYE: ICD-10-CM

## 2024-07-29 DIAGNOSIS — Z98.41 CATARACT EXTRACTION STATUS, RIGHT: ICD-10-CM

## 2024-07-29 PROCEDURE — 3044F HG A1C LEVEL LT 7.0%: CPT | Mod: CPTII,S$GLB,, | Performed by: OPHTHALMOLOGY

## 2024-07-29 PROCEDURE — 92134 CPTRZ OPH DX IMG PST SGM RTA: CPT | Mod: S$GLB,,, | Performed by: OPHTHALMOLOGY

## 2024-07-29 PROCEDURE — 1159F MED LIST DOCD IN RCRD: CPT | Mod: CPTII,S$GLB,, | Performed by: OPHTHALMOLOGY

## 2024-07-29 PROCEDURE — 1160F RVW MEDS BY RX/DR IN RCRD: CPT | Mod: CPTII,S$GLB,, | Performed by: OPHTHALMOLOGY

## 2024-07-29 PROCEDURE — 99214 OFFICE O/P EST MOD 30 MIN: CPT | Mod: S$GLB,,, | Performed by: OPHTHALMOLOGY

## 2024-07-29 PROCEDURE — 99999 PR PBB SHADOW E&M-EST. PATIENT-LVL III: CPT | Mod: PBBFAC,,, | Performed by: OPHTHALMOLOGY

## 2024-07-29 NOTE — PROGRESS NOTES
===============================  Date today is 7/29/2024  Janna Sheffield is a 63 y.o. female  Last visit [facility]: :2/13/2024   Last visit eye dept. 2/13/2024    Corrected distance visual acuity was 20/50 in the right eye and 20/80 in the left eye.  Tonometry       Tonometry (Applanation, 8:11 AM)         Right Left    Pressure 18 18                  Wearing Rx       Wearing Rx         Sphere Cylinder Axis Add    Right -11.25 +2.00 075 +2.50    Left -11.00 +1.50 090 +2.50      Type: PAL                  Not recorded       Not recorded       Chief Complaint   Patient presents with    Lamellar macular hole of left eye     Blurry vision in both eyes     HPI     Lamellar macular hole of left eye            Comments: Blurry vision in both eyes          Comments    [facility] Patient       1. H/O RETINA DETACHMENT OS   2. LATTICE / COBBLESTONE OU   3. MAC HOLE OS   HIGH MYOPE -9   4. PERIPHERAL PIGMENTATION   5. H/O SARCOID UVEITIS   6. GUTTATA OU   7. PCIOL OS   PCIOL OD W/ Kenalog 3/24/2022 (set for NVA) / CDE: 11.67/ SN60WF 22.5                  Last edited by Izabel Gama on 7/29/2024  7:59 AM.      Problem List Items Addressed This Visit    None  Visit Diagnoses       Lamellar macular hole of left eye    -  Primary    Relevant Orders    Posterior Segment OCT Retina-Both eyes (Completed)    Intermediate stage nonexudative age-related macular degeneration of left eye        Relevant Orders    Posterior Segment OCT Retina-Both eyes (Completed)    Sarcoidosis        Cataract extraction status, right        Cataract extraction status, left        Bilateral retinal lattice degeneration              Instructed to call 24/7 for any worsening of vision, visual distortion or pain.  Check OU independently daily.    Gave my office and personal cell phone number.  ________________  7/29/2024 today  Janna Sheffield    OS LMH  Dry SMD  OCT unchanged  OD few SPK  PCIOL OU Post YAG OS, PCM OD- will need YAG  AC no cell   No  sign of sarcoidosis        RTC with Dr. Crowe next available for YAG OD, 1 year with me after YAG OD  Instructed to call 24/7 for any worsening of vision or symptoms. Check OU daily.   Gave my office and cell phone number.    =============================

## 2024-08-27 ENCOUNTER — HOSPITAL ENCOUNTER (OUTPATIENT)
Dept: RADIOLOGY | Facility: HOSPITAL | Age: 64
Discharge: HOME | End: 2024-08-27
Attending: PEDIATRICS
Payer: COMMERCIAL

## 2024-08-27 ENCOUNTER — OFFICE VISIT (OUTPATIENT)
Dept: OBSTETRICS AND GYNECOLOGY | Facility: CLINIC | Age: 64
End: 2024-08-27
Payer: COMMERCIAL

## 2024-08-27 VITALS
DIASTOLIC BLOOD PRESSURE: 80 MMHG | SYSTOLIC BLOOD PRESSURE: 120 MMHG | BODY MASS INDEX: 31.38 KG/M2 | HEIGHT: 67 IN | WEIGHT: 199.94 LBS

## 2024-08-27 DIAGNOSIS — N81.10 BADEN-WALKER GRADE 1 CYSTOCELE: ICD-10-CM

## 2024-08-27 DIAGNOSIS — Z01.419 ROUTINE GYNECOLOGICAL EXAMINATION: Primary | ICD-10-CM

## 2024-08-27 DIAGNOSIS — D86.9 SARCOIDOSIS: Primary | ICD-10-CM

## 2024-08-27 DIAGNOSIS — Z78.0 MENOPAUSE: ICD-10-CM

## 2024-08-27 DIAGNOSIS — Z12.31 ENCOUNTER FOR SCREENING MAMMOGRAM FOR MALIGNANT NEOPLASM OF BREAST: ICD-10-CM

## 2024-08-27 PROCEDURE — 3079F DIAST BP 80-89 MM HG: CPT | Mod: CPTII,S$GLB,, | Performed by: OBSTETRICS & GYNECOLOGY

## 2024-08-27 PROCEDURE — 3008F BODY MASS INDEX DOCD: CPT | Mod: CPTII,S$GLB,, | Performed by: OBSTETRICS & GYNECOLOGY

## 2024-08-27 PROCEDURE — 1159F MED LIST DOCD IN RCRD: CPT | Mod: CPTII,S$GLB,, | Performed by: OBSTETRICS & GYNECOLOGY

## 2024-08-27 PROCEDURE — 99396 PREV VISIT EST AGE 40-64: CPT | Mod: S$GLB,,, | Performed by: OBSTETRICS & GYNECOLOGY

## 2024-08-27 PROCEDURE — 3074F SYST BP LT 130 MM HG: CPT | Mod: CPTII,S$GLB,, | Performed by: OBSTETRICS & GYNECOLOGY

## 2024-08-27 PROCEDURE — 77067 SCR MAMMO BI INCL CAD: CPT | Mod: TC

## 2024-08-27 PROCEDURE — 3044F HG A1C LEVEL LT 7.0%: CPT | Mod: CPTII,S$GLB,, | Performed by: OBSTETRICS & GYNECOLOGY

## 2024-08-27 PROCEDURE — 77063 BREAST TOMOSYNTHESIS BI: CPT | Mod: 26,,, | Performed by: RADIOLOGY

## 2024-08-27 PROCEDURE — 99999 PR PBB SHADOW E&M-EST. PATIENT-LVL III: CPT | Mod: PBBFAC,,, | Performed by: OBSTETRICS & GYNECOLOGY

## 2024-08-27 PROCEDURE — 77067 SCR MAMMO BI INCL CAD: CPT | Mod: 26,,, | Performed by: RADIOLOGY

## 2024-08-27 NOTE — PROGRESS NOTES
"  Subjective:       Patient ID: Janna Sheffield is a 63 y.o. female.    Chief Complaint:  Annual Exam      History of Present Illness  HPI  Patient presents to day for annual exam , postmenopausal.   Hysterectomy in distant past, ovaries are intact   Known cystocele, some vaginal pressure no loss of urine and no GI symptoms   No gyn complaints, no vaginal bleeding or pelvic pain noted.   Hormonal therapy reviewed and discussed. On none   Preventive screening exam indication and testing reviewed and discussed.  Pap not indicated   DEXA due age 65.   Mammogram scheduled     Health Maintenance   Topic Date Due    Mammogram  2024    DEXA Scan  2024    TETANUS VACCINE  2025    Lipid Panel  2025    Colorectal Cancer Screening  2027    Hepatitis C Screening  Completed    Shingles Vaccine  Completed     GYN & OB History  No LMP recorded. Patient has had a hysterectomy.   Date of Last Pap: No result found    OB History    Para Term  AB Living   6 3 2 1 3 2   SAB IAB Ectopic Multiple Live Births   3       2      # Outcome Date GA Lbr Hunter/2nd Weight Sex Type Anes PTL Lv   6  92    F Vag-Spont   CANDICE   5 Term 82    F Vag-Spont EPI  CANDICE   4 Term            3 SAB            2 SAB            1 SAB                Review of Systems  Review of Systems        Objective:   /80   Ht 5' 7" (1.702 m)   Wt 90.7 kg (199 lb 15.3 oz)   BMI 31.32 kg/m²    Physical Exam:   Constitutional: She appears well-developed and well-nourished. No distress.      Neck: No JVD present. No thyroid mass and no thyromegaly present.    Cardiovascular:  Normal rate and regular rhythm.                  Abdominal: Soft. Bowel sounds are normal. No hernia. Hernia confirmed negative in the ventral area, confirmed negative in the right inguinal area and confirmed negative in the left inguinal area.     Genitourinary:    Vagina and rectum normal.   The external female genitalia was normal.   No " external genitalia lesions identified,  Labial bartholins normal.There is no rash, tenderness, lesion or injury on the right labia. There is no rash, tenderness, lesion or injury on the left labia. Right adnexum displays no mass, no tenderness and no fullness. Left adnexum displays no mass, no tenderness and no fullness. There is cystocele (grade 2) in the vagina. No erythema, vaginal discharge, tenderness or bleeding in the vagina.    No foreign body in the vagina.   Cervix is absent.Uterus is absent. Urethral Meatus exhibits: urethral lesionUrethra findings: no tenderness and prolapsedBladder findings: no bladder tenderness                     Assessment:        1. Routine gynecological examination    2. Menopause    3. [place] grade 1 cystocele                Plan:            Janna was seen today for annual exam.    Diagnoses and all orders for this visit:    Routine gynecological examination    Menopause    [place] grade 1 cystocele    Follow up 2 years or prn

## 2024-09-10 ENCOUNTER — OFFICE VISIT (OUTPATIENT)
Dept: OPHTHALMOLOGY | Facility: CLINIC | Age: 64
End: 2024-09-10
Payer: COMMERCIAL

## 2024-09-10 DIAGNOSIS — H35.342 LAMELLAR MACULAR HOLE OF LEFT EYE: ICD-10-CM

## 2024-09-10 DIAGNOSIS — H26.491 PCO (POSTERIOR CAPSULAR OPACIFICATION), RIGHT: Primary | ICD-10-CM

## 2024-09-10 DIAGNOSIS — H35.3122 INTERMEDIATE STAGE NONEXUDATIVE AGE-RELATED MACULAR DEGENERATION OF LEFT EYE: ICD-10-CM

## 2024-09-10 PROCEDURE — 1160F RVW MEDS BY RX/DR IN RCRD: CPT | Mod: CPTII,S$GLB,, | Performed by: OPHTHALMOLOGY

## 2024-09-10 PROCEDURE — 66821 AFTER CATARACT LASER SURGERY: CPT | Mod: RT,S$GLB,, | Performed by: OPHTHALMOLOGY

## 2024-09-10 PROCEDURE — 3044F HG A1C LEVEL LT 7.0%: CPT | Mod: CPTII,S$GLB,, | Performed by: OPHTHALMOLOGY

## 2024-09-10 PROCEDURE — 1159F MED LIST DOCD IN RCRD: CPT | Mod: CPTII,S$GLB,, | Performed by: OPHTHALMOLOGY

## 2024-09-10 PROCEDURE — 99999 PR PBB SHADOW E&M-EST. PATIENT-LVL II: CPT | Mod: PBBFAC,,, | Performed by: OPHTHALMOLOGY

## 2024-09-10 PROCEDURE — 99214 OFFICE O/P EST MOD 30 MIN: CPT | Mod: 25,S$GLB,, | Performed by: OPHTHALMOLOGY

## 2024-09-10 RX ORDER — PREDNISOLONE ACETATE 10 MG/ML
1 SUSPENSION/ DROPS OPHTHALMIC 4 TIMES DAILY
Qty: 5 ML | Refills: 0 | Status: SHIPPED | OUTPATIENT
Start: 2024-09-10

## 2024-09-10 NOTE — PROGRESS NOTES
HPI     Procedure            Comments: Pt here for yag od   Pt using Ivizia prn OU            Comments      1. H/O RETINA DETACHMENT OS   2. LATTICE / COBBLESTONE OU   3. MAC HOLE OS   HIGH MYOPE -9   4. PERIPHERAL PIGMENTATION   5. H/O SARCOID UVEITIS   6. GUTTATA OU   7. PCIOL OS   PCIOL OD W/ Kenalog 3/24/2022 (set for NVA) / CDE: 11.67/ SN60WF 22.5             Last edited by Colin Almanzar on 9/10/2024 10:56 AM.            Assessment /Plan     For exam results, see Encounter Report.      ICD-10-CM ICD-9-CM    1. PCO (posterior capsular opacification), right  H26.491 366.50 Yag Operative Procedure Note    63 y.o. year old patient experiencing a symptomatic decrease in vision OD with inability to perform activities of daily living including reading.      SLE: Posterior intraocular lens implant with capsular fibrosis     Risks, benefits and alternatives of Yag Laser Capsulotomy discussed. Including risks of retinal detachment (1-3%), macular edema, dislocated implant, pain, inflammation elevated intraocular pressure and vision loss. Consent signed.  Time out procedure form completed prior to laser.    Medications given:  Alphagan 0.15%  Tetracaine    Laser energy settings:    2.0 energy per shot  116 bursts  1 to 1 ratio per shot     Central capsular opening created without difficulty        2. Lamellar macular hole of left eye  H35.342 362.54       3. Intermediate stage nonexudative age-related macular degeneration of left eye  H35.4262 362.51 Monitor with JCC          RETURN TO CLINIC 4-5 week with JCC for PO

## 2024-09-16 DIAGNOSIS — I10 HYPERTENSION, UNSPECIFIED TYPE: ICD-10-CM

## 2024-09-16 DIAGNOSIS — E55.9 VITAMIN D DEFICIENCY: Primary | ICD-10-CM

## 2024-09-17 RX ORDER — ERGOCALCIFEROL 1.25 MG/1
50000 CAPSULE ORAL
Qty: 12 CAPSULE | Refills: 0 | Status: SHIPPED | OUTPATIENT
Start: 2024-09-17

## 2024-10-08 ENCOUNTER — OFFICE VISIT (OUTPATIENT)
Dept: OPHTHALMOLOGY | Facility: CLINIC | Age: 64
End: 2024-10-08
Payer: COMMERCIAL

## 2024-10-08 DIAGNOSIS — H35.342 LAMELLAR MACULAR HOLE OF LEFT EYE: ICD-10-CM

## 2024-10-08 DIAGNOSIS — H35.3122 INTERMEDIATE STAGE NONEXUDATIVE AGE-RELATED MACULAR DEGENERATION OF LEFT EYE: ICD-10-CM

## 2024-10-08 DIAGNOSIS — Z98.890 STATUS POST YAG CAPSULOTOMY OF RIGHT EYE: ICD-10-CM

## 2024-10-08 DIAGNOSIS — Z98.890 POST-OPERATIVE STATE: Primary | ICD-10-CM

## 2024-10-08 PROCEDURE — 99999 PR PBB SHADOW E&M-EST. PATIENT-LVL III: CPT | Mod: PBBFAC,,, | Performed by: OPHTHALMOLOGY

## 2024-10-08 NOTE — PROGRESS NOTES
===============================  Date today is 10/8/2024  Janna Sheffield is a 63 y.o. female  Last visit [facility]: :7/29/2024   Last visit eye dept. 9/10/2024    Corrected distance visual acuity was 20/20 -2 in the right eye and 20/200 in the left eye.  Tonometry       Tonometry (Applanation, 11:45 AM)         Right Left    Pressure 12 11                  Not recorded       Not recorded       Not recorded       Chief Complaint   Patient presents with    Lamellar macular hole of left eye     Yag PO check OD     HPI     Lamellar macular hole of left eye            Comments: Yag PO check OD          Comments      1. H/O RETINA DETACHMENT OS   2. LATTICE / COBBLESTONE OU   3. MAC HOLE OS   HIGH MYOPE -9   4. PERIPHERAL PIGMENTATION   5. H/O SARCOID UVEITIS   6. GUTTATA OU   7. PCIOL OS   PCIOL OD W/ Kenalog 3/24/2022 (set for NVA) / CDE: 11.67/ SN60WF 22.5             Last edited by Izabel Gama on 10/8/2024 11:39 AM.      Problem List Items Addressed This Visit    None  Visit Diagnoses       Post-operative state    -  Primary    Status post YAG capsulotomy of right eye        Lamellar macular hole of left eye        Relevant Orders    Posterior Segment OCT Retina-Both eyes (Completed)    Intermediate stage nonexudative age-related macular degeneration of left eye        Relevant Orders    Posterior Segment OCT Retina-Both eyes (Completed)          Instructed to call 24/7 for any worsening of vision, visual distortion or pain.  Check OU independently daily.    Gave my office and personal cell phone number.  ________________  10/8/2024 today  Janna Sheffield    Post YAG OD   PCIOL OU  LMH OS  OCT stable  Ok to follow yearly    RTC 1 year  Instructed to call 24/7 for any worsening of vision or symptoms. Check OU daily.   Gave my office and cell phone number.    =============================

## 2024-11-08 ENCOUNTER — LAB VISIT (OUTPATIENT)
Dept: LAB | Facility: HOSPITAL | Age: 64
End: 2024-11-08
Attending: NURSE PRACTITIONER
Payer: COMMERCIAL

## 2024-11-08 ENCOUNTER — OFFICE VISIT (OUTPATIENT)
Dept: INTERNAL MEDICINE | Facility: CLINIC | Age: 64
End: 2024-11-08
Payer: COMMERCIAL

## 2024-11-08 VITALS
DIASTOLIC BLOOD PRESSURE: 90 MMHG | WEIGHT: 198.63 LBS | OXYGEN SATURATION: 96 % | SYSTOLIC BLOOD PRESSURE: 146 MMHG | HEIGHT: 67 IN | TEMPERATURE: 97 F | HEART RATE: 76 BPM | BODY MASS INDEX: 31.18 KG/M2

## 2024-11-08 DIAGNOSIS — E66.01 CLASS 2 SEVERE OBESITY DUE TO EXCESS CALORIES WITH SERIOUS COMORBIDITY IN ADULT, UNSPECIFIED BMI: ICD-10-CM

## 2024-11-08 DIAGNOSIS — E66.812 CLASS 2 SEVERE OBESITY DUE TO EXCESS CALORIES WITH SERIOUS COMORBIDITY IN ADULT, UNSPECIFIED BMI: ICD-10-CM

## 2024-11-08 DIAGNOSIS — E78.5 HYPERLIPIDEMIA, UNSPECIFIED HYPERLIPIDEMIA TYPE: ICD-10-CM

## 2024-11-08 DIAGNOSIS — M62.838 NECK MUSCLE SPASM: ICD-10-CM

## 2024-11-08 DIAGNOSIS — R73.03 PRE-DIABETES: ICD-10-CM

## 2024-11-08 DIAGNOSIS — K21.9 GASTROESOPHAGEAL REFLUX DISEASE WITHOUT ESOPHAGITIS: ICD-10-CM

## 2024-11-08 DIAGNOSIS — I10 PRIMARY HYPERTENSION: Primary | ICD-10-CM

## 2024-11-08 DIAGNOSIS — E55.9 VITAMIN D DEFICIENCY: ICD-10-CM

## 2024-11-08 DIAGNOSIS — M79.7 FIBROMYALGIA: ICD-10-CM

## 2024-11-08 DIAGNOSIS — J01.00 ACUTE MAXILLARY SINUSITIS, RECURRENCE NOT SPECIFIED: ICD-10-CM

## 2024-11-08 DIAGNOSIS — M15.9 GENERALIZED OSTEOARTHRITIS OF MULTIPLE SITES: Chronic | ICD-10-CM

## 2024-11-08 LAB
ALBUMIN SERPL BCP-MCNC: 3.8 G/DL (ref 3.5–5.2)
ALP SERPL-CCNC: 84 U/L (ref 40–150)
ALT SERPL W/O P-5'-P-CCNC: 14 U/L (ref 10–44)
ANION GAP SERPL CALC-SCNC: 14 MMOL/L (ref 8–16)
AST SERPL-CCNC: 22 U/L (ref 10–40)
BILIRUB SERPL-MCNC: 0.6 MG/DL (ref 0.1–1)
BUN SERPL-MCNC: 12 MG/DL (ref 8–23)
CALCIUM SERPL-MCNC: 9.9 MG/DL (ref 8.7–10.5)
CHLORIDE SERPL-SCNC: 97 MMOL/L (ref 95–110)
CHOLEST SERPL-MCNC: 168 MG/DL (ref 120–199)
CHOLEST/HDLC SERPL: 4.5 {RATIO} (ref 2–5)
CO2 SERPL-SCNC: 30 MMOL/L (ref 23–29)
CREAT SERPL-MCNC: 0.8 MG/DL (ref 0.5–1.4)
EST. GFR  (NO RACE VARIABLE): >60 ML/MIN/1.73 M^2
ESTIMATED AVG GLUCOSE: 123 MG/DL (ref 68–131)
GLUCOSE SERPL-MCNC: 99 MG/DL (ref 70–110)
HBA1C MFR BLD: 5.9 % (ref 4–5.6)
HDLC SERPL-MCNC: 37 MG/DL (ref 40–75)
HDLC SERPL: 22 % (ref 20–50)
LDLC SERPL CALC-MCNC: 113.4 MG/DL (ref 63–159)
NONHDLC SERPL-MCNC: 131 MG/DL
POTASSIUM SERPL-SCNC: 3.4 MMOL/L (ref 3.5–5.1)
PROT SERPL-MCNC: 7.8 G/DL (ref 6–8.4)
SODIUM SERPL-SCNC: 141 MMOL/L (ref 136–145)
TRIGL SERPL-MCNC: 88 MG/DL (ref 30–150)

## 2024-11-08 PROCEDURE — 80053 COMPREHEN METABOLIC PANEL: CPT | Performed by: NURSE PRACTITIONER

## 2024-11-08 PROCEDURE — 36415 COLL VENOUS BLD VENIPUNCTURE: CPT | Performed by: NURSE PRACTITIONER

## 2024-11-08 PROCEDURE — 80061 LIPID PANEL: CPT | Performed by: NURSE PRACTITIONER

## 2024-11-08 PROCEDURE — 83036 HEMOGLOBIN GLYCOSYLATED A1C: CPT | Performed by: NURSE PRACTITIONER

## 2024-11-08 PROCEDURE — 99999 PR PBB SHADOW E&M-EST. PATIENT-LVL IV: CPT | Mod: PBBFAC,,, | Performed by: NURSE PRACTITIONER

## 2024-11-08 RX ORDER — TRIAMCINOLONE ACETONIDE 40 MG/ML
60 INJECTION, SUSPENSION INTRA-ARTICULAR; INTRAMUSCULAR
Status: COMPLETED | OUTPATIENT
Start: 2024-11-08 | End: 2024-11-08

## 2024-11-08 RX ORDER — METHOCARBAMOL 500 MG/1
500 TABLET, FILM COATED ORAL 3 TIMES DAILY
Qty: 30 TABLET | Refills: 0 | Status: SHIPPED | OUTPATIENT
Start: 2024-11-08

## 2024-11-08 RX ORDER — AMOXICILLIN AND CLAVULANATE POTASSIUM 875; 125 MG/1; MG/1
1 TABLET, FILM COATED ORAL EVERY 12 HOURS
Qty: 14 TABLET | Refills: 0 | Status: SHIPPED | OUTPATIENT
Start: 2024-11-08

## 2024-11-08 RX ADMIN — TRIAMCINOLONE ACETONIDE 60 MG: 40 INJECTION, SUSPENSION INTRA-ARTICULAR; INTRAMUSCULAR at 01:11

## 2024-11-08 NOTE — PROGRESS NOTES
Subjective:       Patient ID: Janna Sheffield is a 63 y.o. female.    Chief Complaint: Follow-up and Sinusitis (Over a week)    Follow-up  Associated symptoms include congestion, neck pain and a sore throat. Pertinent negatives include no chest pain, chills, coughing, diaphoresis, fatigue, headaches, numbness, rash or weakness.   Sinusitis  Associated symptoms include congestion, ear pain, neck pain, sinus pressure, sneezing and a sore throat. Pertinent negatives include no chills, coughing, diaphoresis, headaches or shortness of breath.         1) HTN: No home B/P monitoring. No HTNive symptoms BP up today missed meds today due to rushing  2) Hypercholesterol: compliant with statin. No D&E.  3) Fibromyalgia:Gabapentin working(uses prn)  4) GERD: pantoprazole is helping- occ flares. S/P choley.  5) LIPIDS:following D&E, tolerating and compliant with med(s).  6) Sarcoid/fibromyalgia/immunocompromised: overdue for pulm denies complaints. Upcoming rheum appt no current complaints  7) Obesity- sedentary, not exercising  8) DJD: c/o continued cervical radic/shoulder pain - neurosurg/ortho        Review of Systems   Constitutional:  Negative for activity change, appetite change, chills, diaphoresis, fatigue and unexpected weight change.   HENT:  Positive for congestion, ear pain, postnasal drip, rhinorrhea, sinus pressure, sinus pain, sneezing and sore throat. Negative for dental problem, drooling, ear discharge, facial swelling, hearing loss, mouth sores, nosebleeds, tinnitus, trouble swallowing and voice change.    Respiratory:  Negative for cough, choking, shortness of breath and wheezing.    Cardiovascular:  Negative for chest pain, palpitations and leg swelling.   Musculoskeletal:  Positive for neck pain and neck stiffness.   Skin:  Negative for rash.   Neurological:  Negative for dizziness, seizures, syncope, facial asymmetry, speech difficulty, weakness, light-headedness, numbness and headaches.    Psychiatric/Behavioral:  Positive for sleep disturbance.        Objective:      Physical Exam  Vitals reviewed.   Constitutional:       General: She is not in acute distress.  HENT:      Right Ear: Tympanic membrane is erythematous.      Left Ear: Tympanic membrane is erythematous.      Nose: Mucosal edema and rhinorrhea present.      Mouth/Throat:      Mouth: No oral lesions.      Pharynx: Posterior oropharyngeal erythema present. No oropharyngeal exudate or uvula swelling.   Eyes:      Conjunctiva/sclera: Conjunctivae normal.      Pupils: Pupils are equal, round, and reactive to light.   Cardiovascular:      Rate and Rhythm: Normal rate and regular rhythm.      Heart sounds: Normal heart sounds. No murmur heard.     No friction rub. No gallop.   Pulmonary:      Effort: Pulmonary effort is normal. No respiratory distress.      Breath sounds: Normal breath sounds. No wheezing or rales.   Musculoskeletal:      Cervical back: Pain with movement and muscular tenderness present. Decreased range of motion.   Skin:     General: Skin is warm and dry.   Neurological:      Mental Status: She is alert and oriented to person, place, and time.         Assessment:     Vitals:    11/08/24 1400   BP: (!) 146/90   Pulse:    Temp:          1. Primary hypertension    2. Hyperlipidemia, unspecified hyperlipidemia type    3. Pre-diabetes    4. Class 2 severe obesity due to excess calories with serious comorbidity in adult, unspecified BMI    5. Vitamin D deficiency    6. Gastroesophageal reflux disease without esophagitis    7. Fibromyalgia    8. Generalized osteoarthritis of multiple sites    9. Acute maxillary sinusitis, recurrence not specified    10. Neck muscle spasm        Plan:   Primary hypertension    Hyperlipidemia, unspecified hyperlipidemia type  -     Comprehensive Metabolic Panel; Future; Expected date: 11/08/2024  -     Lipid Panel; Future; Expected date: 11/08/2024  -     Comprehensive Metabolic Panel; Future;  Expected date: 05/07/2025  -     Lipid Panel; Future; Expected date: 05/07/2025    Pre-diabetes  -     Hemoglobin A1C; Future; Expected date: 11/08/2024  -     Hemoglobin A1C; Future; Expected date: 05/07/2025    Class 2 severe obesity due to excess calories with serious comorbidity in adult, unspecified BMI    Vitamin D deficiency    Gastroesophageal reflux disease without esophagitis    Fibromyalgia    Generalized osteoarthritis of multiple sites    Acute maxillary sinusitis, recurrence not specified    Neck muscle spasm    Other orders  -     triamcinolone acetonide injection 60 mg  -     amoxicillin-clavulanate 875-125mg (AUGMENTIN) 875-125 mg per tablet; Take 1 tablet by mouth every 12 (twelve) hours.  Dispense: 14 tablet; Refill: 0  -     methocarbamoL (ROBAXIN) 500 MG Tab; Take 1 tablet (500 mg total) by mouth 3 (three) times daily.  Dispense: 30 tablet; Refill: 0      Sinusitis-augmentin/steroid IM  Robaxin/steroid IM/heat for neck spasm  Labs now  Get flu shot in a couple weeks since steroid today  Dexa  Diet, exercise, weight loss  Maintain meds  Keep specialty care  Monitor BP   Rtc 6 mo w lab  Chronic complex care done

## 2024-11-12 ENCOUNTER — LAB VISIT (OUTPATIENT)
Dept: LAB | Facility: HOSPITAL | Age: 64
End: 2024-11-12
Attending: PEDIATRICS
Payer: COMMERCIAL

## 2024-11-12 DIAGNOSIS — D86.9 SARCOIDOSIS: ICD-10-CM

## 2024-11-12 LAB
ALBUMIN SERPL BCP-MCNC: 3.8 G/DL (ref 3.5–5.2)
ALP SERPL-CCNC: 85 U/L (ref 40–150)
ALT SERPL W/O P-5'-P-CCNC: 18 U/L (ref 10–44)
ANION GAP SERPL CALC-SCNC: 11 MMOL/L (ref 8–16)
AST SERPL-CCNC: 23 U/L (ref 10–40)
BASOPHILS # BLD AUTO: 0.05 K/UL (ref 0–0.2)
BASOPHILS NFR BLD: 0.6 % (ref 0–1.9)
BILIRUB SERPL-MCNC: 0.4 MG/DL (ref 0.1–1)
BUN SERPL-MCNC: 14 MG/DL (ref 8–23)
CALCIUM SERPL-MCNC: 10.2 MG/DL (ref 8.7–10.5)
CHLORIDE SERPL-SCNC: 100 MMOL/L (ref 95–110)
CO2 SERPL-SCNC: 31 MMOL/L (ref 23–29)
CREAT SERPL-MCNC: 0.8 MG/DL (ref 0.5–1.4)
CRP SERPL-MCNC: 4.3 MG/L (ref 0–8.2)
DIFFERENTIAL METHOD BLD: NORMAL
EOSINOPHIL # BLD AUTO: 0 K/UL (ref 0–0.5)
EOSINOPHIL NFR BLD: 0.5 % (ref 0–8)
ERYTHROCYTE [DISTWIDTH] IN BLOOD BY AUTOMATED COUNT: 13.1 % (ref 11.5–14.5)
ERYTHROCYTE [SEDIMENTATION RATE] IN BLOOD BY WESTERGREN METHOD: 57 MM/HR (ref 0–20)
EST. GFR  (NO RACE VARIABLE): >60 ML/MIN/1.73 M^2
GLUCOSE SERPL-MCNC: 96 MG/DL (ref 70–110)
HCT VFR BLD AUTO: 40.4 % (ref 37–48.5)
HGB BLD-MCNC: 13.2 G/DL (ref 12–16)
IMM GRANULOCYTES # BLD AUTO: 0.02 K/UL (ref 0–0.04)
IMM GRANULOCYTES NFR BLD AUTO: 0.3 % (ref 0–0.5)
LYMPHOCYTES # BLD AUTO: 2.9 K/UL (ref 1–4.8)
LYMPHOCYTES NFR BLD: 37.3 % (ref 18–48)
MCH RBC QN AUTO: 30.8 PG (ref 27–31)
MCHC RBC AUTO-ENTMCNC: 32.7 G/DL (ref 32–36)
MCV RBC AUTO: 94 FL (ref 82–98)
MONOCYTES # BLD AUTO: 0.6 K/UL (ref 0.3–1)
MONOCYTES NFR BLD: 7.8 % (ref 4–15)
NEUTROPHILS # BLD AUTO: 4.2 K/UL (ref 1.8–7.7)
NEUTROPHILS NFR BLD: 53.5 % (ref 38–73)
NRBC BLD-RTO: 0 /100 WBC
PLATELET # BLD AUTO: 260 K/UL (ref 150–450)
PMV BLD AUTO: 10 FL (ref 9.2–12.9)
POTASSIUM SERPL-SCNC: 3.5 MMOL/L (ref 3.5–5.1)
PROT SERPL-MCNC: 7.9 G/DL (ref 6–8.4)
RBC # BLD AUTO: 4.28 M/UL (ref 4–5.4)
SODIUM SERPL-SCNC: 142 MMOL/L (ref 136–145)
WBC # BLD AUTO: 7.83 K/UL (ref 3.9–12.7)

## 2024-11-12 PROCEDURE — 85651 RBC SED RATE NONAUTOMATED: CPT | Performed by: INTERNAL MEDICINE

## 2024-11-12 PROCEDURE — 80053 COMPREHEN METABOLIC PANEL: CPT | Performed by: INTERNAL MEDICINE

## 2024-11-12 PROCEDURE — 86140 C-REACTIVE PROTEIN: CPT | Performed by: INTERNAL MEDICINE

## 2024-11-12 PROCEDURE — 82164 ANGIOTENSIN I ENZYME TEST: CPT | Performed by: INTERNAL MEDICINE

## 2024-11-12 PROCEDURE — 85025 COMPLETE CBC W/AUTO DIFF WBC: CPT | Performed by: INTERNAL MEDICINE

## 2024-11-14 LAB — ACE SERPL-CCNC: 16 U/L (ref 16–85)

## 2024-11-15 ENCOUNTER — PATIENT OUTREACH (OUTPATIENT)
Dept: ADMINISTRATIVE | Facility: HOSPITAL | Age: 64
End: 2024-11-15
Payer: COMMERCIAL

## 2024-11-15 NOTE — PROGRESS NOTES
VBC OUTREACH: per chart review pt is overdue for BP check , appt already scheduled 5.15.25, will send pt a portal message requesting remote bp.

## 2024-11-20 ENCOUNTER — TELEPHONE (OUTPATIENT)
Dept: INTERNAL MEDICINE | Facility: CLINIC | Age: 64
End: 2024-11-20
Payer: COMMERCIAL

## 2024-11-22 ENCOUNTER — OFFICE VISIT (OUTPATIENT)
Dept: RHEUMATOLOGY | Facility: CLINIC | Age: 64
End: 2024-11-22
Payer: COMMERCIAL

## 2024-11-22 ENCOUNTER — CLINICAL SUPPORT (OUTPATIENT)
Dept: INTERNAL MEDICINE | Facility: CLINIC | Age: 64
End: 2024-11-22
Payer: COMMERCIAL

## 2024-11-22 VITALS
WEIGHT: 198 LBS | HEART RATE: 85 BPM | DIASTOLIC BLOOD PRESSURE: 82 MMHG | BODY MASS INDEX: 31.08 KG/M2 | SYSTOLIC BLOOD PRESSURE: 137 MMHG | DIASTOLIC BLOOD PRESSURE: 79 MMHG | HEIGHT: 67 IN | SYSTOLIC BLOOD PRESSURE: 136 MMHG

## 2024-11-22 DIAGNOSIS — D86.9 SARCOIDOSIS: Primary | ICD-10-CM

## 2024-11-22 DIAGNOSIS — D86.0 PULMONARY SARCOIDOSIS: ICD-10-CM

## 2024-11-22 DIAGNOSIS — G89.29 CHRONIC THORACIC BACK PAIN, UNSPECIFIED BACK PAIN LATERALITY: ICD-10-CM

## 2024-11-22 DIAGNOSIS — Z01.30 BLOOD PRESSURE CHECK: Primary | ICD-10-CM

## 2024-11-22 DIAGNOSIS — M67.919 DISORDER OF ROTATOR CUFF, UNSPECIFIED LATERALITY: ICD-10-CM

## 2024-11-22 DIAGNOSIS — R73.03 PRE-DIABETES: ICD-10-CM

## 2024-11-22 DIAGNOSIS — M79.18 MYOFASCIAL PAIN: ICD-10-CM

## 2024-11-22 DIAGNOSIS — M48.02 DEGENERATIVE CERVICAL SPINAL STENOSIS: ICD-10-CM

## 2024-11-22 DIAGNOSIS — M54.6 CHRONIC THORACIC BACK PAIN, UNSPECIFIED BACK PAIN LATERALITY: ICD-10-CM

## 2024-11-22 PROCEDURE — 99999 PR PBB SHADOW E&M-EST. PATIENT-LVL II: CPT | Mod: PBBFAC,,,

## 2024-11-22 PROCEDURE — 99999 PR PBB SHADOW E&M-EST. PATIENT-LVL III: CPT | Mod: PBBFAC,,, | Performed by: INTERNAL MEDICINE

## 2024-11-22 NOTE — PROGRESS NOTES
RHEUMATOLOGY OUTPATIENT CLINIC NOTE    11/22/2024    Attending Rheumatologist: Toñito Schwartz  Primary Care Provider/Physician Requesting Consultation: Ajit Botello MD   Chief Complaint/Reason For Consultation:  Sarcoidosis      Subjective:     Janna Sheffield is a 63 y.o. Black or  female with sarcoidosis w/ multi organ involvement.    Self limited arthralgias w/ mechanical pattern.  Associated w/ myofascial pain and chronic fatigue.     Review of Systems   Constitutional:  Positive for malaise/fatigue. Negative for fever.   Eyes:  Negative for blurred vision, double vision, photophobia and pain.   Respiratory:  Negative for cough and shortness of breath (ALLEN, chronic).    Cardiovascular:  Negative for chest pain and palpitations.   Gastrointestinal:  Negative for blood in stool and melena.   Genitourinary:  Negative for hematuria.   Musculoskeletal:  Positive for back pain and myalgias. Negative for joint pain.   Skin:  Negative for rash.   Neurological:  Negative for focal weakness and weakness.     Chronic comorbid conditions affecting medical decision making today:  Past Medical History:   Diagnosis Date    Arthritis     Chest pain syndrome 10/23/2014    Hyperlipidemia     Hypertension     Macular degeneration, age related, nonexudative - Left Eye 3/24/2014    Retinal detachment, old, partial 4/14/2014    Sarcoidosis      Past Surgical History:   Procedure Laterality Date    CATARACT EXTRACTION      COLONOSCOPY N/A 03/14/2022    Procedure: COLONOSCOPY;  Surgeon: Christi Medrano MD;  Location: [facility];  Service: Endoscopy;  Laterality: N/A;    DILATION AND CURETTAGE OF UTERUS      missed ab x 3    EPIDURAL STEROID INJECTION INTO CERVICAL SPINE N/A 12/21/2021    Procedure: C6/C7 IL HIMANSHU RN IV sedation;  Surgeon: Hadley Marsh MD;  Location: [facility];  Service: Pain Management;  Laterality: N/A;    ESOPHAGOGASTRODUODENOSCOPY N/A 02/06/2019    Procedure:  ESOPHAGOGASTRODUODENOSCOPY (EGD);  Surgeon: Jairo Vargas III, MD;  Location: [facility] ENDO;  Service: Endoscopy;  Laterality: N/A;    HYSTERECTOMY  2001    MACARIO - complete    JOINT REPLACEMENT Left     Knee    knee surgery      TRANSFORAMINAL EPIDURAL INJECTION OF STEROID Right 04/18/2022    Procedure: INJECTION, STEROID, EPIDURAL, TRANSFORAMINAL APPROACH, RIGHT C6-C7;  Surgeon: Viraj Kramer MD;  Location: [facility] PAIN MGT;  Service: Pain Management;  Laterality: Right;    TRANSFORAMINAL EPIDURAL INJECTION OF STEROID Right 9/11/2023    Procedure: right C6/7 TF HIMANSHU w/ RN IV sedation;  Surgeon: Derian Watts MD;  Location: [facility] PAIN MGT;  Service: Pain Management;  Laterality: Right;    TUBAL LIGATION       Family History   Problem Relation Name Age of Onset    Cervical cancer Mother      Heart disease Mother      Heart attack Mother      Hypertension Mother      Hyperlipidemia Mother      Hypertension Father      Hyperlipidemia Father      Stroke Father      Breast cancer Maternal Aunt      Stroke Brother      Breast cancer Maternal Cousin      Breast cancer Maternal Cousin      Breast cancer Paternal Cousin      Breast cancer Paternal Cousin      Colon cancer Neg Hx      Thrombophilia Neg Hx      Deep vein thrombosis Neg Hx      Pulmonary embolism Neg Hx       Social History     Tobacco Use   Smoking Status Never   Smokeless Tobacco Never       Current Outpatient Medications:     amoxicillin-clavulanate 875-125mg (AUGMENTIN) 875-125 mg per tablet, Take 1 tablet by mouth every 12 (twelve) hours., Disp: 14 tablet, Rfl: 0    diclofenac sodium (VOLTAREN) 1 % Gel, Apply 2 g topically 3 (three) times daily as needed (Knee pain)., Disp: 100 g, Rfl: 3    ergocalciferol (ERGOCALCIFEROL) 50,000 unit Cap, Take 1 capsule (50,000 Units total) by mouth every 7 days., Disp: 12 capsule, Rfl: 0    gabapentin (NEURONTIN) 600 MG tablet, Take 1 tablet (600 mg total) by mouth 3 (three) times daily., Disp: 90 tablet, Rfl: 5     hydroCHLOROthiazide (HYDRODIURIL) 25 MG tablet, Take 1 tablet by mouth once daily, Disp: 90 tablet, Rfl: 3    LIDOcaine-prilocaine (EMLA) cream, Apply topically up to 4x per day to affected area for pain relief, Disp: 60 g, Rfl: 0    methocarbamoL (ROBAXIN) 500 MG Tab, Take 1 tablet (500 mg total) by mouth 3 (three) times daily., Disp: 30 tablet, Rfl: 0    mometasone (ELOCON) 0.1 % ointment, Apply topically once daily., Disp: 45 g, Rfl: 1    multivitamin (THERAGRAN) per tablet, Take 1 tablet by mouth Daily., Disp: , Rfl:     prednisoLONE acetate (PRED FORTE) 1 % DrpS, Place 1 drop into the right eye 4 (four) times daily., Disp: 5 mL, Rfl: 0    rosuvastatin (CRESTOR) 40 MG Tab, Take 1 tablet by mouth once daily, Disp: 90 tablet, Rfl: 3     Objective:     Vitals:    11/22/24 1512   BP: 137/79   Pulse: 85     Physical Exam   Eyes: Conjunctivae are normal.   Pulmonary/Chest: Effort normal. No respiratory distress.   Musculoskeletal:         General: No swelling or tenderness. Normal range of motion.   Skin: No rash noted.     Reviewed available old and all outside pertinent medical records available.    All lab results personally reviewed and interpreted by me.       ASSESSMENT / PLAN     1. Sarcoidosis  Remote Dx on mid 1980s, eye, liver, lung involvement.    Clinically on remission off immunomodulator therapy.  No laboratory or imaging concern of relapsing disease.  CBC Auto Differential    Comprehensive Metabolic Panel    Angiotensin Converting Enzyme    INTERLEUKIN-2 RECEPTOR      2. Myofascial pain  Ambulatory referral/consult to Physical/Occupational Therapy      3. Disorder of rotator cuff, unspecified laterality  Ambulatory referral/consult to Physical/Occupational Therapy      4. Chronic thoracic back pain, unspecified back pain laterality  Ambulatory referral/consult to Physical/Occupational Therapy      5. Degenerative cervical spinal stenosis        6. Pulmonary sarcoidosis  Angiotensin Converting Enzyme     INTERLEUKIN-2 RECEPTOR      7. Pre-diabetes                  Toñito Schwartz M.D.

## 2025-01-30 ENCOUNTER — HOSPITAL ENCOUNTER (OUTPATIENT)
Dept: RADIOLOGY | Facility: HOSPITAL | Age: 65
Discharge: HOME | End: 2025-01-30
Attending: STUDENT IN AN ORGANIZED HEALTH CARE EDUCATION/TRAINING PROGRAM
Payer: COMMERCIAL

## 2025-01-30 ENCOUNTER — OFFICE VISIT (OUTPATIENT)
Dept: SPORTS MEDICINE | Facility: CLINIC | Age: 65
End: 2025-01-30
Payer: COMMERCIAL

## 2025-01-30 VITALS — BODY MASS INDEX: 30.92 KG/M2 | HEIGHT: 67 IN | WEIGHT: 197 LBS

## 2025-01-30 DIAGNOSIS — M25.511 ACUTE PAIN OF RIGHT SHOULDER: ICD-10-CM

## 2025-01-30 DIAGNOSIS — M25.511 ACUTE PAIN OF RIGHT SHOULDER: Primary | ICD-10-CM

## 2025-01-30 DIAGNOSIS — G89.29 CHRONIC RIGHT SHOULDER PAIN: Primary | ICD-10-CM

## 2025-01-30 DIAGNOSIS — M25.511 CHRONIC RIGHT SHOULDER PAIN: Primary | ICD-10-CM

## 2025-01-30 DIAGNOSIS — M67.911 TENDINOPATHY OF RIGHT ROTATOR CUFF: ICD-10-CM

## 2025-01-30 DIAGNOSIS — M19.011 OSTEOARTHRITIS OF RIGHT ACROMIOCLAVICULAR JOINT: ICD-10-CM

## 2025-01-30 PROCEDURE — 1159F MED LIST DOCD IN RCRD: CPT | Mod: CPTII,S$GLB,, | Performed by: STUDENT IN AN ORGANIZED HEALTH CARE EDUCATION/TRAINING PROGRAM

## 2025-01-30 PROCEDURE — 99214 OFFICE O/P EST MOD 30 MIN: CPT | Mod: 25,S$GLB,, | Performed by: STUDENT IN AN ORGANIZED HEALTH CARE EDUCATION/TRAINING PROGRAM

## 2025-01-30 PROCEDURE — 3008F BODY MASS INDEX DOCD: CPT | Mod: CPTII,S$GLB,, | Performed by: STUDENT IN AN ORGANIZED HEALTH CARE EDUCATION/TRAINING PROGRAM

## 2025-01-30 PROCEDURE — 20611 DRAIN/INJ JOINT/BURSA W/US: CPT | Mod: RT,S$GLB,, | Performed by: STUDENT IN AN ORGANIZED HEALTH CARE EDUCATION/TRAINING PROGRAM

## 2025-01-30 PROCEDURE — 73030 X-RAY EXAM OF SHOULDER: CPT | Mod: 26,RT,, | Performed by: RADIOLOGY

## 2025-01-30 PROCEDURE — 73030 X-RAY EXAM OF SHOULDER: CPT | Mod: TC,RT

## 2025-01-30 PROCEDURE — 1160F RVW MEDS BY RX/DR IN RCRD: CPT | Mod: CPTII,S$GLB,, | Performed by: STUDENT IN AN ORGANIZED HEALTH CARE EDUCATION/TRAINING PROGRAM

## 2025-01-30 PROCEDURE — 99999 PR PBB SHADOW E&M-EST. PATIENT-LVL IV: CPT | Mod: PBBFAC,,, | Performed by: STUDENT IN AN ORGANIZED HEALTH CARE EDUCATION/TRAINING PROGRAM

## 2025-01-30 RX ORDER — TRIAMCINOLONE ACETONIDE 40 MG/ML
40 INJECTION, SUSPENSION INTRA-ARTICULAR; INTRAMUSCULAR
Status: DISCONTINUED | OUTPATIENT
Start: 2025-01-30 | End: 2025-01-30 | Stop reason: HOSPADM

## 2025-01-30 RX ADMIN — TRIAMCINOLONE ACETONIDE 40 MG: 40 INJECTION, SUSPENSION INTRA-ARTICULAR; INTRAMUSCULAR at 07:01

## 2025-01-30 NOTE — PATIENT INSTRUCTIONS
Assessment:  Janna Sheffield is a 64 y.o. female   Chief Complaint   Patient presents with    Right Shoulder - Pain, Injury       Encounter Diagnosis   Name Primary?    Chronic right shoulder pain Yes        Plan:  Reviewed xrays with patient  Ultrasound guided sub acromial cortisone injection to the right shoulder  We discussed the proper protocols after the injection such as no submerging pools, baths tubs, or hot tubs for 24 hr.  Showering is okay today.  We also discussed that blood sugars can be elevated after an injection and asked patient to properly checked her sugars over the next few days and contact their PCP if there are any concerns.  We discussed red flags such as fevers, chills, red, warm, tender joint at the area of injection to please seek medical care immediately.    Follow up as needed    Follow-up: as needed.    Thank you for choosing [facility] and Dr. Aleksandar Ovalles for your orthopedic & sports medicine care. It is our goal to provide you with exceptional care that will help keep you healthy, active, and get you back in the game.    Please do not hesitate to reach out to us via email, phone, or MyChart with any questions, concerns, or feedback.    If you felt that you received exemplary care today, please consider leaving us feedback on [employer] at:  [email or web address]    If you are experiencing pain/discomfort ,or have questions after 5pm and would like to be connected to the [facility] on-call team, please call this number and specify which Sports Medicine provider is treating you: [phone]

## 2025-01-30 NOTE — PROGRESS NOTES
"      Patient ID: Janna Sheffield  YOB: 1960  MRN: [id number]    Chief Complaint: Pain and Injury of the Right Shoulder      History of Present Illness: Janna Sheffield is a right-hand dominant 64 y.o. female who presents today with right shoulder pain.  Reports falling on December 16, 2024 while at work.  Reports that the shoulder started to feel better than approximately 3 weeks ago became a constant "pressure" pain in the posterior, anterior and axilla region. Currently using the hot shower, Gabapentin and Tylenol for symptoms.  Uses Voltaren gel for symptoms as well.  Last seen in clinic on 7/3/2024 and received cortisone injection on April 3, 2024.  Reports no issue with shoulder until suffering fall.    7/3/2024 Interval History of Present Illness: Janna Sheffield is a right-hand dominant 63 y.o. female who presents today with right shoulder and right knee pain. LOV 04/03/24 w/ CSI. She report 8/10 pain on the shoulder and 7/10 pain on the knee. She report achy intermittent pain for the shoulder and sharp shooting pain on the knee mostly during the day. She report shoulder pain at night sometimes. She report relief lasted until 1 week ago.      4/3/2024 Interval History of Present Illness: Janna Sheffield is a right-hand dominant 63 y.o. female who presents today with right shoulder pain. The pt presents today with right shoulder joint pain radiating to bicep. The pt stated her pain is worsened when she's cleaning, cooking and dressing and her pain is treated with extra strength Tylenol. The pt stated she was 6/10 pain and denies having any falls. History of cervical radiculopathy. She has not tried any conservative treatment for this problem.    The patient is active in none.  Occupation:       Past Medical History:   Past Medical History:   Diagnosis Date    Arthritis     Chest pain syndrome 10/23/2014    Hyperlipidemia     Hypertension     Macular degeneration, " age related, nonexudative - Left Eye 3/24/2014    Retinal detachment, old, partial 4/14/2014    Sarcoidosis      Past Surgical History:   Procedure Laterality Date    CATARACT EXTRACTION      COLONOSCOPY N/A 03/14/2022    Procedure: COLONOSCOPY;  Surgeon: Christi Medrano MD;  Location: [facility];  Service: Endoscopy;  Laterality: N/A;    DILATION AND CURETTAGE OF UTERUS      missed ab x 3    EPIDURAL STEROID INJECTION INTO CERVICAL SPINE N/A 12/21/2021    Procedure: C6/C7 IL HIMANSHU RN IV sedation;  Surgeon: Hadley Marsh MD;  Location: [facility] PAIN MGT;  Service: Pain Management;  Laterality: N/A;    ESOPHAGOGASTRODUODENOSCOPY N/A 02/06/2019    Procedure: ESOPHAGOGASTRODUODENOSCOPY (EGD);  Surgeon: Jairo Vargas III, MD;  Location: [facility];  Service: Endoscopy;  Laterality: N/A;    HYSTERECTOMY  2001    MACARIO - complete    JOINT REPLACEMENT Left     Knee    knee surgery      TRANSFORAMINAL EPIDURAL INJECTION OF STEROID Right 04/18/2022    Procedure: INJECTION, STEROID, EPIDURAL, TRANSFORAMINAL APPROACH, RIGHT C6-C7;  Surgeon: Viraj Kramer MD;  Location: [facility] PAIN MGT;  Service: Pain Management;  Laterality: Right;    TRANSFORAMINAL EPIDURAL INJECTION OF STEROID Right 9/11/2023    Procedure: right C6/7 TF HIMANSHU w/ RN IV sedation;  Surgeon: Derian Watts MD;  Location: [facility] PAIN MGT;  Service: Pain Management;  Laterality: Right;    TUBAL LIGATION       Family History   Problem Relation Name Age of Onset    Cervical cancer Mother      Heart disease Mother      Heart attack Mother      Hypertension Mother      Hyperlipidemia Mother      Hypertension Father      Hyperlipidemia Father      Stroke Father      Breast cancer Maternal Aunt      Stroke Brother      Breast cancer Maternal Cousin      Breast cancer Maternal Cousin      Breast cancer Paternal Cousin      Breast cancer Paternal Cousin      Colon cancer Neg Hx      Thrombophilia Neg Hx      Deep vein thrombosis Neg Hx      Pulmonary embolism Neg Hx        Social History     Socioeconomic History    Marital status:    Occupational History     Employer: [employer]    Tobacco Use    Smoking status: Never    Smokeless tobacco: Never   Substance and Sexual Activity    Alcohol use: No    Drug use: No    Sexual activity: Yes     Partners: Male     Comment:      Medication List with Changes/Refills   Current Medications    AMOXICILLIN-CLAVULANATE 875-125MG (AUGMENTIN) 875-125 MG PER TABLET    Take 1 tablet by mouth every 12 (twelve) hours.    DICLOFENAC SODIUM (VOLTAREN) 1 % GEL    Apply 2 g topically 3 (three) times daily as needed (Knee pain).    GABAPENTIN (NEURONTIN) 600 MG TABLET    Take 1 tablet (600 mg total) by mouth 3 (three) times daily.    HYDROCHLOROTHIAZIDE (HYDRODIURIL) 25 MG TABLET    Take 1 tablet by mouth once daily    LIDOCAINE-PRILOCAINE (EMLA) CREAM    Apply topically up to 4x per day to affected area for pain relief    METHOCARBAMOL (ROBAXIN) 500 MG TAB    Take 1 tablet (500 mg total) by mouth 3 (three) times daily.    MOMETASONE (ELOCON) 0.1 % OINTMENT    Apply topically once daily.    MULTIVITAMIN (THERAGRAN) PER TABLET    Take 1 tablet by mouth Daily.    PREDNISOLONE ACETATE (PRED FORTE) 1 % DRPS    Place 1 drop into the right eye 4 (four) times daily.    ROSUVASTATIN (CRESTOR) 40 MG TAB    Take 1 tablet by mouth once daily     Review of patient's allergies indicates:  No Known Allergies    Physical Exam:   Body mass index is 30.85 kg/m².    GENERAL: Well appearing, in no acute distress.  HEAD: Normocephalic and atraumatic.  ENT: External ears and nose grossly normal.  EYES: EOMI bilaterally  PULMONARY: Respirations are grossly even and non-labored.  NEURO: Awake, alert, and oriented x 3.  SKIN: No obvious rashes appreciated.  PSYCH: Mood & affect are appropriate.    Detailed MSK exam:     Right shoulder exam:   -ROM: abduction 130, forward flexion 130, external rotation 90, internal rotation 70  -empty can test pain but  no weakness, resisted ER pain but no weakness, belly press pain but no weakness  -preston test positive, neers test negative, whipple test positive  -biceps load test negative, yerguson test negative, Ava's test negative  -sensation intact, pulses 2+  -TTP: anterior, lateral cuff insertion, and posterior        Imaging:  X-ray Shoulder 2 or More Views Right  Narrative: EXAMINATION:  XR SHOULDER COMPLETE 2 OR MORE VIEWS RIGHT    CLINICAL HISTORY:  Pain in right shoulder    TECHNIQUE:  Two or three views of the right shoulder were performed.    COMPARISON:  04/03/2024    FINDINGS:  Similar mild-to-moderate AC arthropathy.  Glenohumeral joint space maintained.  No acute osseous abnormality.  Lung parenchyma clear.  Impression: As above    Electronically signed by: Felipe Barillas MD  Date:    01/30/2025  Time:    08:46        Relevant imaging results were reviewed and interpreted by me and per my read shows mild to moderate AC arthritic changes.  This was discussed with the patient and / or family today.     Assessment:  Janna Sheffield is a 64 y.o. female following up for right shoulder pain. Interested in repeating steroid injection today.   Plan: Steroid injection given today (see separate procedure note for details). We discussed the proper protocols after the injection such as no submerging pools, baths tubs, or hot tubs for 24 hr.  Showering is okay today.  We also discussed that blood sugars can be elevated after an injection and asked patient to properly checked her sugars over the next few days and contact their PCP if there are any concerns.  We discussed red flags such as fevers, chills, red, warm, tender joint at the area of injection to please seek medical care immediately.   Continue conservative management for pain.   Follow up as needed. All questions answered.     Chronic right shoulder pain  -     Sports Medicine US - Guidance for Needle Placement    Tendinopathy of right rotator cuff  -      Large Joint Aspiration/Injection: R subacromial bursa    Osteoarthritis of right acromioclavicular joint  -     Large Joint Aspiration/Injection: R subacromial bursa         Ultrasound guidance was used for needle localization. Images were saved and stored for documentation. The appropriate structures were visualized. Dynamic visualization of the needle was continuous throughout the procedures and maintained good position.      Electronically signed:  Aleksandar Ovalles MD, MPH  01/30/2025  8:26 AM

## 2025-01-30 NOTE — PROCEDURES
Sports Medicine US - Guidance for Needle Placement    Date/Time: 1/30/2025 7:40 AM    Performed by: Aleksandar Ovalles MD  Authorized by: Aleksandar Ovalles MD  Preparation: Patient was prepped and draped in the usual sterile fashion.  Local anesthesia used: no    Anesthesia:  Local anesthesia used: no    Sedation:  Patient sedated: no    Patient tolerance: patient tolerated the procedure well with no immediate complications  Comments: Ultrasound guidance was used for needle localization. Images were saved and stored for documentation. The appropriate structures were visualized. Dynamic visualization of the needle was continuous throughout the procedures and maintained good position.

## 2025-01-30 NOTE — PROCEDURES
Large Joint Aspiration/Injection: R subacromial bursa    Date/Time: 1/30/2025 7:40 AM    Performed by: Aleksandar Ovalles MD  Authorized by: Aleksandar Ovalles MD    Consent Done?:  Yes (Verbal)  Indications:  Pain  Site marked: the procedure site was marked    Timeout: prior to procedure the correct patient, procedure, and site was verified    Prep: patient was prepped and draped in usual sterile fashion    Local anesthetic:  Bupivacaine 0.5% without epinephrine and lidocaine 1% without epinephrine    Details:  Needle Size:  21 G  Ultrasonic Guidance for needle placement?: Yes    Images are saved and documented.  Approach:  Lateral  Location:  Shoulder  Site:  R subacromial bursa  Medications:  40 mg triamcinolone acetonide 40 mg/mL  Patient tolerance:  Patient tolerated the procedure well with no immediate complications     Ultrasound guidance was used for needle localization. Images were saved and stored for documentation. The appropriate structures were visualized. Dynamic visualization of the needle was continuous throughout the procedures and maintained good position.

## 2025-02-17 ENCOUNTER — PATIENT OUTREACH (OUTPATIENT)
Dept: ADMINISTRATIVE | Facility: HOSPITAL | Age: 65
End: 2025-02-17
Payer: COMMERCIAL

## 2025-02-17 NOTE — PROGRESS NOTES
VBC OUTREACH: per chart review pt does NOT MEET CRITERIA for VB program, not due for any HM topics at this time.

## 2025-04-01 ENCOUNTER — NURSE TRIAGE (OUTPATIENT)
Dept: ADMINISTRATIVE | Facility: CLINIC | Age: 65
End: 2025-04-01
Payer: COMMERCIAL

## 2025-04-01 ENCOUNTER — HOSPITAL ENCOUNTER (EMERGENCY)
Facility: HOSPITAL | Age: 65
Discharge: HOME | End: 2025-04-02
Attending: EMERGENCY MEDICINE
Payer: COMMERCIAL

## 2025-04-01 DIAGNOSIS — R07.9 CHEST PAIN: Primary | ICD-10-CM

## 2025-04-01 LAB
ABSOLUTE EOSINOPHIL (OHS): 0.06 K/UL
ABSOLUTE MONOCYTE (OHS): 0.51 K/UL (ref 0.3–1)
ABSOLUTE NEUTROPHIL COUNT (OHS): 3.77 K/UL (ref 1.8–7.7)
ALBUMIN SERPL BCP-MCNC: 3.9 G/DL (ref 3.5–5.2)
ALP SERPL-CCNC: 89 UNIT/L (ref 40–150)
ALT SERPL W/O P-5'-P-CCNC: 14 UNIT/L (ref 10–44)
ANION GAP (OHS): 12 MMOL/L (ref 8–16)
AST SERPL-CCNC: 18 UNIT/L (ref 11–45)
BASOPHILS # BLD AUTO: 0.03 K/UL
BASOPHILS NFR BLD AUTO: 0.4 %
BILIRUB SERPL-MCNC: 0.5 MG/DL (ref 0.1–1)
BNP SERPL-MCNC: 17 PG/ML (ref 0–99)
BUN SERPL-MCNC: 11 MG/DL (ref 8–23)
CALCIUM SERPL-MCNC: 9.8 MG/DL (ref 8.7–10.5)
CHLORIDE SERPL-SCNC: 100 MMOL/L (ref 95–110)
CO2 SERPL-SCNC: 30 MMOL/L (ref 23–29)
CREAT SERPL-MCNC: 0.9 MG/DL (ref 0.5–1.4)
ERYTHROCYTE [DISTWIDTH] IN BLOOD BY AUTOMATED COUNT: 12.9 % (ref 11.5–14.5)
GFR SERPLBLD CREATININE-BSD FMLA CKD-EPI: >60 ML/MIN/1.73/M2
GLUCOSE SERPL-MCNC: 120 MG/DL (ref 70–110)
HCT VFR BLD AUTO: 41.9 % (ref 37–48.5)
HGB BLD-MCNC: 13.8 GM/DL (ref 12–16)
IMM GRANULOCYTES # BLD AUTO: 0.02 K/UL (ref 0–0.04)
IMM GRANULOCYTES NFR BLD AUTO: 0.3 % (ref 0–0.5)
LYMPHOCYTES # BLD AUTO: 2.79 K/UL (ref 1–4.8)
MAGNESIUM SERPL-MCNC: 2.2 MG/DL (ref 1.6–2.6)
MCH RBC QN AUTO: 30.9 PG (ref 27–31)
MCHC RBC AUTO-ENTMCNC: 32.9 G/DL (ref 32–36)
MCV RBC AUTO: 94 FL (ref 82–98)
NUCLEATED RBC (/100WBC) (OHS): 0 /100 WBC
PLATELET # BLD AUTO: 262 K/UL (ref 150–450)
PMV BLD AUTO: 10.3 FL (ref 9.2–12.9)
POTASSIUM SERPL-SCNC: 2.9 MMOL/L (ref 3.5–5.1)
PROT SERPL-MCNC: 8.4 GM/DL (ref 6–8.4)
RBC # BLD AUTO: 4.46 M/UL (ref 4–5.4)
RELATIVE EOSINOPHIL (OHS): 0.8 %
RELATIVE LYMPHOCYTE (OHS): 38.9 % (ref 18–48)
RELATIVE MONOCYTE (OHS): 7.1 % (ref 4–15)
RELATIVE NEUTROPHIL (OHS): 52.5 % (ref 38–73)
SODIUM SERPL-SCNC: 142 MMOL/L (ref 136–145)
TROPONIN I SERPL DL<=0.01 NG/ML-MCNC: 0.01 NG/ML
WBC # BLD AUTO: 7.18 K/UL (ref 3.9–12.7)

## 2025-04-01 PROCEDURE — 99285 EMERGENCY DEPT VISIT HI MDM: CPT | Mod: 25

## 2025-04-01 PROCEDURE — 84484 ASSAY OF TROPONIN QUANT: CPT | Performed by: EMERGENCY MEDICINE

## 2025-04-01 PROCEDURE — 85025 COMPLETE CBC W/AUTO DIFF WBC: CPT | Performed by: EMERGENCY MEDICINE

## 2025-04-01 PROCEDURE — 25000003 PHARM REV CODE 250: Performed by: EMERGENCY MEDICINE

## 2025-04-01 PROCEDURE — 80053 COMPREHEN METABOLIC PANEL: CPT | Performed by: EMERGENCY MEDICINE

## 2025-04-01 PROCEDURE — 83735 ASSAY OF MAGNESIUM: CPT | Performed by: EMERGENCY MEDICINE

## 2025-04-01 PROCEDURE — 83880 ASSAY OF NATRIURETIC PEPTIDE: CPT | Performed by: EMERGENCY MEDICINE

## 2025-04-01 PROCEDURE — 93010 ELECTROCARDIOGRAM REPORT: CPT | Mod: ,,, | Performed by: INTERNAL MEDICINE

## 2025-04-01 PROCEDURE — 93005 ELECTROCARDIOGRAM TRACING: CPT

## 2025-04-01 RX ORDER — NAPROXEN SODIUM 220 MG/1
324 TABLET, FILM COATED ORAL
Status: COMPLETED | OUTPATIENT
Start: 2025-04-01 | End: 2025-04-01

## 2025-04-01 RX ADMIN — ASPIRIN 81 MG CHEWABLE TABLET 324 MG: 81 TABLET CHEWABLE at 10:04

## 2025-04-01 NOTE — TELEPHONE ENCOUNTER
Spoke with patient who states she is having a rapid onset of multiple symptoms.  Patient states she has a tingling sensation radiating from her face down into her shoulders.  Additional symptoms include tightness in the face, neck, and chest.  Patient was asked if sensation in her chest felt like a crushing, pressure-like, or heavy sensation.  Patient states she does not know.  Advised [id number] per protocol.  Patient verbalized understanding.   Patient's daughter is with her.   Reason for Disposition   [1] Chest pain lasts > 5 minutes AND [2] described as crushing, pressure-like, or heavy     States she does not know.  Initially stated she has tightness in the chest area.   Sounds like a life-threatening emergency to the triager    Additional Information   Negative: SEVERE difficulty breathing (e.g., struggling for each breath, speaks in single words)   Negative: Difficult to awaken or acting confused (e.g., disoriented, slurred speech)   Negative: Shock suspected (e.g., cold/pale/clammy skin, too weak to stand, low BP, rapid pulse)   Negative: Passed out (e.g., fainted, lost consciousness, blacked out and was not responding)   Negative: [1] Chest pain lasts > 5 minutes AND [2] age > 44   Negative: [1] Chest pain lasts > 5 minutes AND [2] age > 30 AND [3] one or more cardiac risk factors (e.g., diabetes, high blood pressure, high cholesterol, obesity with BMI 30 or higher, smoker, or strong family history of heart disease)   Negative: [1] Chest pain lasts > 5 minutes AND [2] history of heart disease (i.e., angina, heart attack, heart failure, bypass surgery, takes nitroglycerin)   Negative: [1] SEVERE weakness (i.e., unable to walk or barely able to walk, requires support) AND [2] new-onset or getting worse   Negative: [1] Weakness (i.e., paralysis, loss of muscle strength) of the face, arm / hand, or leg / foot on one side of the body AND [2] sudden onset AND [3] present now  (Exception: Bell's palsy suspected  [weakness on one side of the face, over hours to days].)   Negative: [1] Numbness (i.e., loss of sensation) of the face, arm / hand, or leg / foot on one side of the body AND [2] sudden onset AND [3] present now   Negative: [1] Loss of speech or garbled speech AND [2] sudden onset AND [3] present now   Negative: Difficult to awaken or acting confused (e.g., disoriented, slurred speech)    Protocols used: Chest Pain-A-AH, Neurologic Deficit-A-AH

## 2025-04-02 VITALS
WEIGHT: 197.75 LBS | OXYGEN SATURATION: 95 % | HEART RATE: 60 BPM | DIASTOLIC BLOOD PRESSURE: 83 MMHG | TEMPERATURE: 98 F | RESPIRATION RATE: 15 BRPM | SYSTOLIC BLOOD PRESSURE: 137 MMHG | HEIGHT: 67 IN | BODY MASS INDEX: 31.04 KG/M2

## 2025-04-02 LAB
ANION GAP (OHS): 13 MMOL/L (ref 8–16)
BUN SERPL-MCNC: 10 MG/DL (ref 8–23)
CALCIUM SERPL-MCNC: 9.3 MG/DL (ref 8.7–10.5)
CHLORIDE SERPL-SCNC: 101 MMOL/L (ref 95–110)
CO2 SERPL-SCNC: 26 MMOL/L (ref 23–29)
CREAT SERPL-MCNC: 0.8 MG/DL (ref 0.5–1.4)
GFR SERPLBLD CREATININE-BSD FMLA CKD-EPI: >60 ML/MIN/1.73/M2
GLUCOSE SERPL-MCNC: 113 MG/DL (ref 70–110)
OHS QRS DURATION: 54 MS
OHS QRS DURATION: 64 MS
OHS QTC CALCULATION: 424 MS
OHS QTC CALCULATION: 462 MS
POTASSIUM SERPL-SCNC: 3.5 MMOL/L (ref 3.5–5.1)
SODIUM SERPL-SCNC: 140 MMOL/L (ref 136–145)
TROPONIN I SERPL DL<=0.01 NG/ML-MCNC: <0.006 NG/ML

## 2025-04-02 PROCEDURE — 84484 ASSAY OF TROPONIN QUANT: CPT | Performed by: EMERGENCY MEDICINE

## 2025-04-02 PROCEDURE — 96374 THER/PROPH/DIAG INJ IV PUSH: CPT

## 2025-04-02 PROCEDURE — 93010 ELECTROCARDIOGRAM REPORT: CPT | Mod: ,,, | Performed by: INTERNAL MEDICINE

## 2025-04-02 PROCEDURE — 63600175 PHARM REV CODE 636 W HCPCS: Performed by: EMERGENCY MEDICINE

## 2025-04-02 PROCEDURE — 25000003 PHARM REV CODE 250: Performed by: EMERGENCY MEDICINE

## 2025-04-02 PROCEDURE — 80048 BASIC METABOLIC PNL TOTAL CA: CPT | Performed by: EMERGENCY MEDICINE

## 2025-04-02 PROCEDURE — 93005 ELECTROCARDIOGRAM TRACING: CPT

## 2025-04-02 RX ORDER — PROCHLORPERAZINE EDISYLATE 5 MG/ML
10 INJECTION INTRAMUSCULAR; INTRAVENOUS ONCE
Status: COMPLETED | OUTPATIENT
Start: 2025-04-02 | End: 2025-04-02

## 2025-04-02 RX ADMIN — PROCHLORPERAZINE EDISYLATE 10 MG: 5 INJECTION INTRAMUSCULAR; INTRAVENOUS at 01:04

## 2025-04-02 RX ADMIN — POTASSIUM BICARBONATE 50 MEQ: 978 TABLET, EFFERVESCENT ORAL at 12:04

## 2025-04-02 NOTE — ED PROVIDER NOTES
SCRIBE #1 NOTE: I, Arelis Salmeron, am scribing for, and in the presence of, Antoine Raman DO. I have scribed the entire note.       History     Chief Complaint   Patient presents with    Tingling     Tingling that started I her head and progressed to the rest of her body. Symptoms started about 1730. Denies SOB and weakness     Review of patient's allergies indicates:  No Known Allergies      History of Present Illness     HPI    4/1/2025, 9:24 PM  History obtained from the patient      History of Present Illness: Janna Sheffield is a 64 y.o. female patient with a PMHx of HLD and HTN who presents to the Emergency Department for evaluation of tingling  which onset 5:30 pm, today. Pt was sitting at her desk when she started experiencing tingling in her face. The tingling gradually spread to her chest and bilateral upper extremities. Symptoms are constant and moderate in severity. No mitigating or exacerbating factors reported. Associated sxs include constant chest tightness. Patient denies any neck pain, SOB, N/V, myalgias, and all other sxs at this time. No prior Tx provided. No further complaints or concerns at this time.       Arrival mode: Ambulance Service    PCP: Ajit Botello MD        Past Medical History:  Past Medical History:   Diagnosis Date    Arthritis     Chest pain syndrome 10/23/2014    Hyperlipidemia     Hypertension     Macular degeneration, age related, nonexudative - Left Eye 3/24/2014    Retinal detachment, old, partial 4/14/2014    Sarcoidosis        Past Surgical History:  Past Surgical History:   Procedure Laterality Date    CATARACT EXTRACTION      COLONOSCOPY N/A 03/14/2022    Procedure: COLONOSCOPY;  Surgeon: Christi Medrano MD;  Location: [facility];  Service: Endoscopy;  Laterality: N/A;    DILATION AND CURETTAGE OF UTERUS      missed ab x 3    EPIDURAL STEROID INJECTION INTO CERVICAL SPINE N/A 12/21/2021    Procedure: C6/C7 IL HIMANSHU RN IV sedation;  Surgeon: Hadley WILLIS  MD Maximo;  Location: [facility] PAIN MGT;  Service: Pain Management;  Laterality: N/A;    ESOPHAGOGASTRODUODENOSCOPY N/A 02/06/2019    Procedure: ESOPHAGOGASTRODUODENOSCOPY (EGD);  Surgeon: Jairo Vargas III, MD;  Location: [facility] ENDO;  Service: Endoscopy;  Laterality: N/A;    HYSTERECTOMY  2001    MACARIO - complete    JOINT REPLACEMENT Left     Knee    knee surgery      TRANSFORAMINAL EPIDURAL INJECTION OF STEROID Right 04/18/2022    Procedure: INJECTION, STEROID, EPIDURAL, TRANSFORAMINAL APPROACH, RIGHT C6-C7;  Surgeon: Viraj Kramer MD;  Location: [facility], [place], operated by [facility];  Service: Pain Management;  Laterality: Right;    TRANSFORAMINAL EPIDURAL INJECTION OF STEROID Right 9/11/2023    Procedure: right C6/7 TF HIMANSHU w/ RN IV sedation;  Surgeon: Derian Watts MD;  Location: [facility] PAIN MGT;  Service: Pain Management;  Laterality: Right;    TUBAL LIGATION           Family History:  Family History   Problem Relation Name Age of Onset    Cervical cancer Mother      Heart disease Mother      Heart attack Mother      Hypertension Mother      Hyperlipidemia Mother      Hypertension Father      Hyperlipidemia Father      Stroke Father      Breast cancer Maternal Aunt      Stroke Brother      Breast cancer Maternal Cousin      Breast cancer Maternal Cousin      Breast cancer Paternal Cousin      Breast cancer Paternal Cousin      Colon cancer Neg Hx      Thrombophilia Neg Hx      Deep vein thrombosis Neg Hx      Pulmonary embolism Neg Hx         Social History:  Social History     Tobacco Use    Smoking status: Never    Smokeless tobacco: Never   Substance and Sexual Activity    Alcohol use: No    Drug use: No    Sexual activity: Yes     Partners: Male     Comment:         Review of Systems     Review of Systems   Respiratory:  Positive for chest tightness. Negative for shortness of breath.    Gastrointestinal:  Negative for nausea and vomiting.   Musculoskeletal:  Negative for myalgias and neck pain.   Neurological:         Tingling  "     Physical Exam     Initial Vitals [04/01/25 1926]   BP Pulse Resp Temp SpO2   126/78 86 18 98.2 °F (36.8 °C) 100 %      MAP       --          Physical Exam  Constitutional:       General: She is not in acute distress.     Appearance: Normal appearance.   Cardiovascular:      Rate and Rhythm: Normal rate and regular rhythm.      Heart sounds: No murmur heard.  Pulmonary:      Effort: Pulmonary effort is normal.      Breath sounds: No wheezing.   Abdominal:      General: There is no distension.      Palpations: Abdomen is soft.      Tenderness: There is no abdominal tenderness.   Musculoskeletal:      Comments: Pulses are 2+ to the bilateral upper extremities both radial and ulna, 5/5 muscle strength of the bilateral upper extremity, there is equal subjective sensation abnormalities to the bilateral upper extremities   Neurological:      General: No focal deficit present.      Mental Status: She is alert and oriented to person, place, and time.      Motor: No weakness.       Nursing Notes and Vital Signs Reviewed.       ED Course   Procedures  ED Vital Signs:  Vitals:    04/01/25 1926 04/01/25 2105 04/01/25 2130 04/01/25 2230   BP: 126/78 136/68 (!) 193/94 (!) 190/90   Pulse: 86 68 67 61   Resp: 18 20 20 20   Temp: 98.2 °F (36.8 °C) 98.2 °F (36.8 °C)     TempSrc: Oral Oral     SpO2: 100% 99% 98% 100%   Weight:  89.7 kg (197 lb 12 oz)     Height:  5' 7" (1.702 m)      04/01/25 2300 04/02/25 0000 04/02/25 0249   BP: 137/81 137/83 137/83   Pulse: 64 60 60   Resp: 20 15 15   Temp:   98.2 °F (36.8 °C)   TempSrc:      SpO2: 99% 95% 95%   Weight:      Height:          Abnormal Lab Results:  Labs Reviewed   COMPREHENSIVE METABOLIC PANEL - Abnormal       Result Value    Sodium 142      Potassium 2.9 (*)     Chloride 100      CO2 30 (*)     Glucose 120 (*)     BUN 11      Creatinine 0.9      Calcium 9.8      Protein Total 8.4      Albumin 3.9      Bilirubin Total 0.5      ALP 89      AST 18      ALT 14      Anion Gap 12   "    eGFR >60     BASIC METABOLIC PANEL - Abnormal    Sodium 140      Potassium 3.5      Chloride 101      CO2 26      Glucose 113 (*)     BUN 10      Creatinine 0.8      Calcium 9.3      Anion Gap 13      eGFR >60     B-TYPE NATRIURETIC PEPTIDE - Normal    BNP 17     MAGNESIUM - Normal    Magnesium  2.2     TROPONIN I - Normal    Troponin-I 0.012     CBC WITH DIFFERENTIAL - Normal    WBC 7.18      RBC 4.46      HGB 13.8      HCT 41.9      MCV 94      MCH 30.9      MCHC 32.9      RDW 12.9      Platelet Count 262      MPV 10.3      Nucleated RBC 0      Neut % 52.5      Lymph % 38.9      Mono % 7.1      Eos % 0.8      Basophil % 0.4      Imm Grans % 0.3      Neut # 3.77      Lymph # 2.79      Mono # 0.51      Eos # 0.06      Baso # 0.03      Imm Grans # 0.02     TROPONIN I - Normal    Troponin-I <0.006     CBC W/ AUTO DIFFERENTIAL    Narrative:     The following orders were created for panel order CBC auto differential.  Procedure                               Abnormality         Status                     ---------                               -----------         ------                     CBC with Differential[[id number]]       Normal              Final result                 Please view results for these tests on the individual orders.        All Lab Results:  Results for orders placed or performed during the hospital encounter of 04/01/25   EKG 12-lead    Collection Time: 04/01/25 10:16 PM   Result Value Ref Range    QRS Duration 64 ms    [facility] QTC Calculation 424 ms   Brain natriuretic peptide    Collection Time: 04/01/25 10:46 PM   Result Value Ref Range    BNP 17 0 - 99 pg/mL   Comprehensive metabolic panel    Collection Time: 04/01/25 10:46 PM   Result Value Ref Range    Sodium 142 136 - 145 mmol/L    Potassium 2.9 (L) 3.5 - 5.1 mmol/L    Chloride 100 95 - 110 mmol/L    CO2 30 (H) 23 - 29 mmol/L    Glucose 120 (H) 70 - 110 mg/dL    BUN 11 8 - 23 mg/dL    Creatinine 0.9 0.5 - 1.4 mg/dL    Calcium 9.8 8.7 - 10.5  mg/dL    Protein Total 8.4 6.0 - 8.4 gm/dL    Albumin 3.9 3.5 - 5.2 g/dL    Bilirubin Total 0.5 0.1 - 1.0 mg/dL    ALP 89 40 - 150 unit/L    AST 18 11 - 45 unit/L    ALT 14 10 - 44 unit/L    Anion Gap 12 8 - 16 mmol/L    eGFR >60 >60 mL/min/1.73/m2   Magnesium    Collection Time: 04/01/25 10:46 PM   Result Value Ref Range    Magnesium  2.2 1.6 - 2.6 mg/dL   Troponin I    Collection Time: 04/01/25 10:46 PM   Result Value Ref Range    Troponin-I 0.012 <=0.026 ng/mL   CBC with Differential    Collection Time: 04/01/25 10:46 PM   Result Value Ref Range    WBC 7.18 3.90 - 12.70 K/uL    RBC 4.46 4.00 - 5.40 M/uL    HGB 13.8 12.0 - 16.0 gm/dL    HCT 41.9 37.0 - 48.5 %    MCV 94 82 - 98 fL    MCH 30.9 27.0 - 31.0 pg    MCHC 32.9 32.0 - 36.0 g/dL    RDW 12.9 11.5 - 14.5 %    Platelet Count 262 150 - 450 K/uL    MPV 10.3 9.2 - 12.9 fL    Nucleated RBC 0 <=0 /100 WBC    Neut % 52.5 38 - 73 %    Lymph % 38.9 18 - 48 %    Mono % 7.1 4 - 15 %    Eos % 0.8 <=8 %    Basophil % 0.4 <=1.9 %    Imm Grans % 0.3 0.0 - 0.5 %    Neut # 3.77 1.8 - 7.7 K/uL    Lymph # 2.79 1 - 4.8 K/uL    Mono # 0.51 0.3 - 1 K/uL    Eos # 0.06 <=0.5 K/uL    Baso # 0.03 <=0.2 K/uL    Imm Grans # 0.02 0.00 - 0.04 K/uL   Repeat EKG 12-lead    Collection Time: 04/02/25  1:41 AM   Result Value Ref Range    QRS Duration 54 ms    [facility] QTC Calculation 462 ms   Basic metabolic panel    Collection Time: 04/02/25  2:03 AM   Result Value Ref Range    Sodium 140 136 - 145 mmol/L    Potassium 3.5 3.5 - 5.1 mmol/L    Chloride 101 95 - 110 mmol/L    CO2 26 23 - 29 mmol/L    Glucose 113 (H) 70 - 110 mg/dL    BUN 10 8 - 23 mg/dL    Creatinine 0.8 0.5 - 1.4 mg/dL    Calcium 9.3 8.7 - 10.5 mg/dL    Anion Gap 13 8 - 16 mmol/L    eGFR >60 >60 mL/min/1.73/m2   Troponin I    Collection Time: 04/02/25  2:03 AM   Result Value Ref Range    Troponin-I <0.006 <=0.026 ng/mL       Imaging Results:  Imaging Results              CT Head Without Contrast (Final result)  Result time 04/01/25  22:17:57      Final result by Antoine Gilliland MD (04/01/25 22:17:57)                   Impression:      No acute intracranial abnormality.      All CT scans at [this location] are performed using dose modulation techniques as appropriate to a performed exam including the following: automated exposure control; adjustment of the mA and/or kV according to patient size (this includes techniques or standardized protocols for targeted exams where dose is matched to indication / reason for exam; i.e. extremities or head); use of iterative reconstruction technique.    Finalized on: 4/1/2025 10:17 PM By:  Antoine Gilliland MD  [facility]# [id number]      2025-04-01 22:20:06.525     [facility]               Narrative:    EXAM: CT HEAD WITHOUT CONTRAST    CLINICAL HISTORY: Paresthesias;    TECHNIQUE: Contiguous axial images obtained through the head without intravenous contrast.  Sagittal and coronal reconstructions performed.    COMPARISON: None available.    FINDINGS:  Cerebral and ventricular volumes are within normal limits for the patient's age. No evidence of hydrocephalus.    No CT evidence of acute territorial infarct, intracranial hemorrhage, or mass lesion. No midline shift or mass effect.    Midline structures and posterior fossa structures appear unremarkable. Basal cisterns are patent. Bilateral carotid siphon calcification noted.    Calvarium is intact without acute or aggressive abnormality. Postsurgical appearance of the bilateral orbital lens.  Visualized orbits and globes otherwise within normal limits. Visualized paranasal sinuses and mastoid air cells are clear.                                         X-Ray Chest AP Portable (Final result)  Result time 04/01/25 22:09:00      Final result by Yeyo Dougherty DO (04/01/25 22:09:00)                   Impression:    No acute cardiopulmonary disease.    Finalized on: 4/1/2025 10:09 PM By:  Yeyo Dougherty  [facility]# [id number]      2025-04-01 22:11:05.954     [facility]                Narrative:    Exam: XR CHEST AP PORTABLE    Comparison: None    Clinical Indication:  Chest pain    Findings: Heart size and pulmonary vasculature are unremarkable.  No focal consolidation, pleural effusions or pneumothorax.    No acute angulated or displaced fractures.                                         The EKG was ordered, reviewed, and independently interpreted by the ED provider.  Interpretation time: 22:16  Rate: 61 BPM  Rhythm: normal sinus rhythm  Interpretation: Minimal voltage criteria for LVH, may be normal variant (R in aVL). Inferior infarct, age undetermined. Anterolateral infarct, age undetermined. No STEMI.    The EKG was ordered, reviewed, and independently interpreted by the ED provider.  Interpretation time: 01:41  Rate: 69 BPM  Rhythm:  Sinus rhythm with occasional premature ventricular complexes  Interpretation: Inferior infarct, age undetermined. Anterolateral infarct, age undetermined. No STEMI.           The Emergency Provider reviewed the vital signs and test results, which are outlined above.     ED Discussion     2:51 AM: Reassessed pt at this time. Discussed with patient and/or family/caretaker all pertinent ED information and results. Discussed pt dx and plan of tx. Gave the patient all f/u and return to the ED instructions. All questions and concerns were addressed at this time. Patient and/or family/caretaker expresses understanding of information and instructions, and is comfortable with plan to discharge. Pt is stable for discharge.     I discussed with patient and/or family/caretaker that evaluation in the ED does not suggest any emergent or life threatening medical conditions requiring immediate intervention beyond what was provided in the ED, and I believe patient is safe for discharge.  Regardless, an unremarkable evaluation in the ED does not preclude the development or presence of a serious of life threatening condition. As such, I instructed that the patient is to  return immediately for any worsening or change in current symptoms.    ED Course as of 04/02/25 2236   Wed Apr 02, 2025   0242 Troponin I  Troponin negative x2 [CD]   0243 Magnesium  Within normal limits [CD]   0243 CBC auto differential  Within normal limits [CD]   0243 Basic metabolic panel(!)  Hyperglycemia [CD]   0243 Brain natriuretic peptide  Within normal limits [CD]   0243 Comprehensive metabolic panel(!)  Hypokalemia and other nonspecific findings [CD]   0243 CT Head Without Contrast  No acute findings [CD]   0243 X-Ray Chest AP Portable  No acute findings [CD]      ED Course User Index  [CD] Antoine Raman,      Medical Decision Making  On final re-evaluation patient is sent are all resolved.  Workup is unremarkable.  Instructed to return immediately for any new or worsening symptoms and she verbalized understanding.    Amount and/or Complexity of Data Reviewed  Labs: ordered. Decision-making details documented in ED Course.  Radiology: ordered. Decision-making details documented in ED Course.  ECG/medicine tests: ordered and independent interpretation performed. Decision-making details documented in ED Course.    Risk  OTC drugs.  Prescription drug management.  Risk Details: Differential diagnosis includes but is not limited to:  ACS, heart failure, CVA, electrolyte abnormality                ED Medication(s):  Medications   aspirin chewable tablet 324 mg (324 mg Oral Given 4/1/25 2222)   potassium bicarbonate disintegrating tablet 50 mEq (50 mEq Oral Given 4/2/25 0033)   prochlorperazine injection Soln 10 mg (10 mg Intravenous Given 4/2/25 0152)       Discharge Medication List as of 4/2/2025  2:48 AM           Follow-up Information       Schedule an appointment as soon as possible for a visit  with Ajit Botello MD.    Specialties: Internal Medicine, Pediatrics  Contact information:  [id number]  [place] [address] [phone]               Schedule an appointment as soon as  possible for a visit  with Mariana Peter MD.    Specialties: Interventional Cardiology, Cardiology  Contact information:  [address]  [place] [address]  [phone]                                 Scribe Attestation:   Scribe #1: I performed the above scribed service and the documentation accurately describes the services I performed. I attest to the accuracy of the note.     Attending:   Physician Attestation Statement for Scribe #1: I, Antoine Raman DO, personally performed the services described in this documentation, as scribed by Arelis Salmeron, in my presence, and it is both accurate and complete.           Clinical Impression       ICD-10-CM ICD-9-CM   1. Chest pain  R07.9 786.50       Disposition:   Disposition: Discharged  Condition: Stable       Antoine Raman DO  04/02/25 2248

## 2025-04-07 ENCOUNTER — OFFICE VISIT (OUTPATIENT)
Dept: INTERNAL MEDICINE | Facility: CLINIC | Age: 65
End: 2025-04-07
Payer: COMMERCIAL

## 2025-04-07 ENCOUNTER — LAB VISIT (OUTPATIENT)
Dept: LAB | Facility: HOSPITAL | Age: 65
End: 2025-04-07
Attending: PEDIATRICS
Payer: COMMERCIAL

## 2025-04-07 VITALS
SYSTOLIC BLOOD PRESSURE: 136 MMHG | HEIGHT: 67 IN | OXYGEN SATURATION: 98 % | HEART RATE: 88 BPM | TEMPERATURE: 97 F | RESPIRATION RATE: 16 BRPM | BODY MASS INDEX: 30.9 KG/M2 | DIASTOLIC BLOOD PRESSURE: 86 MMHG | WEIGHT: 196.88 LBS

## 2025-04-07 DIAGNOSIS — E87.6 HYPOKALEMIA: ICD-10-CM

## 2025-04-07 DIAGNOSIS — E87.6 HYPOKALEMIA: Primary | ICD-10-CM

## 2025-04-07 DIAGNOSIS — I10 PRIMARY HYPERTENSION: ICD-10-CM

## 2025-04-07 LAB
ANION GAP (OHS): 11 MMOL/L (ref 8–16)
BUN SERPL-MCNC: 16 MG/DL (ref 8–23)
CALCIUM SERPL-MCNC: 9.6 MG/DL (ref 8.7–10.5)
CHLORIDE SERPL-SCNC: 101 MMOL/L (ref 95–110)
CO2 SERPL-SCNC: 29 MMOL/L (ref 23–29)
CREAT SERPL-MCNC: 0.8 MG/DL (ref 0.5–1.4)
GFR SERPLBLD CREATININE-BSD FMLA CKD-EPI: >60 ML/MIN/1.73/M2
GLUCOSE SERPL-MCNC: 130 MG/DL (ref 70–110)
MAGNESIUM SERPL-MCNC: 2.2 MG/DL (ref 1.6–2.6)
PHOSPHATE SERPL-MCNC: 2.3 MG/DL (ref 2.7–4.5)
POTASSIUM SERPL-SCNC: 3.5 MMOL/L (ref 3.5–5.1)
SODIUM SERPL-SCNC: 141 MMOL/L (ref 136–145)

## 2025-04-07 PROCEDURE — 80048 BASIC METABOLIC PNL TOTAL CA: CPT

## 2025-04-07 PROCEDURE — 36415 COLL VENOUS BLD VENIPUNCTURE: CPT

## 2025-04-07 PROCEDURE — 84100 ASSAY OF PHOSPHORUS: CPT

## 2025-04-07 PROCEDURE — 99999 PR PBB SHADOW E&M-EST. PATIENT-LVL IV: CPT | Mod: PBBFAC,,, | Performed by: PEDIATRICS

## 2025-04-07 PROCEDURE — 83735 ASSAY OF MAGNESIUM: CPT

## 2025-04-07 RX ORDER — SPIRONOLACTONE 25 MG/1
25 TABLET ORAL DAILY
Qty: 90 TABLET | Refills: 3 | Status: SHIPPED | OUTPATIENT
Start: 2025-04-07 | End: 2026-04-07

## 2025-04-07 NOTE — PROGRESS NOTES
Patient ID: Janna Sheffield is a 64 y.o. female.    Chief Complaint: Hospital Follow Up    History of Present Illness    CHIEF COMPLAINT:  Patient presents today for follow up after recent emergency room visit for tingling sensations    HISTORY OF PRESENT ILLNESS:  She continues to experience tingling sensations since her ER visit but with decreased intensity. Episodes occurred on Wednesday and the night prior to visit, with pain noted across chest region. She denies any precipitating factors or activities that may have led to dehydration prior to the ER visit.    RECENT EMERGENCY ROOM EVALUATION:  Multiple tests were performed including EKGs, head CT, cardiac labs, and blood chemistries. Blood work revealed low potassium levels, for which she received potassium supplementation in the ER.    CURRENT MEDICATIONS:  She takes hydrochlorothiazide for blood pressure management.    PMH, PSH, SH, FH reviewed with patient.      ROS:  General: -fever, -chills, -fatigue, -weight gain, -weight loss  Eyes: -vision changes, -redness, -discharge  ENT: -ear pain, -nasal congestion, -sore throat  Cardiovascular: -chest pain, -palpitations, -lower extremity edema  Respiratory: -cough, -shortness of breath  Gastrointestinal: -abdominal pain, -nausea, -vomiting, -diarrhea, -constipation, -blood in stool, +abdominal distention  Genitourinary: -dysuria, -hematuria, -frequency  Musculoskeletal: -joint pain, -muscle pain, +muscle cramps  Skin: -rash, -lesion  Neurological: -headache, -dizziness, -numbness, +tingling  Psychiatric: -anxiety, -depression, -sleep difficulty         Exam:  Physical Exam    General: No acute distress. Well-developed. Well-nourished.  Eyes: EOMI. Sclerae anicteric.  HENT: Normocephalic. Atraumatic. Nares patent. Moist oral mucosa.  Cardiovascular: Regular rate. Regular rhythm. No murmurs. No rubs. No gallops. Normal S1, S2.  Respiratory: Normal respiratory effort. Clear to auscultation bilaterally. No rales.  No rhonchi. No wheezing.  Abdomen: Soft. Non-tender. Non-distended. Normoactive bowel sounds.  Musculoskeletal: No  obvious deformity.  Extremities: No lower extremity edema.  Neurological: Alert & oriented x3. No slurred speech. Normal gait.  Psychiatric: Normal mood. Normal affect. Good insight. Good judgment.  Skin: Warm. Dry. No rash.         Assessment/Plan:  Hypokalemia  -     Basic Metabolic Panel; Future; Expected date: 04/07/2025  -     Magnesium; Future; Expected date: 04/07/2025  -     PHOSPHORUS; Future; Expected date: 04/07/2025  -     Basic Metabolic Panel; Future; Expected date: 04/07/2025    Primary hypertension  -     spironolactone (ALDACTONE) 25 MG tablet; Take 1 tablet (25 mg total) by mouth once daily.  Dispense: 90 tablet; Refill: 3         Assessment & Plan    I10 Essential (primary) hypertension  E87.6 Hypokalemia  R20.2 Paresthesia of skin  R07.89 Other chest pain    IMPRESSION:  - Attributed recent ER visit for tingling sensation to low potassium likely caused by HCTZ.  - Considered options to address low potassium: dietary changes, potassium supplements, medication adjustments.  - Attributed current abdominal tightness to muscle spasms from low potassium.  - Switched from HCTZ to spironolactone 25 mg daily for BP management, potassium preservation, and extra heart protection. May increase to 50 mg if needed.  - Discussed spironolactone's diuretic effects similar to HCTZ.    HYPERTENSION:  - Switched from hydrochlorothiazide to spironolactone 25 mg daily for blood pressure management and extra heart protection.  - May increase dosage to 50 mg if needed.  - Instructed the patient to monitor blood pressure regularly at home.    HYPOKALEMIA:  - Switched from hydrochlorothiazide to spironolactone 25 mg daily for potassium preservation.  - Ordered repeat potassium, magnesium, phosphorus levels and chemistry panel in 2 weeks.  - Educated the patient about the effects of low potassium, including  muscle cramps and lightheadedness.  - Noted that the patient visited the emergency room due to tingling sensation, with labs revealing low potassium levels.  - Observed that the patient reports ongoing symptoms, though less intense.  - Explained to the patient that the pins and needle feeling is an effect of low potassium.  - Assessed that the paresthesia is likely due to low potassium levels and not a more serious condition.  - Treatment focuses on addressing the underlying cause (low potassium) by changing medication.  - Discussed potassium-rich foods and advised patient to increase dietary intake of tomatoes, bananas, cantaloupe, and other fruits and vegetables.    PARESTHESIA:  - Noted that the patient reports ongoing but less intense tingling sensation and abnormal feelings, which initially led to an ER visit.    CHEST PAIN:  - Noted that the patient reports a tightening sensation in the chest area.  - Evaluated that the chest tightness is likely due to muscle spasms from low potassium.  - Assessed that the symptoms are related to low potassium and not a cardiac issue.    FOLLOW-UP:  - Follow up in May (approximately 4 weeks from now) as scheduled for regular appointment.          Visit today included increased complexity associated with the care of the episodic problem  addressed and managing the longitudinal care of the patient due to the serious and/or complex managed problem(s) .      No follow-ups on file.  Transitional Care Note    Family and/or Caretaker present at visit?  No.  Diagnostic tests reviewed/disposition: I have reviewed all completed as well as pending diagnostic tests at the time of discharge.  Disease/illness education: given  Home health/community services discussion/referrals: Patient does not have home health established from hospital visit.  They do not need home health.  If needed, we will set up home health for the patient.   Establishment or re-establishment of referral orders for  community resources: No other necessary community resources.   Discussion with other health care providers: No discussion with other health care providers necessary.        This note was generated with the assistance of ambient listening technology. Verbal consent was obtained by the patient and accompanying visitor(s) for the recording of patient appointment to facilitate this note. I attest to having reviewed and edited the generated note for accuracy, though some syntax or spelling errors may persist. Please contact the author of this note for any clarification.

## 2025-04-08 ENCOUNTER — RESULTS FOLLOW-UP (OUTPATIENT)
Dept: INTERNAL MEDICINE | Facility: CLINIC | Age: 65
End: 2025-04-08

## 2025-04-10 ENCOUNTER — TELEPHONE (OUTPATIENT)
Dept: PULMONOLOGY | Facility: CLINIC | Age: 65
End: 2025-04-10
Payer: COMMERCIAL

## 2025-04-10 NOTE — TELEPHONE ENCOUNTER
----- Message from Kylah sent at 4/10/2025  1:41 PM CDT -----  Contact: Janna  .Patient is calling to speak with the nurse regarding appt  . Reports needing to schedule a well check but want to make sure this is what the pt needs before pt schedule  . Please give patient a call back at .[phone]

## 2025-04-23 ENCOUNTER — OFFICE VISIT (OUTPATIENT)
Dept: CARDIOLOGY | Facility: CLINIC | Age: 65
End: 2025-04-23
Payer: COMMERCIAL

## 2025-04-23 VITALS
HEART RATE: 74 BPM | SYSTOLIC BLOOD PRESSURE: 127 MMHG | OXYGEN SATURATION: 97 % | WEIGHT: 193.13 LBS | BODY MASS INDEX: 30.25 KG/M2 | DIASTOLIC BLOOD PRESSURE: 87 MMHG

## 2025-04-23 DIAGNOSIS — E78.5 HYPERLIPIDEMIA, UNSPECIFIED HYPERLIPIDEMIA TYPE: ICD-10-CM

## 2025-04-23 DIAGNOSIS — R07.9 CHEST PAIN: ICD-10-CM

## 2025-04-23 DIAGNOSIS — Z96.652 H/O TOTAL KNEE REPLACEMENT, LEFT: ICD-10-CM

## 2025-04-23 DIAGNOSIS — D86.0 PULMONARY SARCOIDOSIS: Primary | ICD-10-CM

## 2025-04-23 DIAGNOSIS — I10 PRIMARY HYPERTENSION: ICD-10-CM

## 2025-04-23 DIAGNOSIS — R94.31 ABNORMAL EKG: ICD-10-CM

## 2025-04-23 DIAGNOSIS — R73.03 PRE-DIABETES: ICD-10-CM

## 2025-04-23 PROCEDURE — 3008F BODY MASS INDEX DOCD: CPT | Mod: CPTII,S$GLB,, | Performed by: INTERNAL MEDICINE

## 2025-04-23 PROCEDURE — 3074F SYST BP LT 130 MM HG: CPT | Mod: CPTII,S$GLB,, | Performed by: INTERNAL MEDICINE

## 2025-04-23 PROCEDURE — 99204 OFFICE O/P NEW MOD 45 MIN: CPT | Mod: S$GLB,,, | Performed by: INTERNAL MEDICINE

## 2025-04-23 PROCEDURE — 99999 PR PBB SHADOW E&M-EST. PATIENT-LVL IV: CPT | Mod: PBBFAC,,, | Performed by: INTERNAL MEDICINE

## 2025-04-23 PROCEDURE — 3079F DIAST BP 80-89 MM HG: CPT | Mod: CPTII,S$GLB,, | Performed by: INTERNAL MEDICINE

## 2025-04-23 PROCEDURE — 1159F MED LIST DOCD IN RCRD: CPT | Mod: CPTII,S$GLB,, | Performed by: INTERNAL MEDICINE

## 2025-04-23 NOTE — PROGRESS NOTES
Subjective:   Patient ID:  04/23/2025      Janna Sheffield is a 64 y.o. female who presents for evaluation of Chest Pain, Fatigue, and Shortness of Breath      History of Present Illness    CHIEF COMPLAINT:  Patient presents today for evaluation of chest pain and tingling sensations    HISTORY OF PRESENT ILLNESS:  She experiences chest pain with associated tingling sensations in face, head, neck, chest, and arms. The chest tightness worsens with exertion, rated as 6-7/10 in severity. Troponin from recent ER visit were normal. Echo in 2022 was normal.    MEDICAL HISTORY:  She has sarcoidosis diagnosed in 1985 with liver involvement, previously treated with MRM which was discontinued 10 years ago due to disease inactivity. She has received multiple courses of prednisone in the past. History also includes knee replacement.    MEDICATIONS:  She takes Spironolactone for BP management and a medication for high cholesterol.    SOCIAL HISTORY:  She has never smoked and denies alcohol use. Diet includes high sodium foods such as potato chips and peanuts.         HPI    Past Medical History:   Diagnosis Date    Arthritis     Chest pain syndrome 10/23/2014    Hyperlipidemia     Hypertension     Macular degeneration, age related, nonexudative - Left Eye 3/24/2014    Retinal detachment, old, partial 4/14/2014    Sarcoidosis        Past Surgical History:   Procedure Laterality Date    CATARACT EXTRACTION      COLONOSCOPY N/A 03/14/2022    Procedure: COLONOSCOPY;  Surgeon: Christi Medrano MD;  Location: [facility];  Service: Endoscopy;  Laterality: N/A;    DILATION AND CURETTAGE OF UTERUS      missed ab x 3    EPIDURAL STEROID INJECTION INTO CERVICAL SPINE N/A 12/21/2021    Procedure: C6/C7 IL HIMANSHU RN IV sedation;  Surgeon: Hadley Marsh MD;  Location: [facility];  Service: Pain Management;  Laterality: N/A;    ESOPHAGOGASTRODUODENOSCOPY N/A 02/06/2019    Procedure: ESOPHAGOGASTRODUODENOSCOPY (EGD);  Surgeon: Jairo MONK  Sam MALONE MD;  Location: [facility] ENDO;  Service: Endoscopy;  Laterality: N/A;    HYSTERECTOMY  2001    MACARIO - complete    JOINT REPLACEMENT Left     Knee    knee surgery      TRANSFORAMINAL EPIDURAL INJECTION OF STEROID Right 04/18/2022    Procedure: INJECTION, STEROID, EPIDURAL, TRANSFORAMINAL APPROACH, RIGHT C6-C7;  Surgeon: Viraj Kramer MD;  Location: [facility] PAIN MGT;  Service: Pain Management;  Laterality: Right;    TRANSFORAMINAL EPIDURAL INJECTION OF STEROID Right 9/11/2023    Procedure: right C6/7 TF HIMANSHU w/ RN IV sedation;  Surgeon: Derian Watts MD;  Location: [facility] PAIN [facility]GT;  Service: Pain Management;  Laterality: Right;    TUBAL LIGATION         Social History[1]    Family History   Problem Relation Name Age of Onset    Cervical cancer Mother      Heart disease Mother      Heart attack Mother      Hypertension Mother      Hyperlipidemia Mother      Hypertension Father      Hyperlipidemia Father      Stroke Father      Breast cancer Maternal Aunt      Stroke Brother      Breast cancer Maternal Cousin      Breast cancer Maternal Cousin      Breast cancer Paternal Cousin      Breast cancer Paternal Cousin      Colon cancer Neg Hx      Thrombophilia Neg Hx      Deep vein thrombosis Neg Hx      Pulmonary embolism Neg Hx           Review of Systems   Cardiovascular:  Positive for chest pain and dyspnea on exertion. Negative for palpitations and syncope.   Neurological:  Negative for focal weakness.       Current Outpatient Medications on File Prior to Visit   Medication Sig    diclofenac sodium (VOLTAREN) 1 % Gel Apply 2 g topically 3 (three) times daily as needed (Knee pain).    LIDOcaine-prilocaine (EMLA) cream Apply topically up to 4x per day to affected area for pain relief    methocarbamoL (ROBAXIN) 500 MG Tab Take 1 tablet (500 mg total) by mouth 3 (three) times daily.    mometasone (ELOCON) 0.1 % ointment Apply topically once daily.    rosuvastatin (CRESTOR) 40 MG Tab Take 1 tablet by mouth once daily     spironolactone (ALDACTONE) 25 MG tablet Take 1 tablet (25 mg total) by mouth once daily.    amoxicillin-clavulanate 875-125mg (AUGMENTIN) 875-125 mg per tablet Take 1 tablet by mouth every 12 (twelve) hours.    gabapentin (NEURONTIN) 600 MG tablet Take 1 tablet (600 mg total) by mouth 3 (three) times daily. (Patient not taking: Reported on 4/23/2025)    multivitamin (THERAGRAN) per tablet Take 1 tablet by mouth Daily.    prednisoLONE acetate (PRED FORTE) 1 % DrpS Place 1 drop into the right eye 4 (four) times daily.     No current facility-administered medications on file prior to visit.       Objective:   Objective:  Wt Readings from Last 3 Encounters:   04/23/25 87.6 kg (193 lb 2 oz)   04/07/25 89.3 kg (196 lb 13.9 oz)   04/01/25 89.7 kg (197 lb 12 oz)     Temp Readings from Last 3 Encounters:   04/07/25 96.7 °F (35.9 °C) (Tympanic)   04/02/25 98.2 °F (36.8 °C)   11/08/24 97.2 °F (36.2 °C) (Tympanic)     BP Readings from Last 3 Encounters:   04/23/25 127/87   04/07/25 136/86   04/02/25 137/83     Pulse Readings from Last 3 Encounters:   04/23/25 74   04/07/25 88   04/02/25 60       Physical Exam  Vitals reviewed.   Constitutional:       Appearance: She is well-developed.   Neck:      Vascular: No carotid bruit.   Cardiovascular:      Rate and Rhythm: Normal rate and regular rhythm.      Pulses: Intact distal pulses.      Heart sounds: Normal heart sounds. No murmur heard.  Pulmonary:      Breath sounds: Normal breath sounds.   Neurological:      Mental Status: She is oriented to person, place, and time.           Lab Results   Component Value Date    CHOL 168 11/08/2024    CHOL 181 05/02/2024    CHOL 178 10/25/2023     Lab Results   Component Value Date    HDL 37 (L) 11/08/2024    HDL 47 05/02/2024    HDL 42 10/25/2023     Lab Results   Component Value Date    LDLCALC 113.4 11/08/2024    LDLCALC 117.2 05/02/2024    LDLCALC 120.2 10/25/2023     Lab Results   Component Value Date    TRIG 88 11/08/2024    TRIG 84  05/02/2024    TRIG 79 10/25/2023     Lab Results   Component Value Date    CHOLHDL 22.0 11/08/2024    CHOLHDL 26.0 05/02/2024    CHOLHDL 23.6 10/25/2023       Chemistry        Component Value Date/Time     04/07/2025 1431     11/12/2024 1357    K 3.5 04/07/2025 1431    K 3.5 11/12/2024 1357     04/07/2025 1431     11/12/2024 1357    CO2 29 04/07/2025 1431    CO2 31 (H) 11/12/2024 1357    BUN 16 04/07/2025 1431    CREATININE 0.8 04/07/2025 1431    GLU 96 11/12/2024 1357        Component Value Date/Time    CALCIUM 9.6 04/07/2025 1431    CALCIUM 10.2 11/12/2024 1357    ALKPHOS 89 04/01/2025 2246    ALKPHOS 85 11/12/2024 1357    AST 18 04/01/2025 2246    AST 23 11/12/2024 1357    ALT 14 04/01/2025 2246    ALT 18 11/12/2024 1357    BILITOT 0.5 04/01/2025 2246    BILITOT 0.4 11/12/2024 1357    ESTGFRAFRICA >60 07/28/2022 1427    EGFRNONAA >60 07/28/2022 1427          Lab Results   Component Value Date    TSH 0.930 02/05/2022     Lab Results   Component Value Date    INR 1.1 10/22/2014     Lab Results   Component Value Date    WBC 7.18 04/01/2025    HGB 13.8 04/01/2025    HCT 41.9 04/01/2025    MCV 94 04/01/2025     04/01/2025     BNP  @LABRCNTIP(BNP,BNPTRIAGEBLO)@  CrCl cannot be calculated (Patient's most recent lab result is older than the maximum 7 days allowed.).     Imaging:  ======    No results found for this or any previous visit.    No results found for this or any previous visit.    Results for orders placed during the hospital encounter of 01/23/24    X-Ray Chest PA And Lateral    Narrative  EXAMINATION:  XR CHEST PA AND LATERAL    CLINICAL HISTORY:  Sarcoidosis of lung    TECHNIQUE:  PA and lateral views of the chest were performed.    FINDINGS:  Comparison: January 31, 2023.    Mediastinal silhouette is within normal limits.  The lungs are clear.  No pneumothorax or pleural effusion.  No acute osseous finding.    Impression  No acute finding in the chest.      Electronically  signed by: Robert Lynch  Date:    01/23/2024  Time:    11:08      No results found for this or any previous visit.      No valid procedures specified.        No results found for this or any previous visit.      Results for orders placed during the hospital encounter of 07/21/20    Echo Color Flow Doppler? Yes    Interpretation Summary  · Normal left ventricular systolic function. The estimated ejection fraction is 65%.  · Normal right ventricular systolic function.  · Normal LV diastolic function.  · The estimated PA systolic pressure is 26 mmHg.      No results found for this or any previous visit.      Lab Results   Component Value Date    HGBA1C 5.9 (H) 11/08/2024     Summary 2022  Show Result ComparisonThe left ventricle is normal in size with concentric remodeling and normal systolic function.  Normal left ventricular diastolic function.  The estimated PA systolic pressure is 30 mmHg.  Normal right ventricular size with normal right ventricular systolic function.  Normal central venous pressure (3 mmHg).  The estimated ejection fraction is 60%.  Mild mitral regurgitation.  During stress, the following significant arrhythmias were observed: occasional PVCs.  The test was stopped because the patient experienced fatigue.  The stress echo portion of this study is negative for myocardial ischemia.  The patient's exercise capacity was normal.  The ECG portion of this study is abnormal but not diagnostic for ischemia.       The 10-year ASCVD risk score (Patsy DK, et al., 2019) is: 8.2%    Values used to calculate the score:      Age: 64 years      Sex: Female      Is Non- : Yes      Diabetic: No      Tobacco smoker: No      Systolic Blood Pressure: 127 mmHg      Is BP treated: Yes      HDL Cholesterol: 37 mg/dL      Total Cholesterol: 168 mg/dL    Diagnostic Results:  ECG: Reviewed  Sinus rhythm with occasional Premature ventricular complexes  Inferior infarct ,age  undetermined  Anterolateral infarct ,age undetermined  Abnormal ECG  No previous ECGs available  Confirmed by Mason Howe ([id number]) on 4/2/2025 3:55:21 PM       Assessment and Plan:   Pulmonary sarcoidosis    Chest pain  -     Ambulatory referral/consult to Cardiology  -     Stress Echo Which stress agent will be used? Treadmill Exercise; Color Flow Doppler? No; Future    Primary hypertension    Hyperlipidemia, unspecified hyperlipidemia type    Pre-diabetes    H/O total knee replacement, left    BMI 30.0-30.9,adult    Abnormal EKG        Assessment & Plan    R07.9 Chest pain  D86.0 Pulmonary sarcoidosis  I10 Primary hypertension  E78.5 Hyperlipidemia, unspecified hyperlipidemia type  R73.03 Pre-diabetes  Z96.652 H/O total knee replacement, left  Z68.30 BMI 30.0-30.9,adult    IMPRESSION:  - Will consider heart catheterization if stress test suggests possible stenosis.  - May need to adjust HTN management based on stress test results and blood pressure trends.    CHEST PAIN:  - Ordered stress test (treadmill) to rule out cardiac causes.  - Follow up in 6 months.  - Contact the office sooner depending on stress test results.    PRIMARY HYPERTENSION:  - Monitor blood pressure at least twice weekly in the morning.  - Continued Spironolactone for HTN.    HYPERLIPIDEMIA, UNSPECIFIED HYPERLIPIDEMIA TYPE:  - Continued Crestor for high cholesterol.    LIFESTYLE CHANGES:  - Patient to minimize salt intake.           This note was generated with the assistance of ambient listening technology. Verbal consent was obtained by the patient and accompanying visitor(s) for the recording of patient appointment to facilitate this note. I attest to having reviewed and edited the generated note for accuracy, though some syntax or spelling errors may persist. Please contact the author of this note for any clarification.      Reviewed all tests and above medical conditions with patient in detail and formulated treatment plan.  Maintaining  healthy weight and weight loss goals were discussed in clinic.    Follow up in  6 months         [1]   Social History  Tobacco Use    Smoking status: Never    Smokeless tobacco: Never   Substance Use Topics    Alcohol use: No    Drug use: No

## 2025-05-08 ENCOUNTER — HOSPITAL ENCOUNTER (OUTPATIENT)
Dept: CARDIOLOGY | Facility: HOSPITAL | Age: 65
Discharge: HOME | End: 2025-05-08
Attending: INTERNAL MEDICINE
Payer: COMMERCIAL

## 2025-05-08 VITALS
WEIGHT: 193 LBS | SYSTOLIC BLOOD PRESSURE: 134 MMHG | HEIGHT: 67 IN | BODY MASS INDEX: 30.29 KG/M2 | HEART RATE: 83 BPM | DIASTOLIC BLOOD PRESSURE: 72 MMHG

## 2025-05-08 DIAGNOSIS — R07.9 CHEST PAIN: ICD-10-CM

## 2025-05-08 LAB
AORTIC ROOT ANNULUS: 2.5 CM
AORTIC SIZE INDEX: 1.4 CM/M2
ASCENDING AORTA: 2.8 CM
AV INDEX (PROSTH): 0.82
AV MEAN GRADIENT: 3 MMHG
AV PEAK GRADIENT: 7 MMHG
AV VALVE AREA BY VELOCITY RATIO: 2.2 CM²
AV VALVE AREA: 2.3 CM²
AV VELOCITY RATIO: 0.77
BSA FOR ECHO PROCEDURE: 2.03 M2
CV ECHO LV RWT: 0.57 CM
CV STRESS BASE HR: 83 BPM
DIASTOLIC BLOOD PRESSURE: 72 MMHG
DOP CALC AO PEAK VEL: 1.3 M/S
DOP CALC AO VTI: 27.8 CM
DOP CALC LVOT AREA: 2.8 CM2
DOP CALC LVOT DIAMETER: 1.9 CM
DOP CALC LVOT PEAK VEL: 1 M/S
DOP CALC LVOT STROKE VOLUME: 64.9 CM3
DOP CALC RVOT PEAK VEL: 0.64 M/S
DOP CALC RVOT VTI: 15.5 CM
DOP CALCLVOT PEAK VEL VTI: 22.9 CM
E WAVE DECELERATION TIME: 175 MSEC
E/A RATIO: 0.7
E/E' RATIO: 10 M/S
ECHO LV POSTERIOR WALL: 1 CM (ref 0.6–1.1)
FRACTIONAL SHORTENING: 34.3 % (ref 28–44)
INTERVENTRICULAR SEPTUM: 1.2 CM (ref 0.6–1.1)
IVRT: 106 MSEC
LA MAJOR: 3.7 CM
LA MINOR: 3.8 CM
LA WIDTH: 2.8 CM
LEFT ATRIUM AREA SYSTOLIC (APICAL 2 CHAMBER): 9.8 CM2
LEFT ATRIUM AREA SYSTOLIC (APICAL 4 CHAMBER): 11.21 CM2
LEFT ATRIUM SIZE: 3.5 CM
LEFT ATRIUM VOLUME INDEX MOD: 11 ML/M2
LEFT ATRIUM VOLUME INDEX: 16 ML/M2
LEFT ATRIUM VOLUME MOD: 21 ML
LEFT ATRIUM VOLUME: 31 CM3
LEFT INTERNAL DIMENSION IN SYSTOLE: 2.3 CM (ref 2.1–4)
LEFT VENTRICLE DIASTOLIC VOLUME INDEX: 24.62 ML/M2
LEFT VENTRICLE DIASTOLIC VOLUME: 49 ML
LEFT VENTRICLE END SYSTOLIC VOLUME APICAL 2 CHAMBER: 18.27 ML
LEFT VENTRICLE END SYSTOLIC VOLUME APICAL 4 CHAMBER: 24.76 ML
LEFT VENTRICLE MASS INDEX: 59.8 G/M2
LEFT VENTRICLE SYSTOLIC VOLUME INDEX: 9.5 ML/M2
LEFT VENTRICLE SYSTOLIC VOLUME: 19 ML
LEFT VENTRICULAR INTERNAL DIMENSION IN DIASTOLE: 3.5 CM (ref 3.5–6)
LEFT VENTRICULAR MASS: 119 G
LV LATERAL E/E' RATIO: 9.5 M/S
LV SEPTAL E/E' RATIO: 9.5 M/S
LVED V (TEICH): 49.4 ML
LVES V (TEICH): 18.67 ML
LVOT MG: 2.49 MMHG
LVOT MV: 0.75 CM/S
MV PEAK A VEL: 0.82 M/S
MV PEAK E VEL: 0.57 M/S
MV STENOSIS PRESSURE HALF TIME: 50.81 MS
MV VALVE AREA P 1/2 METHOD: 4.33 CM2
OHS CV CPX 1 MINUTE RECOVERY HEART RATE: 104 BPM
OHS CV CPX 85 PERCENT MAX PREDICTED HEART RATE MALE: 133
OHS CV CPX ESTIMATED METS: 6
OHS CV CPX MAX PREDICTED HEART RATE: 156
OHS CV CPX PATIENT IS FEMALE: 1
OHS CV CPX PATIENT IS MALE: 0
OHS CV CPX PEAK DIASTOLIC BLOOD PRESSURE: 88 MMHG
OHS CV CPX PEAK HEAR RATE: 139 BPM
OHS CV CPX PEAK RATE PRESSURE PRODUCT: ABNORMAL
OHS CV CPX PEAK SYSTOLIC BLOOD PRESSURE: 169 MMHG
OHS CV CPX PERCENT MAX PREDICTED HEART RATE ACHIEVED: 93
OHS CV CPX RATE PRESSURE PRODUCT PRESENTING: ABNORMAL
OHS CV RV/LV RATIO: 0.66 CM
PISA TR MAX VEL: 2.3 M/S
PULM VEIN S/D RATIO: 1.7
PV MEAN GRADIENT: 1 MMHG
PV PEAK D VEL: 0.3 M/S
PV PEAK GRADIENT: 3 MMHG
PV PEAK S VEL: 0.51 M/S
PV PEAK VELOCITY: 0.91 M/S
RA MAJOR: 3.3 CM
RA PRESSURE ESTIMATED: 3 MMHG
RA WIDTH: 2.61 CM
RIGHT VENTRICLE DIASTOLIC BASEL DIMENSION: 2.3 CM
RIGHT VENTRICULAR END-DIASTOLIC DIMENSION: 2.29 CM
RV TB RVSP: 5 MMHG
SINUS: 2.21 CM
STJ: 2.2 CM
STRESS ECHO POST EXERCISE DUR MIN: 3 MINUTES
STRESS ECHO POST EXERCISE DUR SEC: 58 SECONDS
SYSTOLIC BLOOD PRESSURE: 134 MMHG
TDI LATERAL: 0.06 M/S
TDI SEPTAL: 0.06 M/S
TDI: 0.06 M/S
TR MAX PG: 20 MMHG
TV REST PULMONARY ARTERY PRESSURE: 24 MMHG
Z-SCORE OF LEFT VENTRICULAR DIMENSION IN END DIASTOLE: -5
Z-SCORE OF LEFT VENTRICULAR DIMENSION IN END SYSTOLE: -3.42

## 2025-05-08 PROCEDURE — 93351 STRESS TTE COMPLETE: CPT

## 2025-05-08 PROCEDURE — 93351 STRESS TTE COMPLETE: CPT | Mod: 26,,, | Performed by: INTERNAL MEDICINE

## 2025-05-12 ENCOUNTER — RESULTS FOLLOW-UP (OUTPATIENT)
Dept: CARDIOLOGY | Facility: CLINIC | Age: 65
End: 2025-05-12

## 2025-05-15 ENCOUNTER — OFFICE VISIT (OUTPATIENT)
Dept: INTERNAL MEDICINE | Facility: CLINIC | Age: 65
End: 2025-05-15
Payer: COMMERCIAL

## 2025-05-15 ENCOUNTER — LAB VISIT (OUTPATIENT)
Dept: LAB | Facility: HOSPITAL | Age: 65
End: 2025-05-15
Attending: NURSE PRACTITIONER
Payer: COMMERCIAL

## 2025-05-15 VITALS
OXYGEN SATURATION: 98 % | DIASTOLIC BLOOD PRESSURE: 86 MMHG | SYSTOLIC BLOOD PRESSURE: 132 MMHG | BODY MASS INDEX: 30.59 KG/M2 | TEMPERATURE: 97 F | WEIGHT: 194.88 LBS | HEART RATE: 65 BPM | HEIGHT: 67 IN

## 2025-05-15 DIAGNOSIS — M17.11 PRIMARY OSTEOARTHRITIS OF RIGHT KNEE: ICD-10-CM

## 2025-05-15 DIAGNOSIS — K21.9 GASTROESOPHAGEAL REFLUX DISEASE WITHOUT ESOPHAGITIS: ICD-10-CM

## 2025-05-15 DIAGNOSIS — E78.5 HYPERLIPIDEMIA, UNSPECIFIED HYPERLIPIDEMIA TYPE: ICD-10-CM

## 2025-05-15 DIAGNOSIS — M15.9 GENERALIZED OSTEOARTHRITIS OF MULTIPLE SITES: Chronic | ICD-10-CM

## 2025-05-15 DIAGNOSIS — Z23 NEED FOR VACCINATION AGAINST STREPTOCOCCUS PNEUMONIAE: ICD-10-CM

## 2025-05-15 DIAGNOSIS — E55.9 VITAMIN D DEFICIENCY: ICD-10-CM

## 2025-05-15 DIAGNOSIS — D86.0 PULMONARY SARCOIDOSIS: ICD-10-CM

## 2025-05-15 DIAGNOSIS — I10 PRIMARY HYPERTENSION: ICD-10-CM

## 2025-05-15 DIAGNOSIS — R73.03 PRE-DIABETES: ICD-10-CM

## 2025-05-15 DIAGNOSIS — Z23 NEED FOR TDAP VACCINATION: ICD-10-CM

## 2025-05-15 DIAGNOSIS — Z00.00 WELL ADULT EXAM: Primary | ICD-10-CM

## 2025-05-15 LAB
ALBUMIN SERPL BCP-MCNC: 3.7 G/DL (ref 3.5–5.2)
ALP SERPL-CCNC: 86 UNIT/L (ref 40–150)
ALT SERPL W/O P-5'-P-CCNC: 9 UNIT/L (ref 10–44)
ANION GAP (OHS): 10 MMOL/L (ref 8–16)
AST SERPL-CCNC: 15 UNIT/L (ref 11–45)
BILIRUB SERPL-MCNC: 0.6 MG/DL (ref 0.1–1)
BUN SERPL-MCNC: 11 MG/DL (ref 8–23)
CALCIUM SERPL-MCNC: 9.3 MG/DL (ref 8.7–10.5)
CHLORIDE SERPL-SCNC: 105 MMOL/L (ref 95–110)
CHOLEST SERPL-MCNC: 159 MG/DL (ref 120–199)
CHOLEST/HDLC SERPL: 3.8 {RATIO} (ref 2–5)
CO2 SERPL-SCNC: 27 MMOL/L (ref 23–29)
CREAT SERPL-MCNC: 0.8 MG/DL (ref 0.5–1.4)
EAG (OHS): 114 MG/DL (ref 68–131)
GFR SERPLBLD CREATININE-BSD FMLA CKD-EPI: >60 ML/MIN/1.73/M2
GLUCOSE SERPL-MCNC: 78 MG/DL (ref 70–110)
HBA1C MFR BLD: 5.6 % (ref 4–5.6)
HDLC SERPL-MCNC: 42 MG/DL (ref 40–75)
HDLC SERPL: 26.4 % (ref 20–50)
LDLC SERPL CALC-MCNC: 100 MG/DL (ref 63–159)
NONHDLC SERPL-MCNC: 117 MG/DL
POTASSIUM SERPL-SCNC: 3.8 MMOL/L (ref 3.5–5.1)
PROT SERPL-MCNC: 7.4 GM/DL (ref 6–8.4)
SODIUM SERPL-SCNC: 142 MMOL/L (ref 136–145)
TRIGL SERPL-MCNC: 85 MG/DL (ref 30–150)

## 2025-05-15 PROCEDURE — 36415 COLL VENOUS BLD VENIPUNCTURE: CPT

## 2025-05-15 PROCEDURE — 99999 PR PBB SHADOW E&M-EST. PATIENT-LVL IV: CPT | Mod: PBBFAC,,, | Performed by: PEDIATRICS

## 2025-05-15 PROCEDURE — 80053 COMPREHEN METABOLIC PANEL: CPT

## 2025-05-15 PROCEDURE — 80061 LIPID PANEL: CPT

## 2025-05-15 PROCEDURE — 83036 HEMOGLOBIN GLYCOSYLATED A1C: CPT

## 2025-05-15 RX ORDER — DICLOFENAC SODIUM 10 MG/G
2 GEL TOPICAL 3 TIMES DAILY PRN
Qty: 100 G | Refills: 3 | Status: SHIPPED | OUTPATIENT
Start: 2025-05-15

## 2025-05-15 RX ORDER — ROSUVASTATIN CALCIUM 40 MG/1
TABLET, COATED ORAL
Qty: 90 TABLET | Refills: 3 | Status: SHIPPED | OUTPATIENT
Start: 2025-05-15

## 2025-05-15 RX ORDER — SPIRONOLACTONE 25 MG/1
25 TABLET ORAL DAILY
Qty: 90 TABLET | Refills: 3 | Status: SHIPPED | OUTPATIENT
Start: 2025-05-15 | End: 2026-05-15

## 2025-05-15 NOTE — PROGRESS NOTES
Subjective:       Patient ID: Janna Sheffield is a 64 y.o. female.    Chief Complaint: Follow-up    65 yo here for annual follow up.    1) HTN: No home B/P monitoring. No HTNive symptoms   2) Hypercholesterol: compliant with statin. No D&E.  3) Fibromyalgia:ok off Gabapentin  4) GERD: pantoprazole is helping- occ flares. S/P choley.  5) LIPIDS:following D&E, tolerating and compliant with med(s).  6) Sarcoid/fibromyalgia/immunocompromised: overdue for pulm denies complaints. Upcoming rheum appt no current complaints  7) Obesity- sedentary, not exercising  8) DJD: c/o continued cervical radic/shoulder pain - neurosurg/ortho      PMHx, PSHx, SocHx, and FHx reviewed and discussed with patient.     Labs reviewed with patient.      Review of Systems   Constitutional:  Negative for fever and unexpected weight change.   HENT:  Negative for congestion and rhinorrhea.    Eyes:  Negative for discharge and redness.   Respiratory:  Negative for cough, shortness of breath and wheezing.    Cardiovascular:  Negative for chest pain, palpitations and leg swelling.   Gastrointestinal:  Negative for abdominal pain, constipation, diarrhea and vomiting.   Endocrine: Negative for cold intolerance, heat intolerance, polydipsia, polyphagia and polyuria.   Genitourinary:  Negative for decreased urine volume, difficulty urinating and menstrual problem.   Musculoskeletal:  Positive for arthralgias, back pain and myalgias. Negative for joint swelling.        Stable osteo and finro   Skin:  Negative for rash and wound.   Neurological:  Negative for syncope and headaches.   Psychiatric/Behavioral:  Negative for behavioral problems and sleep disturbance.        Objective:      Physical Exam  Constitutional:       Appearance: Normal appearance. She is well-developed.   HENT:      Head: Normocephalic and atraumatic.   Eyes:      General: Lids are normal.      Conjunctiva/sclera: Conjunctivae normal.      Pupils: Pupils are equal, round, and  reactive to light.   Neck:      Thyroid: No thyroid mass or thyromegaly.      Vascular: No carotid bruit.      Trachea: Trachea normal.      Meningeal: Brudzinski's sign and Kernig's sign absent.   Cardiovascular:      Rate and Rhythm: Normal rate and regular rhythm.      Pulses: Normal pulses.      Heart sounds: Normal heart sounds. No murmur heard.  Pulmonary:      Effort: Pulmonary effort is normal.      Breath sounds: Normal breath sounds. No wheezing, rhonchi or rales.   Abdominal:      Palpations: Abdomen is soft.      Tenderness: There is no abdominal tenderness. There is no rebound.   Musculoskeletal:      Cervical back: Normal range of motion and neck supple.   Skin:     General: Skin is warm.      Findings: No abrasion or rash.   Neurological:      Mental Status: She is alert and oriented to person, place, and time.      Cranial Nerves: No cranial nerve deficit.      Sensory: No sensory deficit.      Coordination: Coordination normal.      Gait: Gait normal.      Deep Tendon Reflexes: Reflexes are normal and symmetric.   Psychiatric:         Mood and Affect: Mood is not anxious or depressed.         Speech: Speech normal.         Behavior: Behavior normal. Behavior is cooperative.         Thought Content: Thought content normal.         Judgment: Judgment normal.         Assessment:       1. Well adult exam    2. Vitamin D deficiency    3. Pulmonary sarcoidosis    4. Pre-diabetes    5. Generalized osteoarthritis of multiple sites    6. Gastroesophageal reflux disease without esophagitis    7. Primary hypertension    8. Hyperlipidemia, unspecified hyperlipidemia type    9. Primary osteoarthritis of right knee    10. Need for vaccination against Streptococcus pneumoniae    11. Need for Tdap vaccination        Plan:       Well adult exam    Vitamin D deficiency    Pulmonary sarcoidosis  -     Ambulatory referral/consult to Pulmonology; Future; Expected date: 05/22/2025    Pre-diabetes  -     Hemoglobin A1C;  Future; Expected date: 05/15/2025    Generalized osteoarthritis of multiple sites    Gastroesophageal reflux disease without esophagitis    Primary hypertension  -     spironolactone (ALDACTONE) 25 MG tablet; Take 1 tablet (25 mg total) by mouth once daily.  Dispense: 90 tablet; Refill: 3    Hyperlipidemia, unspecified hyperlipidemia type  -     Comprehensive Metabolic Panel; Future; Expected date: 05/15/2025  -     Lipid Panel; Future; Expected date: 05/15/2025    Primary osteoarthritis of right knee  -     diclofenac sodium (VOLTAREN) 1 % Gel; Apply 2 g topically 3 (three) times daily as needed (Knee pain).  Dispense: 100 g; Refill: 3    Need for vaccination against Streptococcus pneumoniae  -     pneumoc 20-edvin conj-dip cr(PF) (PREVNAR-20 (PF)) injection Syrg 0.5 mL    Need for Tdap vaccination  -     DIPH,PERTUSS(ACEL),TET VAC(PF)(ADULT)(ADACEL)(TDaP)    Other orders  -     rosuvastatin (CRESTOR) 40 MG Tab; TAKE 1 TABLET BY MOUTH ONCE DAILY  Dispense: 90 tablet; Refill: 3    HMI d/w pt. Lifestyle mod, continue meds. At goal for B/P, lipids, and A1c. Maintain meds, self monitoring D&E, weight moderation. F/U 6 months with labs.

## 2025-05-19 ENCOUNTER — RESULTS FOLLOW-UP (OUTPATIENT)
Dept: INTERNAL MEDICINE | Facility: CLINIC | Age: 65
End: 2025-05-19

## 2025-06-12 DIAGNOSIS — I10 ESSENTIAL HYPERTENSION: ICD-10-CM

## 2025-06-12 RX ORDER — HYDROCHLOROTHIAZIDE 25 MG/1
25 TABLET ORAL
Qty: 90 TABLET | Refills: 0 | OUTPATIENT
Start: 2025-06-12

## 2025-06-12 NOTE — TELEPHONE ENCOUNTER
No care due was identified.  [facility] Embedded Care Due Messages. Reference number: [id number].   6/12/2025 6:01:53 AM CDT

## 2025-06-12 NOTE — TELEPHONE ENCOUNTER
Refill Decision Note   Janna Sheffield  is requesting a refill authorization.  Brief Assessment and Rationale for Refill:  Quick Discontinue     Medication Therapy Plan:  HCTZ 25MG WAS DISCONTINUED ON 04/07/25      Comments:     Note composed:10:30 AM 06/12/2025

## 2025-06-20 ENCOUNTER — LAB VISIT (OUTPATIENT)
Dept: LAB | Facility: HOSPITAL | Age: 65
End: 2025-06-20
Attending: INTERNAL MEDICINE
Payer: COMMERCIAL

## 2025-06-20 DIAGNOSIS — D86.0 PULMONARY SARCOIDOSIS: ICD-10-CM

## 2025-06-20 DIAGNOSIS — D86.9 SARCOIDOSIS: ICD-10-CM

## 2025-06-20 LAB
ABSOLUTE EOSINOPHIL (OHS): 0.08 K/UL
ABSOLUTE MONOCYTE (OHS): 0.4 K/UL (ref 0.3–1)
ABSOLUTE NEUTROPHIL COUNT (OHS): 2.13 K/UL (ref 1.8–7.7)
ALBUMIN SERPL BCP-MCNC: 3.6 G/DL (ref 3.5–5.2)
ALP SERPL-CCNC: 72 UNIT/L (ref 40–150)
ALT SERPL W/O P-5'-P-CCNC: 13 UNIT/L (ref 10–44)
ANION GAP (OHS): 11 MMOL/L (ref 8–16)
AST SERPL-CCNC: 15 UNIT/L (ref 11–45)
BASOPHILS # BLD AUTO: 0.02 K/UL
BASOPHILS NFR BLD AUTO: 0.4 %
BILIRUB SERPL-MCNC: 0.5 MG/DL (ref 0.1–1)
BUN SERPL-MCNC: 10 MG/DL (ref 8–23)
CALCIUM SERPL-MCNC: 9.7 MG/DL (ref 8.7–10.5)
CHLORIDE SERPL-SCNC: 107 MMOL/L (ref 95–110)
CO2 SERPL-SCNC: 23 MMOL/L (ref 23–29)
CREAT SERPL-MCNC: 0.8 MG/DL (ref 0.5–1.4)
ERYTHROCYTE [DISTWIDTH] IN BLOOD BY AUTOMATED COUNT: 13.5 % (ref 11.5–14.5)
GFR SERPLBLD CREATININE-BSD FMLA CKD-EPI: >60 ML/MIN/1.73/M2
GLUCOSE SERPL-MCNC: 131 MG/DL (ref 70–110)
HCT VFR BLD AUTO: 39.2 % (ref 37–48.5)
HGB BLD-MCNC: 12.6 GM/DL (ref 12–16)
IMM GRANULOCYTES # BLD AUTO: 0.01 K/UL (ref 0–0.04)
IMM GRANULOCYTES NFR BLD AUTO: 0.2 % (ref 0–0.5)
LYMPHOCYTES # BLD AUTO: 2.11 K/UL (ref 1–4.8)
MCH RBC QN AUTO: 31.3 PG (ref 27–31)
MCHC RBC AUTO-ENTMCNC: 32.1 G/DL (ref 32–36)
MCV RBC AUTO: 97 FL (ref 82–98)
NUCLEATED RBC (/100WBC) (OHS): 0 /100 WBC
PLATELET # BLD AUTO: 225 K/UL (ref 150–450)
PMV BLD AUTO: 10.1 FL (ref 9.2–12.9)
POTASSIUM SERPL-SCNC: 3.7 MMOL/L (ref 3.5–5.1)
PROT SERPL-MCNC: 7.3 GM/DL (ref 6–8.4)
RBC # BLD AUTO: 4.03 M/UL (ref 4–5.4)
RELATIVE EOSINOPHIL (OHS): 1.7 %
RELATIVE LYMPHOCYTE (OHS): 44.4 % (ref 18–48)
RELATIVE MONOCYTE (OHS): 8.4 % (ref 4–15)
RELATIVE NEUTROPHIL (OHS): 44.9 % (ref 38–73)
SODIUM SERPL-SCNC: 141 MMOL/L (ref 136–145)
WBC # BLD AUTO: 4.75 K/UL (ref 3.9–12.7)

## 2025-06-20 PROCEDURE — 85025 COMPLETE CBC W/AUTO DIFF WBC: CPT

## 2025-06-20 PROCEDURE — 82164 ANGIOTENSIN I ENZYME TEST: CPT

## 2025-06-20 PROCEDURE — 80053 COMPREHEN METABOLIC PANEL: CPT

## 2025-06-20 PROCEDURE — 83520 IMMUNOASSAY QUANT NOS NONAB: CPT

## 2025-06-20 PROCEDURE — 36415 COLL VENOUS BLD VENIPUNCTURE: CPT

## 2025-06-24 LAB — ACE SERPL-CCNC: 16 U/L (ref 16–85)

## 2025-06-26 LAB — AR INTERLEUKIN 2 RECEPTOR, SOLUBLE, SERUM: 262.2 PG/ML

## 2025-06-27 ENCOUNTER — OFFICE VISIT (OUTPATIENT)
Dept: RHEUMATOLOGY | Facility: CLINIC | Age: 65
End: 2025-06-27
Payer: COMMERCIAL

## 2025-06-27 VITALS
BODY MASS INDEX: 30.59 KG/M2 | SYSTOLIC BLOOD PRESSURE: 128 MMHG | DIASTOLIC BLOOD PRESSURE: 85 MMHG | HEIGHT: 67 IN | WEIGHT: 194.88 LBS | HEART RATE: 73 BPM

## 2025-06-27 DIAGNOSIS — Z71.89 COUNSELING ON HEALTH PROMOTION AND DISEASE PREVENTION: ICD-10-CM

## 2025-06-27 DIAGNOSIS — M79.7 FIBROMYALGIA: ICD-10-CM

## 2025-06-27 DIAGNOSIS — D86.0 PULMONARY SARCOIDOSIS: ICD-10-CM

## 2025-06-27 DIAGNOSIS — R52 BREAKTHROUGH PAIN: ICD-10-CM

## 2025-06-27 DIAGNOSIS — D86.83 SARCOID UVEITIS: Primary | ICD-10-CM

## 2025-06-27 DIAGNOSIS — Z51.81 MEDICATION MONITORING ENCOUNTER: ICD-10-CM

## 2025-06-27 PROCEDURE — 99999 PR PBB SHADOW E&M-EST. PATIENT-LVL III: CPT | Mod: PBBFAC,,, | Performed by: INTERNAL MEDICINE

## 2025-06-27 RX ORDER — METHOCARBAMOL 500 MG/1
500 TABLET, FILM COATED ORAL 3 TIMES DAILY PRN
Qty: 30 TABLET | Refills: 0 | Status: SHIPPED | OUTPATIENT
Start: 2025-06-27 | End: 2025-07-27

## 2025-06-27 NOTE — PROGRESS NOTES
RHEUMATOLOGY OUTPATIENT CLINIC NOTE    6/27/2025    Attending Rheumatologist: Toñito Schwartz  Primary Care Provider/Physician Requesting Consultation: Ajit Botello MD   Chief Complaint/Reason For Consultation:  Sarcoidosis and Osteoarthritis      Subjective:     Janna Sheffield is a 64 y.o. Black or  female with Sarcoidosis.    Off immunomodulator.  No acute complaints.  Self limited neck/back pain w/ mechanical pattern and no alarm s/s.      Review of Systems   Constitutional:  Negative for fever.   Eyes:  Negative for blurred vision, photophobia and pain.   Respiratory:  Negative for cough and shortness of breath.    Cardiovascular:  Negative for chest pain, palpitations and claudication.   Gastrointestinal:  Negative for melena.   Genitourinary:  Negative for dysuria, hematuria and urgency.   Musculoskeletal:  Positive for back pain and myalgias. Negative for joint pain.   Skin:  Negative for rash.   Neurological:  Negative for focal weakness and weakness.       Chronic comorbid conditions affecting medical decision making today:  Past Medical History:   Diagnosis Date    Arthritis     Chest pain syndrome 10/23/2014    Hyperlipidemia     Hypertension     Macular degeneration, age related, nonexudative - Left Eye 3/24/2014    Retinal detachment, old, partial 4/14/2014    Sarcoidosis      Past Surgical History:   Procedure Laterality Date    CATARACT EXTRACTION      COLONOSCOPY N/A 03/14/2022    Procedure: COLONOSCOPY;  Surgeon: Christi Medrano MD;  Location: [facility];  Service: Endoscopy;  Laterality: N/A;    DILATION AND CURETTAGE OF UTERUS      missed ab x 3    EPIDURAL STEROID INJECTION INTO CERVICAL SPINE N/A 12/21/2021    Procedure: C6/C7 IL HIMANSHU RN IV sedation;  Surgeon: Hadley Marsh MD;  Location: [facility];  Service: Pain Management;  Laterality: N/A;    ESOPHAGOGASTRODUODENOSCOPY N/A 02/06/2019    Procedure: ESOPHAGOGASTRODUODENOSCOPY (EGD);  Surgeon: Jairo MONK  Sam MALONE MD;  Location: [facility] ENDO;  Service: Endoscopy;  Laterality: N/A;    HYSTERECTOMY  2001    MACARIO - complete    JOINT REPLACEMENT Left     Knee    knee surgery      TRANSFORAMINAL EPIDURAL INJECTION OF STEROID Right 04/18/2022    Procedure: INJECTION, STEROID, EPIDURAL, TRANSFORAMINAL APPROACH, RIGHT C6-C7;  Surgeon: Viraj Kramer MD;  Location: [facility] [place], [facility]IN [facility]GT;  Service: Pain Management;  Laterality: Right;    TRANSFORAMINAL EPIDURAL INJECTION OF STEROID Right 9/11/2023    Procedure: right C6/7 TF HIMANSHU w/ RN IV sedation;  Surgeon: Derian Watts MD;  Location: [facility];  Service: Pain Management;  Laterality: Right;    TUBAL LIGATION       Family History   Problem Relation Name Age of Onset    Cervical cancer Mother      Heart disease Mother      Heart attack Mother      Hypertension Mother      Hyperlipidemia Mother      Hypertension Father      Hyperlipidemia Father      Stroke Father      Breast cancer Maternal Aunt      Stroke Brother      Breast cancer Maternal Cousin      Breast cancer Maternal Cousin      Breast cancer Paternal Cousin      Breast cancer Paternal Cousin      Colon cancer Neg Hx      Thrombophilia Neg Hx      Deep vein thrombosis Neg Hx      Pulmonary embolism Neg Hx       Tobacco Use History[1]  Current Medications[2]     Objective:     Vitals:    06/27/25 1428   BP: 128/85   Pulse: 73     Physical Exam   Pulmonary/Chest: Effort normal. No respiratory distress.   Musculoskeletal:         General: No swelling or tenderness. Normal range of motion.   Neurological: She displays no weakness.   Skin: No rash noted.       Reviewed available old and all outside pertinent medical records available.    All lab results personally reviewed and interpreted by me.       ASSESSMENT / PLAN     1. Sarcoid uveitis  Resolved.    C-Reactive Protein    Angiotensin Converting Enzyme      2. Pulmonary sarcoidosis  Clinically quiescent, off immunomodulator.   Angiotensin Converting Enzyme      3.  Fibromyalgia  Monitor need to resume gabapentin.      4. Breakthrough pain  methocarbamoL (ROBAXIN) 500 MG Tab      5. Medication monitoring encounter  Comprehensive Metabolic Panel      6. Counseling on health promotion and disease prevention  Healthy ways to deal with stress.  Mediterranean diet.  Regular low impact, aerobic exercise.              Visit today included increased complexity associated with the care of the episodic problem Sarcoid uveitis addressed and managing the longitudinal care of the patient due to the serious and/or complex managed problem(s) Pulmonary sarcoidosis, Medication monitoring encounter, Counseling on health promotion and disease prevention .    Toñito Schwartz M.D.           [1]   Social History  Tobacco Use   Smoking Status Never   Smokeless Tobacco Never   [2]   Current Outpatient Medications:     diclofenac sodium (VOLTAREN) 1 % Gel, Apply 2 g topically 3 (three) times daily as needed (Knee pain)., Disp: 100 g, Rfl: 3    LIDOcaine-prilocaine (EMLA) cream, Apply topically up to 4x per day to affected area for pain relief, Disp: 60 g, Rfl: 0    mometasone (ELOCON) 0.1 % ointment, Apply topically once daily., Disp: 45 g, Rfl: 1    rosuvastatin (CRESTOR) 40 MG Tab, TAKE 1 TABLET BY MOUTH ONCE DAILY, Disp: 90 tablet, Rfl: 3    spironolactone (ALDACTONE) 25 MG tablet, Take 1 tablet (25 mg total) by mouth once daily., Disp: 90 tablet, Rfl: 3    methocarbamoL (ROBAXIN) 500 MG Tab, Take 1 tablet (500 mg total) by mouth 3 (three) times daily as needed (stiffness / cramps - neck-back pain)., Disp: 30 tablet, Rfl: 0    prednisoLONE acetate (PRED FORTE) 1 % DrpS, Place 1 drop into the right eye 4 (four) times daily., Disp: 5 mL, Rfl: 0

## 2025-07-25 ENCOUNTER — OFFICE VISIT (OUTPATIENT)
Dept: PULMONOLOGY | Facility: CLINIC | Age: 65
End: 2025-07-25
Payer: COMMERCIAL

## 2025-07-25 VITALS
BODY MASS INDEX: 30.52 KG/M2 | HEIGHT: 67 IN | OXYGEN SATURATION: 97 % | HEART RATE: 78 BPM | WEIGHT: 194.44 LBS | SYSTOLIC BLOOD PRESSURE: 130 MMHG | DIASTOLIC BLOOD PRESSURE: 70 MMHG

## 2025-07-25 DIAGNOSIS — I10 PRIMARY HYPERTENSION: ICD-10-CM

## 2025-07-25 DIAGNOSIS — R73.03 PRE-DIABETES: ICD-10-CM

## 2025-07-25 DIAGNOSIS — E78.5 HYPERLIPIDEMIA, UNSPECIFIED HYPERLIPIDEMIA TYPE: ICD-10-CM

## 2025-07-25 DIAGNOSIS — E66.09 CLASS 1 OBESITY DUE TO EXCESS CALORIES WITH SERIOUS COMORBIDITY AND BODY MASS INDEX (BMI) OF 30.0 TO 30.9 IN ADULT: ICD-10-CM

## 2025-07-25 DIAGNOSIS — D86.0 PULMONARY SARCOIDOSIS: Primary | ICD-10-CM

## 2025-07-25 DIAGNOSIS — E66.811 CLASS 1 OBESITY DUE TO EXCESS CALORIES WITH SERIOUS COMORBIDITY AND BODY MASS INDEX (BMI) OF 30.0 TO 30.9 IN ADULT: ICD-10-CM

## 2025-07-25 PROCEDURE — 99999 PR PBB SHADOW E&M-EST. PATIENT-LVL IV: CPT | Mod: PBBFAC,,, | Performed by: INTERNAL MEDICINE

## 2025-07-25 NOTE — PROGRESS NOTES
Subjective:       Patient ID: Janna Sheffield is a 64 y.o. female.       Chief Complaint: pulmonary sarcoidois    Ms Sheffield  is a 59 y.o. .  LV was 03/12 /2019,   Here to review CXR and Emmett  No interval health issues.  She has sarcoidosis currently treated with IMURAN since 1985.  Was discontinued Last year  Last ACE level normal.  No cough no wheezing no shortness of breath  Her spirometry was consistent with a restrictive defect    Echocardiogram update pending  CXR was normal     Immunizations are up-to-date      02/07/2023  Last visit 03/04/2020  Doing well  Reviewed CXR and labs  Has eye appt due next week  Seen cardiology last year  Echo reviewed  Weight dropped from 208 lb to 203lb  Occasional SOB stooping down  No cough, No wheezing      07/25/2025    Followup  Recent rheum visit  Off immunomodulators  No cough, No SOB  No skin issues  Lost weight  Recent ER visit  No issues      The following portions of the patient's history were reviewed and updated as appropriate: She  has a past medical history of Arthritis, Chest pain syndrome (10/23/2014), Hyperlipidemia, Hypertension, Macular degeneration, age related, nonexudative - Left Eye (3/24/2014), Retinal detachment, old, partial (4/14/2014), and Sarcoidosis.  She does not have any pertinent problems on file.  She  has a past surgical history that includes Tubal ligation; knee surgery; Cataract extraction; Dilation and curettage of uterus; Hysterectomy (2001); Joint replacement (Left); Esophagogastroduodenoscopy (N/A, 02/06/2019); Epidural steroid injection into cervical spine (N/A, 12/21/2021); Colonoscopy (N/A, 03/14/2022); Transforaminal epidural injection of steroid (Right, 04/18/2022); and Transforaminal epidural injection of steroid (Right, 9/11/2023).  Her family history includes Breast cancer in her maternal aunt, maternal cousin, maternal cousin, paternal cousin, and paternal cousin; Cervical cancer in her mother; Heart attack in her mother;  Heart disease in her mother; Hyperlipidemia in her father and mother; Hypertension in her father and mother; Stroke in her brother and father.  She  reports that she has never smoked. She has never used smokeless tobacco. She reports that she does not drink alcohol and does not use drugs.  She has a current medication list which includes the following prescription(s): diclofenac sodium, lidocaine-prilocaine, methocarbamol, mometasone, prednisolone acetate, rosuvastatin, and spironolactone.  Current Outpatient Medications on File Prior to Visit   Medication Sig Dispense Refill    diclofenac sodium (VOLTAREN) 1 % Gel Apply 2 g topically 3 (three) times daily as needed (Knee pain). 100 g 3    LIDOcaine-prilocaine (EMLA) cream Apply topically up to 4x per day to affected area for pain relief 60 g 0    methocarbamoL (ROBAXIN) 500 MG Tab Take 1 tablet (500 mg total) by mouth 3 (three) times daily as needed (stiffness / cramps - neck-back pain). 30 tablet 0    mometasone (ELOCON) 0.1 % ointment Apply topically once daily. 45 g 1    prednisoLONE acetate (PRED FORTE) 1 % DrpS Place 1 drop into the right eye 4 (four) times daily. 5 mL 0    rosuvastatin (CRESTOR) 40 MG Tab TAKE 1 TABLET BY MOUTH ONCE DAILY 90 tablet 3    spironolactone (ALDACTONE) 25 MG tablet Take 1 tablet (25 mg total) by mouth once daily. 90 tablet 3     No current facility-administered medications on file prior to visit.     She has no known allergies..     Review of Systems   Constitutional: Negative.    HENT: Negative.     Eyes: Negative.    Respiratory:  Negative for cough, chest tightness, shortness of breath, dyspnea on extertion and use of rescue inhaler.    Cardiovascular: Negative.    Genitourinary: Negative.    Endocrine: endocrine negative    Musculoskeletal: Negative.    Skin: Negative.    Gastrointestinal: Negative.    Neurological: Negative.    Psychiatric/Behavioral: Negative.     All other systems reviewed and are negative.      Objective:  "      Vitals:    07/25/25 1217   BP: 130/70   Pulse: 78   SpO2: 97%   Weight: 88.2 kg (194 lb 7.1 oz)   Height: 5' 7" (1.702 m)         Physical Exam   Constitutional: She is oriented to person, place, and time. She appears well-developed and well-nourished. She is not obese.   HENT:   Head: Normocephalic.   Nose: Nose normal.   Neck: No JVD present. No tracheal deviation present. No thyromegaly present.   Cardiovascular: Normal rate, regular rhythm and normal heart sounds.   No murmur heard.  Pulmonary/Chest: Normal expansion, symmetric chest wall expansion, effort normal and breath sounds normal. No respiratory distress. She has no decreased breath sounds. She has no rales. Chest wall is not dull to percussion. She exhibits no tenderness. Negative for egophony.   Abdominal: Soft. Bowel sounds are normal.   Musculoskeletal:         General: No edema. Normal range of motion.      Cervical back: Normal range of motion and neck supple.   Lymphadenopathy:     She has no cervical adenopathy.   Neurological: She is alert and oriented to person, place, and time. Gait normal.   Skin: Skin is warm and dry. No cyanosis. Nails show no clubbing.   Psychiatric: She has a normal mood and affect. Her behavior is normal.   Nursing note and vitals reviewed.    Personal Diagnostic Review  Stress Echo Which stress agent will be used? Treadmill Exercise; Color Flow Doppler? No    Left Ventricle: The left ventricle is normal in size. Mildly increased   wall thickness. There is mild concentric hypertrophy. There is normal   systolic function with a visually estimated ejection fraction of 60 - 65%.   There is normal diastolic function.    Right Ventricle: The right ventricle is normal in size Wall thickness   is normal. Systolic function is normal.    Mitral Valve: Mildly thickened leaflets. Mildly calcified leaflets.   There is mild mitral annular calcification.    Tricuspid Valve: There is mild regurgitation.    Pulmonary Artery: The " estimated pulmonary artery systolic pressure is   24 mmHg.    IVC/SVC: Normal venous pressure at 3 mmHg.    ECG Conclusion: The ECG portion of the study is negative for ischemia.    Post-stress Conclusion: The study is negative with no echocardiographic   evidence of stress induced ischemia.    Stress Protocol: The patient exercised for 3 minutes 58 seconds on a   Chase protocol, achieving a peak heart rate of 139 bpm, which is 93% of   the age predicted maximum heart rate. Their exercise capacity was mildly   impaired. The patient reported shortness of breath during the stress test.   The test was stopped because the patient requested it and they experienced   leg fatigue and shortness of breath.                             Assessment:       Problem List Items Addressed This Visit       Class 1 obesity with serious comorbidity and body mass index (BMI) of 30.0 to 30.9 in adult    Pre-diabetes    HTN (hypertension)    On HCTZ         Pulmonary sarcoidosis - Primary    ASSESSMENT:     Sarcoidosis stage:   []        Stage I Sarcoidosis  [x]        Stage II Sarcoidosis  []        Stage III Sarcoidosis  []        Stage IV Srcoidosis     Clinical assessment:   []        Symptomatic  [x]        Assymptomatic         Indications for treatment of pulmonary sarcoidosis:   [x]        NO Evidence of progressive disease   []        Severe disease at presentation     Management options:   []        Observation without therapy  []        Systemic glucocorticoids  []        Methotrexate.  [x]        Azathioprine discontinued    []        Leuflonamide  []        Mycophenolate  []        Anti TNF alpha antagonists            Relevant Orders    X-Ray Chest PA And Lateral    Complete PFT without bronchodilator - Clinic    Stress test, pulmonary    Hyperlipidemia    Stable on Crestor          Plan:        Currently off IMURAN  Stable sarcoid  Follow up cardiology and Opthamology    Follow up in about 1 year (around 7/25/2026), or cxr,  PFT, 6MWD.    Thank you for the courtesy of participating in the care of this patient    Demetrio Casper MD

## 2025-08-25 ENCOUNTER — TELEPHONE (OUTPATIENT)
Dept: INTERNAL MEDICINE | Facility: CLINIC | Age: 65
End: 2025-08-25
Payer: COMMERCIAL

## 2025-08-25 DIAGNOSIS — Z12.31 ENCOUNTER FOR SCREENING MAMMOGRAM FOR MALIGNANT NEOPLASM OF BREAST: Primary | ICD-10-CM

## 2025-09-03 ENCOUNTER — HOSPITAL ENCOUNTER (OUTPATIENT)
Dept: RADIOLOGY | Facility: HOSPITAL | Age: 65
Discharge: HOME | End: 2025-09-03
Attending: PEDIATRICS
Payer: COMMERCIAL

## 2025-09-03 DIAGNOSIS — Z12.31 ENCOUNTER FOR SCREENING MAMMOGRAM FOR MALIGNANT NEOPLASM OF BREAST: ICD-10-CM

## 2025-09-03 PROCEDURE — 77063 BREAST TOMOSYNTHESIS BI: CPT | Mod: 26,,, | Performed by: STUDENT IN AN ORGANIZED HEALTH CARE EDUCATION/TRAINING PROGRAM

## 2025-09-03 PROCEDURE — 77067 SCR MAMMO BI INCL CAD: CPT | Mod: 26,,, | Performed by: STUDENT IN AN ORGANIZED HEALTH CARE EDUCATION/TRAINING PROGRAM

## 2025-09-03 PROCEDURE — 77063 BREAST TOMOSYNTHESIS BI: CPT | Mod: TC

## (undated) DEVICE — BANDAGE ADHESIVE